# Patient Record
Sex: FEMALE | Race: WHITE | NOT HISPANIC OR LATINO | Employment: OTHER | URBAN - METROPOLITAN AREA
[De-identification: names, ages, dates, MRNs, and addresses within clinical notes are randomized per-mention and may not be internally consistent; named-entity substitution may affect disease eponyms.]

---

## 2018-05-30 ENCOUNTER — HOSPITAL ENCOUNTER (INPATIENT)
Facility: HOSPITAL | Age: 78
LOS: 6 days | Discharge: HOME | End: 2018-06-06
Attending: INTERNAL MEDICINE | Admitting: INTERNAL MEDICINE

## 2018-05-30 ENCOUNTER — APPOINTMENT (OUTPATIENT)
Dept: GENERAL RADIOLOGY | Facility: HOSPITAL | Age: 78
End: 2018-05-30

## 2018-05-30 PROBLEM — Z85.3 HISTORY OF BREAST CANCER: Status: ACTIVE | Noted: 2018-05-30

## 2018-05-30 PROBLEM — D64.9 NORMOCYTIC ANEMIA: Status: ACTIVE | Noted: 2018-05-30

## 2018-05-30 PROBLEM — E87.20 LACTIC ACIDOSIS: Status: ACTIVE | Noted: 2018-05-30

## 2018-05-30 PROBLEM — F03.90 DEMENTIA: Status: ACTIVE | Noted: 2018-05-30

## 2018-05-30 PROBLEM — N17.9 ACUTE RENAL FAILURE: Status: ACTIVE | Noted: 2018-05-30

## 2018-05-30 PROBLEM — I10 ESSENTIAL HYPERTENSION: Status: ACTIVE | Noted: 2018-05-30

## 2018-05-30 LAB
ALBUMIN SERPL-MCNC: 3.4 G/DL (ref 3.2–4.8)
ALBUMIN/GLOB SERPL: 1.4 G/DL (ref 1.5–2.5)
ALP SERPL-CCNC: 46 U/L (ref 25–100)
ALT SERPL W P-5'-P-CCNC: 9 U/L (ref 7–40)
ANION GAP SERPL CALCULATED.3IONS-SCNC: 12 MMOL/L (ref 3–11)
APTT PPP: 25.8 SECONDS (ref 24–31)
AST SERPL-CCNC: 12 U/L (ref 0–33)
BILIRUB SERPL-MCNC: 0.5 MG/DL (ref 0.3–1.2)
BUN BLD-MCNC: 104 MG/DL (ref 9–23)
BUN/CREAT SERPL: 22.6 (ref 7–25)
CALCIUM SPEC-SCNC: 8.3 MG/DL (ref 8.7–10.4)
CHLORIDE SERPL-SCNC: 111 MMOL/L (ref 99–109)
CO2 SERPL-SCNC: 14 MMOL/L (ref 20–31)
CREAT BLD-MCNC: 4.6 MG/DL (ref 0.6–1.3)
D-LACTATE SERPL-SCNC: 0.9 MMOL/L (ref 0.5–2)
DEPRECATED RDW RBC AUTO: 42.3 FL (ref 37–54)
ERYTHROCYTE [DISTWIDTH] IN BLOOD BY AUTOMATED COUNT: 13.7 % (ref 11.3–14.5)
GFR SERPL CREATININE-BSD FRML MDRD: 9 ML/MIN/1.73
GLOBULIN UR ELPH-MCNC: 2.4 GM/DL
GLUCOSE BLD-MCNC: 93 MG/DL (ref 70–100)
HCT VFR BLD AUTO: 30 % (ref 34.5–44)
HGB BLD-MCNC: 10.4 G/DL (ref 11.5–15.5)
INR PPP: 1.02 (ref 0.91–1.09)
MCH RBC QN AUTO: 29.5 PG (ref 27–31)
MCHC RBC AUTO-ENTMCNC: 34.7 G/DL (ref 32–36)
MCV RBC AUTO: 85.2 FL (ref 80–99)
PHOSPHATE SERPL-MCNC: 5.8 MG/DL (ref 2.4–5.1)
PLATELET # BLD AUTO: 173 10*3/MM3 (ref 150–450)
PMV BLD AUTO: 11.1 FL (ref 6–12)
POTASSIUM BLD-SCNC: 3.8 MMOL/L (ref 3.5–5.5)
PROT SERPL-MCNC: 5.8 G/DL (ref 5.7–8.2)
PROTHROMBIN TIME: 10.7 SECONDS (ref 9.6–11.5)
RBC # BLD AUTO: 3.52 10*6/MM3 (ref 3.89–5.14)
SODIUM BLD-SCNC: 137 MMOL/L (ref 132–146)
WBC NRBC COR # BLD: 7.51 10*3/MM3 (ref 3.5–10.8)

## 2018-05-30 PROCEDURE — 25010000002 HEPARIN (PORCINE) PER 1000 UNITS: Performed by: NURSE PRACTITIONER

## 2018-05-30 PROCEDURE — 85610 PROTHROMBIN TIME: CPT | Performed by: INTERNAL MEDICINE

## 2018-05-30 PROCEDURE — 63710000001 DONEPEZIL 10 MG TABLET: Performed by: NURSE PRACTITIONER

## 2018-05-30 PROCEDURE — A9270 NON-COVERED ITEM OR SERVICE: HCPCS | Performed by: NURSE PRACTITIONER

## 2018-05-30 PROCEDURE — 84100 ASSAY OF PHOSPHORUS: CPT | Performed by: INTERNAL MEDICINE

## 2018-05-30 PROCEDURE — 80053 COMPREHEN METABOLIC PANEL: CPT | Performed by: INTERNAL MEDICINE

## 2018-05-30 PROCEDURE — 85730 THROMBOPLASTIN TIME PARTIAL: CPT | Performed by: INTERNAL MEDICINE

## 2018-05-30 PROCEDURE — 71045 X-RAY EXAM CHEST 1 VIEW: CPT

## 2018-05-30 PROCEDURE — 83605 ASSAY OF LACTIC ACID: CPT | Performed by: NURSE PRACTITIONER

## 2018-05-30 PROCEDURE — 99223 1ST HOSP IP/OBS HIGH 75: CPT | Performed by: NURSE PRACTITIONER

## 2018-05-30 PROCEDURE — 85027 COMPLETE CBC AUTOMATED: CPT | Performed by: INTERNAL MEDICINE

## 2018-05-30 RX ORDER — HEPARIN SODIUM 5000 [USP'U]/ML
5000 INJECTION, SOLUTION INTRAVENOUS; SUBCUTANEOUS EVERY 12 HOURS SCHEDULED
Status: DISCONTINUED | OUTPATIENT
Start: 2018-05-30 | End: 2018-06-06 | Stop reason: HOSPADM

## 2018-05-30 RX ORDER — ACETAMINOPHEN 325 MG/1
650 TABLET ORAL EVERY 4 HOURS PRN
Status: DISCONTINUED | OUTPATIENT
Start: 2018-05-30 | End: 2018-06-06 | Stop reason: HOSPADM

## 2018-05-30 RX ORDER — DONEPEZIL HYDROCHLORIDE 10 MG/1
10 TABLET, FILM COATED ORAL NIGHTLY
Status: DISCONTINUED | OUTPATIENT
Start: 2018-05-30 | End: 2018-06-06 | Stop reason: HOSPADM

## 2018-05-30 RX ORDER — ASPIRIN 81 MG/1
81 TABLET ORAL DAILY
Status: DISCONTINUED | OUTPATIENT
Start: 2018-05-31 | End: 2018-06-06 | Stop reason: HOSPADM

## 2018-05-30 RX ORDER — SODIUM CHLORIDE 9 MG/ML
125 INJECTION, SOLUTION INTRAVENOUS CONTINUOUS
Status: DISCONTINUED | OUTPATIENT
Start: 2018-05-30 | End: 2018-06-05

## 2018-05-30 RX ADMIN — DONEPEZIL HYDROCHLORIDE 10 MG: 10 TABLET, FILM COATED ORAL at 21:49

## 2018-05-30 RX ADMIN — SODIUM CHLORIDE 100 ML/HR: 9 INJECTION, SOLUTION INTRAVENOUS at 21:49

## 2018-05-30 RX ADMIN — HEPARIN SODIUM 5000 UNITS: 5000 INJECTION, SOLUTION INTRAVENOUS; SUBCUTANEOUS at 21:49

## 2018-05-31 ENCOUNTER — APPOINTMENT (OUTPATIENT)
Dept: ULTRASOUND IMAGING | Facility: HOSPITAL | Age: 78
End: 2018-05-31

## 2018-05-31 PROBLEM — K52.9 GASTROENTERITIS: Status: ACTIVE | Noted: 2018-05-31

## 2018-05-31 PROBLEM — E07.9 THYROID MASS: Status: ACTIVE | Noted: 2018-05-31

## 2018-05-31 PROBLEM — N17.9 AKI (ACUTE KIDNEY INJURY): Status: ACTIVE | Noted: 2018-05-31

## 2018-05-31 LAB
ALBUMIN SERPL-MCNC: 3.2 G/DL (ref 3.2–4.8)
ALBUMIN/GLOB SERPL: 1.3 G/DL (ref 1.5–2.5)
ALP SERPL-CCNC: 43 U/L (ref 25–100)
ALT SERPL W P-5'-P-CCNC: 9 U/L (ref 7–40)
AMORPH URATE CRY URNS QL MICRO: NORMAL /HPF
ANION GAP SERPL CALCULATED.3IONS-SCNC: 12 MMOL/L (ref 3–11)
AST SERPL-CCNC: 9 U/L (ref 0–33)
BACTERIA UR QL AUTO: NORMAL /HPF
BASOPHILS # BLD AUTO: 0.02 10*3/MM3 (ref 0–0.2)
BASOPHILS NFR BLD AUTO: 0.3 % (ref 0–1)
BILIRUB SERPL-MCNC: 0.5 MG/DL (ref 0.3–1.2)
BILIRUB UR QL STRIP: NEGATIVE
BUN BLD-MCNC: 101 MG/DL (ref 9–23)
BUN/CREAT SERPL: 25.3 (ref 7–25)
CA-I SERPL ISE-MCNC: 1.23 MMOL/L (ref 1.12–1.32)
CALCIUM SPEC-SCNC: 8.1 MG/DL (ref 8.7–10.4)
CHLORIDE SERPL-SCNC: 114 MMOL/L (ref 99–109)
CLARITY UR: CLEAR
CO2 SERPL-SCNC: 12 MMOL/L (ref 20–31)
COLOR UR: YELLOW
CREAT BLD-MCNC: 4 MG/DL (ref 0.6–1.3)
CREAT UR-MCNC: 102.6 MG/DL
DEPRECATED RDW RBC AUTO: 42.2 FL (ref 37–54)
EOSINOPHIL # BLD AUTO: 0.15 10*3/MM3 (ref 0–0.3)
EOSINOPHIL NFR BLD AUTO: 2.1 % (ref 0–3)
EOSINOPHIL SPEC QL MICRO: 0 % EOS/100 CELLS (ref 0–0)
ERYTHROCYTE [DISTWIDTH] IN BLOOD BY AUTOMATED COUNT: 13.8 % (ref 11.3–14.5)
FERRITIN SERPL-MCNC: 400 NG/ML (ref 10–291)
FOLATE SERPL-MCNC: 6.14 NG/ML (ref 3.2–20)
GFR SERPL CREATININE-BSD FRML MDRD: 11 ML/MIN/1.73
GLOBULIN UR ELPH-MCNC: 2.4 GM/DL
GLUCOSE BLD-MCNC: 99 MG/DL (ref 70–100)
GLUCOSE UR STRIP-MCNC: NEGATIVE MG/DL
HCT VFR BLD AUTO: 29.7 % (ref 34.5–44)
HGB BLD-MCNC: 10.2 G/DL (ref 11.5–15.5)
HGB UR QL STRIP.AUTO: ABNORMAL
HYALINE CASTS UR QL AUTO: NORMAL /LPF
IMM GRANULOCYTES # BLD: 0.02 10*3/MM3 (ref 0–0.03)
IMM GRANULOCYTES NFR BLD: 0.3 % (ref 0–0.6)
IRON 24H UR-MRATE: 62 MCG/DL (ref 50–175)
IRON SATN MFR SERPL: 30 % (ref 15–50)
KETONES UR QL STRIP: NEGATIVE
LEUKOCYTE ESTERASE UR QL STRIP.AUTO: NEGATIVE
LYMPHOCYTES # BLD AUTO: 1.07 10*3/MM3 (ref 0.6–4.8)
LYMPHOCYTES NFR BLD AUTO: 14.8 % (ref 24–44)
MCH RBC QN AUTO: 29.2 PG (ref 27–31)
MCHC RBC AUTO-ENTMCNC: 34.3 G/DL (ref 32–36)
MCV RBC AUTO: 85.1 FL (ref 80–99)
MONOCYTES # BLD AUTO: 0.6 10*3/MM3 (ref 0–1)
MONOCYTES NFR BLD AUTO: 8.3 % (ref 0–12)
NEUTROPHILS # BLD AUTO: 5.39 10*3/MM3 (ref 1.5–8.3)
NEUTROPHILS NFR BLD AUTO: 74.5 % (ref 41–71)
NITRITE UR QL STRIP: NEGATIVE
PH UR STRIP.AUTO: <=5 [PH] (ref 5–8)
PLATELET # BLD AUTO: 188 10*3/MM3 (ref 150–450)
PMV BLD AUTO: 12 FL (ref 6–12)
POTASSIUM BLD-SCNC: 3.7 MMOL/L (ref 3.5–5.5)
PROT SERPL-MCNC: 5.6 G/DL (ref 5.7–8.2)
PROT UR QL STRIP: NEGATIVE
RBC # BLD AUTO: 3.49 10*6/MM3 (ref 3.89–5.14)
RBC # UR: NORMAL /HPF
REF LAB TEST METHOD: NORMAL
RETICS/RBC NFR AUTO: 1.33 % (ref 0.5–1.5)
SODIUM BLD-SCNC: 138 MMOL/L (ref 132–146)
SODIUM UR-SCNC: <10 MMOL/L (ref 30–90)
SP GR UR STRIP: 1.01 (ref 1–1.03)
SQUAMOUS #/AREA URNS HPF: NORMAL /HPF
TIBC SERPL-MCNC: 210 MCG/DL (ref 250–450)
TSH SERPL DL<=0.05 MIU/L-ACNC: 1.27 MIU/ML (ref 0.35–5.35)
UROBILINOGEN UR QL STRIP: ABNORMAL
VIT B12 BLD-MCNC: 353 PG/ML (ref 211–911)
WBC NRBC COR # BLD: 7.23 10*3/MM3 (ref 3.5–10.8)
WBC UR QL AUTO: NORMAL /HPF

## 2018-05-31 PROCEDURE — 83540 ASSAY OF IRON: CPT | Performed by: NURSE PRACTITIONER

## 2018-05-31 PROCEDURE — 85025 COMPLETE CBC W/AUTO DIFF WBC: CPT | Performed by: NURSE PRACTITIONER

## 2018-05-31 PROCEDURE — 83550 IRON BINDING TEST: CPT | Performed by: NURSE PRACTITIONER

## 2018-05-31 PROCEDURE — 76775 US EXAM ABDO BACK WALL LIM: CPT

## 2018-05-31 PROCEDURE — 87205 SMEAR GRAM STAIN: CPT | Performed by: INTERNAL MEDICINE

## 2018-05-31 PROCEDURE — 99233 SBSQ HOSP IP/OBS HIGH 50: CPT | Performed by: FAMILY MEDICINE

## 2018-05-31 PROCEDURE — 82607 VITAMIN B-12: CPT | Performed by: NURSE PRACTITIONER

## 2018-05-31 PROCEDURE — 84443 ASSAY THYROID STIM HORMONE: CPT | Performed by: NURSE PRACTITIONER

## 2018-05-31 PROCEDURE — 81001 URINALYSIS AUTO W/SCOPE: CPT | Performed by: INTERNAL MEDICINE

## 2018-05-31 PROCEDURE — 85045 AUTOMATED RETICULOCYTE COUNT: CPT | Performed by: NURSE PRACTITIONER

## 2018-05-31 PROCEDURE — 25010000002 HEPARIN (PORCINE) PER 1000 UNITS: Performed by: NURSE PRACTITIONER

## 2018-05-31 PROCEDURE — 82728 ASSAY OF FERRITIN: CPT | Performed by: NURSE PRACTITIONER

## 2018-05-31 PROCEDURE — 84165 PROTEIN E-PHORESIS SERUM: CPT | Performed by: NURSE PRACTITIONER

## 2018-05-31 PROCEDURE — 76536 US EXAM OF HEAD AND NECK: CPT

## 2018-05-31 PROCEDURE — 25010000002 ONDANSETRON PER 1 MG: Performed by: NURSE PRACTITIONER

## 2018-05-31 PROCEDURE — 82746 ASSAY OF FOLIC ACID SERUM: CPT | Performed by: NURSE PRACTITIONER

## 2018-05-31 PROCEDURE — 82330 ASSAY OF CALCIUM: CPT | Performed by: NURSE PRACTITIONER

## 2018-05-31 PROCEDURE — 80053 COMPREHEN METABOLIC PANEL: CPT | Performed by: NURSE PRACTITIONER

## 2018-05-31 PROCEDURE — 84300 ASSAY OF URINE SODIUM: CPT | Performed by: INTERNAL MEDICINE

## 2018-05-31 PROCEDURE — P9612 CATHETERIZE FOR URINE SPEC: HCPCS

## 2018-05-31 PROCEDURE — 82570 ASSAY OF URINE CREATININE: CPT | Performed by: INTERNAL MEDICINE

## 2018-05-31 RX ORDER — ONDANSETRON 2 MG/ML
4 INJECTION INTRAMUSCULAR; INTRAVENOUS EVERY 6 HOURS PRN
Status: DISCONTINUED | OUTPATIENT
Start: 2018-05-31 | End: 2018-06-03

## 2018-05-31 RX ADMIN — ASPIRIN 81 MG: 81 TABLET, COATED ORAL at 09:08

## 2018-05-31 RX ADMIN — DONEPEZIL HYDROCHLORIDE 10 MG: 10 TABLET, FILM COATED ORAL at 23:33

## 2018-05-31 RX ADMIN — SODIUM CHLORIDE 125 ML/HR: 9 INJECTION, SOLUTION INTRAVENOUS at 19:28

## 2018-05-31 RX ADMIN — ONDANSETRON 4 MG: 2 INJECTION, SOLUTION INTRAMUSCULAR; INTRAVENOUS at 15:28

## 2018-05-31 RX ADMIN — ONDANSETRON 4 MG: 2 INJECTION, SOLUTION INTRAMUSCULAR; INTRAVENOUS at 02:00

## 2018-05-31 RX ADMIN — SODIUM CHLORIDE 500 ML: 9 INJECTION, SOLUTION INTRAVENOUS at 02:00

## 2018-05-31 RX ADMIN — HEPARIN SODIUM 5000 UNITS: 5000 INJECTION, SOLUTION INTRAVENOUS; SUBCUTANEOUS at 22:00

## 2018-05-31 RX ADMIN — SODIUM CHLORIDE 125 ML/HR: 9 INJECTION, SOLUTION INTRAVENOUS at 11:55

## 2018-05-31 RX ADMIN — HEPARIN SODIUM 5000 UNITS: 5000 INJECTION, SOLUTION INTRAVENOUS; SUBCUTANEOUS at 09:08

## 2018-06-01 LAB
ALBUMIN SERPL-MCNC: 2.9 G/DL (ref 2.9–4.4)
ALBUMIN SERPL-MCNC: 3 G/DL (ref 3.2–4.8)
ALBUMIN/GLOB SERPL: 1.3 {RATIO} (ref 0.7–1.7)
ALPHA1 GLOB FLD ELPH-MCNC: 0.2 G/DL (ref 0–0.4)
ALPHA2 GLOB SERPL ELPH-MCNC: 0.6 G/DL (ref 0.4–1)
ANION GAP SERPL CALCULATED.3IONS-SCNC: 6 MMOL/L (ref 3–11)
B-GLOBULIN SERPL ELPH-MCNC: 0.7 G/DL (ref 0.7–1.3)
BASOPHILS # BLD AUTO: 0.02 10*3/MM3 (ref 0–0.2)
BASOPHILS NFR BLD AUTO: 0.3 % (ref 0–1)
BUN BLD-MCNC: 76 MG/DL (ref 9–23)
BUN/CREAT SERPL: 27.1 (ref 7–25)
CALCIUM SPEC-SCNC: 8.1 MG/DL (ref 8.7–10.4)
CHLORIDE SERPL-SCNC: 120 MMOL/L (ref 99–109)
CO2 SERPL-SCNC: 16 MMOL/L (ref 20–31)
CREAT BLD-MCNC: 2.8 MG/DL (ref 0.6–1.3)
CREAT UR-MCNC: 74.7 MG/DL
DEPRECATED RDW RBC AUTO: 43.6 FL (ref 37–54)
EOSINOPHIL # BLD AUTO: 0.12 10*3/MM3 (ref 0–0.3)
EOSINOPHIL NFR BLD AUTO: 1.6 % (ref 0–3)
ERYTHROCYTE [DISTWIDTH] IN BLOOD BY AUTOMATED COUNT: 14 % (ref 11.3–14.5)
GAMMA GLOB SERPL ELPH-MCNC: 0.9 G/DL (ref 0.4–1.8)
GFR SERPL CREATININE-BSD FRML MDRD: 16 ML/MIN/1.73
GLOBULIN SER CALC-MCNC: 2.3 G/DL (ref 2.2–3.9)
GLUCOSE BLD-MCNC: 101 MG/DL (ref 70–100)
HCT VFR BLD AUTO: 28.1 % (ref 34.5–44)
HGB BLD-MCNC: 9.7 G/DL (ref 11.5–15.5)
IMM GRANULOCYTES # BLD: 0.03 10*3/MM3 (ref 0–0.03)
IMM GRANULOCYTES NFR BLD: 0.4 % (ref 0–0.6)
LYMPHOCYTES # BLD AUTO: 0.99 10*3/MM3 (ref 0.6–4.8)
LYMPHOCYTES NFR BLD AUTO: 12.9 % (ref 24–44)
Lab: ABNORMAL
M-SPIKE: ABNORMAL G/DL
MCH RBC QN AUTO: 29.6 PG (ref 27–31)
MCHC RBC AUTO-ENTMCNC: 34.5 G/DL (ref 32–36)
MCV RBC AUTO: 85.7 FL (ref 80–99)
MONOCYTES # BLD AUTO: 0.52 10*3/MM3 (ref 0–1)
MONOCYTES NFR BLD AUTO: 6.8 % (ref 0–12)
NEUTROPHILS # BLD AUTO: 6 10*3/MM3 (ref 1.5–8.3)
NEUTROPHILS NFR BLD AUTO: 78.4 % (ref 41–71)
PHOSPHATE SERPL-MCNC: 4.5 MG/DL (ref 2.4–5.1)
PLATELET # BLD AUTO: 170 10*3/MM3 (ref 150–450)
PMV BLD AUTO: 11.4 FL (ref 6–12)
POTASSIUM BLD-SCNC: 3.6 MMOL/L (ref 3.5–5.5)
PROT PATTERN SERPL ELPH-IMP: ABNORMAL
PROT SERPL-MCNC: 5.2 G/DL (ref 6–8.5)
PROT UR-MCNC: 13 MG/DL (ref 1–14)
RBC # BLD AUTO: 3.28 10*6/MM3 (ref 3.89–5.14)
SODIUM BLD-SCNC: 142 MMOL/L (ref 132–146)
WBC NRBC COR # BLD: 7.65 10*3/MM3 (ref 3.5–10.8)

## 2018-06-01 PROCEDURE — 82570 ASSAY OF URINE CREATININE: CPT | Performed by: INTERNAL MEDICINE

## 2018-06-01 PROCEDURE — 80069 RENAL FUNCTION PANEL: CPT | Performed by: FAMILY MEDICINE

## 2018-06-01 PROCEDURE — 99232 SBSQ HOSP IP/OBS MODERATE 35: CPT | Performed by: FAMILY MEDICINE

## 2018-06-01 PROCEDURE — 84156 ASSAY OF PROTEIN URINE: CPT | Performed by: INTERNAL MEDICINE

## 2018-06-01 PROCEDURE — 85025 COMPLETE CBC W/AUTO DIFF WBC: CPT | Performed by: INTERNAL MEDICINE

## 2018-06-01 PROCEDURE — 25010000002 ONDANSETRON PER 1 MG: Performed by: NURSE PRACTITIONER

## 2018-06-01 PROCEDURE — 25010000002 HEPARIN (PORCINE) PER 1000 UNITS: Performed by: NURSE PRACTITIONER

## 2018-06-01 RX ADMIN — SODIUM CHLORIDE 125 ML/HR: 9 INJECTION, SOLUTION INTRAVENOUS at 18:46

## 2018-06-01 RX ADMIN — ONDANSETRON 4 MG: 2 INJECTION, SOLUTION INTRAMUSCULAR; INTRAVENOUS at 03:46

## 2018-06-01 RX ADMIN — ASPIRIN 81 MG: 81 TABLET, COATED ORAL at 09:02

## 2018-06-01 RX ADMIN — HEPARIN SODIUM 5000 UNITS: 5000 INJECTION, SOLUTION INTRAVENOUS; SUBCUTANEOUS at 09:02

## 2018-06-01 RX ADMIN — HEPARIN SODIUM 5000 UNITS: 5000 INJECTION, SOLUTION INTRAVENOUS; SUBCUTANEOUS at 20:20

## 2018-06-01 RX ADMIN — ONDANSETRON 4 MG: 2 INJECTION, SOLUTION INTRAMUSCULAR; INTRAVENOUS at 20:20

## 2018-06-01 RX ADMIN — DONEPEZIL HYDROCHLORIDE 10 MG: 10 TABLET, FILM COATED ORAL at 20:21

## 2018-06-01 NOTE — PROGRESS NOTES
"    [facility] Medicine Services  PROGRESS NOTE    Patient Name: Shantelle Decker  : 1940  MRN: [id number]    Date of Admission: 2018  Length of Stay: 1  Primary Care Physician: Raghavendra Mei MD    Subjective   Subjective     CC:  ARF    HPI:  C/o N/V few episodes overnight, \"something I ate\".  Denies chronic problems with N/V.  Today feels fine, up to chair, drinking soup without problems.      Review of Systems  Otherwise ROS is negative except as mentioned in the HPI.    Objective   Objective     Vital Signs:   Temp:  [97 °F (36.1 °C)-98.4 °F (36.9 °C)] 98.4 °F (36.9 °C)  Heart Rate:  [59-85] 64  Resp:  [15-16] 15  BP: ()/(46-68) 91/54        Physical Exam:  Constitutional: No acute distress, awake, alert, nontoxic, mildly obese body habitus  Respiratory: Clear to auscultation bilaterally, good effort, nonlabored respirations   Cardiovascular: RRR, no murmur  Gastrointestinal: Positive bowel sounds, soft, nontender, nondistended  Musculoskeletal: No peripheral edema, normal muscle tone for age  Psychiatric: Appropriate affect, limited insight and judgement, cooperative      Results Reviewed:  I have personally reviewed current lab, radiology, and data and agree.      Results from last 7 days  Lab Units 18  0742 18   WBC 10*3/mm3 7.65 7.23 7.51   HEMOGLOBIN g/dL 9.7* 10.2* 10.4*   HEMATOCRIT % 28.1* 29.7* 30.0*   PLATELETS 10*3/mm3 170 188 173   INR   --   --  1.02       Results from last 7 days  Lab Units 18  0742 18   SODIUM mmol/L 142 138 137   POTASSIUM mmol/L 3.6 3.7 3.8   CHLORIDE mmol/L 120* 114* 111*   CO2 mmol/L 16.0* 12.0* 14.0*   BUN mg/dL 76* 101* 104*   CREATININE mg/dL 2.80* 4.00* 4.60*   GLUCOSE mg/dL 101* 99 93   CALCIUM mg/dL 8.1* 8.1* 8.3*   ALT (SGPT) U/L  --  9 9   AST (SGOT) U/L  --  9 12     Estimated Creatinine Clearance: 16.4 mL/min (A) (by C-G formula based on SCr of 2.8 mg/dL " (H)).  No results found for: BNP  No results found for: PHART    Microbiology Results Abnormal     Procedure Component Value - Date/Time    Eosinophil Smear - Urine, Urine, Clean Catch [[id number]]  (Normal) Collected:  05/31/18 0155    Lab Status:  Final result Specimen:  Urine from Urine, Catheter In/Out Updated:  05/31/18 0309     Eosinophil Smear 0 % EOS/100 Cells     Narrative:       No eosinophil seen          Imaging Results (last 24 hours)     Procedure Component Value Units Date/Time    US Thyroid [[id number]] Collected:  05/31/18 1323     Updated:  05/31/18 1323    Narrative:       EXAMINATION: US THYROID- 05/31/2018     INDICATION: bilateral thyroid masses on CT of chest; thyroid mass      TECHNIQUE: Sonographic imaging was obtained of the thyroid in both the  sagittal and transverse planes.     COMPARISON: NONE     FINDINGS: The isthmus is within normal limits or mildly thickened at 6  mm. No underlying mass or lesions identified.     The right lobe of the thyroid measures 4.5 x 2.2 x 3.3 cm. There is  enlargement of the right lobe of the thyroid the largest dominant nodule  measuring 3.8 x 2.1 x 2.9 cm. Minimal flow seen within the nodule.     The left lobe of the thyroid measures 4.0 x 1.8 x 1.2 cm. There is a  smaller nodule seen within the left lobe of the thyroid measuring 2.5 x  1.3 x 1.4 cm.       Impression:       Enlargement of the thyroid with dominant nodules identified  within the thyroid bilaterally. Continued follow-up in 6 months is  recommended for stability.      D:  05/31/2018  E:  05/31/2018       US Renal Limited [[id number]] Collected:  05/31/18 1315     Updated:  05/31/18 1315    Narrative:       EXAMINATION: US RENAL LIMITED- 05/31/2018     INDICATION: acute renal failure; renal insufficiency     TECHNIQUE: Sonographic imaging was obtained of the kidneys in both the  sagittal and transverse planes.     COMPARISON: NONE     FINDINGS: The right kidney measures in length from pole to pole  10.8 cm.  No solid cortical mass or renal cortical cysts seen within the right  kidney. No hydronephrosis or nephrolithiasis.     The left kidney measures in length from pole to pole 9.7 cm. No solid  cortical mass or renal cortical cysts seen within the left kidney. No  hydronephrosis or nephrolithiasis. The bladder is grossly unremarkable  in appearance.       Impression:       Unremarkable bilateral renal ultrasound.      D:  05/31/2018  E:  05/31/2018                I have reviewed the medications.    Assessment/Plan   Assessment / Plan     Hospital Problem List     * (Principal)Acute renal failure    Coronary artery disease without angina pectoris    Lactic acidosis    Normocytic anemia    Dementia    Essential hypertension    History of breast cancer    Gastroenteritis    Thyroid mass    LIANNA (acute kidney injury)             Brief Hospital Course to date:  Shantelle Decker is a 78 y.o. female with a past medical history significant for CAD, essential hypertension, breast cancer and dementia presented initially to [facility] due to abnormal creatinine level.  Patient was seen by PCP about a week ago due to weakness, nausea, vomiting, diarrhea and fatigue likey related to viral illness as other family was also sick at that time with same.  She had lab work drawn at PCP office, since then PCP had been trying to get a hold of the patient since last week due to creatinine level of 4 but was unsuccessful. On 5/30/18 patient presented back to PCP for follow up and patient was sent to the ED for her abnormal lab findings. Evaluation at [facility] included creatinine 5.8, CT chest abdomen and pelvis unremarkable except thyroid mass, elevated lactate at 2.2.  She was given fluids and transferred to [facility] for of renal consultation.    Assessment & Plan:    - continue IVF's as dehydration and ATN of ongoing ACEI use while dehydrated suspected -->  Creatinine responding nicely.   Will d/w Nephro but if creatinine continues to rapidly improve likely could be d/c appropriate in next 24-48hr and remain off ACEI.  - Nephrology consult follows  - am renal function panel  - U/S thyroid with enlarged nodules and recc'd repeat imaging 6 months, TSH normal    DVT Prophylaxis:  SQ hep    CODE STATUS: Full Code    Disposition: I expect the patient to be discharged possible tomorrow pending her creatinine level.      Electronically signed by Nancy Jamil MD, 06/01/18, 10:26 AM.

## 2018-06-01 NOTE — PLAN OF CARE
Problem: Patient Care Overview  Goal: Plan of Care Review  Outcome: Ongoing (interventions implemented as appropriate)   06/01/18 1647   Coping/Psychosocial   Plan of Care Reviewed With patient;spouse   OTHER   Outcome Summary Diarrhea not noted, no vomitting. IVF maintained. Patient is cooperative.   Plan of Care Review   Progress improving     Goal: Individualization and Mutuality  Outcome: Ongoing (interventions implemented as appropriate)   06/01/18 1647   Individualization   Patient Specific Goals (Include Timeframe) Improve nutritional and fluid intake.     Goal: Discharge Needs Assessment  Outcome: Ongoing (interventions implemented as appropriate)   06/01/18 1647   Discharge Needs Assessment   Equipment Needed After Discharge walker, rolling

## 2018-06-01 NOTE — PROGRESS NOTES
Continued Stay Note  [facility]     Patient Name: Shantelle Decker  MRN: [id number]  Today's Date: 6/1/2018    Admit Date: 5/30/2018          Discharge Plan     Row Name 06/01/18 1300       Plan    Plan Return home with spouse    Patient/Family in Agreement with Plan yes    Plan Comments Met with patient and  at bedside. They are hoping patient will be discharged Sunday. PT consult still pending, but  states patient has been up walking with the nurse and back and forth to the bathroom. Currently sitting in chair eating lunch. They deny need for home health or inpatient rehab at this time. CM will continue to follow. Gaby North RN [phone]    Final Discharge Disposition Code 01 - home or self-care              Discharge Codes    No documentation.       Expected Discharge Date and Time     Expected Discharge Date Expected Discharge Time    Jun 2, 2018             Gaby North RN

## 2018-06-01 NOTE — PROGRESS NOTES
"   LOS: 1 day    Patient Care Team:  Raghavendra Mei MD as PCP - General (Family Medicine)    Reason For Visit:  F/U LIANNA  Subjective           Review of Systems:    Pulm: No soa   CV:  No CP      Objective       aspirin 81 mg Oral Daily   donepezil 10 mg Oral Nightly   heparin (porcine) 5,000 Units Subcutaneous Q12H       sodium chloride 125 mL/hr Last Rate: 125 mL/hr (05/31/18 1928)         Vital Signs:  Blood pressure 95/53, pulse 65, temperature 97.7 °F (36.5 °C), temperature source Oral, resp. rate 14, height 165.1 cm (65\"), weight 77 kg (169 lb 12.1 oz), SpO2 100 %.    Flowsheet Rows      First Filed Value   Admission Height  165.1 cm (65\") Documented at 05/31/2018 1810   Admission Weight  77 kg (169 lb 12.1 oz) Documented at 05/31/2018 1810 05/31 0701 - 06/01 0700  In: 1920 [P.O.:120; I.V.:1800]  Out: 350 [Urine:350]    Physical Exam:    General Appearance: NAD, alert and cooperative, Ox3  Eyes: PER, conjunctivae and sclerae normal, no icterus  Lungs: respirations regular and unlabored, no crepitus, clear to auscultation  Heart/CV: regular rhythm & normal rate, no murmur, no gallop, no rub and no edema  Abdomen: not distended, soft, non-tender, no masses,  bowel sounds present  Skin: No rash, Warm and dry    Radiology:            Labs: P/C RATIO 174.    Results from last 7 days  Lab Units 06/01/18  0742 05/31/18 0450 05/30/18 2029   WBC 10*3/mm3 7.65 7.23 7.51   HEMOGLOBIN g/dL 9.7* 10.2* 10.4*   HEMATOCRIT % 28.1* 29.7* 30.0*   PLATELETS 10*3/mm3 170 188 173       Results from last 7 days  Lab Units 06/01/18  0742 05/31/18 0450 05/30/18 2029   SODIUM mmol/L 142 138 137   POTASSIUM mmol/L 3.6 3.7 3.8   CHLORIDE mmol/L 120* 114* 111*   CO2 mmol/L 16.0* 12.0* 14.0*   BUN mg/dL 76* 101* 104*   CREATININE mg/dL 2.80* 4.00* 4.60*   CALCIUM mg/dL 8.1* 8.1* 8.3*   PHOSPHORUS mg/dL 4.5  --  5.8*   ALBUMIN g/dL 3.00* 3.20 3.40       Results from last 7 days  Lab Units 06/01/18  0742   GLUCOSE mg/dL 101* "         Results from last 7 days  Lab Units 05/31/18  0450   ALK PHOS U/L 43   BILIRUBIN mg/dL 0.5   ALT (SGPT) U/L 9   AST (SGOT) U/L 9             Results from last 7 days  Lab Units 05/31/18  0155   COLOR UA  Yellow   CLARITY UA  Clear   PH, URINE  <=5.0   SPECIFIC GRAVITY, URINE  1.013   GLUCOSE UA  Negative   KETONES UA  Negative   BILIRUBIN UA  Negative   PROTEIN UA  Negative   BLOOD UA  Trace*   LEUKOCYTES UA  Negative   NITRITE UA  Negative       Estimated Creatinine Clearance: 16.4 mL/min (A) (by C-G formula based on SCr of 2.8 mg/dL (H)).      Assessment     Principal Problem:    Acute renal failure  Active Problems:    Coronary artery disease without angina pectoris    Lactic acidosis    Normocytic anemia    Dementia    Essential hypertension    History of breast cancer    Gastroenteritis    Thyroid mass    LIANNA (acute kidney injury)            Impression: LIANNA LIKELY FROM VOLUME DEPLETION AND ACE EFFECT. GFR IS IMPROVING.            Recommendations: PRESENT IVF'S. WOULD KEEP OFF ACE.      Curt Winkler MD  06/01/18  12:08 PM

## 2018-06-01 NOTE — PLAN OF CARE
Problem: Skin Injury Risk (Adult)  Goal: Identify Related Risk Factors and Signs and Symptoms  Outcome: Ongoing (interventions implemented as appropriate)   06/01/18 0334   Skin Injury Risk (Adult)   Related Risk Factors (Skin Injury Risk) advanced age;fluid intake inadequate;mobility impaired;nutritional deficiencies     Goal: Skin Health and Integrity  Outcome: Ongoing (interventions implemented as appropriate)   06/01/18 0334   Skin Injury Risk (Adult)   Skin Health and Integrity making progress toward outcome

## 2018-06-02 LAB
ALBUMIN SERPL-MCNC: 2.7 G/DL (ref 3.2–4.8)
ANION GAP SERPL CALCULATED.3IONS-SCNC: 7 MMOL/L (ref 3–11)
BUN BLD-MCNC: 59 MG/DL (ref 9–23)
BUN/CREAT SERPL: 25.7 (ref 7–25)
C DIFF TOX GENS STL QL NAA+PROBE: NOT DETECTED
CALCIUM SPEC-SCNC: 7.4 MG/DL (ref 8.7–10.4)
CHLORIDE SERPL-SCNC: 121 MMOL/L (ref 99–109)
CO2 SERPL-SCNC: 16 MMOL/L (ref 20–31)
CREAT BLD-MCNC: 2.3 MG/DL (ref 0.6–1.3)
GFR SERPL CREATININE-BSD FRML MDRD: 20 ML/MIN/1.73
GLUCOSE BLD-MCNC: 89 MG/DL (ref 70–100)
PHOSPHATE SERPL-MCNC: 3.5 MG/DL (ref 2.4–5.1)
POTASSIUM BLD-SCNC: 4.4 MMOL/L (ref 3.5–5.5)
SODIUM BLD-SCNC: 144 MMOL/L (ref 132–146)

## 2018-06-02 PROCEDURE — 80069 RENAL FUNCTION PANEL: CPT | Performed by: FAMILY MEDICINE

## 2018-06-02 PROCEDURE — 87493 C DIFF AMPLIFIED PROBE: CPT | Performed by: NURSE PRACTITIONER

## 2018-06-02 PROCEDURE — 99232 SBSQ HOSP IP/OBS MODERATE 35: CPT | Performed by: NURSE PRACTITIONER

## 2018-06-02 PROCEDURE — 97161 PT EVAL LOW COMPLEX 20 MIN: CPT

## 2018-06-02 PROCEDURE — 25010000002 ONDANSETRON PER 1 MG: Performed by: NURSE PRACTITIONER

## 2018-06-02 PROCEDURE — 25010000002 HEPARIN (PORCINE) PER 1000 UNITS: Performed by: NURSE PRACTITIONER

## 2018-06-02 PROCEDURE — 97116 GAIT TRAINING THERAPY: CPT

## 2018-06-02 RX ADMIN — HEPARIN SODIUM 5000 UNITS: 5000 INJECTION, SOLUTION INTRAVENOUS; SUBCUTANEOUS at 20:32

## 2018-06-02 RX ADMIN — HEPARIN SODIUM 5000 UNITS: 5000 INJECTION, SOLUTION INTRAVENOUS; SUBCUTANEOUS at 08:26

## 2018-06-02 RX ADMIN — SODIUM CHLORIDE 1000 ML: 9 INJECTION, SOLUTION INTRAVENOUS at 04:36

## 2018-06-02 RX ADMIN — ACETAMINOPHEN 650 MG: 325 TABLET ORAL at 20:32

## 2018-06-02 RX ADMIN — DONEPEZIL HYDROCHLORIDE 10 MG: 10 TABLET, FILM COATED ORAL at 20:32

## 2018-06-02 RX ADMIN — ASPIRIN 81 MG: 81 TABLET, COATED ORAL at 08:26

## 2018-06-02 RX ADMIN — ONDANSETRON 4 MG: 2 INJECTION, SOLUTION INTRAMUSCULAR; INTRAVENOUS at 14:04

## 2018-06-02 RX ADMIN — SODIUM CHLORIDE 125 ML/HR: 9 INJECTION, SOLUTION INTRAVENOUS at 08:26

## 2018-06-02 RX ADMIN — ONDANSETRON 4 MG: 2 INJECTION, SOLUTION INTRAMUSCULAR; INTRAVENOUS at 03:26

## 2018-06-02 RX ADMIN — ONDANSETRON 4 MG: 2 INJECTION, SOLUTION INTRAMUSCULAR; INTRAVENOUS at 20:41

## 2018-06-02 NOTE — PLAN OF CARE
Problem: Fall Risk (Adult)  Goal: Identify Related Risk Factors and Signs and Symptoms  Outcome: Ongoing (interventions implemented as appropriate)    Goal: Absence of Fall  Outcome: Ongoing (interventions implemented as appropriate)      Problem: Skin Injury Risk (Adult)  Goal: Identify Related Risk Factors and Signs and Symptoms  Outcome: Ongoing (interventions implemented as appropriate)    Goal: Skin Health and Integrity  Outcome: Ongoing (interventions implemented as appropriate)      Problem: Patient Care Overview  Goal: Plan of Care Review  Outcome: Ongoing (interventions implemented as appropriate)   06/02/18 0227   Coping/Psychosocial   Plan of Care Reviewed With patient;spouse   OTHER   Outcome Summary no emesis, no vomiting, minimal c/o nausea, no c/o pain, vss, rested well, pt hopeful that she will be home tomorrow   Plan of Care Review   Progress improving     Goal: Individualization and Mutuality  Outcome: Ongoing (interventions implemented as appropriate)    Goal: Discharge Needs Assessment  Outcome: Ongoing (interventions implemented as appropriate)

## 2018-06-02 NOTE — THERAPY EVALUATION
Acute Care - Physical Therapy Initial Evaluation   [place]     Patient Name: Shantelle Decker  : 1940  MRN: [id number]  Today's Date: 2018   Onset of Illness/Injury or Date of Surgery: 18  Date of Referral to PT: 18  Referring Physician: MD MELBA      Admit Date: 2018    Visit Dx: No diagnosis found.  Patient Active Problem List   Diagnosis   • Incarcerated ventral hernia   • Coronary artery disease without angina pectoris   • Morbid obesity   • Acute renal failure   • Lactic acidosis   • Normocytic anemia   • Dementia   • Essential hypertension   • History of breast cancer   • Gastroenteritis   • Thyroid mass   • LIANNA (acute kidney injury)     Past Medical History:   Diagnosis Date   • Breast cancer    • Coronary artery disease    • Dementia    • Dysrhythmia    • Hypertension      Past Surgical History:   Procedure Laterality Date   • ADENOIDECTOMY     • BREAST SURGERY     • CARDIAC DEFIBRILLATOR PLACEMENT     • EXPLORATORY LAPAROTOMY N/A 2016    Procedure: open ventral hernia repair with mesh;  Surgeon: Bright Buckley MD;  Location: [facility];  Service:    • MASTECTOMY Bilateral    • TONSILLECTOMY          PT ASSESSMENT (last 12 hours)      Physical Therapy Evaluation     Row Name 18 0835          PT Evaluation Time/Intention    Subjective Information no complaints  -CD     Document Type evaluation  -CD     Mode of Treatment physical therapy  -CD     Patient Effort good  -CD     Symptoms Noted During/After Treatment none  -CD     Row Name 18 0835          General Information    Patient Profile Reviewed? yes  -CD     Onset of Illness/Injury or Date of Surgery 18  -CD     Referring Physician MD MELBA  -CD     Patient Observations alert;cooperative;agree to therapy  -CD     Patient/Family Observations  PRESENT AT END OF EVAL.   -CD     General Observations of Patient PT SUPINE UPON ARRIVAL ON RA AND WITH IV.   -CD     Prior Level of Function  "independent:;all household mobility;gait;transfer;bed mobility;min assist:;dressing;bathing;mod assist:;home management  -CD     Equipment Currently Used at Home cane, straight;rollator;shower chair  -CD     Pertinent History of Current Functional Problem PT TO OSH WITH ABNORMAL CREATININE LEVELS AFTER PCP VISIT DUE TO WEAKNSS, N&V, DIARRHEA AND FATIGUE. CT CHEST SHOWED THYROID MASSESS,CT ABDOMIN SHOWED DIVERTICULOSIS. CHEST XRAY NEG. PT WAS TRANSFERRED TO [facility].   -CD     Existing Precautions/Restrictions fall   CONFUSION.   -CD     Equipment Issued to Patient --   LOANER WALKER AND GAIT BELT IN ROOM.   -CD     Risks Reviewed patient:;spouse/S.O.:;LOB;change in vital signs;lines disloged  -CD     Benefits Reviewed patient and family:;improve function;increase independence;increase strength;increase balance;increase knowledge  -CD     Barriers to Rehab none identified  -CD     Row Name 06/02/18 0835          Relationship/Environment    Lives With spouse  -CD     Family Caregiver if Needed spouse  -CD     Row Name 06/02/18 0835          Resource/Environmental Concerns    Current Living Arrangements home/apartment/condo  -CD     Row Name 06/02/18 0835          Cognitive Assessment/Intervention- PT/OT    Orientation Status (Cognition) oriented to;person   UNABLE TO PROVIDE HISTORY RE: EQUIPMENT/PLOF. \"I'M NOT SURE\"  -CD     Follows Commands (Cognition) over 90% accuracy  -CD     Safety Deficit (Cognitive) awareness of need for assistance;insight into deficits/self awareness  -CD     Row Name 06/02/18 0835          Safety Issues, Functional Mobility    Safety Issues Affecting Function (Mobility) insight into deficits/self awareness;awareness of need for assistance  -CD     Impairments Affecting Function (Mobility) balance;strength  -CD     Row Name 06/02/18 0835          Bed Mobility Assessment/Treatment    Bed Mobility Assessment/Treatment supine-sit  -CD     Supine-Sit [place] (Bed Mobility) contact guard  -CD     " Assistive Device (Bed Mobility) bed rails;head of bed elevated  -CD     Row Name 06/02/18 0835          Transfer Assessment/Treatment    Transfer Assessment/Treatment sit-stand transfer;stand-sit transfer  -CD     Comment (Transfers) CUES FOR HAND PLACEMENT.   -CD     Sit-Stand [place] (Transfers) verbal cues;contact guard  -CD     Stand-Sit [place] (Transfers) verbal cues;contact guard  -CD     Row Name 06/02/18 0835          Sit-Stand Transfer    Assistive Device (Sit-Stand Transfers) walker, front-wheeled  -CD     Row Name 06/02/18 0835          Stand-Sit Transfer    Assistive Device (Stand-Sit Transfers) walker, front-wheeled  -CD     Row Name 06/02/18 0835          Gait/Stairs Assessment/Training    Gait/Stairs Assessment/Training gait/ambulation independence;gait/ambulation assistive device;distance ambulated  -CD     [place] Level (Gait) contact guard  -CD     Assistive Device (Gait) walker, front-wheeled  -CD     Distance in Feet (Gait) 240   -CD     Deviations/Abnormal Patterns (Gait) stride length decreased;deyanira decreased  -CD     Bilateral Gait Deviations forward flexed posture  -CD     Row Name 06/02/18 0835          General ROM    GENERAL ROM COMMENTS B LE AROM WFL'S   -CD     Row Name 06/02/18 0835          General Assessment (Manual Muscle Testing)    General Manual Muscle Testing (MMT) Assessment lower extremity strength deficits identified   GROSSLY 4/5 B LE'S WITH FUNCTIONAL MOBILITY.   -CD     Row Name 06/02/18 0835          Pain Assessment    Additional Documentation Pain Scale: Numbers Pre/Post-Treatment (Group)  -CD     Row Name 06/02/18 0835          Pain Scale: Numbers Pre/Post-Treatment    Pain Scale: Numbers, Pretreatment 0/10 - no pain  -CD     Pain Scale: Numbers, Post-Treatment 0/10 - no pain  -CD     Row Name 06/02/18 0835          Plan of Care Review    Plan of Care Reviewed With patient;spouse  -CD     Row Name 06/02/18 0835          Physical Therapy Clinical  Impression    Date of Referral to PT 05/31/18  -CD     PT Diagnosis (PT Clinical Impression) IMPAIRED FUNCTIONAL MOBILITY.   -CD     Patient/Family Goals Statement (PT Clinical Impression) TO GO HOME.  [facility] AS PT IS CLOSE TO BASELINE AND HE IS ABLE TO ASSIST 24/7.   -CD     Criteria for Skilled Interventions Met (PT Clinical Impression) yes  -CD     Rehab Potential (PT Clinical Summary) good, to achieve stated therapy goals  -CD     Care Plan Review (PT) evaluation/treatment results reviewed;care plan/treatment goals reviewed;risks/benefits reviewed;current/potential barriers reviewed;patient/other agree to care plan  -CD     Care Plan Review, Other Participant (PT Clinical Impression) spouse  -CD     Row Name 06/02/18 0835          Vital Signs    Pre Systolic BP Rehab --   VSS. NSG CLEARED FOR TREATMENT.   -CD     Row Name 06/02/18 0835          Physical Therapy Goals    Bed Mobility Goal Selection (PT) bed mobility, PT goal 1  -CD     Transfer Goal Selection (PT) transfer, PT goal 1  -CD     Gait Training Goal Selection (PT) gait training, PT goal 1  -CD     Row Name 06/02/18 0835          Bed Mobility Goal 1 (PT)    Activity/Assistive Device (Bed Mobility Goal 1, PT) bed mobility activities, all  -CD     [place] Level/Cues Needed (Bed Mobility Goal 1, PT) independent  -CD     Time Frame (Bed Mobility Goal 1, PT) long term goal (LTG);2 weeks  -CD     Row Name 06/02/18 0835          Transfer Goal 1 (PT)    Activity/Assistive Device (Transfer Goal 1, PT) transfers, all;cane, straight  -CD     [place] Level/Cues Needed (Transfer Goal 1, PT) independent  -CD     Time Frame (Transfer Goal 1, PT) long term goal (LTG);2 weeks  -CD     Row Name 06/02/18 0835          Gait Training Goal 1 (PT)    Activity/Assistive Device (Gait Training Goal 1, PT) gait (walking locomotion);assistive device use;cane, straight  -CD     [place] Level (Gait Training Goal 1, PT) supervision required  -CD      Distance (Gait Goal 1, PT) 600  -CD     Time Frame (Gait Training Goal 1, PT) long term goal (LTG);2 weeks  -CD     Row Name 06/02/18 0835          Positioning and Restraints    Pre-Treatment Position in bed  -CD     Post Treatment Position chair  -CD     In Chair sitting;call light within reach;notified nsg;exit alarm on;with family/caregiver   WITH BREAKFAST TRAY.   -CD     Row Name 06/02/18 0835          Living Environment    Home Accessibility tub/shower is not walk in   NO STAIRS AT HOME.   -CD       User Key  (r) = Recorded By, (t) = Taken By, (c) = Cosigned By    Initials Name Provider Type    CD Franca Reveles, PT Physical Therapist          Physical Therapy Education     Title: PT OT SLP Therapies (Done)     Topic: Physical Therapy (Done)     Point: Mobility training (Done)    Learning Progress Summary     Learner Status Readiness Method Response Comment Documented by    Patient Done Acceptance E VU,NR BENEFITS OF OOB ACTIVITY, SAFETY WITH MOBILITY, PROGRESSION OF POC, D/C PLANNING.  06/02/18 0937    Significant Other Done Acceptance E VU,NR BENEFITS OF OOB ACTIVITY, SAFETY WITH MOBILITY, PROGRESSION OF POC, D/C PLANNING.  06/02/18 0937          Point: Home exercise program (Done)    Learning Progress Summary     Learner Status Readiness Method Response Comment Documented by    Patient Done Acceptance E VU,NR BENEFITS OF OOB ACTIVITY, SAFETY WITH MOBILITY, PROGRESSION OF POC, D/C PLANNING.  06/02/18 0937    Significant Other Done Acceptance E VU,NR BENEFITS OF OOB ACTIVITY, SAFETY WITH MOBILITY, PROGRESSION OF POC, D/C PLANNING.  06/02/18 0937          Point: Body mechanics (Done)    Learning Progress Summary     Learner Status Readiness Method Response Comment Documented by    Patient Done Acceptance E VU,NR BENEFITS OF OOB ACTIVITY, SAFETY WITH MOBILITY, PROGRESSION OF POC, D/C PLANNING.  06/02/18 0937    Significant Other Done Acceptance E VU,NR BENEFITS OF OOB ACTIVITY, SAFETY WITH MOBILITY,  PROGRESSION OF POC, D/C PLANNING.  06/02/18 0937          Point: Precautions (Done)    Learning Progress Summary     Learner Status Readiness Method Response Comment Documented by    Patient Done Acceptance E VU,NR BENEFITS OF OOB ACTIVITY, SAFETY WITH MOBILITY, PROGRESSION OF POC, D/C PLANNING.  06/02/18 0937    Significant Other Done Acceptance E VU,NR BENEFITS OF OOB ACTIVITY, SAFETY WITH MOBILITY, PROGRESSION OF POC, D/C PLANNING.  06/02/18 0937                      User Key     Initials Effective Dates Name Provider Type Discipline     06/19/15 -  Franca Reveles, PT Physical Therapist PT                PT Recommendation and Plan  Anticipated Discharge Disposition (PT): home with assist  Planned Therapy Interventions (PT Eval): balance training, bed mobility training, gait training, home exercise program, strengthening, transfer training  Outcome Summary/Treatment Plan (PT)  Anticipated Discharge Disposition (PT): home with assist  Plan of Care Reviewed With: patient  Progress: improving  Outcome Summary: PT PRESENTS WITH EVOLVING SYMPTOMS TO INCLUDE GENERALIZED WEAKNESS,  IMPAIRED BALANCE AND DECLINE IN FUNCTIONAL MOBILITY. PT IS LIKELY CLOSE TO BASELINE BUT WILL BENEFIT FROM SHORT COURSE OF INPT P.T. PT IS SAFE TO RETURN HOME WITH HUSBANDS ASSIST.           Outcome Measures     Row Name 06/02/18 0835             How much help from another person do you currently need...    Turning from your back to your side while in flat bed without using bedrails? 4  -CD      Moving from lying on back to sitting on the side of a flat bed without bedrails? 3  -CD      Moving to and from a bed to a chair (including a wheelchair)? 3  -CD      Standing up from a chair using your arms (e.g., wheelchair, bedside chair)? 3  -CD      Climbing 3-5 steps with a railing? 3  -CD      To walk in hospital room? 3  -CD      AM-PAC 6 Clicks Score 19  -CD         Functional Assessment    Outcome Measure Options AM-PAC 6 Clicks Basic  Mobility (PT)  -CD        User Key  (r) = Recorded By, (t) = Taken By, (c) = Cosigned By    Initials Name Provider Type    WM Reveles PT Physical Therapist           Time Calculation:         PT Charges     Row Name 06/02/18 0942             Time Calculation    Start Time 0835  -CD      PT Received On 06/02/18  -      PT Goal Re-Cert Due Date 06/12/18  -         Time Calculation- PT    Total Timed Code Minutes- PT 13 minute(s)  -CD        User Key  (r) = Recorded By, (t) = Taken By, (c) = Cosigned By    Initials Name Provider Type    WM Reveles PT Physical Therapist          Therapy Charges for Today     Code Description Service Date Service Provider Modifiers Qty    04914426481 HC GAIT TRAINING EA 15 MIN 6/2/2018 Franca Reveles, PT GP 1    44771569496 HC PT EVAL LOW COMPLEXITY 4 6/2/2018 Franca Reveles, PT GP 1          PT G-Codes  Outcome Measure Options: AM-PAC 6 Clicks Basic Mobility (PT)      Franca Reveles PT  6/2/2018

## 2018-06-02 NOTE — PROGRESS NOTES
[facility] Medicine Services  PROGRESS NOTE    Patient Name: Shantelle Decker  : 1940  MRN: [id number]    Date of Admission: 2018  Length of Stay: 2  Primary Care Physician: Raghavendra Mei MD    Subjective   Subjective     CC:  F/u ARF    HPI:  Pt is seen sitting up in chair in NAD.  Has a winter hat on.   at bs.  Pt states she had n/v last night but better today.  Ate breakfast.  No abd pain, cp or soa.  + BM.  States she feels better but not going home until tomorrow or Monday (states that what the kidney doctor told her).  No new issues.     Review of Systems   Gen- No fevers, chills  CV- No chest pain, palpitations  Resp- No cough, dyspnea  GI- No N/V/D, abd pain    Otherwise ROS is negative except as mentioned in the HPI.    Objective   Objective     Vital Signs:   Temp:  [97.7 °F (36.5 °C)-98.6 °F (37 °C)] 97.8 °F (36.6 °C)  Heart Rate:  [59-80] 60  Resp:  [15-16] 16  BP: ()/(51-66) 99/66        Physical Exam:  Constitutional: No acute distress, awake, alert.  Sitting up in chair.   at bs.   HENT: NCAT, mucous membranes moist  Respiratory: Clear to auscultation bilaterally, respiratory effort normal.  On RA.    Cardiovascular: RRR, no murmurs, rubs, or gallops, palpable pedal pulses bilaterally  Gastrointestinal: Positive bowel sounds, soft, nontender, nondistended.  Obese  Musculoskeletal: No bilateral ankle edema.  GARCIA spont   Psychiatric: Appropriate affect, cooperative and pleasant.  Limited insight and judgement  Neurologic: Oriented x 3, strength symmetric in all extremities, Cranial Nerves grossly intact to confrontation, speech clear and appropriate.  Follows commands   Skin: No rashes      Results Reviewed:  I have personally reviewed current lab, radiology, and data and agree.      Results from last 7 days  Lab Units 18  0742 18  0450 18   WBC 10*3/mm3 7.65 7.23 7.51   HEMOGLOBIN g/dL 9.7* 10.2* 10.4*   HEMATOCRIT %  28.1* 29.7* 30.0*   PLATELETS 10*3/mm3 170 188 173   INR   --   --  1.02       Results from last 7 days  Lab Units 06/02/18  0727 06/01/18  0742 05/31/18  0450 05/30/18 2029   SODIUM mmol/L 144 142 138 137   POTASSIUM mmol/L 4.4 3.6 3.7 3.8   CHLORIDE mmol/L 121* 120* 114* 111*   CO2 mmol/L 16.0* 16.0* 12.0* 14.0*   BUN mg/dL 59* 76* 101* 104*   CREATININE mg/dL 2.30* 2.80* 4.00* 4.60*   GLUCOSE mg/dL 89 101* 99 93   CALCIUM mg/dL 7.4* 8.1* 8.1* 8.3*   ALT (SGPT) U/L  --   --  9 9   AST (SGOT) U/L  --   --  9 12     Estimated Creatinine Clearance: 20 mL/min (A) (by C-G formula based on SCr of 2.3 mg/dL (H)).  No results found for: BNP  No results found for: PHART    Microbiology Results Abnormal     Procedure Component Value - Date/Time    Eosinophil Smear - Urine, Urine, Clean Catch [[id number]]  (Normal) Collected:  05/31/18 0155    Lab Status:  Final result Specimen:  Urine from Urine, Catheter In/Out Updated:  05/31/18 0309     Eosinophil Smear 0 % EOS/100 Cells     Narrative:       No eosinophil seen          Imaging Results (last 24 hours)     Procedure Component Value Units Date/Time    US Renal Limited [[id number]] Collected:  05/31/18 1315     Updated:  06/01/18 1532    Narrative:       EXAMINATION: US RENAL LIMITED- 05/31/2018     INDICATION: acute renal failure; renal insufficiency     TECHNIQUE: Sonographic imaging was obtained of the kidneys in both the  sagittal and transverse planes.     COMPARISON: NONE     FINDINGS: The right kidney measures in length from pole to pole 10.8 cm.  No solid cortical mass or renal cortical cysts seen within the right  kidney. No hydronephrosis or nephrolithiasis.     The left kidney measures in length from pole to pole 9.7 cm. No solid  cortical mass or renal cortical cysts seen within the left kidney. No  hydronephrosis or nephrolithiasis. The bladder is grossly unremarkable  in appearance.       Impression:       Unremarkable bilateral renal ultrasound.      D:   05/31/2018  E:  05/31/2018     This report was finalized on 6/1/2018 3:30 PM by Dr. Marisela Woods MD.       US Thyroid [[id number]] Collected:  05/31/18 1323     Updated:  06/01/18 1532    Narrative:       EXAMINATION: US THYROID- 05/31/2018     INDICATION: bilateral thyroid masses on CT of chest; thyroid mass      TECHNIQUE: Sonographic imaging was obtained of the thyroid in both the  sagittal and transverse planes.     COMPARISON: NONE     FINDINGS: The isthmus is within normal limits or mildly thickened at 6  mm. No underlying mass or lesions identified.     The right lobe of the thyroid measures 4.5 x 2.2 x 3.3 cm. There is  enlargement of the right lobe of the thyroid the largest dominant nodule  measuring 3.8 x 2.1 x 2.9 cm. Minimal flow seen within the nodule.     The left lobe of the thyroid measures 4.0 x 1.8 x 1.2 cm. There is a  smaller nodule seen within the left lobe of the thyroid measuring 2.5 x  1.3 x 1.4 cm.       Impression:       Enlargement of the thyroid with dominant nodules identified  within the thyroid bilaterally. Continued follow-up in 6 months is  recommended for stability.      D:  05/31/2018  E:  05/31/2018     This report was finalized on 6/1/2018 3:30 PM by Dr. Marisela Woods MD.                I have reviewed the medications.    Assessment/Plan   Assessment / Plan     Hospital Problem List     * (Principal)Acute renal failure    Coronary artery disease without angina pectoris    Lactic acidosis    Normocytic anemia    Dementia    Essential hypertension    History of breast cancer    Gastroenteritis    Thyroid mass    LIANNA (acute kidney injury)             Brief Hospital Course to date:  Shantelle Decker is a 78 y.o. female with a past medical history significant for CAD, essential hypertension, breast cancer and dementia presented initially to [facility] due to abnormal creatinine level.  Patient was seen by PCP about a week ago due to weakness, nausea,  vomiting, diarrhea and fatigue likey related to viral illness as other family was also sick at that time with same.  She had lab work drawn at PCP office, since then PCP had been trying to get a hold of the patient since last week due to creatinine level of 4 but was unsuccessful. On 5/30/18 patient presented back to PCP for follow up and patient was sent to the ED for her abnormal lab findings. Evaluation at [facility] included creatinine 5.8, CT chest abdomen and pelvis unremarkable except thyroid mass, elevated lactate at 2.2.  She was given fluids and transferred to [facility] for of renal consultation.    Assessment & Plan:    - continue IVF's as dehydration and ATN of ongoing ACEI use while dehydrated suspected -->  Creatinine responding nicely.    - Nephro following.  Noted creatinine continues to improve, down from 2.8 yesterday to 2.3 today.  Anticipate if continues to improved may dc home as soon as tomorrow or Monday and will remain off ACEI.  - am renal function panel per neph  - U/S thyroid with enlarged nodules and rec'd repeat imaging 6 months, TSH normal  --BP low end of nl but stable, asymptomatic, monitor    DVT Prophylaxis:  SQ hep    CODE STATUS: Full Code    Disposition: I expect the patient to be discharged possible tomorrow pending her creatinine level and nephrology recs.      Electronically signed by ROSALINO Owen, 06/02/18, 1:47 PM.

## 2018-06-02 NOTE — PROGRESS NOTES
"   LOS: 2 days    Patient Care Team:  Raghavendra Mei MD as PCP - General (Family Medicine)    Reason For Visit:  F/U LIANNA  Subjective           Review of Systems:    Pulm: No soa   CV:  No CP      Objective       aspirin 81 mg Oral Daily   donepezil 10 mg Oral Nightly   heparin (porcine) 5,000 Units Subcutaneous Q12H       sodium chloride 125 mL/hr Last Rate: 125 mL/hr (06/02/18 0826)         Vital Signs:  Blood pressure 99/66, pulse 60, temperature 97.8 °F (36.6 °C), temperature source Oral, resp. rate 16, height 165.1 cm (65\"), weight 77 kg (169 lb 12.1 oz), SpO2 98 %.    Flowsheet Rows      First Filed Value   Admission Height  165.1 cm (65\") Documented at 05/31/2018 1810   Admission Weight  77 kg (169 lb 12.1 oz) Documented at 05/30/2018 2108          06/01 0701 - 06/02 0700  In: 1500 [P.O.:600; I.V.:900]  Out: 200 [Urine:200]    Physical Exam:    General Appearance: NAD, alert and cooperative, Ox3  Eyes: PER, conjunctivae and sclerae normal, no icterus  Lungs: respirations regular and unlabored, no crepitus, clear to auscultation  Heart/CV: regular rhythm & normal rate, no murmur, no gallop, no rub and no edema  Abdomen: not distended, soft, non-tender, no masses,  bowel sounds present  Skin: No rash, Warm and dry    Radiology:            Labs:    Results from last 7 days  Lab Units 06/01/18  0742 05/31/18 0450 05/30/18 2029   WBC 10*3/mm3 7.65 7.23 7.51   HEMOGLOBIN g/dL 9.7* 10.2* 10.4*   HEMATOCRIT % 28.1* 29.7* 30.0*   PLATELETS 10*3/mm3 170 188 173       Results from last 7 days  Lab Units 06/02/18  0727 06/01/18  0742 05/31/18 0450 05/30/18 2029   SODIUM mmol/L 144 142 138 137   POTASSIUM mmol/L 4.4 3.6 3.7 3.8   CHLORIDE mmol/L 121* 120* 114* 111*   CO2 mmol/L 16.0* 16.0* 12.0* 14.0*   BUN mg/dL 59* 76* 101* 104*   CREATININE mg/dL 2.30* 2.80* 4.00* 4.60*   CALCIUM mg/dL 7.4* 8.1* 8.1* 8.3*   PHOSPHORUS mg/dL 3.5 4.5  --  5.8*   ALBUMIN g/dL 2.70* 3.00* 3.20  2.9 3.40       Results from last 7 " days  Lab Units 06/02/18  0727   GLUCOSE mg/dL 89         Results from last 7 days  Lab Units 05/31/18  0450   ALK PHOS U/L 43   BILIRUBIN mg/dL 0.5   ALT (SGPT) U/L 9   AST (SGOT) U/L 9             Results from last 7 days  Lab Units 05/31/18  0155   COLOR UA  Yellow   CLARITY UA  Clear   PH, URINE  <=5.0   SPECIFIC GRAVITY, URINE  1.013   GLUCOSE UA  Negative   KETONES UA  Negative   BILIRUBIN UA  Negative   PROTEIN UA  Negative   BLOOD UA  Trace*   LEUKOCYTES UA  Negative   NITRITE UA  Negative       Estimated Creatinine Clearance: 20 mL/min (A) (by C-G formula based on SCr of 2.3 mg/dL (H)).      Assessment     Principal Problem:    Acute renal failure  Active Problems:    Coronary artery disease without angina pectoris    Lactic acidosis    Normocytic anemia    Dementia    Essential hypertension    History of breast cancer    Gastroenteritis    Thyroid mass    LIANNA (acute kidney injury)            Impression: LIANNA FROM HYPOTENSION, VOLUME DEPLETION AND ACE. GFR IMPROVING. ANEMIA.            Recommendations: LAB AM.      Curt Winkler MD  06/02/18  1:33 PM

## 2018-06-02 NOTE — PLAN OF CARE
Problem: Patient Care Overview  Goal: Plan of Care Review  Outcome: Ongoing (interventions implemented as appropriate)   06/02/18 7685   Coping/Psychosocial   Plan of Care Reviewed With patient   OTHER   Outcome Summary PT PRESENTS WITH EVOLVING SYMPTOMS TO INCLUDE GENERALIZED WEAKNESS, IMPAIRED BALANCE AND DECLINE IN FUNCTIONAL MOBILITY. PT IS LIKELY CLOSE TO BASELINE BUT WILL BENEFIT FROM SHORT COURSE OF P.T. TO IMPROVE BALANCE WITH GAIT AND PROGRESS FROM WALKER TO CANE. PT IS SAFE TO RETURN HOME WITH HUSBANDS ASSIST AT D/C.    Plan of Care Review   Progress improving

## 2018-06-03 LAB
ANION GAP SERPL CALCULATED.3IONS-SCNC: 8 MMOL/L (ref 3–11)
BUN BLD-MCNC: 42 MG/DL (ref 9–23)
BUN/CREAT SERPL: 22.1 (ref 7–25)
CALCIUM SPEC-SCNC: 7.6 MG/DL (ref 8.7–10.4)
CHLORIDE SERPL-SCNC: 120 MMOL/L (ref 99–109)
CO2 SERPL-SCNC: 15 MMOL/L (ref 20–31)
CREAT BLD-MCNC: 1.9 MG/DL (ref 0.6–1.3)
GFR SERPL CREATININE-BSD FRML MDRD: 25 ML/MIN/1.73
GLUCOSE BLD-MCNC: 85 MG/DL (ref 70–100)
POTASSIUM BLD-SCNC: 4.2 MMOL/L (ref 3.5–5.5)
SODIUM BLD-SCNC: 143 MMOL/L (ref 132–146)

## 2018-06-03 PROCEDURE — 80048 BASIC METABOLIC PNL TOTAL CA: CPT | Performed by: INTERNAL MEDICINE

## 2018-06-03 PROCEDURE — 99232 SBSQ HOSP IP/OBS MODERATE 35: CPT | Performed by: NURSE PRACTITIONER

## 2018-06-03 PROCEDURE — 25010000002 HEPARIN (PORCINE) PER 1000 UNITS: Performed by: NURSE PRACTITIONER

## 2018-06-03 RX ORDER — SODIUM BICARBONATE 650 MG/1
650 TABLET ORAL 2 TIMES DAILY
Status: DISCONTINUED | OUTPATIENT
Start: 2018-06-03 | End: 2018-06-05 | Stop reason: SDUPTHER

## 2018-06-03 RX ORDER — ONDANSETRON 4 MG/1
4 TABLET, FILM COATED ORAL EVERY 8 HOURS PRN
Status: DISCONTINUED | OUTPATIENT
Start: 2018-06-03 | End: 2018-06-06 | Stop reason: HOSPADM

## 2018-06-03 RX ADMIN — ONDANSETRON 4 MG: 4 TABLET, FILM COATED ORAL at 22:47

## 2018-06-03 RX ADMIN — SODIUM BICARBONATE 650 MG TABLET 650 MG: at 20:13

## 2018-06-03 RX ADMIN — ONDANSETRON 4 MG: 4 TABLET, FILM COATED ORAL at 14:03

## 2018-06-03 RX ADMIN — HEPARIN SODIUM 5000 UNITS: 5000 INJECTION, SOLUTION INTRAVENOUS; SUBCUTANEOUS at 08:25

## 2018-06-03 RX ADMIN — HEPARIN SODIUM 5000 UNITS: 5000 INJECTION, SOLUTION INTRAVENOUS; SUBCUTANEOUS at 20:13

## 2018-06-03 RX ADMIN — DONEPEZIL HYDROCHLORIDE 10 MG: 10 TABLET, FILM COATED ORAL at 20:13

## 2018-06-03 RX ADMIN — ASPIRIN 81 MG: 81 TABLET, COATED ORAL at 08:25

## 2018-06-03 RX ADMIN — SODIUM BICARBONATE 650 MG TABLET 650 MG: at 13:02

## 2018-06-03 RX ADMIN — SODIUM CHLORIDE 125 ML/HR: 9 INJECTION, SOLUTION INTRAVENOUS at 00:11

## 2018-06-03 RX ADMIN — SODIUM CHLORIDE 125 ML/HR: 9 INJECTION, SOLUTION INTRAVENOUS at 20:33

## 2018-06-03 NOTE — PROGRESS NOTES
"   LOS: 3 days    Patient Care Team:  Raghavendra Mei MD as PCP - General (Family Medicine)    Reason For Visit:  F/U LIANNA  Subjective           Review of Systems:    Pulm: No soa   CV:  No CP      Objective       aspirin 81 mg Oral Daily   donepezil 10 mg Oral Nightly   heparin (porcine) 5,000 Units Subcutaneous Q12H   sodium bicarbonate 650 mg Oral BID       sodium chloride 125 mL/hr Last Rate: 125 mL/hr (06/03/18 0011)         Vital Signs:  Blood pressure (!) 82/39, pulse 70, temperature 98 °F (36.7 °C), temperature source Oral, resp. rate 18, height 165.1 cm (65\"), weight 77 kg (169 lb 12.1 oz), SpO2 98 %.    Flowsheet Rows      First Filed Value   Admission Height  165.1 cm (65\") Documented at 05/31/2018 1810   Admission Weight  77 kg (169 lb 12.1 oz) Documented at 05/30/2018 2108          06/02 0701 - 06/03 0700  In: -   Out: 250 [Urine:250]    Physical Exam:    General Appearance: NAD, alert and cooperative, Ox3  Eyes: PER, conjunctivae and sclerae normal, no icterus  Lungs: respirations regular and unlabored, no crepitus, clear to auscultation  Heart/CV: regular rhythm & normal rate, no murmur, no gallop, no rub and no edema  Abdomen: not distended, soft, non-tender, no masses,  bowel sounds present  Skin: No rash, Warm and dry    Radiology:            Labs:    Results from last 7 days  Lab Units 06/01/18  0742 05/31/18  0450 05/30/18 2029   WBC 10*3/mm3 7.65 7.23 7.51   HEMOGLOBIN g/dL 9.7* 10.2* 10.4*   HEMATOCRIT % 28.1* 29.7* 30.0*   PLATELETS 10*3/mm3 170 188 173       Results from last 7 days  Lab Units 06/03/18  0812 06/02/18  0727 06/01/18  0742 05/31/18  0450 05/30/18 2029   SODIUM mmol/L 143 144 142 138 137   POTASSIUM mmol/L 4.2 4.4 3.6 3.7 3.8   CHLORIDE mmol/L 120* 121* 120* 114* 111*   CO2 mmol/L 15.0* 16.0* 16.0* 12.0* 14.0*   BUN mg/dL 42* 59* 76* 101* 104*   CREATININE mg/dL 1.90* 2.30* 2.80* 4.00* 4.60*   CALCIUM mg/dL 7.6* 7.4* 8.1* 8.1* 8.3*   PHOSPHORUS mg/dL  --  3.5 4.5  --  5.8* "   ALBUMIN g/dL  --  2.70* 3.00* 3.20  2.9 3.40       Results from last 7 days  Lab Units 06/03/18  0812   GLUCOSE mg/dL 85         Results from last 7 days  Lab Units 05/31/18  0450   ALK PHOS U/L 43   BILIRUBIN mg/dL 0.5   ALT (SGPT) U/L 9   AST (SGOT) U/L 9             Results from last 7 days  Lab Units 05/31/18  0155   COLOR UA  Yellow   CLARITY UA  Clear   PH, URINE  <=5.0   SPECIFIC GRAVITY, URINE  1.013   GLUCOSE UA  Negative   KETONES UA  Negative   BILIRUBIN UA  Negative   PROTEIN UA  Negative   BLOOD UA  Trace*   LEUKOCYTES UA  Negative   NITRITE UA  Negative       Estimated Creatinine Clearance: 24.2 mL/min (A) (by C-G formula based on SCr of 1.9 mg/dL (H)).      Assessment     Principal Problem:    Acute renal failure  Active Problems:    Coronary artery disease without angina pectoris    Lactic acidosis    Normocytic anemia    Dementia    Essential hypertension    History of breast cancer    Gastroenteritis    Thyroid mass    LIANNA (acute kidney injury)            Impression: LIANNA WITH IMPROVING GFR. ACIDOSIS.            Recommendations: LAB AM. START PO BICARBONATE.    Curt Winkler MD  06/03/18  12:26 PM

## 2018-06-03 NOTE — PLAN OF CARE
Problem: Fall Risk (Adult)  Goal: Identify Related Risk Factors and Signs and Symptoms  Outcome: Ongoing (interventions implemented as appropriate)    Goal: Absence of Fall  Outcome: Ongoing (interventions implemented as appropriate)      Problem: Skin Injury Risk (Adult)  Goal: Identify Related Risk Factors and Signs and Symptoms  Outcome: Ongoing (interventions implemented as appropriate)    Goal: Skin Health and Integrity  Outcome: Ongoing (interventions implemented as appropriate)      Problem: Patient Care Overview  Goal: Plan of Care Review  Outcome: Ongoing (interventions implemented as appropriate)   06/03/18 0436   Coping/Psychosocial   Plan of Care Reviewed With patient   OTHER   Outcome Summary rested well , c/o nausea x 1,  at bedside. probable dc soon.   Plan of Care Review   Progress improving     Goal: Individualization and Mutuality  Outcome: Ongoing (interventions implemented as appropriate)    Goal: Interprofessional Rounds/Family Conf  Outcome: Ongoing (interventions implemented as appropriate)

## 2018-06-03 NOTE — PLAN OF CARE
Problem: Fall Risk (Adult)  Goal: Identify Related Risk Factors and Signs and Symptoms  Outcome: Ongoing (interventions implemented as appropriate)    Goal: Absence of Fall  Outcome: Ongoing (interventions implemented as appropriate)      Problem: Skin Injury Risk (Adult)  Goal: Identify Related Risk Factors and Signs and Symptoms  Outcome: Ongoing (interventions implemented as appropriate)    Goal: Skin Health and Integrity  Outcome: Ongoing (interventions implemented as appropriate)      Problem: Patient Care Overview  Goal: Plan of Care Review  Outcome: Ongoing (interventions implemented as appropriate)    Goal: Individualization and Mutuality  Outcome: Ongoing (interventions implemented as appropriate)    Goal: Discharge Needs Assessment  Outcome: Ongoing (interventions implemented as appropriate)

## 2018-06-03 NOTE — PROGRESS NOTES
[facility] Medicine Services  PROGRESS NOTE    Patient Name: Shantelle Decker  : 1940  MRN: [id number]    Date of Admission: 2018  Length of Stay: 3  Primary Care Physician: Raghavendra Mei MD    Subjective   Subjective     CC:  F/u ARF    HPI:  Seen resting upright in bed in no acute distress.   in chair at bedside.  Both watching TV.  Patient continues to wear her red Winter toboggan.  She is awake and alert.  Smiling.  Reports that she feels much better.  Only intermittent mild nausea overnight.  No further vomiting or diarrhea.  States no pain currently.  Overall feels much better.  No dizziness, syncope, shortness of air.  Nursing staff, blood pressures continue to run low however patient is fairly stable and asymptomatic.        Review of Systems   Gen- No fevers, chills  CV- No chest pain, palpitations  Resp- No cough, dyspnea  GI- No N/V/D, abd pain    Otherwise ROS is negative except as mentioned in the HPI.    Objective   Objective     Vital Signs:   Temp:  [97.5 °F (36.4 °C)-99.2 °F (37.3 °C)] 98 °F (36.7 °C)  Heart Rate:  [59-76] 70  Resp:  [16-20] 18  BP: ()/(33-90) 112/90        Physical Exam:  Constitutional: No acute distress, awake, alert.  Sitting up in bed.   in chair at bs.   HENT: NCAT, mucous membranes moist  Respiratory: Clear to auscultation bilaterally, respiratory effort normal.  On RA.    Cardiovascular: RRR, no murmurs, rubs, or gallops, palpable pedal pulses bilaterally  Gastrointestinal: Positive bowel sounds, soft, nontender, nondistended.  Obese  Musculoskeletal: No bilateral ankle edema.  GARCIA spont   Psychiatric: Appropriate affect, cooperative and pleasant.  Limited insight and judgement  Neurologic: Oriented x 3, strength symmetric in all extremities, Cranial Nerves grossly intact to confrontation, speech clear and appropriate.  Slightly hard of hearing.  Follows commands   Skin: No rashes      Results Reviewed:  I have  personally reviewed current lab, radiology, and data and agree.      Results from last 7 days  Lab Units 06/01/18  0742 05/31/18  0450 05/30/18 2029   WBC 10*3/mm3 7.65 7.23 7.51   HEMOGLOBIN g/dL 9.7* 10.2* 10.4*   HEMATOCRIT % 28.1* 29.7* 30.0*   PLATELETS 10*3/mm3 170 188 173   INR   --   --  1.02       Results from last 7 days  Lab Units 06/03/18  0812 06/02/18  0727 06/01/18  0742 05/31/18  0450 05/30/18 2029   SODIUM mmol/L 143 144 142 138 137   POTASSIUM mmol/L 4.2 4.4 3.6 3.7 3.8   CHLORIDE mmol/L 120* 121* 120* 114* 111*   CO2 mmol/L 15.0* 16.0* 16.0* 12.0* 14.0*   BUN mg/dL 42* 59* 76* 101* 104*   CREATININE mg/dL 1.90* 2.30* 2.80* 4.00* 4.60*   GLUCOSE mg/dL 85 89 101* 99 93   CALCIUM mg/dL 7.6* 7.4* 8.1* 8.1* 8.3*   ALT (SGPT) U/L  --   --   --  9 9   AST (SGOT) U/L  --   --   --  9 12     Estimated Creatinine Clearance: 24.2 mL/min (A) (by C-G formula based on SCr of 1.9 mg/dL (H)).  No results found for: BNP  No results found for: PHART    Microbiology Results Abnormal     Procedure Component Value - Date/Time    Clostridium Difficile Toxin - Stool, Per Rectum [[id number]] Collected:  06/02/18 1429    Lab Status:  Final result Specimen:  Stool from Per Rectum Updated:  06/02/18 1719    Narrative:       The following orders were created for panel order Clostridium Difficile Toxin - Stool, Per Rectum.  Procedure                               Abnormality         Status                     ---------                               -----------         ------                     Clostridium Difficile To...[[id number]]  Normal              Final result                 Please view results for these tests on the individual orders.    Clostridium Difficile Toxin, PCR - Stool, Per Rectum [[id number]]  (Normal) Collected:  06/02/18 1429    Lab Status:  Final result Specimen:  Stool from Per Rectum Updated:  06/02/18 5369     C. Difficile Toxins by PCR Not Detected    Narrative:         Performance characteristics of  test not established for patients <2 years of age.  Negative for Toxigenic C. Difficile    Eosinophil Smear - Urine, Urine, Clean Catch [[id number]]  (Normal) Collected:  05/31/18 0155    Lab Status:  Final result Specimen:  Urine from Urine, Catheter In/Out Updated:  05/31/18 0309     Eosinophil Smear 0 % EOS/100 Cells     Narrative:       No eosinophil seen          Imaging Results (last 24 hours)     ** No results found for the last 24 hours. **             I have reviewed the medications.    Assessment/Plan   Assessment / Plan     Hospital Problem List     * (Principal)Acute renal failure    Coronary artery disease without angina pectoris    Lactic acidosis    Normocytic anemia    Dementia    Essential hypertension    History of breast cancer    Gastroenteritis    Thyroid mass    LIANNA (acute kidney injury)             Brief Hospital Course to date:  Shantelle Decker is a 78 y.o. female with a past medical history significant for CAD, essential hypertension, breast cancer and dementia presented initially to [facility] due to abnormal creatinine level.  Patient was seen by PCP about a week ago due to weakness, nausea, vomiting, diarrhea and fatigue likey related to viral illness as other family was also sick at that time with same.  She had lab work drawn at PCP office, since then PCP had been trying to get a hold of the patient since last week due to creatinine level of 4 but was unsuccessful. On 5/30/18 patient presented back to PCP for follow up and patient was sent to the ED for her abnormal lab findings. Evaluation at [facility] included creatinine 5.8, CT chest abdomen and pelvis unremarkable except thyroid mass, elevated lactate at 2.2.  She was given fluids and transferred to [facility] for of renal consultation.    Assessment & Plan:    - continue IVF's as dehydration and ATN of ongoing ACEI use while dehydrated suspected -->  Creatinine responding nicely.    - Nephro  following.  Noted creatinine continues to improve, down from 2.3 to 1.9 today.  Anticipate if continues to improved may dc home as soon as tomorrow or Tuesday pending nephrology recs---and will remain off ACEI.  - U/S thyroid with enlarged nodules and rec'd repeat imaging 6 months, TSH normal  --BP continues to run low end of nl but stable, asymptomatic, monitor.  Will also have staff ck orthostatic bps today and records to further eval.   Low CO2  --trending down.  Asymptomatic  --monitor labs. May need bicarb tabs.   --nephrology following.  Pending recs.   --am labs     DVT Prophylaxis:  SQ hep    CODE STATUS: Full Code    Disposition: I expect the patient to be discharged possible tomorrow pending her creatinine level and nephrology recs.      Electronically signed by ROSALINO Owen, 06/03/18, 1:44 PM.

## 2018-06-04 LAB
ALBUMIN SERPL-MCNC: 2.7 G/DL (ref 3.2–4.8)
ANION GAP SERPL CALCULATED.3IONS-SCNC: 4 MMOL/L (ref 3–11)
BASOPHILS # BLD AUTO: 0.02 10*3/MM3 (ref 0–0.2)
BASOPHILS NFR BLD AUTO: 0.3 % (ref 0–1)
BUN BLD-MCNC: 29 MG/DL (ref 9–23)
BUN/CREAT SERPL: 17.1 (ref 7–25)
CALCIUM SPEC-SCNC: 7.4 MG/DL (ref 8.7–10.4)
CHLORIDE SERPL-SCNC: 120 MMOL/L (ref 99–109)
CO2 SERPL-SCNC: 19 MMOL/L (ref 20–31)
CREAT BLD-MCNC: 1.7 MG/DL (ref 0.6–1.3)
DEPRECATED RDW RBC AUTO: 47.8 FL (ref 37–54)
EOSINOPHIL # BLD AUTO: 0.28 10*3/MM3 (ref 0–0.3)
EOSINOPHIL NFR BLD AUTO: 4.8 % (ref 0–3)
ERYTHROCYTE [DISTWIDTH] IN BLOOD BY AUTOMATED COUNT: 14.8 % (ref 11.3–14.5)
GFR SERPL CREATININE-BSD FRML MDRD: 29 ML/MIN/1.73
GLUCOSE BLD-MCNC: 106 MG/DL (ref 70–100)
HCT VFR BLD AUTO: 27 % (ref 34.5–44)
HGB BLD-MCNC: 9.1 G/DL (ref 11.5–15.5)
IMM GRANULOCYTES # BLD: 0.04 10*3/MM3 (ref 0–0.03)
IMM GRANULOCYTES NFR BLD: 0.7 % (ref 0–0.6)
LYMPHOCYTES # BLD AUTO: 1.16 10*3/MM3 (ref 0.6–4.8)
LYMPHOCYTES NFR BLD AUTO: 19.9 % (ref 24–44)
MCH RBC QN AUTO: 29.8 PG (ref 27–31)
MCHC RBC AUTO-ENTMCNC: 33.7 G/DL (ref 32–36)
MCV RBC AUTO: 88.5 FL (ref 80–99)
MONOCYTES # BLD AUTO: 0.43 10*3/MM3 (ref 0–1)
MONOCYTES NFR BLD AUTO: 7.4 % (ref 0–12)
NEUTROPHILS # BLD AUTO: 3.94 10*3/MM3 (ref 1.5–8.3)
NEUTROPHILS NFR BLD AUTO: 67.6 % (ref 41–71)
PHOSPHATE SERPL-MCNC: 2.3 MG/DL (ref 2.4–5.1)
PLATELET # BLD AUTO: 174 10*3/MM3 (ref 150–450)
PMV BLD AUTO: 11.4 FL (ref 6–12)
POTASSIUM BLD-SCNC: 3.8 MMOL/L (ref 3.5–5.5)
RBC # BLD AUTO: 3.05 10*6/MM3 (ref 3.89–5.14)
SODIUM BLD-SCNC: 143 MMOL/L (ref 132–146)
WBC NRBC COR # BLD: 5.83 10*3/MM3 (ref 3.5–10.8)

## 2018-06-04 PROCEDURE — 85025 COMPLETE CBC W/AUTO DIFF WBC: CPT | Performed by: NURSE PRACTITIONER

## 2018-06-04 PROCEDURE — 97110 THERAPEUTIC EXERCISES: CPT

## 2018-06-04 PROCEDURE — 80069 RENAL FUNCTION PANEL: CPT | Performed by: INTERNAL MEDICINE

## 2018-06-04 PROCEDURE — 99232 SBSQ HOSP IP/OBS MODERATE 35: CPT | Performed by: INTERNAL MEDICINE

## 2018-06-04 RX ADMIN — SODIUM BICARBONATE 650 MG TABLET 650 MG: at 20:27

## 2018-06-04 RX ADMIN — ACETAMINOPHEN 650 MG: 325 TABLET ORAL at 05:05

## 2018-06-04 RX ADMIN — SODIUM CHLORIDE 125 ML/HR: 9 INJECTION, SOLUTION INTRAVENOUS at 12:25

## 2018-06-04 RX ADMIN — SODIUM CHLORIDE 125 ML/HR: 9 INJECTION, SOLUTION INTRAVENOUS at 20:27

## 2018-06-04 RX ADMIN — ASPIRIN 81 MG: 81 TABLET, COATED ORAL at 09:06

## 2018-06-04 RX ADMIN — SODIUM BICARBONATE 650 MG TABLET 650 MG: at 09:06

## 2018-06-04 RX ADMIN — DONEPEZIL HYDROCHLORIDE 10 MG: 10 TABLET, FILM COATED ORAL at 20:27

## 2018-06-04 NOTE — PROGRESS NOTES
"   LOS: 4 days    Patient Care Team:  Raghavendra Mei MD as PCP - General (Family Medicine)    Reason For Visit:  F/U LIANNA  Subjective         No new complaints  Review of Systems:    Pulm: No soa   CV:  No CP      Objective       aspirin 81 mg Oral Daily   donepezil 10 mg Oral Nightly   heparin (porcine) 5,000 Units Subcutaneous Q12H   sodium bicarbonate 650 mg Oral BID       sodium chloride 125 mL/hr Last Rate: 125 mL/hr (06/04/18 1225)         Vital Signs:  Blood pressure 102/61, pulse 62, temperature 97.8 °F (36.6 °C), temperature source Oral, resp. rate 15, height 165.1 cm (65\"), weight 77 kg (169 lb 12.1 oz), SpO2 97 %.    Flowsheet Rows      First Filed Value   Admission Height  165.1 cm (65\") Documented at 05/31/2018 1810   Admission Weight  77 kg (169 lb 12.1 oz) Documented at 05/30/2018 2108          06/03 0701 - 06/04 0700  In: 3158 [P.O.:1200; I.V.:1958]  Out: 550 [Urine:550]    Physical Exam:    General Appearance: NAD, alert and cooperative, Ox3  Eyes: PER, conjunctivae and sclerae normal, no icterus  Lungs: respirations regular and unlabored, no crepitus, clear to auscultation  Heart/CV: regular rhythm & normal rate, no murmur, no gallop, no rub and no edema  Abdomen: not distended, soft, non-tender, no masses,  bowel sounds present  Skin: No rash, Warm and dry    Radiology:            Labs:    Results from last 7 days  Lab Units 06/04/18  0939 06/01/18  0742 05/31/18  0450   WBC 10*3/mm3 5.83 7.65 7.23   HEMOGLOBIN g/dL 9.1* 9.7* 10.2*   HEMATOCRIT % 27.0* 28.1* 29.7*   PLATELETS 10*3/mm3 174 170 188       Results from last 7 days  Lab Units 06/04/18  0939 06/03/18  0812 06/02/18  0727 06/01/18  0742 05/31/18  0450 05/30/18  2029   SODIUM mmol/L 143 143 144 142 138 137   POTASSIUM mmol/L 3.8 4.2 4.4 3.6 3.7 3.8   CHLORIDE mmol/L 120* 120* 121* 120* 114* 111*   CO2 mmol/L 19.0* 15.0* 16.0* 16.0* 12.0* 14.0*   BUN mg/dL 29* 42* 59* 76* 101* 104*   CREATININE mg/dL 1.70* 1.90* 2.30* 2.80* 4.00* 4.60* "   CALCIUM mg/dL 7.4* 7.6* 7.4* 8.1* 8.1* 8.3*   PHOSPHORUS mg/dL 2.3*  --  3.5 4.5  --  5.8*   ALBUMIN g/dL 2.70*  --  2.70* 3.00* 3.20  2.9 3.40       Results from last 7 days  Lab Units 06/04/18  0939   GLUCOSE mg/dL 106*         Results from last 7 days  Lab Units 05/31/18  0450   ALK PHOS U/L 43   BILIRUBIN mg/dL 0.5   ALT (SGPT) U/L 9   AST (SGOT) U/L 9             Results from last 7 days  Lab Units 05/31/18  0155   COLOR UA  Yellow   CLARITY UA  Clear   PH, URINE  <=5.0   SPECIFIC GRAVITY, URINE  1.013   GLUCOSE UA  Negative   KETONES UA  Negative   BILIRUBIN UA  Negative   PROTEIN UA  Negative   BLOOD UA  Trace*   LEUKOCYTES UA  Negative   NITRITE UA  Negative       Estimated Creatinine Clearance: 27.1 mL/min (A) (by C-G formula based on SCr of 1.7 mg/dL (H)).      Assessment     Principal Problem:    Acute renal failure  Active Problems:    Coronary artery disease without angina pectoris    Lactic acidosis    Normocytic anemia    Dementia    Essential hypertension    History of breast cancer    Gastroenteritis    Thyroid mass    LIANNA (acute kidney injury)            Impression:   LIANNA FROM HYPOTENSION, VOLUME DEPLETION AND ACE. GFR IMPROVING.   NEMIA.            Recommendations:   Continue to encourage po intake  Sodium will improve if she drink more water      Artie Parkinson MD  06/04/18  12:41 PM

## 2018-06-04 NOTE — PLAN OF CARE
Problem: Fall Risk (Adult)  Goal: Absence of Fall  Outcome: Ongoing (interventions implemented as appropriate)      Problem: Skin Injury Risk (Adult)  Goal: Skin Health and Integrity  Outcome: Ongoing (interventions implemented as appropriate)      Problem: Patient Care Overview  Goal: Plan of Care Review  Outcome: Ongoing (interventions implemented as appropriate)

## 2018-06-04 NOTE — PROGRESS NOTES
[facility] Medicine Services  PROGRESS NOTE    Patient Name: Shantelle Decker  : 1940  MRN: [id number]    Date of Admission: 2018  Length of Stay: 4  Primary Care Physician: Raghavendra Mei MD    Subjective   Subjective     CC:  F/u ARF    HPI:  Breathing is good right now; had some hemoptysis last night.  Has been up walking.  Has been drinking fluids  Hoping to go home soon .    Review of Systems   Gen- No fevers, chills  CV- No chest pain, palpitations  Resp- No cough, dyspnea  GI- No N/V/D, abd pain    Otherwise ROS is negative except as mentioned in the HPI.    Objective   Objective     Vital Signs:   Temp:  [97.8 °F (36.6 °C)-98.6 °F (37 °C)] 97.8 °F (36.6 °C)  Heart Rate:  [] 62  Resp:  [14-20] 15  BP: ()/(58-72) 102/61        Physical Exam:  Constitutional: No acute distress, asleep but wakes easily.   present, talking about taking her dancing after discharge  HENT: NCAT, mucous membranes moist  Respiratory: Clear to auscultation bilaterally, respiratory effort normal.  On RA.    Cardiovascular: RRR, no murmurs, rubs, or gallops, palpable pedal pulses bilaterally  Gastrointestinal: Positive bowel sounds, soft, nontender, nondistended.  Obese  Musculoskeletal: No bilateral ankle edema.  GARCIA spont   Psychiatric: Appropriate affect, cooperative and pleasant.  Limited insight and judgement  Neurologic: Oriented x 3, strength symmetric in all extremities, Cranial Nerves grossly intact to confrontation, speech clear and appropriate.  Slightly hard of hearing.  Follows commands   Skin: No rashes      Results Reviewed:  I have personally reviewed current lab, radiology, and data and agree.      Results from last 7 days  Lab Units 18  0939 18  0742 18  0450 18   WBC 10*3/mm3 5.83 7.65 7.23 7.51   HEMOGLOBIN g/dL 9.1* 9.7* 10.2* 10.4*   HEMATOCRIT % 27.0* 28.1* 29.7* 30.0*   PLATELETS 10*3/mm3 174 170 188 173   INR   --   --   --   1.02       Results from last 7 days  Lab Units 06/04/18  0939 06/03/18  0812 06/02/18  0727  05/31/18  0450 05/30/18 2029   SODIUM mmol/L 143 143 144  < > 138 137   POTASSIUM mmol/L 3.8 4.2 4.4  < > 3.7 3.8   CHLORIDE mmol/L 120* 120* 121*  < > 114* 111*   CO2 mmol/L 19.0* 15.0* 16.0*  < > 12.0* 14.0*   BUN mg/dL 29* 42* 59*  < > 101* 104*   CREATININE mg/dL 1.70* 1.90* 2.30*  < > 4.00* 4.60*   GLUCOSE mg/dL 106* 85 89  < > 99 93   CALCIUM mg/dL 7.4* 7.6* 7.4*  < > 8.1* 8.3*   ALT (SGPT) U/L  --   --   --   --  9 9   AST (SGOT) U/L  --   --   --   --  9 12   < > = values in this interval not displayed.  Estimated Creatinine Clearance: 27.1 mL/min (A) (by C-G formula based on SCr of 1.7 mg/dL (H)).  No results found for: BNP  No results found for: PHART    Microbiology Results Abnormal     Procedure Component Value - Date/Time    Clostridium Difficile Toxin - Stool, Per Rectum [[id number]] Collected:  06/02/18 1429    Lab Status:  Final result Specimen:  Stool from Per Rectum Updated:  06/02/18 1719    Narrative:       The following orders were created for panel order Clostridium Difficile Toxin - Stool, Per Rectum.  Procedure                               Abnormality         Status                     ---------                               -----------         ------                     Clostridium Difficile To...[[id number]]  Normal              Final result                 Please view results for these tests on the individual orders.    Clostridium Difficile Toxin, PCR - Stool, Per Rectum [[id number]]  (Normal) Collected:  06/02/18 1429    Lab Status:  Final result Specimen:  Stool from Per Rectum Updated:  06/02/18 1719     C. Difficile Toxins by PCR Not Detected    Narrative:         Performance characteristics of test not established for patients <2 years of age.  Negative for Toxigenic C. Difficile    Eosinophil Smear - Urine, Urine, Clean Catch [[id number]]  (Normal) Collected:  05/31/18 0155    Lab Status:   Final result Specimen:  Urine from Urine, Catheter In/Out Updated:  05/31/18 0309     Eosinophil Smear 0 % EOS/100 Cells     Narrative:       No eosinophil seen          Imaging Results (last 24 hours)     ** No results found for the last 24 hours. **             I have reviewed the medications.    Assessment/Plan   Assessment / Plan     Hospital Problem List     * (Principal)Acute renal failure    Coronary artery disease without angina pectoris    Lactic acidosis    Normocytic anemia    Dementia    Essential hypertension    History of breast cancer    Gastroenteritis    Thyroid mass    LIANNA (acute kidney injury)             Brief Hospital Course to date:  Shantelle Decker is a 78 y.o. female with a past medical history significant for CAD, essential hypertension, breast cancer and dementia presented initially to [facility] due to abnormal creatinine level.  Patient was seen by PCP about a week ago due to weakness, nausea, vomiting, diarrhea and fatigue likey related to viral illness as other family was also sick at that time with same.  She had lab work drawn at PCP office, since then PCP had been trying to get a hold of the patient since last week due to creatinine level of 4 but was unsuccessful. On 5/30/18 patient presented back to PCP for follow up and patient was sent to the ED for her abnormal lab findings. Evaluation at [facility] included creatinine 5.8, CT chest abdomen and pelvis unremarkable except thyroid mass, elevated lactate at 2.2.  She was given fluids and transferred to [facility] for of renal consultation.    Assessment & Plan:    LIANNA  - improving  - continue IVF's as dehydration and ATN of ongoing ACEI use while dehydrated suspected  -- holding acei    - U/S thyroid with enlarged nodules and rec'd repeat imaging 6 months, TSH normal    --BP continues to run low end of nl but stable, asymptomatic, monitor.        DVT Prophylaxis:  SQ hep    CODE STATUS: Full  Code    Disposition: I expect the patient to be discharged in AM    Electronically signed by Oumou Dacosta MD, 06/04/18, 3:12 PM.

## 2018-06-04 NOTE — PLAN OF CARE
Problem: Patient Care Overview  Goal: Plan of Care Review  Outcome: Ongoing (interventions implemented as appropriate)   06/04/18 2753   Coping/Psychosocial   Plan of Care Reviewed With patient   OTHER   Outcome Summary Pt ambulated 250 feet with CGA and RW, limited by fatigue. Pt required cues for safety throughout. Initiated HEP and will progress with mobility as able.

## 2018-06-04 NOTE — PROGRESS NOTES
Continued Stay Note   [place]     Patient Name: Shantelle Decker  MRN: [id number]  Today's Date: 6/4/2018    Admit Date: 5/30/2018          Discharge Plan     Row Name 06/04/18 1333       Plan    Plan Home with     Patient/Family in Agreement with Plan yes    Plan Comments Spoke with patient and . Her discharge plan remains the same, home with . No anticipated needs at this time. Will continue to follow. Roxanne Wright RN x [phone]              Discharge Codes    No documentation.       Expected Discharge Date and Time     Expected Discharge Date Expected Discharge Time    Jun 2, 2018             Roxanne Wright RN

## 2018-06-04 NOTE — THERAPY TREATMENT NOTE
Acute Care - Physical Therapy Treatment Note  [facility]     Patient Name: Shantelle Decker  : 1940  MRN: [id number]  Today's Date: 2018  Onset of Illness/Injury or Date of Surgery: 18  Date of Referral to PT: 18  Referring Physician: MD MELBA    Admit Date: 2018    Visit Dx:  No diagnosis found.  Patient Active Problem List   Diagnosis   • Incarcerated ventral hernia   • Coronary artery disease without angina pectoris   • Morbid obesity   • Acute renal failure   • Lactic acidosis   • Normocytic anemia   • Dementia   • Essential hypertension   • History of breast cancer   • Gastroenteritis   • Thyroid mass   • LIANNA (acute kidney injury)       Therapy Treatment          Rehabilitation Treatment Summary     Row Name 18 1421             Treatment Time/Intention    Discipline physical therapist  -LM      Document Type therapy note (daily note)  -LM      Subjective Information no complaints  -LM      Mode of Treatment physical therapy;individual therapy  -LM      Patient/Family Observations Spouse present.  -LM      Care Plan Review patient/other agree to care plan  -LM      Care Plan Review, Other Participant(s) spouse  -LM      Therapy Frequency (PT Clinical Impression) daily  -LM      Patient Effort good  -LM      Existing Precautions/Restrictions fall  -LM      Recorded by [LM] Jaky Elizalde, PT 18 1452      Row Name 18 1421             Vital Signs    O2 Delivery Pre Treatment room air  -LM      O2 Delivery Intra Treatment room air  -LM      O2 Delivery Post Treatment room air  -LM      Pre Patient Position Supine  -LM      Intra Patient Position Standing  -LM      Post Patient Position Supine  -LM      Recorded by [LM] Jaky Elizalde, PT 18 1454      Row Name 18 1421             Cognitive Assessment/Intervention    Additional Documentation Cognitive Assessment/Intervention (Group)  -LM      Recorded by [LM] Jaky Elizalde, PT 18 145      Row Name  06/04/18 1421             Cognitive Assessment/Intervention- PT/OT    Affect/Mental Status (Cognitive) confused   mildly  -LM      Orientation Status (Cognition) oriented to;person  -LM      Follows Commands (Cognition) over 90% accuracy  -LM      Cognitive Function (Cognitive) safety deficit  -LM      Safety Deficit (Cognitive) mild deficit;insight into deficits/self awareness;problem solving;judgment;safety precautions awareness;awareness of need for assistance  -LM      Recorded by [LM] Jaky lEizalde, PT 06/04/18 1454      Row Name 06/04/18 1421             Safety Issues, Functional Mobility    Safety Issues Affecting Function (Mobility) insight into deficits/self awareness;safety precaution awareness;safety precautions follow-through/compliance  -LM      Impairments Affecting Function (Mobility) balance;strength  -LM      Recorded by [LM] Jaky Elizalde, PT 06/04/18 1454      Row Name 06/04/18 1421             Bed Mobility Assessment/Treatment    Bed Mobility Assessment/Treatment supine-sit;sit-supine  -LM      Supine-Sit [place] (Bed Mobility) minimum assist (75% patient effort);verbal cues  -LM      Sit-Supine [place] (Bed Mobility) contact guard;verbal cues  -LM      Assistive Device (Bed Mobility) bed rails;head of bed elevated  -LM      Comment (Bed Mobility) Verbal cues for sequencing, pt required Jayme at trunk to raise into upright sitting.  -LM      Recorded by [LM] Jaky Elizalde, PT 06/04/18 1454      Row Name 06/04/18 1421             Transfer Assessment/Treatment    Transfer Assessment/Treatment sit-stand transfer;stand-sit transfer  -LM      Comment (Transfers) Verbal cues for correct hand placement.  -LM      Sit-Stand [place] (Transfers) contact guard;verbal cues  -LM      Stand-Sit [place] (Transfers) contact guard;verbal cues  -LM      Recorded by [LM] Jaky Elizalde, PT 06/04/18 1454      Row Name 06/04/18 1421             Sit-Stand Transfer    Assistive Device  "(Sit-Stand Transfers) walker, front-wheeled  -LM      Recorded by [LM] Jaky Elizalde, PT 06/04/18 1454      Row Name 06/04/18 1421             Stand-Sit Transfer    Assistive Device (Stand-Sit Transfers) walker, front-wheeled  -LM      Recorded by [LM] Jaky FULTON Fide, PT 06/04/18 1454      Row Name 06/04/18 1421             Gait/Stairs Assessment/Training    [place] Level (Gait) contact guard;verbal cues  -LM      Assistive Device (Gait) walker, front-wheeled  -LM      Distance in Feet (Gait) 250  -LM      Pattern (Gait) step-through  -LM      Deviations/Abnormal Patterns (Gait) bilateral deviations;deyanira decreased;stride length decreased  -LM      Bilateral Gait Deviations forward flexed posture;heel strike decreased  -LM      Comment (Gait/Stairs) Pt ambulated with step-through pattern at slow pace. Pt did not attend to cues to step closer to RW, pt reported \"it did not feel comfortable.\" Verbal cues to remain within RW at all times and upright posture. Gait limited by fatigue.  -LM      Recorded by [LM] Jaky K Fide, PT 06/04/18 1454      Row Name 06/04/18 1421             Motor Skills Assessment/Interventions    Additional Documentation Therapeutic Exercise (Group)  -LM      Recorded by [LM] Jaky K Fide, PT 06/04/18 1454      Row Name 06/04/18 1421             Therapeutic Exercise    Additional Documentation Therapeutic Exercise (Row)  -LM      Recorded by [LM] Jaky Elizalde, PT 06/04/18 1454      Row Name 06/04/18 1421             Therapeutic Exercise    Lower Extremity (Therapeutic Exercise) gluteal sets;quad sets, bilateral  -LM      Lower Extremity Range of Motion (Therapeutic Exercise) ankle dorsiflexion/plantar flexion, bilateral  -LM      Recorded by [LM] Jaky KIRSTIN Elizalde, PT 06/04/18 1454      Row Name 06/04/18 1421             Positioning and Restraints    Pre-Treatment Position in bed  -LM      Post Treatment Position bed  -LM      In Bed notified nsg;supine;call light within " reach;encouraged to call for assist;exit alarm on;with family/caregiver  -LM      Recorded by [LM] Jaky Elizalde, PT 06/04/18 1454      Row Name 06/04/18 1421             Pain Assessment    Additional Documentation Pain Scale: Numbers Pre/Post-Treatment (Group)  -LM      Recorded by [LM] Jaky Elizalde, PT 06/04/18 1454      Row Name 06/04/18 1421             Pain Scale: Numbers Pre/Post-Treatment    Pain Scale: Numbers, Pretreatment 0/10 - no pain  -LM      Pain Scale: Numbers, Post-Treatment 0/10 - no pain  -LM      Recorded by [LM] Jaky Elizalde, PT 06/04/18 1454      Row Name 06/04/18 1421             Plan of Care Review    Plan of Care Reviewed With patient  -LM      Recorded by [LM] Jaky Elizalde, PT 06/04/18 1454      Row Name 06/04/18 1421             Outcome Summary/Treatment Plan (PT)    Daily Summary of Progress (PT) progress toward functional goals is good  -LM      Recorded by [] Jaky Elizalde, PT 06/04/18 1454        User Key  (r) = Recorded By, (t) = Taken By, (c) = Cosigned By    Initials Name Effective Dates Discipline    LM Jaky Elizalde, PT 04/03/18 -  PT                     Physical Therapy Education     Title: PT OT SLP Therapies (Done)     Topic: Physical Therapy (Done)     Point: Mobility training (Done)    Learning Progress Summary     Learner Status Readiness Method Response Comment Documented by    Patient Done Acceptance E,D VU,NR Reviewed benefits of activity, safety with mobility/transfers.  06/04/18 1454     Done Acceptance E,TB VU,Bed IU,NR   06/03/18 1527     Done Acceptance E VU,NR BENEFITS OF OOB ACTIVITY, SAFETY WITH MOBILITY, PROGRESSION OF POC, D/C PLANNING.  06/02/18 0937    Significant Other Done Acceptance E,TB VU,Bed IU,NR   06/03/18 1527     Done Acceptance E VU,NR BENEFITS OF OOB ACTIVITY, SAFETY WITH MOBILITY, PROGRESSION OF POC, D/C PLANNING.  06/02/18 0937          Point: Home exercise program (Done)    Learning Progress Summary     Learner Status  Readiness Method Response Comment Documented by    Patient Done Acceptance E,D VU,NR Reviewed benefits of activity, safety with mobility/transfers.  06/04/18 1454     Done Acceptance E,TB VU,Bed IU,NR   06/03/18 1527     Done Acceptance E VU,NR BENEFITS OF OOB ACTIVITY, SAFETY WITH MOBILITY, PROGRESSION OF POC, D/C PLANNING.  06/02/18 0937    Significant Other Done Acceptance E,TB VU,Bed IU,NR   06/03/18 1527     Done Acceptance E VU,NR BENEFITS OF OOB ACTIVITY, SAFETY WITH MOBILITY, PROGRESSION OF POC, D/C PLANNING.  06/02/18 0937          Point: Body mechanics (Done)    Learning Progress Summary     Learner Status Readiness Method Response Comment Documented by    Patient Done Acceptance E,D VU,NR Reviewed benefits of activity, safety with mobility/transfers.  06/04/18 1454     Done Acceptance E,TB VU,Bed IU,NR   06/03/18 1527     Done Acceptance E VU,NR BENEFITS OF OOB ACTIVITY, SAFETY WITH MOBILITY, PROGRESSION OF POC, D/C PLANNING.  06/02/18 0937    Significant Other Done Acceptance E,TB VU,Bed IU,NR   06/03/18 1527     Done Acceptance E VU,NR BENEFITS OF OOB ACTIVITY, SAFETY WITH MOBILITY, PROGRESSION OF POC, D/C PLANNING.  06/02/18 0937          Point: Precautions (Done)    Learning Progress Summary     Learner Status Readiness Method Response Comment Documented by    Patient Done Acceptance E,D VU,NR Reviewed benefits of activity, safety with mobility/transfers.  06/04/18 1454     Done Acceptance E,TB VU,Bed IU,NR   06/03/18 1527     Done Acceptance E VU,NR BENEFITS OF OOB ACTIVITY, SAFETY WITH MOBILITY, PROGRESSION OF POC, D/C PLANNING.  06/02/18 0937    Significant Other Done Acceptance E,TB VU,Bed IU,NR   06/03/18 1527     Done Acceptance E VU,NR BENEFITS OF OOB ACTIVITY, SAFETY WITH MOBILITY, PROGRESSION OF POC, D/C PLANNING.  06/02/18 0937                      User Key     Initials Effective Dates Name Provider Type Discipline     06/19/15 -  Franca Reveles, PT Physical  Therapist PT    KAREN 11/10/16 -  Nohemy Jackson RN Registered Nurse Nurse     04/03/18 -  Jaky Elizalde, PT Physical Therapist PT                    PT Recommendation and Plan  Therapy Frequency (PT Clinical Impression): daily  Outcome Summary/Treatment Plan (PT)  Daily Summary of Progress (PT): progress toward functional goals is good  Plan of Care Reviewed With: patient  Outcome Summary: Pt ambulated 250 feet with CGA and RW, limited by fatigue. Pt required cues for safety throughout. Initiated HEP and will progress with mobility as able.           Outcome Measures     Row Name 06/04/18 1421 06/02/18 0835          How much help from another person do you currently need...    Turning from your back to your side while in flat bed without using bedrails? 3  -LM 4  -CD     Moving from lying on back to sitting on the side of a flat bed without bedrails? 3  -LM 3  -CD     Moving to and from a bed to a chair (including a wheelchair)? 3  -LM 3  -CD     Standing up from a chair using your arms (e.g., wheelchair, bedside chair)? 3  -LM 3  -CD     Climbing 3-5 steps with a railing? 3  -LM 3  -CD     To walk in hospital room? 3  -LM 3  -CD     AM-PAC 6 Clicks Score 18  -LM 19  -CD        Functional Assessment    Outcome Measure Options AM-PAC 6 Clicks Basic Mobility (PT)  -LM AM-PAC 6 Clicks Basic Mobility (PT)  -CD       User Key  (r) = Recorded By, (t) = Taken By, (c) = Cosigned By    Initials Name Provider Type    CD Franca Reveles, PT Physical Therapist    ALIA Elizalde, PT Physical Therapist           Time Calculation:         PT Charges     Row Name 06/04/18 1455             Time Calculation    Start Time 1421  -LM      PT Received On 06/04/18  -LM      PT Goal Re-Cert Due Date 06/12/18  -LM         Time Calculation- PT    Total Timed Code Minutes- PT 20 minute(s)  -        User Key  (r) = Recorded By, (t) = Taken By, (c) = Cosigned By    Initials Name Provider Type    ALIA Elizalde PT Physical  Therapist          Therapy Charges for Today     Code Description Service Date Service Provider Modifiers Qty    62653923723 HC PT THER PROC EA 15 MIN 6/4/2018 Jaky Elizalde, PT GP 1          PT G-Codes  Outcome Measure Options: AM-PAC 6 Clicks Basic Mobility (PT)    Jaky Elizalde PT  6/4/2018

## 2018-06-04 NOTE — PROGRESS NOTES
"                  Clinical Nutrition     Nutrition Assessment  Reason for Visit:   Follow-up protocol      Patient Name: Shantelle Decker  YOB: 1940  MRN: [id number]  Date of Encounter: 06/04/18 12:52 PM  Admission date: 5/30/2018      Nutrition Assessment   Assessment       Hospital Problem List  Principal Problem:    Acute renal failure  Active Problems:    Coronary artery disease without angina pectoris    Lactic acidosis    Normocytic anemia    Dementia    Essential hypertension    History of breast cancer    Gastroenteritis    Thyroid mass    LIANNA (acute kidney injury)      PMH: She  has a past medical history of Breast cancer; Coronary artery disease; Dementia; Dysrhythmia; and Hypertension.   PSxH: She  has a past surgical history that includes Cardiac defibrillator placement; Adenoidectomy; Tonsillectomy; Mastectomy (Bilateral); Breast surgery; and Exploratory Laparotomy (N/A, 7/16/2016).       Anthropometrics     Height: 165.1 cm (65\")  Last filed wt: Weight: 77 kg (169 lb 12.1 oz) (05/31/18 1810)  Weight Method: Bed scale    BMI: BMI (Calculated): 28.2  Overweight: 25.0-29.9kg/m2     Ideal Body Weight (IBW) (kg): 57.29      Labs reviewed     )  Results from last 7 days  Lab Units 06/04/18  0939  05/31/18  0450   SODIUM mmol/L 143  < > 138   POTASSIUM mmol/L 3.8  < > 3.7   CHLORIDE mmol/L 120*  < > 114*   CO2 mmol/L 19.0*  < > 12.0*   BUN mg/dL 29*  < > 101*   CREATININE mg/dL 1.70*  < > 4.00*   CALCIUM mg/dL 7.4*  < > 8.1*   BILIRUBIN mg/dL  --   --  0.5   ALK PHOS U/L  --   --  43   ALT (SGPT) U/L  --   --  9   AST (SGOT) U/L  --   --  9   GLUCOSE mg/dL 106*  < > 99   < > = values in this interval not displayed.        No results found for: HGBA1C    Intake/Ouptut 24 hrs (7:00AM - 6:59 AM)     Intake & Output (last day)       06/03 0701 - 06/04 0700 06/04 0701 - 06/05 0700    P.O. 1200     I.V. (mL/kg) 1958 (25.4) 909 (11.8)    Total Intake(mL/kg) 3158 (41) 909 (11.8)    Urine (mL/kg/hr) 550 " (0.3)     Stool 0 (0)     Total Output 550      Net +2608 +909          Unmeasured Urine Occurrence 3 x 1 x    Unmeasured Stool Occurrence 5 x 1 x          Current Nutrition Prescription     PO: Diet Regular; Cardiac, Renal    Intake:  Per charting pt consuming 75% of 6 meals          Nutrition Diagnosis       Problem Nutrition appropriate for condition at this time   Etiology    Signs/Symptoms PO intake 75% of 6 meals       Monitoring/Evaluation:   Per protocol, PO intake      Will Continue to follow per protocol      Taisha JEFFERSON Waits  Time Spent: 15 minutes

## 2018-06-05 LAB
ANION GAP SERPL CALCULATED.3IONS-SCNC: 3 MMOL/L (ref 3–11)
BUN BLD-MCNC: 24 MG/DL (ref 9–23)
BUN/CREAT SERPL: 16 (ref 7–25)
CALCIUM SPEC-SCNC: 7.2 MG/DL (ref 8.7–10.4)
CHLORIDE SERPL-SCNC: 123 MMOL/L (ref 99–109)
CO2 SERPL-SCNC: 16 MMOL/L (ref 20–31)
CREAT BLD-MCNC: 1.5 MG/DL (ref 0.6–1.3)
GFR SERPL CREATININE-BSD FRML MDRD: 33 ML/MIN/1.73
GLUCOSE BLD-MCNC: 72 MG/DL (ref 70–100)
POTASSIUM BLD-SCNC: 3.7 MMOL/L (ref 3.5–5.5)
SODIUM BLD-SCNC: 142 MMOL/L (ref 132–146)

## 2018-06-05 PROCEDURE — 80048 BASIC METABOLIC PNL TOTAL CA: CPT | Performed by: INTERNAL MEDICINE

## 2018-06-05 PROCEDURE — 25010000002 HEPARIN (PORCINE) PER 1000 UNITS: Performed by: NURSE PRACTITIONER

## 2018-06-05 PROCEDURE — 99232 SBSQ HOSP IP/OBS MODERATE 35: CPT | Performed by: INTERNAL MEDICINE

## 2018-06-05 RX ORDER — SODIUM CHLORIDE 450 MG/100ML
75 INJECTION, SOLUTION INTRAVENOUS CONTINUOUS
Status: DISCONTINUED | OUTPATIENT
Start: 2018-06-05 | End: 2018-06-06 | Stop reason: HOSPADM

## 2018-06-05 RX ORDER — SODIUM BICARBONATE 650 MG/1
650 TABLET ORAL 3 TIMES DAILY
Status: DISCONTINUED | OUTPATIENT
Start: 2018-06-05 | End: 2018-06-06 | Stop reason: HOSPADM

## 2018-06-05 RX ORDER — DIPHENOXYLATE HYDROCHLORIDE AND ATROPINE SULFATE 2.5; .025 MG/1; MG/1
1 TABLET ORAL 4 TIMES DAILY PRN
Status: DISCONTINUED | OUTPATIENT
Start: 2018-06-05 | End: 2018-06-06 | Stop reason: HOSPADM

## 2018-06-05 RX ORDER — SODIUM BICARBONATE 650 MG/1
650 TABLET ORAL 2 TIMES DAILY
Qty: 9 TABLET | Refills: 0 | Status: CANCELLED | OUTPATIENT
Start: 2018-06-05 | End: 2018-06-10

## 2018-06-05 RX ADMIN — ASPIRIN 81 MG: 81 TABLET, COATED ORAL at 09:40

## 2018-06-05 RX ADMIN — DONEPEZIL HYDROCHLORIDE 10 MG: 10 TABLET, FILM COATED ORAL at 21:10

## 2018-06-05 RX ADMIN — HEPARIN SODIUM 5000 UNITS: 5000 INJECTION, SOLUTION INTRAVENOUS; SUBCUTANEOUS at 21:10

## 2018-06-05 RX ADMIN — SODIUM CHLORIDE 75 ML/HR: 4.5 INJECTION, SOLUTION INTRAVENOUS at 12:15

## 2018-06-05 RX ADMIN — SODIUM BICARBONATE 650 MG TABLET 650 MG: at 16:00

## 2018-06-05 RX ADMIN — SODIUM BICARBONATE 650 MG TABLET 650 MG: at 21:10

## 2018-06-05 RX ADMIN — DIPHENOXYLATE HYDROCHLORIDE AND ATROPINE SULFATE 1 TABLET: 2.5; .025 TABLET ORAL at 16:00

## 2018-06-05 RX ADMIN — SODIUM BICARBONATE 650 MG TABLET 650 MG: at 12:14

## 2018-06-05 RX ADMIN — SODIUM BICARBONATE 650 MG TABLET 650 MG: at 09:40

## 2018-06-05 NOTE — PLAN OF CARE
Problem: Fall Risk (Adult)  Goal: Identify Related Risk Factors and Signs and Symptoms  Outcome: Ongoing (interventions implemented as appropriate)   06/03/18 0436   Fall Risk (Adult)   Related Risk Factors (Fall Risk) age-related changes;fatigue/slow reaction;gait/mobility problems   Signs and Symptoms (Fall Risk) presence of risk factors     Goal: Absence of Fall  Outcome: Ongoing (interventions implemented as appropriate)   06/04/18 2341   Fall Risk (Adult)   Absence of Fall making progress toward outcome

## 2018-06-05 NOTE — PROGRESS NOTES
"   LOS: 5 days    Patient Care Team:  Raghavendra Mei MD as PCP - General (Family Medicine)    Reason For Visit:  F/U LIANNA  Subjective         No new complaints  Review of Systems:    Pulm: No soa   CV:  No CP      Objective       aspirin 81 mg Oral Daily   donepezil 10 mg Oral Nightly   heparin (porcine) 5,000 Units Subcutaneous Q12H   sodium bicarbonate 650 mg Oral BID       sodium chloride 125 mL/hr Last Rate: 125 mL/hr (06/04/18 2027)         Vital Signs:  Blood pressure 117/71, pulse 59, temperature 97.8 °F (36.6 °C), temperature source Oral, resp. rate 16, height 165.1 cm (65\"), weight 77 kg (169 lb 12.1 oz), SpO2 98 %.    Flowsheet Rows      First Filed Value   Admission Height  165.1 cm (65\") Documented at 05/31/2018 1810   Admission Weight  77 kg (169 lb 12.1 oz) Documented at 05/30/2018 2108          06/04 0701 - 06/05 0700  In: 2639 [P.O.:720; I.V.:1919]  Out: -     Physical Exam:    General Appearance: NAD, alert and cooperative, Ox3  Eyes: PER, conjunctivae and sclerae normal, no icterus  Lungs: respirations regular and unlabored, no crepitus, clear to auscultation  Heart/CV: regular rhythm & normal rate, no murmur, no gallop, no rub and no edema  Abdomen: not distended, soft, non-tender, no masses,  bowel sounds present  Skin: No rash, Warm and dry    Radiology:            Labs:    Results from last 7 days  Lab Units 06/04/18  0939 06/01/18  0742 05/31/18  0450   WBC 10*3/mm3 5.83 7.65 7.23   HEMOGLOBIN g/dL 9.1* 9.7* 10.2*   HEMATOCRIT % 27.0* 28.1* 29.7*   PLATELETS 10*3/mm3 174 170 188       Results from last 7 days  Lab Units 06/05/18  0329 06/04/18  0939 06/03/18  0812 06/02/18  0727 06/01/18  0742 05/31/18  0450 05/30/18 2029   SODIUM mmol/L 142 143 143 144 142 138 137   POTASSIUM mmol/L 3.7 3.8 4.2 4.4 3.6 3.7 3.8   CHLORIDE mmol/L 123* 120* 120* 121* 120* 114* 111*   CO2 mmol/L 16.0* 19.0* 15.0* 16.0* 16.0* 12.0* 14.0*   BUN mg/dL 24* 29* 42* 59* 76* 101* 104*   CREATININE mg/dL 1.50* 1.70* " 1.90* 2.30* 2.80* 4.00* 4.60*   CALCIUM mg/dL 7.2* 7.4* 7.6* 7.4* 8.1* 8.1* 8.3*   PHOSPHORUS mg/dL  --  2.3*  --  3.5 4.5  --  5.8*   ALBUMIN g/dL  --  2.70*  --  2.70* 3.00* 3.20  2.9 3.40       Results from last 7 days  Lab Units 06/05/18  0329   GLUCOSE mg/dL 72         Results from last 7 days  Lab Units 05/31/18  0450   ALK PHOS U/L 43   BILIRUBIN mg/dL 0.5   ALT (SGPT) U/L 9   AST (SGOT) U/L 9             Results from last 7 days  Lab Units 05/31/18  0155   COLOR UA  Yellow   CLARITY UA  Clear   PH, URINE  <=5.0   SPECIFIC GRAVITY, URINE  1.013   GLUCOSE UA  Negative   KETONES UA  Negative   BILIRUBIN UA  Negative   PROTEIN UA  Negative   BLOOD UA  Trace*   LEUKOCYTES UA  Negative   NITRITE UA  Negative       Estimated Creatinine Clearance: 30.7 mL/min (A) (by C-G formula based on SCr of 1.5 mg/dL (H)).      Assessment     Principal Problem:    Acute renal failure  Active Problems:    Coronary artery disease without angina pectoris    Lactic acidosis    Normocytic anemia    Dementia    Essential hypertension    History of breast cancer    Gastroenteritis    Thyroid mass    LIANNA (acute kidney injury)            Impression:   LIANNA FROM HYPOTENSION, VOLUME DEPLETION AND ACE. GFR IMPROVING.   NEMIA.            Recommendations:   Continue to encourage po intake  Change fluids to 0.45% sol  Sodium will improve if she drink more water  Will add sodium bicarbonate      Artie Parkinson MD  06/05/18  10:31 AM

## 2018-06-05 NOTE — PROGRESS NOTES
[facility] Medicine Services  PROGRESS NOTE    Patient Name: Shantelle Decker  : 1940  MRN: [id number]    Date of Admission: 2018  Length of Stay: 5  Primary Care Physician: Raghavendra Mei MD    Subjective   Subjective     CC:  F/u ARF    HPI:  Breathing is good.   She denies having diarrhea but husb and RN confirm it - 2-3 eps today    Review of Systems   Gen- No fevers, chills  CV- No chest pain, palpitations  Resp- No cough, dyspnea  GI- No N/V/D, abd pain    Otherwise ROS is negative except as mentioned in the HPI.    Objective   Objective     Vital Signs:   Temp:  [97.8 °F (36.6 °C)-99 °F (37.2 °C)] 98.1 °F (36.7 °C)  Heart Rate:  [59-74] 63  Resp:  [16-20] 20  BP: ()/(49-71) 126/67        Physical Exam:  Constitutional: No acute distress, asleep but wakes easily.   present.   HENT: NCAT, mucous membranes moist  Respiratory: Clear to auscultation bilaterally, respiratory effort normal.  On RA.    Cardiovascular: RRR, no murmurs, rubs, or gallops, palpable pedal pulses bilaterally  Gastrointestinal: Positive bowel sounds, soft, nontender, nondistended.  Obese  Musculoskeletal: No bilateral ankle edema.  GARCIA spont   Psychiatric: Appropriate affect, cooperative and pleasant.  Limited insight and judgement  Neurologic: Oriented x 3, strength symmetric in all extremities, Cranial Nerves grossly intact to confrontation, speech clear and appropriate.  Slightly hard of hearing.  Follows commands.  Appears to have memory deficit  Skin: No rashes      Results Reviewed:  I have personally reviewed current lab, radiology, and data and agree.      Results from last 7 days  Lab Units 18  0939 18  0742 18  0450 18   WBC 10*3/mm3 5.83 7.65 7.23 7.51   HEMOGLOBIN g/dL 9.1* 9.7* 10.2* 10.4*   HEMATOCRIT % 27.0* 28.1* 29.7* 30.0*   PLATELETS 10*3/mm3 174 170 188 173   INR   --   --   --  1.02       Results from last 7 days  Lab Units 18  4605  06/04/18  0939 06/03/18  0812  05/31/18  0450 05/30/18 2029   SODIUM mmol/L 142 143 143  < > 138 137   POTASSIUM mmol/L 3.7 3.8 4.2  < > 3.7 3.8   CHLORIDE mmol/L 123* 120* 120*  < > 114* 111*   CO2 mmol/L 16.0* 19.0* 15.0*  < > 12.0* 14.0*   BUN mg/dL 24* 29* 42*  < > 101* 104*   CREATININE mg/dL 1.50* 1.70* 1.90*  < > 4.00* 4.60*   GLUCOSE mg/dL 72 106* 85  < > 99 93   CALCIUM mg/dL 7.2* 7.4* 7.6*  < > 8.1* 8.3*   ALT (SGPT) U/L  --   --   --   --  9 9   AST (SGOT) U/L  --   --   --   --  9 12   < > = values in this interval not displayed.  Estimated Creatinine Clearance: 30.7 mL/min (A) (by C-G formula based on SCr of 1.5 mg/dL (H)).  No results found for: BNP  No results found for: PHART    Microbiology Results Abnormal     Procedure Component Value - Date/Time    Clostridium Difficile Toxin - Stool, Per Rectum [[id number]] Collected:  06/02/18 1429    Lab Status:  Final result Specimen:  Stool from Per Rectum Updated:  06/02/18 1719    Narrative:       The following orders were created for panel order Clostridium Difficile Toxin - Stool, Per Rectum.  Procedure                               Abnormality         Status                     ---------                               -----------         ------                     Clostridium Difficile To...[[id number]]  Normal              Final result                 Please view results for these tests on the individual orders.    Clostridium Difficile Toxin, PCR - Stool, Per Rectum [[id number]]  (Normal) Collected:  06/02/18 1429    Lab Status:  Final result Specimen:  Stool from Per Rectum Updated:  06/02/18 1719     C. Difficile Toxins by PCR Not Detected    Narrative:         Performance characteristics of test not established for patients <2 years of age.  Negative for Toxigenic C. Difficile    Eosinophil Smear - Urine, Urine, Clean Catch [[id number]]  (Normal) Collected:  05/31/18 0155    Lab Status:  Final result Specimen:  Urine from Urine, Catheter In/Out Updated:   05/31/18 0309     Eosinophil Smear 0 % EOS/100 Cells     Narrative:       No eosinophil seen          Imaging Results (last 24 hours)     ** No results found for the last 24 hours. **             I have reviewed the medications.    Assessment/Plan   Assessment / Plan     Hospital Problem List     * (Principal)Acute renal failure    Coronary artery disease without angina pectoris    Lactic acidosis    Normocytic anemia    Dementia    Essential hypertension    History of breast cancer    Gastroenteritis    Thyroid mass    LIANNA (acute kidney injury)             Brief Hospital Course to date:  Shantelle Decker is a 78 y.o. female with a past medical history significant for CAD, essential hypertension, breast cancer and dementia presented initially to [facility] due to abnormal creatinine level.  Patient was seen by PCP about a week ago due to weakness, nausea, vomiting, diarrhea and fatigue likey related to viral illness as other family was also sick at that time with same.  She had lab work drawn at PCP office, since then PCP had been trying to get a hold of the patient since last week due to creatinine level of 4 but was unsuccessful. On 5/30/18 patient presented back to PCP for follow up and patient was sent to the ED for her abnormal lab findings. Evaluation at [facility] included creatinine 5.8, CT chest abdomen and pelvis unremarkable except thyroid mass, elevated lactate at 2.2.  She was given fluids and transferred to [facility] for of renal consultation.    Assessment & Plan:    LIANNA  - improving  - continue IVF's as dehydration and ATN of ongoing ACEI use while dehydrated suspected  -- holding acei    - U/S thyroid with enlarged nodules and rec'd repeat imaging 6 months, TSH normal    --BP continues to run low end of nl but stable, asymptomatic, monitor.      Diarrrhea  -- await resolution      DVT Prophylaxis:  SQ hep    CODE STATUS: Full Code    Disposition: I expect  the patient to be discharged in AM    Electronically signed by Oumou Dacosta MD, 06/05/18, 3:00 PM.

## 2018-06-06 VITALS
HEART RATE: 60 BPM | HEIGHT: 65 IN | TEMPERATURE: 97.9 F | WEIGHT: 169.75 LBS | OXYGEN SATURATION: 95 % | SYSTOLIC BLOOD PRESSURE: 136 MMHG | RESPIRATION RATE: 16 BRPM | DIASTOLIC BLOOD PRESSURE: 73 MMHG | BODY MASS INDEX: 28.28 KG/M2

## 2018-06-06 PROBLEM — E87.20 LACTIC ACIDOSIS: Status: RESOLVED | Noted: 2018-05-30 | Resolved: 2018-06-06

## 2018-06-06 PROBLEM — N17.9 ACUTE RENAL FAILURE: Status: RESOLVED | Noted: 2018-05-30 | Resolved: 2018-06-06

## 2018-06-06 PROBLEM — N17.9 AKI (ACUTE KIDNEY INJURY): Status: RESOLVED | Noted: 2018-05-31 | Resolved: 2018-06-06

## 2018-06-06 PROBLEM — K52.9 GASTROENTERITIS: Status: RESOLVED | Noted: 2018-05-31 | Resolved: 2018-06-06

## 2018-06-06 LAB
ANION GAP SERPL CALCULATED.3IONS-SCNC: 5 MMOL/L (ref 3–11)
BUN BLD-MCNC: 20 MG/DL (ref 9–23)
BUN/CREAT SERPL: 14.5 (ref 7–25)
CALCIUM SPEC-SCNC: 7.5 MG/DL (ref 8.7–10.4)
CHLORIDE SERPL-SCNC: 118 MMOL/L (ref 99–109)
CO2 SERPL-SCNC: 18 MMOL/L (ref 20–31)
CREAT BLD-MCNC: 1.38 MG/DL (ref 0.6–1.3)
GFR SERPL CREATININE-BSD FRML MDRD: 37 ML/MIN/1.73
GLUCOSE BLD-MCNC: 86 MG/DL (ref 70–100)
POTASSIUM BLD-SCNC: 3.6 MMOL/L (ref 3.5–5.5)
SODIUM BLD-SCNC: 141 MMOL/L (ref 132–146)

## 2018-06-06 PROCEDURE — 25010000002 HEPARIN (PORCINE) PER 1000 UNITS: Performed by: NURSE PRACTITIONER

## 2018-06-06 PROCEDURE — 99239 HOSP IP/OBS DSCHRG MGMT >30: CPT | Performed by: NURSE PRACTITIONER

## 2018-06-06 PROCEDURE — 80048 BASIC METABOLIC PNL TOTAL CA: CPT | Performed by: INTERNAL MEDICINE

## 2018-06-06 RX ADMIN — SODIUM BICARBONATE 650 MG TABLET 650 MG: at 08:33

## 2018-06-06 RX ADMIN — HEPARIN SODIUM 5000 UNITS: 5000 INJECTION, SOLUTION INTRAVENOUS; SUBCUTANEOUS at 08:33

## 2018-06-06 RX ADMIN — ONDANSETRON 4 MG: 4 TABLET, FILM COATED ORAL at 08:35

## 2018-06-06 RX ADMIN — ASPIRIN 81 MG: 81 TABLET, COATED ORAL at 08:33

## 2018-06-06 NOTE — PROGRESS NOTES
"   LOS: 6 days    Patient Care Team:  Raghavendra Mei MD as PCP - General (Family Medicine)    Reason For Visit:  F/U LIANNA  Subjective         No new complaints  Still not able to eat full tray  Review of Systems:    Pulm: No soa   CV:  No CP      Objective       aspirin 81 mg Oral Daily   donepezil 10 mg Oral Nightly   heparin (porcine) 5,000 Units Subcutaneous Q12H   sodium bicarbonate 650 mg Oral TID       sodium chloride 75 mL/hr Last Rate: 75 mL/hr (06/05/18 1215)         Vital Signs:  Blood pressure 111/68, pulse 70, temperature 98.4 °F (36.9 °C), temperature source Oral, resp. rate 18, height 165.1 cm (65\"), weight 77 kg (169 lb 12.1 oz), SpO2 100 %.    Flowsheet Rows      First Filed Value   Admission Height  165.1 cm (65\") Documented at 05/31/2018 1810   Admission Weight  77 kg (169 lb 12.1 oz) Documented at 05/30/2018 2108          06/05 0701 - 06/06 0700  In: 2339 [P.O.:1640; I.V.:699]  Out: -     Physical Exam:    General Appearance: NAD, alert and cooperative, Ox3  Eyes: PER, conjunctivae and sclerae normal, no icterus  Lungs: respirations regular and unlabored, no crepitus, clear to auscultation  Heart/CV: regular rhythm & normal rate, no murmur, no gallop, no rub and no edema  Abdomen: not distended, soft, non-tender, no masses,  bowel sounds present  Skin: No rash, Warm and dry    Radiology:            Labs:    Results from last 7 days  Lab Units 06/04/18  0939 06/01/18  0742 05/31/18  0450   WBC 10*3/mm3 5.83 7.65 7.23   HEMOGLOBIN g/dL 9.1* 9.7* 10.2*   HEMATOCRIT % 27.0* 28.1* 29.7*   PLATELETS 10*3/mm3 174 170 188       Results from last 7 days  Lab Units 06/06/18  0705 06/05/18  0329 06/04/18  0939 06/03/18  0812 06/02/18  0727 06/01/18  0742 05/31/18  0450 05/30/18  2029   SODIUM mmol/L 141 142 143 143 144 142 138 137   POTASSIUM mmol/L 3.6 3.7 3.8 4.2 4.4 3.6 3.7 3.8   CHLORIDE mmol/L 118* 123* 120* 120* 121* 120* 114* 111*   CO2 mmol/L 18.0* 16.0* 19.0* 15.0* 16.0* 16.0* 12.0* 14.0*   BUN mg/dL " 20 24* 29* 42* 59* 76* 101* 104*   CREATININE mg/dL 1.38* 1.50* 1.70* 1.90* 2.30* 2.80* 4.00* 4.60*   CALCIUM mg/dL 7.5* 7.2* 7.4* 7.6* 7.4* 8.1* 8.1* 8.3*   PHOSPHORUS mg/dL  --   --  2.3*  --  3.5 4.5  --  5.8*   ALBUMIN g/dL  --   --  2.70*  --  2.70* 3.00* 3.20  2.9 3.40       Results from last 7 days  Lab Units 06/06/18  0705   GLUCOSE mg/dL 86         Results from last 7 days  Lab Units 05/31/18  0450   ALK PHOS U/L 43   BILIRUBIN mg/dL 0.5   ALT (SGPT) U/L 9   AST (SGOT) U/L 9             Results from last 7 days  Lab Units 05/31/18  0155   COLOR UA  Yellow   CLARITY UA  Clear   PH, URINE  <=5.0   SPECIFIC GRAVITY, URINE  1.013   GLUCOSE UA  Negative   KETONES UA  Negative   BILIRUBIN UA  Negative   PROTEIN UA  Negative   BLOOD UA  Trace*   LEUKOCYTES UA  Negative   NITRITE UA  Negative       Estimated Creatinine Clearance: 33.4 mL/min (A) (by C-G formula based on SCr of 1.38 mg/dL (H)).      Assessment     Principal Problem:    Acute renal failure  Active Problems:    Coronary artery disease without angina pectoris    Lactic acidosis    Normocytic anemia    Dementia    Essential hypertension    History of breast cancer    Gastroenteritis    Thyroid mass    LIANNA (acute kidney injury)            Impression:   LIANNA FROM HYPOTENSION, VOLUME DEPLETION AND ACE. GFR IMPROVING.   NEMIA.            Recommendations:   Continue to encourage po intake  Change fluids to 0.45% sol- improving na  hc03 better      Artie Parkinson MD  06/06/18  9:27 AM

## 2018-06-06 NOTE — DISCHARGE SUMMARY
[facility] Medicine Services  DISCHARGE SUMMARY    Patient Name: Shantelle Decker  : 1940  MRN: [id number]    Date of Admission: 2018  Date of Discharge:  18  Primary Care Physician: Raghavendra Mei MD    Consults     Date and Time Order Name Status Description    2018 0030 Inpatient Nephrology Consult Completed         Hospital Course     Presenting Problem:   LIANNA (acute kidney injury) [N17.9]    Active Hospital Problems (** Indicates Principal Problem)    Diagnosis Date Noted   • Thyroid mass [E07.9] 2018   • Normocytic anemia [D64.9] 2018   • Dementia [F03.90] 2018   • Essential hypertension [I10] 2018   • History of breast cancer [Z85.3] 2018   • Coronary artery disease without angina pectoris [I25.10] 2016      Resolved Hospital Problems    Diagnosis Date Noted Date Resolved   • **Acute renal failure [N17.9] 2018   • Gastroenteritis [K52.9] 2018   • LIANNA (acute kidney injury) [N17.9] 2018   • Lactic acidosis [E87.2] 2018          Hospital Course:  Shantelle Decker is a 78 y.o. female with a past medical history significant for CAD, essential hypertension, breast cancer and dementia presented initially to [facility] due to abnormal creatinine level.  Patient was seen by PCP about a week ago due to weakness, nausea, vomiting, diarrhea and fatigue likey related to viral illness as other family was also sick at that time with same.  She had lab work drawn at PCP office, since then PCP had been trying to get a hold of the patient since last week due to creatinine level of 4 but was unsuccessful. On 18 patient presented back to PCP for follow up and patient was sent to the ED for her abnormal lab findings. Evaluation at [facility] included creatinine 5.8, CT chest abdomen and pelvis unremarkable except thyroid mass, elevated lactate  at 2.2.  She was given fluids and transferred to [facility] for of renal consultation.    Patient has been given IV fluids half-normal saline and started on sodium bicarbonate tablets with significant improvement in laboratory work.  Renal function has approached near baseline today with a creatinine of 1.3.  Patient is eating and drinking well.  No nausea vomiting or further diarrhea.  She will need [facility]up with [facility] in approximately 4 weeks.  She should continue to hold lisinopril and Lasix at this time due to lower blood pressure and recovering renal function.  Follow-up with primary care for bmp and blood pressure check in one week.  PCP to further manage all medications that are on hold.    Incidental finding of thyroid mass with enlarged nodules with recommended repeat imaging in 6 months.  TSH normal this hospitalization.  Patient to be discharged home today with follow-up to primary care in 1 week.              Day of Discharge     HPI:   Patient feeling well. Asking for william, sausage and eggs on exam (after she has eaten breakfast). No n/v or further diarrhea.    Review of Systems  Gen- No fevers, chills  CV- No chest pain, palpitations  Resp- No cough, dyspnea  GI- No N/V/D, abd pain      Otherwise ROS is negative except as mentioned in the HPI.    Vital Signs:   Temp:  [97.9 °F (36.6 °C)-98.9 °F (37.2 °C)] 98.4 °F (36.9 °C)  Heart Rate:  [62-70] 70  Resp:  [18-20] 18  BP: (111-129)/(62-75) 111/68     Physical Exam:  Constitutional: No acute distress, awake, alert  HENT: NCAT, mucous membranes moist  Respiratory: Clear to auscultation bilaterally, respiratory effort normal   Cardiovascular: RRR, no murmurs, rubs, or gallops, palpable pedal pulses bilaterally  Gastrointestinal: Positive bowel sounds, soft, nontender, nondistended  Musculoskeletal: No bilateral ankle edema  Psychiatric: Appropriate affect, cooperative  Neurologic: Oriented x 2, strength symmetric in all  extremities, Cranial Nerves grossly intact to confrontation, speech clear  Skin: No rashes      Pertinent  and/or Most Recent Results       Results from last 7 days  Lab Units 06/06/18  0705 06/05/18  0329 06/04/18  0939 06/03/18  0812 06/02/18  0727 06/01/18  0742 05/31/18 0450 05/30/18 2029   WBC 10*3/mm3  --   --  5.83  --   --  7.65 7.23 7.51   HEMOGLOBIN g/dL  --   --  9.1*  --   --  9.7* 10.2* 10.4*   HEMATOCRIT %  --   --  27.0*  --   --  28.1* 29.7* 30.0*   PLATELETS 10*3/mm3  --   --  174  --   --  170 188 173   SODIUM mmol/L 141 142 143 143 144 142 138 137   POTASSIUM mmol/L 3.6 3.7 3.8 4.2 4.4 3.6 3.7 3.8   CHLORIDE mmol/L 118* 123* 120* 120* 121* 120* 114* 111*   CO2 mmol/L 18.0* 16.0* 19.0* 15.0* 16.0* 16.0* 12.0* 14.0*   BUN mg/dL 20 24* 29* 42* 59* 76* 101* 104*   CREATININE mg/dL 1.38* 1.50* 1.70* 1.90* 2.30* 2.80* 4.00* 4.60*   GLUCOSE mg/dL 86 72 106* 85 89 101* 99 93   CALCIUM mg/dL 7.5* 7.2* 7.4* 7.6* 7.4* 8.1* 8.1* 8.3*       Results from last 7 days  Lab Units 05/31/18 0450 05/30/18 2029   BILIRUBIN mg/dL 0.5 0.5   ALK PHOS U/L 43 46   ALT (SGPT) U/L 9 9   AST (SGOT) U/L 9 12   PROTIME Seconds  --  10.7   INR   --  1.02   APTT seconds  --  25.8           Invalid input(s): TG, LDLCALC, LDLREALC    Results from last 7 days  Lab Units 05/31/18 0450   TSH mIU/mL 1.269     Brief Urine Lab Results  (Last result in the past 365 days)      Color   Clarity   Blood   Leuk Est   Nitrite   Protein   CREAT   Urine HCG        06/01/18 0104             74.7             Microbiology Results Abnormal     Procedure Component Value - Date/Time    Clostridium Difficile Toxin - Stool, Per Rectum [[id number]] Collected:  06/02/18 1429    Lab Status:  Final result Specimen:  Stool from Per Rectum Updated:  06/02/18 2669    Narrative:       The following orders were created for panel order Clostridium Difficile Toxin - Stool, Per Rectum.  Procedure                               Abnormality         Status                      ---------                               -----------         ------                     Clostridium Difficile To...[[id number]]  Normal              Final result                 Please view results for these tests on the individual orders.    Clostridium Difficile Toxin, PCR - Stool, Per Rectum [[id number]]  (Normal) Collected:  06/02/18 1429    Lab Status:  Final result Specimen:  Stool from Per Rectum Updated:  06/02/18 1719     C. Difficile Toxins by PCR Not Detected    Narrative:         Performance characteristics of test not established for patients <2 years of age.  Negative for Toxigenic C. Difficile    Eosinophil Smear - Urine, Urine, Clean Catch [[id number]]  (Normal) Collected:  05/31/18 0155    Lab Status:  Final result Specimen:  Urine from Urine, Catheter In/Out Updated:  05/31/18 0309     Eosinophil Smear 0 % EOS/100 Cells     Narrative:       No eosinophil seen          Imaging Results (all)     Procedure Component Value Units Date/Time    US Renal Limited [[id number]] Collected:  05/31/18 1315     Updated:  06/01/18 1532    Narrative:       EXAMINATION: US RENAL LIMITED- 05/31/2018     INDICATION: acute renal failure; renal insufficiency     TECHNIQUE: Sonographic imaging was obtained of the kidneys in both the  sagittal and transverse planes.     COMPARISON: NONE     FINDINGS: The right kidney measures in length from pole to pole 10.8 cm.  No solid cortical mass or renal cortical cysts seen within the right  kidney. No hydronephrosis or nephrolithiasis.     The left kidney measures in length from pole to pole 9.7 cm. No solid  cortical mass or renal cortical cysts seen within the left kidney. No  hydronephrosis or nephrolithiasis. The bladder is grossly unremarkable  in appearance.       Impression:       Unremarkable bilateral renal ultrasound.      D:  05/31/2018  E:  05/31/2018     This report was finalized on 6/1/2018 3:30 PM by Dr. Marisela Woods MD.       US Thyroid [[id number]]  Collected:  05/31/18 1323     Updated:  06/01/18 1532    Narrative:       EXAMINATION: US THYROID- 05/31/2018     INDICATION: bilateral thyroid masses on CT of chest; thyroid mass      TECHNIQUE: Sonographic imaging was obtained of the thyroid in both the  sagittal and transverse planes.     COMPARISON: NONE     FINDINGS: The isthmus is within normal limits or mildly thickened at 6  mm. No underlying mass or lesions identified.     The right lobe of the thyroid measures 4.5 x 2.2 x 3.3 cm. There is  enlargement of the right lobe of the thyroid the largest dominant nodule  measuring 3.8 x 2.1 x 2.9 cm. Minimal flow seen within the nodule.     The left lobe of the thyroid measures 4.0 x 1.8 x 1.2 cm. There is a  smaller nodule seen within the left lobe of the thyroid measuring 2.5 x  1.3 x 1.4 cm.       Impression:       Enlargement of the thyroid with dominant nodules identified  within the thyroid bilaterally. Continued follow-up in 6 months is  recommended for stability.      D:  05/31/2018  E:  05/31/2018     This report was finalized on 6/1/2018 3:30 PM by Dr. Marisela Woods MD.       XR Chest 1 View [[id number]] Collected:  05/30/18 2017     Updated:  05/30/18 2105    Narrative:       EXAM:  XR Chest, 1 View    CLINICAL HISTORY:  78 years old, female; Signs and symptoms; Other: Fatigue; Additional info:   Renal failure, fatigue    TECHNIQUE:  Frontal view of the chest.    COMPARISON:  CR - XR CHEST 1  2016-07-16 17:11    FINDINGS:  Lungs: Calcified granulomas within the right midlung zone.  Pleural space:  Normal.  No pneumothorax.  Heart:  Normal.  No cardiomegaly.  Mediastinum:  Normal.  Bones/joints:  Moderate degenerative changes of the glenohumeral and   acromioclavicular joints.  Humeral heads are high riding in relation to the   glenoid, with reciprocal remodeling of the undersurface of the acromion,   compatible with chronic rotator cuff arthropathy.  Soft tissues:  Surgical clips within the left  axilla.  Vasculature:  Atherosclerotic calcification of the thoracic aorta.  Tubes, lines and devices:  Subclavian transvenous pacemaker in place.      Impression:         1. No acute findings.    2. Non-acute findings are described above.    THIS DOCUMENT HAS BEEN ELECTRONICALLY SIGNED BY MANUEL HINOJOSA MD                           Discharge Details      Shantelle Decker   Home Medication Instructions ADRIANNE:[id number]    Printed on:06/06/18 5119   Medication Information                      aspirin 81 MG EC tablet  Take 81 mg by mouth daily.             donepezil (ARICEPT) 10 MG tablet  Take 10 mg by mouth every night.                   Discharge Disposition:  Home or Self Care    Discharge Diet:  Diet Instructions     Advance Diet As Tolerated             Discharge Activity:   Activity Instructions     Activity as Tolerated             Special Instructions:  Follow up BMP/ BP check with PCP 1 week    No future appointments.    Additional Instructions for the Follow-ups that You Need to Schedule     Discharge Follow-up with PCP    As directed      Follow Up Details:  1 week - BMP repeat         Discharge Follow-up with Specified Provider: GEN 4 weeks; 1 Month    As directed      To:  GEN 4 weeks    Follow Up:  1 Month               Time Spent on Discharge:  36 minutes    Electronically signed by ROSALINO Doherty, 06/06/18, 10:47 AM.

## 2021-03-02 ENCOUNTER — HOSPITAL ENCOUNTER (OUTPATIENT)
Facility: HOSPITAL | Age: 81
Setting detail: OBSERVATION
Discharge: HOME | End: 2021-03-05
Attending: EMERGENCY MEDICINE | Admitting: HOSPITALIST

## 2021-03-02 DIAGNOSIS — F02.81 ALZHEIMER'S DEMENTIA WITH BEHAVIORAL DISTURBANCE, UNSPECIFIED TIMING OF DEMENTIA ONSET: Primary | ICD-10-CM

## 2021-03-02 DIAGNOSIS — F03.911 AGITATION DUE TO DEMENTIA (HCC): ICD-10-CM

## 2021-03-02 DIAGNOSIS — R45.851 SUICIDAL IDEATION: ICD-10-CM

## 2021-03-02 DIAGNOSIS — G30.9 ALZHEIMER'S DEMENTIA WITH BEHAVIORAL DISTURBANCE, UNSPECIFIED TIMING OF DEMENTIA ONSET: Primary | ICD-10-CM

## 2021-03-02 DIAGNOSIS — R46.89 AGGRESSIVE BEHAVIOR: ICD-10-CM

## 2021-03-02 LAB
ALBUMIN SERPL-MCNC: 3.6 G/DL (ref 3.5–5.2)
ALBUMIN/GLOB SERPL: 1.4 G/DL
ALP SERPL-CCNC: 61 U/L (ref 39–117)
ALT SERPL W P-5'-P-CCNC: 12 U/L (ref 1–33)
ANION GAP SERPL CALCULATED.3IONS-SCNC: 8 MMOL/L (ref 5–15)
APTT PPP: 26.6 SECONDS (ref 24–37)
AST SERPL-CCNC: 16 U/L (ref 1–32)
BACTERIA UR QL AUTO: ABNORMAL /HPF
BASOPHILS # BLD AUTO: 0.08 10*3/MM3 (ref 0–0.2)
BASOPHILS NFR BLD AUTO: 0.9 % (ref 0–1.5)
BILIRUB SERPL-MCNC: 0.5 MG/DL (ref 0–1.2)
BILIRUB UR QL STRIP: NEGATIVE
BUN SERPL-MCNC: 28 MG/DL (ref 8–23)
BUN/CREAT SERPL: 22.8 (ref 7–25)
CALCIUM SPEC-SCNC: 8.5 MG/DL (ref 8.6–10.5)
CHLORIDE SERPL-SCNC: 107 MMOL/L (ref 98–107)
CLARITY UR: CLEAR
CO2 SERPL-SCNC: 24 MMOL/L (ref 22–29)
COLOR UR: YELLOW
CREAT SERPL-MCNC: 1.23 MG/DL (ref 0.57–1)
DEPRECATED RDW RBC AUTO: 41.2 FL (ref 37–54)
EOSINOPHIL # BLD AUTO: 0.09 10*3/MM3 (ref 0–0.4)
EOSINOPHIL NFR BLD AUTO: 1 % (ref 0.3–6.2)
ERYTHROCYTE [DISTWIDTH] IN BLOOD BY AUTOMATED COUNT: 12.1 % (ref 12.3–15.4)
GFR SERPL CREATININE-BSD FRML MDRD: 42 ML/MIN/1.73
GLOBULIN UR ELPH-MCNC: 2.6 GM/DL
GLUCOSE SERPL-MCNC: 103 MG/DL (ref 65–99)
GLUCOSE UR STRIP-MCNC: NEGATIVE MG/DL
HCT VFR BLD AUTO: 39.3 % (ref 34–46.6)
HGB BLD-MCNC: 12.6 G/DL (ref 12–15.9)
HGB UR QL STRIP.AUTO: ABNORMAL
HYALINE CASTS UR QL AUTO: ABNORMAL /LPF
IMM GRANULOCYTES # BLD AUTO: 0.04 10*3/MM3 (ref 0–0.05)
IMM GRANULOCYTES NFR BLD AUTO: 0.4 % (ref 0–0.5)
INR PPP: 1.04 (ref 0.85–1.16)
KETONES UR QL STRIP: NEGATIVE
LEUKOCYTE ESTERASE UR QL STRIP.AUTO: ABNORMAL
LYMPHOCYTES # BLD AUTO: 1.9 10*3/MM3 (ref 0.7–3.1)
LYMPHOCYTES NFR BLD AUTO: 20.2 % (ref 19.6–45.3)
MCH RBC QN AUTO: 29.7 PG (ref 26.6–33)
MCHC RBC AUTO-ENTMCNC: 32.1 G/DL (ref 31.5–35.7)
MCV RBC AUTO: 92.7 FL (ref 79–97)
MONOCYTES # BLD AUTO: 0.88 10*3/MM3 (ref 0.1–0.9)
MONOCYTES NFR BLD AUTO: 9.4 % (ref 5–12)
NEUTROPHILS NFR BLD AUTO: 6.41 10*3/MM3 (ref 1.7–7)
NEUTROPHILS NFR BLD AUTO: 68.1 % (ref 42.7–76)
NITRITE UR QL STRIP: NEGATIVE
NRBC BLD AUTO-RTO: 0 /100 WBC (ref 0–0.2)
PH UR STRIP.AUTO: 6 [PH] (ref 5–8)
PLATELET # BLD AUTO: 237 10*3/MM3 (ref 140–450)
PMV BLD AUTO: 10.8 FL (ref 6–12)
POTASSIUM SERPL-SCNC: 4.9 MMOL/L (ref 3.5–5.2)
PROT SERPL-MCNC: 6.2 G/DL (ref 6–8.5)
PROT UR QL STRIP: NEGATIVE
PROTHROMBIN TIME: 13.3 SECONDS (ref 11.5–14)
RBC # BLD AUTO: 4.24 10*6/MM3 (ref 3.77–5.28)
RBC # UR: ABNORMAL /HPF
REF LAB TEST METHOD: ABNORMAL
SODIUM SERPL-SCNC: 139 MMOL/L (ref 136–145)
SP GR UR STRIP: 1.01 (ref 1–1.03)
SQUAMOUS #/AREA URNS HPF: ABNORMAL /HPF
TSH SERPL DL<=0.05 MIU/L-ACNC: 4.36 UIU/ML (ref 0.27–4.2)
UROBILINOGEN UR QL STRIP: ABNORMAL
WBC # BLD AUTO: 9.4 10*3/MM3 (ref 3.4–10.8)
WBC UR QL AUTO: ABNORMAL /HPF

## 2021-03-02 PROCEDURE — 80306 DRUG TEST PRSMV INSTRMNT: CPT | Performed by: EMERGENCY MEDICINE

## 2021-03-02 PROCEDURE — 85730 THROMBOPLASTIN TIME PARTIAL: CPT | Performed by: EMERGENCY MEDICINE

## 2021-03-02 PROCEDURE — 81001 URINALYSIS AUTO W/SCOPE: CPT | Performed by: EMERGENCY MEDICINE

## 2021-03-02 PROCEDURE — 80053 COMPREHEN METABOLIC PANEL: CPT | Performed by: EMERGENCY MEDICINE

## 2021-03-02 PROCEDURE — 85610 PROTHROMBIN TIME: CPT | Performed by: EMERGENCY MEDICINE

## 2021-03-02 PROCEDURE — 99284 EMERGENCY DEPT VISIT MOD MDM: CPT

## 2021-03-02 PROCEDURE — 84443 ASSAY THYROID STIM HORMONE: CPT | Performed by: EMERGENCY MEDICINE

## 2021-03-02 PROCEDURE — 85025 COMPLETE CBC W/AUTO DIFF WBC: CPT | Performed by: EMERGENCY MEDICINE

## 2021-03-02 RX ORDER — QUETIAPINE FUMARATE 25 MG/1
75 TABLET, FILM COATED ORAL NIGHTLY
COMMUNITY
End: 2023-01-26 | Stop reason: HOSPADM

## 2021-03-02 RX ORDER — MEMANTINE HYDROCHLORIDE 10 MG/1
10 TABLET ORAL 2 TIMES DAILY
COMMUNITY

## 2021-03-02 RX ORDER — TRAZODONE HYDROCHLORIDE 50 MG/1
50 TABLET ORAL NIGHTLY
COMMUNITY

## 2021-03-02 RX ORDER — CITALOPRAM 20 MG/1
20 TABLET ORAL DAILY
COMMUNITY
End: 2023-01-26 | Stop reason: HOSPADM

## 2021-03-03 PROBLEM — F03.918 DEMENTIA WITH BEHAVIORAL DISTURBANCE: Status: ACTIVE | Noted: 2018-05-30

## 2021-03-03 PROBLEM — N18.30 CKD (CHRONIC KIDNEY DISEASE) STAGE 3, GFR 30-59 ML/MIN: Status: ACTIVE | Noted: 2021-03-03

## 2021-03-03 LAB
AMPHET+METHAMPHET UR QL: NEGATIVE
AMPHETAMINES UR QL: NEGATIVE
BARBITURATES UR QL SCN: NEGATIVE
BENZODIAZ UR QL SCN: NEGATIVE
BUPRENORPHINE SERPL-MCNC: NEGATIVE NG/ML
CANNABINOIDS SERPL QL: NEGATIVE
COCAINE UR QL: NEGATIVE
ETHANOL BLD-MCNC: <10 MG/DL (ref 0–10)
FLUAV RNA RESP QL NAA+PROBE: NOT DETECTED
FLUBV RNA RESP QL NAA+PROBE: NOT DETECTED
METHADONE UR QL SCN: NEGATIVE
OPIATES UR QL: NEGATIVE
OXYCODONE UR QL SCN: NEGATIVE
PCP UR QL SCN: NEGATIVE
PROPOXYPH UR QL: NEGATIVE
SARS-COV-2 RNA RESP QL NAA+PROBE: NOT DETECTED
TRICYCLICS UR QL SCN: NEGATIVE

## 2021-03-03 PROCEDURE — 82077 ASSAY SPEC XCP UR&BREATH IA: CPT | Performed by: EMERGENCY MEDICINE

## 2021-03-03 PROCEDURE — 25010000002 HALOPERIDOL LACTATE PER 5 MG: Performed by: INTERNAL MEDICINE

## 2021-03-03 PROCEDURE — 96372 THER/PROPH/DIAG INJ SC/IM: CPT

## 2021-03-03 PROCEDURE — G0378 HOSPITAL OBSERVATION PER HR: HCPCS

## 2021-03-03 PROCEDURE — 99219 PR INITIAL OBSERVATION CARE/DAY 50 MINUTES: CPT | Performed by: INTERNAL MEDICINE

## 2021-03-03 PROCEDURE — 87636 SARSCOV2 & INF A&B AMP PRB: CPT | Performed by: PHYSICIAN ASSISTANT

## 2021-03-03 PROCEDURE — 96374 THER/PROPH/DIAG INJ IV PUSH: CPT

## 2021-03-03 PROCEDURE — 25010000002 LORAZEPAM PER 2 MG: Performed by: INTERNAL MEDICINE

## 2021-03-03 PROCEDURE — 25010000002 ENOXAPARIN PER 10 MG: Performed by: INTERNAL MEDICINE

## 2021-03-03 RX ORDER — SODIUM CHLORIDE 0.9 % (FLUSH) 0.9 %
10 SYRINGE (ML) INJECTION EVERY 12 HOURS SCHEDULED
Status: DISCONTINUED | OUTPATIENT
Start: 2021-03-03 | End: 2021-03-05 | Stop reason: HOSPADM

## 2021-03-03 RX ORDER — HALOPERIDOL 5 MG/ML
3 INJECTION INTRAMUSCULAR ONCE
Status: COMPLETED | OUTPATIENT
Start: 2021-03-03 | End: 2021-03-03

## 2021-03-03 RX ORDER — SODIUM CHLORIDE 0.9 % (FLUSH) 0.9 %
10 SYRINGE (ML) INJECTION AS NEEDED
Status: DISCONTINUED | OUTPATIENT
Start: 2021-03-03 | End: 2021-03-05 | Stop reason: HOSPADM

## 2021-03-03 RX ORDER — DONEPEZIL HYDROCHLORIDE 10 MG/1
10 TABLET, FILM COATED ORAL NIGHTLY
Status: DISCONTINUED | OUTPATIENT
Start: 2021-03-03 | End: 2021-03-05 | Stop reason: HOSPADM

## 2021-03-03 RX ORDER — TRAZODONE HYDROCHLORIDE 50 MG/1
50 TABLET ORAL NIGHTLY
Status: DISCONTINUED | OUTPATIENT
Start: 2021-03-03 | End: 2021-03-05 | Stop reason: HOSPADM

## 2021-03-03 RX ORDER — ACETAMINOPHEN 325 MG/1
650 TABLET ORAL EVERY 4 HOURS PRN
Status: DISCONTINUED | OUTPATIENT
Start: 2021-03-03 | End: 2021-03-05 | Stop reason: HOSPADM

## 2021-03-03 RX ORDER — QUETIAPINE FUMARATE 25 MG/1
25 TABLET, FILM COATED ORAL EVERY 8 HOURS PRN
Status: DISCONTINUED | OUTPATIENT
Start: 2021-03-03 | End: 2021-03-05 | Stop reason: HOSPADM

## 2021-03-03 RX ORDER — LORAZEPAM 2 MG/ML
0.5 INJECTION INTRAMUSCULAR EVERY 6 HOURS PRN
Status: DISCONTINUED | OUTPATIENT
Start: 2021-03-03 | End: 2021-03-05 | Stop reason: HOSPADM

## 2021-03-03 RX ORDER — MEMANTINE HYDROCHLORIDE 10 MG/1
10 TABLET ORAL 2 TIMES DAILY
Status: DISCONTINUED | OUTPATIENT
Start: 2021-03-03 | End: 2021-03-05 | Stop reason: HOSPADM

## 2021-03-03 RX ORDER — CITALOPRAM 20 MG/1
20 TABLET ORAL DAILY
Status: DISCONTINUED | OUTPATIENT
Start: 2021-03-03 | End: 2021-03-05 | Stop reason: HOSPADM

## 2021-03-03 RX ORDER — QUETIAPINE FUMARATE 25 MG/1
50 TABLET, FILM COATED ORAL NIGHTLY
Status: DISCONTINUED | OUTPATIENT
Start: 2021-03-03 | End: 2021-03-05 | Stop reason: HOSPADM

## 2021-03-03 RX ADMIN — CITALOPRAM HYDROBROMIDE 20 MG: 20 TABLET ORAL at 17:00

## 2021-03-03 RX ADMIN — ENOXAPARIN SODIUM 40 MG: 40 INJECTION SUBCUTANEOUS at 17:43

## 2021-03-03 RX ADMIN — ACETAMINOPHEN 650 MG: 325 TABLET ORAL at 21:11

## 2021-03-03 RX ADMIN — HALOPERIDOL LACTATE 3 MG: 5 INJECTION, SOLUTION INTRAMUSCULAR at 20:22

## 2021-03-03 RX ADMIN — LORAZEPAM 0.5 MG: 2 INJECTION INTRAMUSCULAR; INTRAVENOUS at 18:25

## 2021-03-03 RX ADMIN — QUETIAPINE FUMARATE 25 MG: 25 TABLET ORAL at 17:00

## 2021-03-03 RX ADMIN — QUETIAPINE FUMARATE 50 MG: 25 TABLET ORAL at 21:35

## 2021-03-03 NOTE — ED NOTES
Spoke with Lolis at [facility] re: transferring pt, sts currently reviewing chart and would like pt tested for COVID.  Fax results of COVID test to [phone] and call for follow up with Lolis at [phone].  Melania notified.     Taryn Woo, RN  03/03/21 7592

## 2021-03-03 NOTE — ED NOTES
Per Lolis at [facility], admission refused per psychiatrist, stating he feels patient is at baseline and does meet admission criteria.     Taryn Woo, RADHA  03/03/21 8831

## 2021-03-03 NOTE — PROGRESS NOTES
Discharge Planning Assessment  [facility]     Patient Name: Shantelle Decker  MRN: [id number]  Today's Date: 3/3/2021    Admit Date: 3/2/2021    Discharge Needs Assessment    No documentation.       Discharge Plan     Row Name 03/03/21 0299       Plan    Plan  update    Plan Comments  Pt spouse was presented with ABN document and signed. Questions answered per CM. Spouse is only really concerned with how many days pt will be here and when he can take her home.    Row Name 03/03/21 8324       Plan    Plan  update    Plan Comments  spoke with admissions person at [facility], faxed packet to [phone] for review. Although ATT Dr. Marlow is admitting her to observation for placement to continue        Continued Care and Services - Admitted Since 3/2/2021    Coordination has not been started for this encounter.         Demographic Summary    No documentation.       Functional Status    No documentation.       Psychosocial    No documentation.       Abuse/Neglect    No documentation.       Legal    No documentation.       Substance Abuse    No documentation.       Patient Forms    No documentation.           Maggy Mcallister RN
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
## 2021-03-03 NOTE — ED NOTES
Per Dr. Mei case management is to evaluate pt today if [facility].  Dr. Braun notified.     Taryn Woo, RADHA  03/03/21 0632

## 2021-03-03 NOTE — ED NOTES
Food trays given to patient and family; assisted with set up      Denisa Orta, PCT  03/03/21 9664

## 2021-03-03 NOTE — ED NOTES
"DURING BLOOD DRAW, PT BECAME AGITATED AND STARTED SHOUTING \" I WISH I WAS JUST DEAD.  WHY DON'T YOU KILL ME?  CAN'T YOU JUST GIVE ME PILLS\"      PROVIDER NOTIFIED.     Ramiro Jolley  03/02/21 3161    "

## 2021-03-03 NOTE — PROGRESS NOTES
Discharge Planning Assessment  [facility]     Patient Name: Shantelle Decker  MRN: [id number]  Today's Date: 3/3/2021    Admit Date: 3/2/2021    Discharge Needs Assessment     Row Name 03/03/21 1533       Living Environment    Lives With  spouse    Name(s) of Who Lives With Patient  Sheldon [phone]    Current Living Arrangements  home/apartment/condo    Potentially Unsafe Housing Conditions  unable to assess    Primary Care Provided by  spouse/significant other;self    Provides Primary Care For  no one    Family Caregiver if Needed  spouse;child(guillermina), adult    Family Caregiver Names  pt has 3 adult step daughters    Quality of Family Relationships  unable to assess    Able to Return to Prior Arrangements  no       Resource/Environmental Concerns    Resource/Environmental Concerns  other (see comments)    Transportation Concerns  car, none       Transition Planning    Patient/Family Anticipates Transition to  long-term care facility    Patient/Family Anticipated Services at Transition  durable medical equipment;skilled nursing    Transportation Anticipated  family or friend will provide       Discharge Needs Assessment    Readmission Within the Last 30 Days  no previous admission in last 30 days    Equipment Currently Used at Home  none    Concerns to be Addressed  basic needs;cognitive/perceptual;coping/stress;decision-making;developmental;discharge planning;mental health;relationship;other (see comments)    Anticipated Changes Related to Illness  inability to care for self    Equipment Needed After Discharge  walker, rolling    Discharge Facility/Level of Care Needs  nursing facility, intermediate    Provided Post Acute Provider List?  N/A    N/A Provider List Comment  pt is unable to focus or make decisions currently; ditto for spouse- there is some mental deficit there    Provided Post Acute Provider Quality & Resource List?  N/A    Current Discharge Risk  cognitively impaired        Discharge Plan     Row Name  03/03/21 1538       Plan    Plan  see previous notes    Final Discharge Disposition Code  30 - still a patient    Row Name 03/03/21 1529       Plan    Plan  update    Plan Comments  Pt spouse was presented with ABN document and signed. Questions answered per CM. Spouse is only really concerned with how many days pt will be here and when he can take her home.    Row Name 03/03/21 1422       Plan    Plan  update    Plan Comments  spoke with admissions person at [facility], faxed packet to [phone] for review. Although ATT Dr. Marlow is admitting her to observation for placement to continue        Continued Care and Services - Admitted Since 3/2/2021    Coordination has not been started for this encounter.         Demographic Summary    No documentation.       Functional Status     Row Name 03/03/21 1533       Functional Status    Usual Activity Tolerance  good       Functional Status, IADL    Medications  assistive person    Meal Preparation  assistive person    Housekeeping  assistive person    Laundry  assistive person    Shopping  assistive person       Mental Status    General Appearance WDL  ex       Mental Status Summary    Recent Changes in Mental Status/Cognitive Functioning  no changes       Employment/    Employment Status  retired        Psychosocial    No documentation.       Abuse/Neglect    No documentation.       Legal    No documentation.       Substance Abuse    No documentation.       Patient Forms    No documentation.           Maggy Mcallister RN
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
## 2021-03-03 NOTE — PLAN OF CARE
Goal Outcome Evaluation:     Progress: no change     Patient transferred to unit from ED this shift. Patient is confused; unable to logically answer questions. Required PRN medications. Bed alarms in place. On RA. Will scream out due to confusion.
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
## 2021-03-03 NOTE — PROGRESS NOTES
Discharge Planning Assessment  [facility]     Patient Name: Shantelle Decker  MRN: [id number]  Today's Date: 3/3/2021    Admit Date: 3/2/2021    Discharge Needs Assessment    No documentation.       Discharge Plan     Row Name 03/03/21 5689       Plan    Plan  update    Plan Comments  spoke with admissions person at [facility], faxed packet to [phone] for review. Although ATT Dr. Marlow is admitting her to observation for placement to continue    Row Name 03/03/21 1155       Plan    Plan  initial    Plan Comments  MANOLO assisted ARABELLA with POC. Called [facility] [phone] to inquire about bed availabillty for this pt, no success; also spoke with Jillian Fuentes, [phone] with [facility] for bed, also no success. ARABELLA ANN, continues to follow for POC    Final Discharge Disposition Code  30 - still a patient        Continued Care and Services - Admitted Since 3/2/2021    Coordination has not been started for this encounter.         Demographic Summary    No documentation.       Functional Status    No documentation.       Psychosocial    No documentation.       Abuse/Neglect    No documentation.       Legal    No documentation.       Substance Abuse    No documentation.       Patient Forms    No documentation.           Maggy Mcallister RN
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
## 2021-03-04 LAB — T4 FREE SERPL-MCNC: 1.16 NG/DL (ref 0.93–1.7)

## 2021-03-04 PROCEDURE — 96372 THER/PROPH/DIAG INJ SC/IM: CPT

## 2021-03-04 PROCEDURE — 99226 PR SBSQ OBSERVATION CARE/DAY 35 MINUTES: CPT | Performed by: HOSPITALIST

## 2021-03-04 PROCEDURE — 84439 ASSAY OF FREE THYROXINE: CPT | Performed by: HOSPITALIST

## 2021-03-04 PROCEDURE — G0378 HOSPITAL OBSERVATION PER HR: HCPCS

## 2021-03-04 PROCEDURE — 97162 PT EVAL MOD COMPLEX 30 MIN: CPT

## 2021-03-04 PROCEDURE — 25010000002 ENOXAPARIN PER 10 MG: Performed by: INTERNAL MEDICINE

## 2021-03-04 RX ADMIN — QUETIAPINE FUMARATE 50 MG: 25 TABLET ORAL at 21:40

## 2021-03-04 RX ADMIN — SODIUM CHLORIDE, PRESERVATIVE FREE 10 ML: 5 INJECTION INTRAVENOUS at 21:40

## 2021-03-04 RX ADMIN — MEMANTINE 10 MG: 10 TABLET ORAL at 11:58

## 2021-03-04 RX ADMIN — TRAZODONE HYDROCHLORIDE 50 MG: 50 TABLET ORAL at 21:40

## 2021-03-04 RX ADMIN — SODIUM CHLORIDE, PRESERVATIVE FREE 10 ML: 5 INJECTION INTRAVENOUS at 11:59

## 2021-03-04 RX ADMIN — ENOXAPARIN SODIUM 40 MG: 40 INJECTION SUBCUTANEOUS at 11:58

## 2021-03-04 RX ADMIN — MEMANTINE 10 MG: 10 TABLET ORAL at 21:41

## 2021-03-04 RX ADMIN — QUETIAPINE FUMARATE 25 MG: 25 TABLET ORAL at 04:06

## 2021-03-04 RX ADMIN — DONEPEZIL HYDROCHLORIDE 10 MG: 10 TABLET, FILM COATED ORAL at 21:41

## 2021-03-04 RX ADMIN — CITALOPRAM HYDROBROMIDE 20 MG: 20 TABLET ORAL at 11:58
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
## 2021-03-04 NOTE — THERAPY EVALUATION
Patient Name: Shantelle Decker  : 1940    MRN: [id number]                              Today's Date: 3/4/2021       Admit Date: 3/2/2021    Visit Dx:     ICD-10-CM ICD-9-CM   1. Alzheimer's dementia with behavioral disturbance, unspecified timing of dementia onset (CMS/HCC)  G30.9 331.0    F02.81 294.11   2. Agitation due to dementia (CMS/[facility])  F03.91 294.21   3. Suicidal ideation  R45.851 V62.84   4. Aggressive behavior  R46.89 V40.39     Patient Active Problem List   Diagnosis   • Incarcerated ventral hernia   • Coronary artery disease without angina pectoris   • Morbid obesity (CMS/[facility])   • Normocytic anemia   • Dementia with behavioral disturbance (CMS/[facility])   • Essential hypertension   • History of breast cancer   • Thyroid mass   • CKD (chronic kidney disease) stage 3, GFR 30-59 ml/min (CMS/HCC)     Past Medical History:   Diagnosis Date   • Breast cancer (CMS/HCC)    • Coronary artery disease    • Dementia (CMS/[facility])    • Dysrhythmia    • Hypertension      Past Surgical History:   Procedure Laterality Date   • ADENOIDECTOMY     • BREAST SURGERY     • CARDIAC DEFIBRILLATOR PLACEMENT     • EXPLORATORY LAPAROTOMY N/A 2016    Procedure: open ventral hernia repair with mesh;  Surgeon: Bright Buckley MD;  [facility];  Service:    • MASTECTOMY Bilateral    • TONSILLECTOMY       General Information     Row Name 21 1528          Physical Therapy Time and Intention    Document Type  evaluation  -KG     Mode of Treatment  physical therapy  -KG     Row Name 21 1528          General Information    Patient Profile Reviewed  yes  -KG     Prior Level of Function  -- pt is poor historian; TBA later  -KG     Existing Precautions/Restrictions  fall;other (see comments) dementia; [place]  -KG     Barriers to Rehab  medically complex;previous functional deficit;cognitive status;hearing deficit  -KG     Row Name 21 1528          Living Environment    Lives With  spouse  -KG     Row Name 21  1528          Home Main Entrance    Number of Stairs, Main Entrance  -- pt is poor historian; TBA later  -KG     Row Name 03/04/21 1528          Cognition    Orientation Status (Cognition)  oriented to;person  -KG     Row Name 03/04/21 1528          Safety Issues, Functional Mobility    Safety Issues Affecting Function (Mobility)  ability to follow commands;awareness of need for assistance;insight into deficits/self-awareness;safety precaution awareness;safety precautions follow-through/compliance  -KG     Impairments Affecting Function (Mobility)  balance;cognition;coordination;endurance/activity tolerance;postural/trunk control;strength  -KG     Cognitive Impairments, Mobility Safety/Performance  attention;awareness, need for assistance;insight into deficits/self-awareness;safety precaution awareness;safety precaution follow-through  -KG       User Key  (r) = Recorded By, (t) = Taken By, (c) = Cosigned By    Initials Name Provider Type    KG Armida Stoll, PT Physical Therapist        Mobility     Row Name 03/04/21 1529          Bed Mobility    Bed Mobility  scooting/bridging;supine-sit;sit-supine  -KG     Scooting/Bridging [place] (Bed Mobility)  maximum assist (25% patient effort);2 person assist;verbal cues  -KG     Supine-Sit [place] (Bed Mobility)  moderate assist (50% patient effort);2 person assist;verbal cues  -KG     Sit-Supine [place] (Bed Mobility)  moderate assist (50% patient effort);verbal cues  -KG     Assistive Device (Bed Mobility)  bed rails;draw sheet;head of bed elevated  -KG     Comment (Bed Mobility)  Increased cueing for sequencing and technique. Pt able to assist with mobility once initiated. Required assistance at trunk and BLEs.  -KG     Row Name 03/04/21 1529          Transfers    Comment (Transfers)  STS from EOB with PT/tech in front on either side to block stormy knees and provide B UE support.  -KG     Row Name 03/04/21 1529          Sit-Stand Transfer     Sit-Stand [place] (Transfers)  minimum assist (75% patient effort);2 person assist;verbal cues  -KG     Assistive Device (Sit-Stand Transfers)  other (see comments) B UE support  -KG     Row Name 03/04/21 1529          Gait/Stairs (Locomotion)    [place] Level (Gait)  moderate assist (50% patient effort);2 person assist;verbal cues  -KG     Assistive Device (Gait)  other (see comments) B UE support  -KG     Distance in Feet (Gait)  2  -KG     Comment (Gait/Stairs)  Pt able to take 4-5 side steps toward HOB. Max cueing for sequencing and technique. Pt unsteady; required B UE support for increased stability. Additional ambulation deferred due to pt's cognitive status and impaired balance.  -KG       User Key  (r) = Recorded By, (t) = Taken By, (c) = Cosigned By    Initials Name Provider Type    KG Armida Stoll, PT Physical Therapist        Obj/Interventions     Row Name 03/04/21 1532          Range of Motion Comprehensive    Comment, General Range of Motion  BLE WFL  -KG     Row Name 03/04/21 1532          Strength Comprehensive (MMT)    Comment, General Manual Muscle Testing (MMT) Assessment  BLE grossly 3+/5  -KG     Row Name 03/04/21 1532          Balance    Balance Assessment  sitting static balance;standing static balance;standing dynamic balance  -KG     Static Sitting Balance  mild impairment;supported;sitting, edge of bed  -KG     Static Standing Balance  mild impairment;supported;standing  -KG     Dynamic Standing Balance  moderate impairment;supported;standing  -KG     Row Name 03/04/21 1532          Sensory Assessment (Somatosensory)    Sensory Assessment (Somatosensory)  unable/difficult to assess  -KG       User Key  (r) = Recorded By, (t) = Taken By, (c) = Cosigned By    Initials Name Provider Type    KG Armida Stoll, PT Physical Therapist        Goals/Plan     Row Name 03/04/21 1535          Bed Mobility Goal 1 (PT)    Activity/Assistive Device (Bed Mobility Goal 1, PT)  sit  to supine;supine to sit  -KG     [place] Level/Cues Needed (Bed Mobility Goal 1, PT)  minimum assist (75% or more patient effort)  -KG     Time Frame (Bed Mobility Goal 1, PT)  2 weeks  -KG     Progress/Outcomes (Bed Mobility Goal 1, PT)  goal ongoing  -KG     Row Name 03/04/21 1535          Transfer Goal 1 (PT)    Activity/Assistive Device (Transfer Goal 1, PT)  sit-to-stand/stand-to-sit;bed-to-chair/chair-to-bed  -KG     [place] Level/Cues Needed (Transfer Goal 1, PT)  minimum assist (75% or more patient effort)  -KG     Time Frame (Transfer Goal 1, PT)  2 weeks  -KG     Progress/Outcome (Transfer Goal 1, PT)  goal ongoing  -KG     Row Name 03/04/21 1535          Gait Training Goal 1 (PT)    Activity/Assistive Device (Gait Training Goal 1, PT)  gait (walking locomotion);assistive device use;walker, rolling  -KG     [place] Level (Gait Training Goal 1, PT)  moderate assist (50-74% patient effort)  -KG     Distance (Gait Training Goal 1, PT)  50 feet  -KG     Time Frame (Gait Training Goal 1, PT)  2 weeks  -KG     Progress/Outcome (Gait Training Goal 1, PT)  goal ongoing  -KG       User Key  (r) = Recorded By, (t) = Taken By, (c) = Cosigned By    Initials Name Provider Type    KG Armida Stoll, PT Physical Therapist        Clinical Impression     Row Name 03/04/21 1532          Pain    Additional Documentation  Pain Scale: FACES Pre/Post-Treatment (Group)  -KG     Row Name 03/04/21 1532          Pain Scale: FACES Pre/Post-Treatment    Pain: FACES Scale, Pretreatment  0-->no hurt  -KG     Posttreatment Pain Rating  0-->no hurt  -KG     Row Name 03/04/21 1532          Plan of Care Review    Plan of Care Reviewed With  patient  -KG     Outcome Summary  PT initial evaluation completed for pt presenting with generalized weakness, impaired cognition, and decreased functional mobility. Pt performed STS transfers with Jayme x2 and took side steps toward HOB with modA x2 and B UE support. Pt's decreased  independence warrants PT skilled care. Recommend D/C to SNF.  -KG     Row Name 03/04/21 1532          Therapy Assessment/Plan (PT)    Patient/Family Therapy Goals Statement (PT)  pt unable to state  -KG     Rehab Potential (PT)  fair, will monitor progress closely  -KG     Criteria for Skilled Interventions Met (PT)  yes;skilled treatment is necessary  -KG     Row Name 03/04/21 1532          Vital Signs    Pre Systolic BP Rehab  -- no telemetry; RN cleared for treatment  -KG     O2 Delivery Pre Treatment  room air  -KG     O2 Delivery Post Treatment  room air  -KG     Pre Patient Position  Supine  -KG     Intra Patient Position  Standing  -KG     Post Patient Position  Supine  -KG     Row Name 03/04/21 1532          Positioning and Restraints    Pre-Treatment Position  in bed  -KG     Post Treatment Position  bed  -KG     In Bed  notified nsg;supine;call light within reach;encouraged to call for assist;exit alarm on;side rails up x3  -KG       User Key  (r) = Recorded By, (t) = Taken By, (c) = Cosigned By    Initials Name Provider Type    Armida Bhandari, PT Physical Therapist        Outcome Measures     Row Name 03/04/21 1536          How much help from another person do you currently need...    Turning from your back to your side while in flat bed without using bedrails?  3  -KG     Moving from lying on back to sitting on the side of a flat bed without bedrails?  2  -KG     Moving to and from a bed to a chair (including a wheelchair)?  2  -KG     Standing up from a chair using your arms (e.g., wheelchair, bedside chair)?  3  -KG     Climbing 3-5 steps with a railing?  1  -KG     To walk in hospital room?  2  -KG     AM-PAC 6 Clicks Score (PT)  13  -KG     Row Name 03/04/21 1536          Functional Assessment    Outcome Measure Options  AM-PAC 6 Clicks Basic Mobility (PT)  -KG       User Key  (r) = Recorded By, (t) = Taken By, (c) = Cosigned By    Initials Name Provider Type    Armida Bhandari, PT  Physical Therapist        Physical Therapy Education                 Title: PT OT SLP Therapies (In Progress)     Topic: Physical Therapy (In Progress)     Point: Mobility training (In Progress)     Learning Progress Summary           Patient Acceptance, E, NR by KG at 3/4/2021 1412                   Point: Home exercise program (Not Started)     Learner Progress:  Not documented in this visit.          Point: Body mechanics (In Progress)     Learning Progress Summary           Patient Acceptance, E, NR by KG at 3/4/2021 1412                   Point: Precautions (In Progress)     Learning Progress Summary           Patient Acceptance, E, NR by KG at 3/4/2021 1412                               User Key     Initials Effective Dates Name Provider Type Discipline    KG 05/22/20 -  Armida Stoll, PT Physical Therapist PT              PT Recommendation and Plan  Planned Therapy Interventions (PT): balance training, bed mobility training, gait training, strengthening, transfer training  Plan of Care Reviewed With: patient  Outcome Summary: PT initial evaluation completed for pt presenting with generalized weakness, impaired cognition, and decreased functional mobility. Pt performed STS transfers with Jayme x2 and took side steps toward HOB with modA x2 and B UE support. Pt's decreased independence warrants PT skilled care. Recommend D/C to SNF.     Time Calculation:   PT Charges     Row Name 03/04/21 1412             Time Calculation    Start Time  1412  -KG      PT Received On  03/04/21  -KG      PT Goal Re-Cert Due Date  03/14/21  -KG        User Key  (r) = Recorded By, (t) = Taken By, (c) = Cosigned By    Initials Name Provider Type    KG Armida Stoll, PT Physical Therapist        Therapy Charges for Today     Code Description Service Date Service Provider Modifiers Qty    09058752573 HC PT EVAL MOD COMPLEXITY 4 3/4/2021 Armida Stoll, PT GP 1    05649363949 HC PT THER SUPP EA 15 MIN 3/4/2021  Armida Stoll, PT GP 2          PT G-Codes  Outcome Measure Options: AM-PAC 6 Clicks Basic Mobility (PT)  AM-PAC 6 Clicks Score (PT): 13    Sherly Stoll, PT  3/4/2021

## 2021-03-04 NOTE — PROGRESS NOTES
[facility] Medicine Services  PROGRESS NOTE    Patient Name: Shantelle Decker  : 1940  MRN: [id number]    Date of Admission: 3/2/2021  Primary Care Physician: Raghavendra Mei MD    Subjective   Subjective     CC:  Agitation    HPI:  Agitated/confused, combative last night. Wakes and denies issues, but confused.    ROS:  Unable to assess    Objective   Objective     Vital Signs:   Temp:  [97.4 °F (36.3 °C)-98.2 °F (36.8 °C)] 97.4 °F (36.3 °C)  Heart Rate:  [60-83] 60  Resp:  [17-19] 17  BP: (120-178)/(68-76) 178/76        Physical Exam:  NAD, sleepy but arouses  OP clear, MM  PERRL  Neck supple  No LAD  RRR  CTAB  +BS, ND, NT, soft  GARCIA, spontaneously    Results Reviewed:  Results from last 7 days   Lab Units 21  2148   WBC 10*3/mm3 9.40   HEMOGLOBIN g/dL 12.6   HEMATOCRIT % 39.3   PLATELETS 10*3/mm3 237   INR  1.04     Results from last 7 days   Lab Units 21  2148   SODIUM mmol/L 139   POTASSIUM mmol/L 4.9   CHLORIDE mmol/L 107   CO2 mmol/L 24.0   BUN mg/dL 28*   CREATININE mg/dL 1.23*   GLUCOSE mg/dL 103*   CALCIUM mg/dL 8.5*   ALT (SGPT) U/L 12   AST (SGOT) U/L 16     Estimated Creatinine Clearance: 30.6 mL/min (A) (by C-G formula based on SCr of 1.23 mg/dL (H)).    Microbiology Results Abnormal     Procedure Component Value - Date/Time    COVID PRE-OP / PRE-PROCEDURE SCREENING ORDER (NO ISOLATION) - Swab, Nasopharynx [[id number]]  (Normal) Collected: 21 0401    Lab Status: Final result Specimen: Swab from Nasopharynx Updated: 21    Narrative:      The following orders were created for panel order COVID PRE-OP / PRE-PROCEDURE SCREENING ORDER (NO ISOLATION) - Swab, Nasopharynx.  Procedure                               Abnormality         Status                     ---------                               -----------         ------                     COVID-19 and FLU A/B PCR...[[id number]]  Normal              Final result                 Please view results  for these tests on the individual orders.    COVID-19 and FLU A/B PCR - Swab, Nasopharynx [[id number]]  (Normal) Collected: 03/03/21 0401    Lab Status: Final result Specimen: Swab from Nasopharynx Updated: 03/03/21 0512     COVID19 Not Detected     Influenza A PCR Not Detected     Influenza B PCR Not Detected    Narrative:      Fact sheet for providers: https://www.fda.gov/media/858938/download    Fact sheet for patients: https://www.fda.gov/media/159986/download    Test performed by PCR.          Imaging Results (Last 24 Hours)     ** No results found for the last 24 hours. **               I have reviewed the medications:  Scheduled Meds:citalopram, 20 mg, Oral, Daily  donepezil, 10 mg, Oral, Nightly  enoxaparin, 40 mg, Subcutaneous, Daily  memantine, 10 mg, Oral, BID  QUEtiapine, 50 mg, Oral, Nightly  sodium chloride, 10 mL, Intravenous, Q12H  traZODone, 50 mg, Oral, Nightly      Continuous Infusions:   PRN Meds:.•  acetaminophen  •  LORazepam  •  QUEtiapine  •  sodium chloride    Assessment/Plan   Assessment & Plan     Active Hospital Problems    Diagnosis  POA   • **Dementia with behavioral disturbance (CMS/[facility]) [F03.91]  Yes   • CKD (chronic kidney disease) stage 3, GFR 30-59 ml/min (CMS/[facility]) [N18.30]  Yes   • Essential hypertension [I10]  Yes   • Coronary artery disease without angina pectoris [I25.10]  Yes      Resolved Hospital Problems   No resolved problems to display.        Brief Hospital Course to date:  Shantelle Decker is a 81 y.o. female with history of dementia with behavior disturbance here with dementia with agitation and the  can no longer take care of her    Dementia with behavior disturbance/agitation  --seroquel  --haldol prn  --no evidence of UTI    CKD  --monitor intake    HTN  --monitor    Hx of CAD, PPM per EKG    Mildly elevated QTC    DVT Prophylaxis:  Lovenox      Disposition: I expect the patient to be discharged TBD.    CODE STATUS:   Code Status and Medical Interventions:    Ordered at: 03/03/21 1551     Level Of Support Discussed With:    Patient     Code Status:    CPR     Medical Interventions (Level of Support Prior to Arrest):    Full       Cameron Gabriel MD  03/04/21

## 2021-03-04 NOTE — PLAN OF CARE
Goal Outcome Evaluation:  Plan of Care Reviewed With: patient  Progress: no change  Outcome Summary: VSS. Pt on assessment presented with illogical speech and anxious behavior. Pt escalated to verbally agressive and violent behavior when staff attempted to reorient patient. Code white called, pt given IM haldol, pt monitored and sitter required to stay with patient. Pt remained with illogical speech w/o aggressive behavior after haldol administration. Pt fell asleep an hour after haldol administration. Pt required PRN PO seroquel during the night. ECG performed on pt during shift. Pt ambulated to the restroom with x2 assist. Last BM 3/3, Adequate UOP.

## 2021-03-04 NOTE — PLAN OF CARE
Goal Outcome Evaluation:  Plan of Care Reviewed With: patient     Outcome Summary: PT initial evaluation completed for pt presenting with generalized weakness, impaired cognition, and decreased functional mobility. Pt performed STS transfers with Jayme x2 and took side steps toward HOB with modA x2 and B UE support. Pt's decreased independence warrants PT skilled care. Recommend D/C to SNF.

## 2021-03-05 VITALS
SYSTOLIC BLOOD PRESSURE: 149 MMHG | RESPIRATION RATE: 17 BRPM | HEIGHT: 61 IN | WEIGHT: 150 LBS | HEART RATE: 58 BPM | DIASTOLIC BLOOD PRESSURE: 65 MMHG | BODY MASS INDEX: 28.32 KG/M2 | TEMPERATURE: 98.2 F | OXYGEN SATURATION: 94 %

## 2021-03-05 LAB
ANION GAP SERPL CALCULATED.3IONS-SCNC: 6 MMOL/L (ref 5–15)
BUN SERPL-MCNC: 26 MG/DL (ref 8–23)
BUN/CREAT SERPL: 23.6 (ref 7–25)
CALCIUM SPEC-SCNC: 8.4 MG/DL (ref 8.6–10.5)
CHLORIDE SERPL-SCNC: 110 MMOL/L (ref 98–107)
CO2 SERPL-SCNC: 23 MMOL/L (ref 22–29)
CREAT SERPL-MCNC: 1.1 MG/DL (ref 0.57–1)
DEPRECATED RDW RBC AUTO: 44 FL (ref 37–54)
ERYTHROCYTE [DISTWIDTH] IN BLOOD BY AUTOMATED COUNT: 12.4 % (ref 12.3–15.4)
GFR SERPL CREATININE-BSD FRML MDRD: 47 ML/MIN/1.73
GLUCOSE SERPL-MCNC: 86 MG/DL (ref 65–99)
HCT VFR BLD AUTO: 38.4 % (ref 34–46.6)
HGB BLD-MCNC: 11.9 G/DL (ref 12–15.9)
MCH RBC QN AUTO: 29.7 PG (ref 26.6–33)
MCHC RBC AUTO-ENTMCNC: 31 G/DL (ref 31.5–35.7)
MCV RBC AUTO: 95.8 FL (ref 79–97)
PLATELET # BLD AUTO: 210 10*3/MM3 (ref 140–450)
PMV BLD AUTO: 11.2 FL (ref 6–12)
POTASSIUM SERPL-SCNC: 4.2 MMOL/L (ref 3.5–5.2)
RBC # BLD AUTO: 4.01 10*6/MM3 (ref 3.77–5.28)
SODIUM SERPL-SCNC: 139 MMOL/L (ref 136–145)
WBC # BLD AUTO: 7.65 10*3/MM3 (ref 3.4–10.8)

## 2021-03-05 PROCEDURE — 85027 COMPLETE CBC AUTOMATED: CPT | Performed by: HOSPITALIST

## 2021-03-05 PROCEDURE — 99225 PR SBSQ OBSERVATION CARE/DAY 25 MINUTES: CPT | Performed by: HOSPITALIST

## 2021-03-05 PROCEDURE — 25010000002 ENOXAPARIN PER 10 MG: Performed by: INTERNAL MEDICINE

## 2021-03-05 PROCEDURE — 96372 THER/PROPH/DIAG INJ SC/IM: CPT

## 2021-03-05 PROCEDURE — 97530 THERAPEUTIC ACTIVITIES: CPT

## 2021-03-05 PROCEDURE — 80048 BASIC METABOLIC PNL TOTAL CA: CPT | Performed by: HOSPITALIST

## 2021-03-05 PROCEDURE — G0378 HOSPITAL OBSERVATION PER HR: HCPCS

## 2021-03-05 PROCEDURE — 97165 OT EVAL LOW COMPLEX 30 MIN: CPT

## 2021-03-05 RX ORDER — QUETIAPINE FUMARATE 25 MG/1
12.5 TABLET, FILM COATED ORAL EVERY 8 HOURS PRN
Qty: 30 TABLET | Refills: 1 | Status: SHIPPED | OUTPATIENT
Start: 2021-03-05 | End: 2022-10-19 | Stop reason: HOSPADM

## 2021-03-05 RX ADMIN — ENOXAPARIN SODIUM 40 MG: 40 INJECTION SUBCUTANEOUS at 10:00

## 2021-03-05 RX ADMIN — MEMANTINE 10 MG: 10 TABLET ORAL at 10:00

## 2021-03-05 RX ADMIN — QUETIAPINE FUMARATE 25 MG: 25 TABLET ORAL at 10:09

## 2021-03-05 RX ADMIN — CITALOPRAM HYDROBROMIDE 20 MG: 20 TABLET ORAL at 10:00

## 2021-03-05 NOTE — THERAPY EVALUATION
Patient Name: Shantelle Decker  : 1940    MRN: [id number]                              Today's Date: 3/5/2021       Admit Date: 3/2/2021    Visit Dx:     ICD-10-CM ICD-9-CM   1. Alzheimer's dementia with behavioral disturbance, unspecified timing of dementia onset (CMS/HCC)  G30.9 331.0    F02.81 294.11   2. Agitation due to dementia (CMS/[facility])  F03.91 294.21   3. Suicidal ideation  R45.851 V62.84   4. Aggressive behavior  R46.89 V40.39     Patient Active Problem List   Diagnosis   • Incarcerated ventral hernia   • Coronary artery disease without angina pectoris   • Morbid obesity (CMS/[facility])   • Normocytic anemia   • Dementia with behavioral disturbance (CMS/[facility])   • Essential hypertension   • History of breast cancer   • Thyroid mass   • CKD (chronic kidney disease) stage 3, GFR 30-59 ml/min (CMS/[facility])     Past Medical History:   Diagnosis Date   • Breast cancer (CMS/[facility])    • Coronary artery disease    • Dementia (CMS/[facility])    • Dysrhythmia    • Hypertension      Past Surgical History:   Procedure Laterality Date   • ADENOIDECTOMY     • BREAST SURGERY     • CARDIAC DEFIBRILLATOR PLACEMENT     • EXPLORATORY LAPAROTOMY N/A 2016    Procedure: open ventral hernia repair with mesh;  Surgeon: Bright Buckley MD;  Location: [facility];  Service:    • MASTECTOMY Bilateral    • TONSILLECTOMY       General Information     Row Name 21 1143          OT Time and Intention    Document Type  evaluation  -CL     Mode of Treatment  occupational therapy  -CL     Row Name 21 1143          General Information    Patient Profile Reviewed  yes  -CL     Prior Level of Function  -- pt poor historianDONNA further  -CL     Existing Precautions/Restrictions  fall;other (see comments) dementia, severely [place]  -CL     Barriers to Rehab  medically complex;previous functional deficit;cognitive status;hearing deficit  -CL     Row Name 21 1143          Living Environment    Lives With  spouse per DONNA FRENCH further  -CL      Row Name 03/05/21 1143          Cognition    Orientation Status (Cognition)  oriented to;person  -CL     Row Name 03/05/21 1143          Safety Issues, Functional Mobility    Impairments Affecting Function (Mobility)  balance;cognition;coordination;endurance/activity tolerance;strength  -CL       User Key  (r) = Recorded By, (t) = Taken By, (c) = Cosigned By    Initials Name Provider Type    CL Mala Gordillo OT Occupational Therapist          Mobility/ADL's     Row Name 03/05/21 1144          Transfers    Transfers  sit-stand transfer  -CL     Comment (Transfers)  BUE support  -CL     Sit-Stand [place] (Transfers)  moderate assist (50% patient effort);2 person assist;verbal cues  -CL     Row Name 03/05/21 1144          Activities of Daily Living    BADL Assessment/Intervention  upper body dressing;grooming  -CL     Row Name 03/05/21 1144          Upper Body Dressing Assessment/Training    [place] Level (Upper Body Dressing)  don;pajama/robe;moderate assist (50% patient effort)  -CL     Position (Upper Body Dressing)  supported sitting  -CL     Row Name 03/05/21 1144          Grooming Assessment/Training    [place] Level (Grooming)  hair care, combing/brushing;moderate assist (50% patient effort)  -CL     Position (Grooming)  supported sitting  -CL       User Key  (r) = Recorded By, (t) = Taken By, (c) = Cosigned By    Initials Name Provider Type    CL Mala Gordillo OT Occupational Therapist        Obj/Interventions     Row Name 03/05/21 1145          Range of Motion Comprehensive    General Range of Motion  bilateral upper extremity ROM WFL  -CL     Row Name 03/05/21 1145          Strength Comprehensive (MMT)    Comment, General Manual Muscle Testing (MMT) Assessment  BUE grossly 3+/5  -CL     Row Name 03/05/21 1145          Balance    Balance Assessment  sitting static balance;sitting dynamic balance;standing static balance  -CL     Static Sitting Balance  WFL;sitting in chair  -CL     Dynamic  Sitting Balance  WFL;sitting in chair  -CL     Static Standing Balance  mild impairment;supported  -CL       User Key  (r) = Recorded By, (t) = Taken By, (c) = Cosigned By    Initials Name Provider Type    Mala Corbin OT Occupational Therapist        Goals/Plan     Row Name 03/05/21 1147          Transfer Goal 1 (OT)    Activity/Assistive Device (Transfer Goal 1, OT)  sit-to-stand/stand-to-sit;toilet  -CL     [place] Level/Cues Needed (Transfer Goal 1, OT)  minimum assist (75% or more patient effort)  -CL     Time Frame (Transfer Goal 1, OT)  long term goal (LTG);10 days  -CL     Progress/Outcome (Transfer Goal 1, OT)  goal ongoing  -CL     Row Name 03/05/21 1147          Toileting Goal 1 (OT)    Activity/Device (Toileting Goal 1, OT)  adjust/manage clothing;perform perineal hygiene  -CL     [place] Level/Cues Needed (Toileting Goal 1, OT)  minimum assist (75% or more patient effort)  -CL     Time Frame (Toileting Goal 1, OT)  long term goal (LTG);10 days  -CL     Progress/Outcome (Toileting Goal 1, OT)  goal ongoing  -CL     Row Name 03/05/21 1147          Grooming Goal 1 (OT)    Activity/Device (Grooming Goal 1, OT)  hair care;wash face, hands  -CL     [place] (Grooming Goal 1, OT)  supervision required  -CL     Time Frame (Grooming Goal 1, OT)  long term goal (LTG);10 days  -CL     Progress/Outcome (Grooming Goal 1, OT)  goal ongoing  -CL       User Key  (r) = Recorded By, (t) = Taken By, (c) = Cosigned By    Initials Name Provider Type    Mala Corbin OT Occupational Therapist        Clinical Impression     Row Name 03/05/21 1140          Pain Assessment    Additional Documentation  Pain Scale: FACES Pre/Post-Treatment (Group)  -CL     Row Name 03/05/21 1145          Pain Scale: FACES Pre/Post-Treatment    Pain: FACES Scale, Pretreatment  0-->no hurt  -CL     Posttreatment Pain Rating  0-->no hurt  -CL     Row Name 03/05/21 1140          Plan of Care Review    Plan of Care Reviewed With   patient  -CL     Outcome Summary  OT eval complete. Pt is Mod Ax2 for STS t/f and Mod A for ADL performance. Pt limited d/t cognitive status and generalized weakness though demo good effort. Recommend cont skilled IPOT POC. Recommend pt DC to SNF.  -CL     Row Name 03/05/21 1145          Therapy Assessment/Plan (OT)    Patient/Family Therapy Goal Statement (OT)  Return to PLOF  -CL     Rehab Potential (OT)  good, to achieve stated therapy goals  -CL     Criteria for Skilled Therapeutic Interventions Met (OT)  yes;skilled treatment is necessary  -CL     Therapy Frequency (OT)  daily  -CL     Row Name 03/05/21 1145          Therapy Plan Review/Discharge Plan (OT)    Anticipated Discharge Disposition (OT)  skilled nursing facility  -CL     Row Name 03/05/21 1145          Vital Signs    Pre Systolic BP Rehab  -- no tele, RN cleared for tx.  -CL     O2 Delivery Pre Treatment  room air  -CL     O2 Delivery Intra Treatment  room air  -CL     O2 Delivery Post Treatment  room air  -CL     Pre Patient Position  Sitting  -CL     Intra Patient Position  Standing  -CL     Post Patient Position  Sitting  -CL     Row Name 03/05/21 1145          Positioning and Restraints    Pre-Treatment Position  sitting in chair/recliner  -CL     Post Treatment Position  chair  -CL     In Chair  notified nsg;reclined;call light within reach;encouraged to call for assist;exit alarm on;waffle cushion;legs elevated  -CL       User Key  (r) = Recorded By, (t) = Taken By, (c) = Cosigned By    Initials Name Provider Type    CL Mala Gordillo, JESIKA Occupational Therapist        Outcome Measures     Row Name 03/05/21 1145          How much help from another is currently needed...    Putting on and taking off regular lower body clothing?  1  -CL     Bathing (including washing, rinsing, and drying)  2  -CL     Toileting (which includes using toilet bed pan or urinal)  1  -CL     Putting on and taking off regular upper body clothing  2  -CL     Taking care of  personal grooming (such as brushing teeth)  2  -CL     Eating meals  3  -CL     AM-PAC 6 Clicks Score (OT)  11  -CL     Row Name 03/05/21 1147          Functional Assessment    Outcome Measure Options  AM-PAC 6 Clicks Daily Activity (OT)  -CL       User Key  (r) = Recorded By, (t) = Taken By, (c) = Cosigned By    Initials Name Provider Type    CL Mala Gordillo OT Occupational Therapist        Occupational Therapy Education                 Title: PT OT SLP Therapies (In Progress)     Topic: Occupational Therapy (In Progress)     Point: ADL training (In Progress)     Description:   Instruct learner(s) on proper safety adaptation and remediation techniques during self care or transfers.   Instruct in proper use of assistive devices.              Learning Progress Summary           Patient Acceptance, E, NR by CL at 3/5/2021 1115                   Point: Home exercise program (Not Started)     Description:   Instruct learner(s) on appropriate technique for monitoring, assisting and/or progressing therapeutic exercises/activities.              Learner Progress:  Not documented in this visit.          Point: Precautions (In Progress)     Description:   Instruct learner(s) on prescribed precautions during self-care and functional transfers.              Learning Progress Summary           Patient Acceptance, E, NR by CL at 3/5/2021 1115                   Point: Body mechanics (In Progress)     Description:   Instruct learner(s) on proper positioning and spine alignment during self-care, functional mobility activities and/or exercises.              Learning Progress Summary           Patient Acceptance, E, NR by CL at 3/5/2021 1115                               User Key     Initials Effective Dates Name Provider Type Discipline     04/03/18 -  Mala Gordillo OT Occupational Therapist OT              OT Recommendation and Plan  Therapy Frequency (OT): daily  Plan of Care Review  Plan of Care Reviewed With: patient  Outcome  Summary: OT eval complete. Pt is Mod Ax2 for STS t/f and Mod A for ADL performance. Pt limited d/t cognitive status and generalized weakness though demo good effort. Recommend cont skilled IPOT POC. Recommend pt DC to SNF.     Time Calculation:   Time Calculation- OT     Row Name 03/05/21 1148             Time Calculation- OT    OT Start Time  1115  -CL      OT Received On  03/05/21  -CL      OT Goal Re-Cert Due Date  03/15/21  -CL         Timed Charges    23486 - OT Therapeutic Activity Minutes  10  -CL        User Key  (r) = Recorded By, (t) = Taken By, (c) = Cosigned By    Initials Name Provider Type    CL Mala Gordillo OT Occupational Therapist        Therapy Charges for Today     Code Description Service Date Service Provider Modifiers Qty    97419123359 HC OT THERAPEUTIC ACT EA 15 MIN 3/5/2021 Mala Gordillo OT GO 1    13334783179 HC OT EVAL LOW COMPLEXITY 3 3/5/2021 Mala Gordillo OT GO 1    78857910133 HC OT THER SUPP EA 15 MIN 3/5/2021 Mala Gordillo OT GO 2               Mala Gordillo OT  3/5/2021

## 2021-03-05 NOTE — PLAN OF CARE
Uneventful shift. Pt has slept for most of night. Has been very pleasant when awake. VSS on room air. Pt will possibly go home with  tomorrow. Will continue to monitor.

## 2021-03-05 NOTE — PLAN OF CARE
Problem: Adult Inpatient Plan of Care  Goal: Plan of Care Review  Outcome: Ongoing, Progressing     Problem: Fall Injury Risk  Goal: Absence of Fall and Fall-Related Injury  Outcome: Ongoing, Progressing  Intervention: Promote Injury-Free Environment  Recent Flowsheet Documentation  Taken 3/5/2021 1000 by Rosalba Baca, RN  Safety Promotion/Fall Prevention:   safety round/check completed   toileting scheduled  Taken 3/5/2021 0800 by Rosalba Baca, RN  Safety Promotion/Fall Prevention:   safety round/check completed   toileting scheduled   Goal Outcome Evaluation:   Pt behavior some improved with Seroquel. Feeds self. [place] at baseline. Plan to d/c home with  this afternoon.

## 2021-03-05 NOTE — PLAN OF CARE
Goal Outcome Evaluation:  Plan of Care Reviewed With: patient     Outcome Summary: OT eval complete. Pt is Mod Ax2 for STS t/f and Mod A for ADL performance. Pt limited d/t cognitive status and generalized weakness though demo good effort. Recommend cont skilled IPOT POC. Recommend pt DC to SNF.

## 2021-03-05 NOTE — PROGRESS NOTES
Case Management Discharge Note      Final Note: I talked with  on the phone this morning and then talked with him again in patient's room about taking patient home. /Sheldon still insistent on taking her home since he can't visit her in a nursing home and patient doesn't want placement either. Patient d/c home with  and as noted APS referral made. Janine @ [phone]    Provided Post Acute Provider List?: N/A  N/A Provider List Comment: pt is unable to focus or make decisions currently; ditto for spouse- there is some mental deficit there  Provided Post Acute Provider Quality & Resource List?: N/A    Selected Continued Care - Discharged on 3/5/2021 Admission date: 3/2/2021 - Discharge disposition: Home or Self Care    Destination    No services have been selected for the patient.              Durable Medical Equipment    No services have been selected for the patient.              Dialysis/Infusion    No services have been selected for the patient.              Home Medical Care    No services have been selected for the patient.              Therapy    No services have been selected for the patient.              Community Resources    No services have been selected for the patient.                       Final Discharge Disposition Code: 01 - home or self-care
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
## 2021-03-05 NOTE — PROGRESS NOTES
Continued Stay Note  [facility]     Patient Name: Shantelle Decker  MRN: [id number]  Today's Date: 3/5/2021    Admit Date: 3/2/2021    Discharge Plan     Row Name 03/05/21 1503       Plan    Plan  Web Referral ID : [id number]    Plan Comments  SW made an APS report today.  Web Referral ID : [id number].        Discharge Codes    No documentation.       Expected Discharge Date and Time     Expected Discharge Date Expected Discharge Time    Mar 5, 2021             VENUS Howard

## 2021-03-05 NOTE — PROGRESS NOTES
[facility] Medicine Services  PROGRESS NOTE    Patient Name: Shantelle Decker  : 1940  MRN: [id number]    Date of Admission: 3/2/2021  Primary Care Physician: Raghavendra Mei MD    Subjective   Subjective     CC:  Agitation    HPI:  Agitated/confused at shift change last night, but took her prescribed seroquel and rested. Had a calm day yesterday per nursing. Won't open eyes, but shakes her head no when asked about pain or needing anything,    ROS:  Unable to assess    Objective   Objective     Vital Signs:   Temp:  [98.2 °F (36.8 °C)] 98.2 °F (36.8 °C)  Heart Rate:  [58-78] 58  Resp:  [16-17] 17  BP: (112-149)/(65-78) 149/65        Physical Exam:  NAD, sleepy but arouses, but won't open her eyes  OP clear, MM, stable  PERRL, stable  Neck supple, stable  No LAD, stable  RRR, stable  CTAB, stable  +BS, ND, NT, soft, stable  GARCIA, spontaneously, stable    Results Reviewed:  Results from last 7 days   Lab Units 21  2148   WBC 10*3/mm3 9.40   HEMOGLOBIN g/dL 12.6   HEMATOCRIT % 39.3   PLATELETS 10*3/mm3 237   INR  1.04     Results from last 7 days   Lab Units 21  2148   SODIUM mmol/L 139   POTASSIUM mmol/L 4.9   CHLORIDE mmol/L 107   CO2 mmol/L 24.0   BUN mg/dL 28*   CREATININE mg/dL 1.23*   GLUCOSE mg/dL 103*   CALCIUM mg/dL 8.5*   ALT (SGPT) U/L 12   AST (SGOT) U/L 16     Estimated Creatinine Clearance: 30.6 mL/min (A) (by C-G formula based on SCr of 1.23 mg/dL (H)).    Microbiology Results Abnormal     Procedure Component Value - Date/Time    COVID PRE-OP / PRE-PROCEDURE SCREENING ORDER (NO ISOLATION) - Swab, Nasopharynx [[id number]]  (Normal) Collected: 21 0401    Lab Status: Final result Specimen: Swab from Nasopharynx Updated: 21    Narrative:      The following orders were created for panel order COVID PRE-OP / PRE-PROCEDURE SCREENING ORDER (NO ISOLATION) - Swab, Nasopharynx.  Procedure                               Abnormality         Status                      ---------                               -----------         ------                     COVID-19 and FLU A/B PCR...[[id number]]  Normal              Final result                 Please view results for these tests on the individual orders.    COVID-19 and FLU A/B PCR - Swab, Nasopharynx [[id number]]  (Normal) Collected: 03/03/21 0401    Lab Status: Final result Specimen: Swab from Nasopharynx Updated: 03/03/21 0512     COVID19 Not Detected     Influenza A PCR Not Detected     Influenza B PCR Not Detected    Narrative:      Fact sheet for providers: https://www.fda.gov/media/914969/download    Fact sheet for patients: https://www.fda.gov/media/844651/download    Test performed by PCR.          Imaging Results (Last 24 Hours)     ** No results found for the last 24 hours. **               I have reviewed the medications:  Scheduled Meds:citalopram, 20 mg, Oral, Daily  donepezil, 10 mg, Oral, Nightly  enoxaparin, 40 mg, Subcutaneous, Daily  memantine, 10 mg, Oral, BID  QUEtiapine, 50 mg, Oral, Nightly  sodium chloride, 10 mL, Intravenous, Q12H  traZODone, 50 mg, Oral, Nightly      Continuous Infusions:   PRN Meds:.•  acetaminophen  •  LORazepam  •  QUEtiapine  •  sodium chloride    Assessment/Plan   Assessment & Plan     Active Hospital Problems    Diagnosis  POA   • **Dementia with behavioral disturbance (CMS/[facility]) [F03.91]  Yes   • CKD (chronic kidney disease) stage 3, GFR 30-59 ml/min (CMS/[facility]) [N18.30]  Yes   • Essential hypertension [I10]  Yes   • Coronary artery disease without angina pectoris [I25.10]  Yes      Resolved Hospital Problems   No resolved problems to display.        Brief Hospital Course to date:  Shantelle Decker is a 81 y.o. female with history of dementia with behavior disturbance here with dementia with agitation and the  can no longer take care of her    Dementia with behavior disturbance/agitation  --seroquel  --haldol prn  --no evidence of UTI  --repeat labs this AM, check  CBC/BMP  --home with prn seroquel and home with , he declines any placement    CKD  --monitor intake    HTN  --monitor/stable    Hx of CAD, PPM per EKG    Mildly elevated QTC    DVT Prophylaxis:  Lovenox      Disposition: I expect the patient to be discharged TBD.    CODE STATUS:   Code Status and Medical Interventions:   Ordered at: 03/03/21 1553     Level Of Support Discussed With:    Patient     Code Status:    CPR     Medical Interventions (Level of Support Prior to Arrest):    Full       Cameron Gabriel MD  03/05/21

## 2021-03-06 ENCOUNTER — READMISSION MANAGEMENT (OUTPATIENT)
Dept: CALL CENTER | Facility: HOSPITAL | Age: 81
End: 2021-03-06

## 2021-03-06 NOTE — OUTREACH NOTE
Prep Survey      Responses   [facility] facility patient discharged from?  [place]   Is LACE score < 7 ?  No   Emergency Room discharge w/ pulse ox?  No   Eligibility  Readm Mgmt   Discharge diagnosis  **Dementia with behavioral disturbance    Does the patient have one of the following disease processes/diagnoses(primary or secondary)?  Other   Does the patient have Home health ordered?  No   Is there a DME ordered?  No   Prep survey completed?  Yes          Colleen Schwarz RN

## 2021-03-08 ENCOUNTER — READMISSION MANAGEMENT (OUTPATIENT)
Dept: CALL CENTER | Facility: HOSPITAL | Age: 81
End: 2021-03-08

## 2021-03-08 NOTE — OUTREACH NOTE
Medical Week 1 Survey      Responses   [facility] patient discharged from?  [place]   Does the patient have one of the following disease processes/diagnoses(primary or secondary)?  Other   Week 1 attempt successful?  No   Call start time  1534   Unsuccessful attempts  Attempt 1   Discharge diagnosis  Dementia with behavioral disturbance    Person spoke with today (if not patient) and relationship  Spoke with daughterChrista she gave me father's phone number as listed.          Carmen Ching RN

## 2021-03-09 ENCOUNTER — READMISSION MANAGEMENT (OUTPATIENT)
Dept: CALL CENTER | Facility: HOSPITAL | Age: 81
End: 2021-03-09

## 2021-03-09 NOTE — OUTREACH NOTE
Medical Week 1 Survey      Responses   [facility] patient discharged from?  [place]   Does the patient have one of the following disease processes/diagnoses(primary or secondary)?  Other   Week 1 attempt successful?  Yes   Call start time  1427   Call end time  1428   Medication comments  Spouse rushed off the phone,  states had to go  Mrs. Decker   Does the patient have a primary care provider?   Yes   Does the patient have an appointment with their PCP within 7 days of discharge?  Yes   Comments regarding PCP  PCP FU 3/10/21 per spouse   Has the patient kept scheduled appointments due by today?  N/A   Psychosocial issues?  No   Did the patient receive a copy of their discharge instructions?  Yes   Nursing interventions  Reviewed instructions with patient   What is the patient's perception of their health status since discharge?  Improving   Is the patient/caregiver able to teach back signs and symptoms related to disease process for when to call PCP?  Yes   Is the patient/caregiver able to teach back signs and symptoms related to disease process for when to call 911?  Yes   Is the patient/caregiver able to teach back the hierarchy of who to call/visit for symptoms/problems? PCP, Specialist, Home health nurse, Urgent Care, ED, 911  Yes   If the patient is a current smoker, are they able to teach back resources for cessation?  Not a smoker   Week 1 call completed?  Yes   Wrap up additional comments  Spouse states pt is better. Very short call. Spouse was rushing to  Mrs. Decker.          Daylin Orr RN

## 2021-04-04 ENCOUNTER — APPOINTMENT (OUTPATIENT)
Dept: GENERAL RADIOLOGY | Facility: HOSPITAL | Age: 81
End: 2021-04-04

## 2021-04-04 ENCOUNTER — APPOINTMENT (OUTPATIENT)
Dept: CARDIOLOGY | Facility: HOSPITAL | Age: 81
End: 2021-04-04

## 2021-04-04 ENCOUNTER — HOSPITAL ENCOUNTER (EMERGENCY)
Facility: HOSPITAL | Age: 81
Discharge: HOME | End: 2021-04-04
Attending: EMERGENCY MEDICINE | Admitting: EMERGENCY MEDICINE

## 2021-04-04 ENCOUNTER — APPOINTMENT (OUTPATIENT)
Dept: CT IMAGING | Facility: HOSPITAL | Age: 81
End: 2021-04-04

## 2021-04-04 VITALS
HEIGHT: 65 IN | RESPIRATION RATE: 16 BRPM | WEIGHT: 160 LBS | BODY MASS INDEX: 26.66 KG/M2 | DIASTOLIC BLOOD PRESSURE: 74 MMHG | SYSTOLIC BLOOD PRESSURE: 161 MMHG | OXYGEN SATURATION: 96 % | HEART RATE: 61 BPM | TEMPERATURE: 98.6 F

## 2021-04-04 DIAGNOSIS — R31.21 ASYMPTOMATIC MICROSCOPIC HEMATURIA: ICD-10-CM

## 2021-04-04 DIAGNOSIS — M71.20 BAKER'S CYST OF KNEE, UNSPECIFIED LATERALITY: ICD-10-CM

## 2021-04-04 DIAGNOSIS — N18.30 CHRONIC RENAL INSUFFICIENCY, STAGE 3 (MODERATE) (HCC): ICD-10-CM

## 2021-04-04 DIAGNOSIS — R60.0 BILATERAL EDEMA OF LOWER EXTREMITY: ICD-10-CM

## 2021-04-04 DIAGNOSIS — M79.605 LEFT LEG PAIN: Primary | ICD-10-CM

## 2021-04-04 DIAGNOSIS — M17.12 ARTHRITIS OF LEFT KNEE: ICD-10-CM

## 2021-04-04 LAB
ALBUMIN SERPL-MCNC: 3.6 G/DL (ref 3.5–5.2)
ALBUMIN/GLOB SERPL: 1.3 G/DL
ALP SERPL-CCNC: 61 U/L (ref 39–117)
ALT SERPL W P-5'-P-CCNC: 8 U/L (ref 1–33)
ANION GAP SERPL CALCULATED.3IONS-SCNC: 6 MMOL/L (ref 5–15)
AST SERPL-CCNC: 15 U/L (ref 1–32)
BACTERIA UR QL AUTO: ABNORMAL /HPF
BASOPHILS # BLD AUTO: 0.05 10*3/MM3 (ref 0–0.2)
BASOPHILS NFR BLD AUTO: 0.5 % (ref 0–1.5)
BH CV ECHO MEAS - BSA(HAYCOCK): 1.8 M^2
BH CV ECHO MEAS - BSA: 1.8 M^2
BH CV ECHO MEAS - BZI_BMI: 26.6 KILOGRAMS/M^2
BH CV ECHO MEAS - BZI_METRIC_HEIGHT: 165.1 CM
BH CV ECHO MEAS - BZI_METRIC_WEIGHT: 72.6 KG
BH CV LOWER VASCULAR LEFT COMMON FEMORAL AUGMENT: NORMAL
BH CV LOWER VASCULAR LEFT COMMON FEMORAL COMPRESS: NORMAL
BH CV LOWER VASCULAR LEFT COMMON FEMORAL PHASIC: NORMAL
BH CV LOWER VASCULAR LEFT COMMON FEMORAL SPONT: NORMAL
BH CV LOWER VASCULAR LEFT DISTAL FEMORAL COMPRESS: NORMAL
BH CV LOWER VASCULAR LEFT GASTRONEMIUS COMPRESS: NORMAL
BH CV LOWER VASCULAR LEFT GREATER SAPH AK COMPRESS: NORMAL
BH CV LOWER VASCULAR LEFT GREATER SAPH BK COMPRESS: NORMAL
BH CV LOWER VASCULAR LEFT LESSER SAPH COMPRESS: NORMAL
BH CV LOWER VASCULAR LEFT MID FEMORAL AUGMENT: NORMAL
BH CV LOWER VASCULAR LEFT MID FEMORAL COMPRESS: NORMAL
BH CV LOWER VASCULAR LEFT MID FEMORAL PHASIC: NORMAL
BH CV LOWER VASCULAR LEFT MID FEMORAL SPONT: NORMAL
BH CV LOWER VASCULAR LEFT PERONEAL COMPRESS: NORMAL
BH CV LOWER VASCULAR LEFT POPLITEAL AUGMENT: NORMAL
BH CV LOWER VASCULAR LEFT POPLITEAL COMPRESS: NORMAL
BH CV LOWER VASCULAR LEFT POPLITEAL PHASIC: NORMAL
BH CV LOWER VASCULAR LEFT POPLITEAL SPONT: NORMAL
BH CV LOWER VASCULAR LEFT POSTERIOR TIBIAL COMPRESS: NORMAL
BH CV LOWER VASCULAR LEFT PROFUNDA FEMORAL COMPRESS: NORMAL
BH CV LOWER VASCULAR LEFT PROXIMAL FEMORAL COMPRESS: NORMAL
BH CV LOWER VASCULAR LEFT SAPHENOFEMORAL JUNCTION COMPRESS: NORMAL
BH CV LOWER VASCULAR RIGHT COMMON FEMORAL AUGMENT: NORMAL
BH CV LOWER VASCULAR RIGHT COMMON FEMORAL COMPRESS: NORMAL
BH CV LOWER VASCULAR RIGHT COMMON FEMORAL PHASIC: NORMAL
BH CV LOWER VASCULAR RIGHT COMMON FEMORAL SPONT: NORMAL
BH CV LOWER VASCULAR RIGHT DISTAL FEMORAL COMPRESS: NORMAL
BH CV LOWER VASCULAR RIGHT GASTRONEMIUS COMPRESS: NORMAL
BH CV LOWER VASCULAR RIGHT GREATER SAPH AK COMPRESS: NORMAL
BH CV LOWER VASCULAR RIGHT GREATER SAPH BK COMPRESS: NORMAL
BH CV LOWER VASCULAR RIGHT LESSER SAPH COMPRESS: NORMAL
BH CV LOWER VASCULAR RIGHT MID FEMORAL AUGMENT: NORMAL
BH CV LOWER VASCULAR RIGHT MID FEMORAL COMPRESS: NORMAL
BH CV LOWER VASCULAR RIGHT MID FEMORAL PHASIC: NORMAL
BH CV LOWER VASCULAR RIGHT MID FEMORAL SPONT: NORMAL
BH CV LOWER VASCULAR RIGHT PERONEAL COMPRESS: NORMAL
BH CV LOWER VASCULAR RIGHT POPLITEAL AUGMENT: NORMAL
BH CV LOWER VASCULAR RIGHT POPLITEAL COMPRESS: NORMAL
BH CV LOWER VASCULAR RIGHT POPLITEAL PHASIC: NORMAL
BH CV LOWER VASCULAR RIGHT POPLITEAL SPONT: NORMAL
BH CV LOWER VASCULAR RIGHT POSTERIOR TIBIAL COMPRESS: NORMAL
BH CV LOWER VASCULAR RIGHT PROFUNDA FEMORAL COMPRESS: NORMAL
BH CV LOWER VASCULAR RIGHT PROXIMAL FEMORAL COMPRESS: NORMAL
BH CV LOWER VASCULAR RIGHT SAPHENOFEMORAL JUNCTION COMPRESS: NORMAL
BILIRUB SERPL-MCNC: 0.6 MG/DL (ref 0–1.2)
BILIRUB UR QL STRIP: NEGATIVE
BUN SERPL-MCNC: 25 MG/DL (ref 8–23)
BUN/CREAT SERPL: 16.9 (ref 7–25)
CALCIUM SPEC-SCNC: 8.5 MG/DL (ref 8.6–10.5)
CHLORIDE SERPL-SCNC: 108 MMOL/L (ref 98–107)
CK SERPL-CCNC: 103 U/L (ref 20–180)
CLARITY UR: CLEAR
CO2 SERPL-SCNC: 25 MMOL/L (ref 22–29)
COLOR UR: YELLOW
CREAT SERPL-MCNC: 1.48 MG/DL (ref 0.57–1)
D DIMER PPP FEU-MCNC: 1.52 MCGFEU/ML (ref 0–0.56)
DEPRECATED RDW RBC AUTO: 48.3 FL (ref 37–54)
EOSINOPHIL # BLD AUTO: 0.04 10*3/MM3 (ref 0–0.4)
EOSINOPHIL NFR BLD AUTO: 0.4 % (ref 0.3–6.2)
ERYTHROCYTE [DISTWIDTH] IN BLOOD BY AUTOMATED COUNT: 13.7 % (ref 12.3–15.4)
GFR SERPL CREATININE-BSD FRML MDRD: 34 ML/MIN/1.73
GLOBULIN UR ELPH-MCNC: 2.7 GM/DL
GLUCOSE SERPL-MCNC: 116 MG/DL (ref 65–99)
GLUCOSE UR STRIP-MCNC: NEGATIVE MG/DL
HCT VFR BLD AUTO: 39.8 % (ref 34–46.6)
HGB BLD-MCNC: 12.5 G/DL (ref 12–15.9)
HGB UR QL STRIP.AUTO: NEGATIVE
HYALINE CASTS UR QL AUTO: ABNORMAL /LPF
IMM GRANULOCYTES # BLD AUTO: 0.05 10*3/MM3 (ref 0–0.05)
IMM GRANULOCYTES NFR BLD AUTO: 0.5 % (ref 0–0.5)
KETONES UR QL STRIP: NEGATIVE
LEUKOCYTE ESTERASE UR QL STRIP.AUTO: ABNORMAL
LYMPHOCYTES # BLD AUTO: 1.13 10*3/MM3 (ref 0.7–3.1)
LYMPHOCYTES NFR BLD AUTO: 11.7 % (ref 19.6–45.3)
MAGNESIUM SERPL-MCNC: 2.1 MG/DL (ref 1.6–2.4)
MCH RBC QN AUTO: 30.2 PG (ref 26.6–33)
MCHC RBC AUTO-ENTMCNC: 31.4 G/DL (ref 31.5–35.7)
MCV RBC AUTO: 96.1 FL (ref 79–97)
MONOCYTES # BLD AUTO: 0.85 10*3/MM3 (ref 0.1–0.9)
MONOCYTES NFR BLD AUTO: 8.8 % (ref 5–12)
NEUTROPHILS NFR BLD AUTO: 7.54 10*3/MM3 (ref 1.7–7)
NEUTROPHILS NFR BLD AUTO: 78.1 % (ref 42.7–76)
NITRITE UR QL STRIP: NEGATIVE
NRBC BLD AUTO-RTO: 0 /100 WBC (ref 0–0.2)
PH UR STRIP.AUTO: 8 [PH] (ref 5–8)
PLATELET # BLD AUTO: 245 10*3/MM3 (ref 140–450)
PMV BLD AUTO: 10.5 FL (ref 6–12)
POTASSIUM SERPL-SCNC: 6 MMOL/L (ref 3.5–5.2)
PROT SERPL-MCNC: 6.3 G/DL (ref 6–8.5)
PROT UR QL STRIP: NEGATIVE
RBC # BLD AUTO: 4.14 10*6/MM3 (ref 3.77–5.28)
RBC # UR: ABNORMAL /HPF
REF LAB TEST METHOD: ABNORMAL
SODIUM SERPL-SCNC: 139 MMOL/L (ref 136–145)
SP GR UR STRIP: 1.02 (ref 1–1.03)
SQUAMOUS #/AREA URNS HPF: ABNORMAL /HPF
UROBILINOGEN UR QL STRIP: ABNORMAL
WBC # BLD AUTO: 9.66 10*3/MM3 (ref 3.4–10.8)
WBC UR QL AUTO: ABNORMAL /HPF

## 2021-04-04 PROCEDURE — 93970 EXTREMITY STUDY: CPT | Performed by: INTERNAL MEDICINE

## 2021-04-04 PROCEDURE — 85025 COMPLETE CBC W/AUTO DIFF WBC: CPT | Performed by: EMERGENCY MEDICINE

## 2021-04-04 PROCEDURE — 73590 X-RAY EXAM OF LOWER LEG: CPT

## 2021-04-04 PROCEDURE — 83735 ASSAY OF MAGNESIUM: CPT | Performed by: EMERGENCY MEDICINE

## 2021-04-04 PROCEDURE — 96372 THER/PROPH/DIAG INJ SC/IM: CPT

## 2021-04-04 PROCEDURE — 25010000002 HYDROMORPHONE PER 4 MG: Performed by: EMERGENCY MEDICINE

## 2021-04-04 PROCEDURE — 99283 EMERGENCY DEPT VISIT LOW MDM: CPT

## 2021-04-04 PROCEDURE — 85379 FIBRIN DEGRADATION QUANT: CPT | Performed by: EMERGENCY MEDICINE

## 2021-04-04 PROCEDURE — 36415 COLL VENOUS BLD VENIPUNCTURE: CPT

## 2021-04-04 PROCEDURE — 74176 CT ABD & PELVIS W/O CONTRAST: CPT

## 2021-04-04 PROCEDURE — 82550 ASSAY OF CK (CPK): CPT | Performed by: EMERGENCY MEDICINE

## 2021-04-04 PROCEDURE — 81001 URINALYSIS AUTO W/SCOPE: CPT | Performed by: EMERGENCY MEDICINE

## 2021-04-04 PROCEDURE — 73630 X-RAY EXAM OF FOOT: CPT

## 2021-04-04 PROCEDURE — 80053 COMPREHEN METABOLIC PANEL: CPT | Performed by: EMERGENCY MEDICINE

## 2021-04-04 PROCEDURE — 73552 X-RAY EXAM OF FEMUR 2/>: CPT

## 2021-04-04 PROCEDURE — 93970 EXTREMITY STUDY: CPT

## 2021-04-04 RX ORDER — LIDOCAINE 50 MG/G
3 PATCH TOPICAL
Status: DISCONTINUED | OUTPATIENT
Start: 2021-04-04 | End: 2021-04-04 | Stop reason: HOSPADM

## 2021-04-04 RX ORDER — SODIUM CHLORIDE 0.9 % (FLUSH) 0.9 %
10 SYRINGE (ML) INJECTION AS NEEDED
Status: DISCONTINUED | OUTPATIENT
Start: 2021-04-04 | End: 2021-04-04 | Stop reason: HOSPADM

## 2021-04-04 RX ORDER — LIDOCAINE 50 MG/G
1 PATCH TOPICAL EVERY 24 HOURS
Qty: 30 PATCH | Refills: 0 | Status: SHIPPED | OUTPATIENT
Start: 2021-04-04 | End: 2022-10-19 | Stop reason: HOSPADM

## 2021-04-04 RX ORDER — ACETAMINOPHEN 500 MG
1000 TABLET ORAL ONCE
Status: COMPLETED | OUTPATIENT
Start: 2021-04-04 | End: 2021-04-04

## 2021-04-04 RX ORDER — HYDROMORPHONE HYDROCHLORIDE 1 MG/ML
0.5 INJECTION, SOLUTION INTRAMUSCULAR; INTRAVENOUS; SUBCUTANEOUS ONCE
Status: COMPLETED | OUTPATIENT
Start: 2021-04-04 | End: 2021-04-04

## 2021-04-04 RX ADMIN — ACETAMINOPHEN 1000 MG: 500 TABLET, FILM COATED ORAL at 08:43

## 2021-04-04 RX ADMIN — LIDOCAINE 3 PATCH: 50 PATCH CUTANEOUS at 10:08

## 2021-04-04 RX ADMIN — HYDROMORPHONE HYDROCHLORIDE 0.5 MG: 1 INJECTION, SOLUTION INTRAMUSCULAR; INTRAVENOUS; SUBCUTANEOUS at 08:43

## 2021-04-04 NOTE — ED PROVIDER NOTES
Subjective   This is a pleasant 81-year-old female who has moderate dementia as she is  accompanied by her  who she lives with.  She was hospitalized here in early March with dementia with behavioral disturbance but refused placement at that time.  At her baseline she gets around room to room in the house using a walker and cane.  She sees Dr. Mei in for sales.    She presents today with left lower abdominal and left leg pain.  Her  reports that they went out dancing last night and she had a walker on the dance floor was just moving a little bit but did not fall.  She woke up this morning and could barely walk due to the pain.  Pain seems to be in the left lower abdomen and left leg.  The patient is very hard of hearing and also demented making history difficult.  Her  provides additional information.  Apparently she has not had symptoms like this in the past that he cannot recall.  She is not been sick with fevers or chills.  She is taken no medication at home for the pain.  She cannot really describe the pain but she points to her left lower quadrant and says it hurts.  Her left leg is swollen which the  reports is apparently a new finding.  She has no rash on the leg that we can see.  She has had no known Covid exposure.  She has had no known trauma.    Remainder review of systems and history are difficult due to dementia and being hard of hearing.          Review of Systems   Unable to perform ROS: Other       Past Medical History:   Diagnosis Date   • Breast cancer (CMS/HCC)    • Coronary artery disease    • Dementia (CMS/HCC)    • Dysrhythmia    • Hypertension        Allergies   Allergen Reactions   • Demerol [Meperidine] Anaphylaxis     And itching       Past Surgical History:   Procedure Laterality Date   • ADENOIDECTOMY     • BREAST SURGERY     • CARDIAC DEFIBRILLATOR PLACEMENT     • EXPLORATORY LAPAROTOMY N/A 7/16/2016    Procedure: open ventral hernia repair with mesh;   Surgeon: Bright Buckley MD;  Location: [facility];  Service:    • MASTECTOMY Bilateral    • TONSILLECTOMY         Family History   Problem Relation Age of Onset   • Heart disease Mother    • Heart disease Father    • Alcohol abuse Father        Social History     Socioeconomic History   • Marital status:      Spouse name: Not on file   • Number of children: Not on file   • Years of education: Not on file   • Highest education level: Not on file   Tobacco Use   • Smoking status: Former Smoker     Types: Cigarettes, Pipe   • Smokeless tobacco: Never Used   • Tobacco comment: quit over 20 years ago    Substance and Sexual Activity   • Alcohol use: Yes     Comment: glass of wine occasionally with dinner    • Drug use: Yes     Types: Marijuana     Comment: quit three years ago            Objective   Physical Exam  Constitutional:       Appearance: She is obese.      Comments: Is an older female who seems quite loquacious she is in no distress.  She engages easily in conversation but is a poor historian.   HENT:      Head: Normocephalic and atraumatic.      Right Ear: External ear normal.      Left Ear: External ear normal.      Nose: Nose normal.      Mouth/Throat:      Mouth: Mucous membranes are moist.      Pharynx: Oropharynx is clear.   Eyes:      Extraocular Movements: Extraocular movements intact.      Conjunctiva/sclera: Conjunctivae normal.      Pupils: Pupils are equal, round, and reactive to light.   Cardiovascular:      Rate and Rhythm: Normal rate and regular rhythm.      Heart sounds: Normal heart sounds.   Pulmonary:      Effort: Pulmonary effort is normal.      Breath sounds: Normal breath sounds.   Abdominal:      Comments: Her abdomen is obese but soft but she has left lower quadrant tenderness without organomegaly, masses, or guarding.  Her femoral pulses are good bilaterally.  Her back is normal to inspection as is her buttocks I do not see a rash there.  She is moderately tender in the low  back.   Musculoskeletal:      Cervical back: Normal range of motion and neck supple.      Comments: I can do a straight leg raise on the patient's left leg and it seems to hurt.  She has moderate swelling of her leg from the toes to the knee worse on the left than the right.  Her compartments are soft.  She has 3 of 4 pulses in her left foot no tissue loss she has 3/4 pulses in her right foot.   Skin:     General: Skin is warm and dry.      Capillary Refill: Capillary refill takes less than 2 seconds.   Neurological:      Mental Status: She is alert.      Comments: Face symmetric voice strong and tongue midline.  She is very hard of hearing but does seem to hear a little bit when you yell at her.  She has moderate dementia.  She moves all extremities and has modest global weakness without focality.         Procedures           ED Course           Recent Results (from the past 24 hour(s))   D-dimer, Quantitative    Collection Time: 04/04/21  6:54 AM    Specimen: Blood   Result Value Ref Range    D-Dimer, Quantitative 1.52 (H) 0.00 - 0.56 MCGFEU/mL   CBC Auto Differential    Collection Time: 04/04/21  6:54 AM    Specimen: Blood   Result Value Ref Range    WBC 9.66 3.40 - 10.80 10*3/mm3    RBC 4.14 3.77 - 5.28 10*6/mm3    Hemoglobin 12.5 12.0 - 15.9 g/dL    Hematocrit 39.8 34.0 - 46.6 %    MCV 96.1 79.0 - 97.0 fL    MCH 30.2 26.6 - 33.0 pg    MCHC 31.4 (L) 31.5 - 35.7 g/dL    RDW 13.7 12.3 - 15.4 %    RDW-SD 48.3 37.0 - 54.0 fl    MPV 10.5 6.0 - 12.0 fL    Platelets 245 140 - 450 10*3/mm3    Neutrophil % 78.1 (H) 42.7 - 76.0 %    Lymphocyte % 11.7 (L) 19.6 - 45.3 %    Monocyte % 8.8 5.0 - 12.0 %    Eosinophil % 0.4 0.3 - 6.2 %    Basophil % 0.5 0.0 - 1.5 %    Immature Grans % 0.5 0.0 - 0.5 %    Neutrophils, Absolute 7.54 (H) 1.70 - 7.00 10*3/mm3    Lymphocytes, Absolute 1.13 0.70 - 3.10 10*3/mm3    Monocytes, Absolute 0.85 0.10 - 0.90 10*3/mm3    Eosinophils, Absolute 0.04 0.00 - 0.40 10*3/mm3    Basophils, Absolute  0.05 0.00 - 0.20 10*3/mm3    Immature Grans, Absolute 0.05 0.00 - 0.05 10*3/mm3    nRBC 0.0 0.0 - 0.2 /100 WBC   Comprehensive Metabolic Panel    Collection Time: 04/04/21  7:39 AM    Specimen: Blood   Result Value Ref Range    Glucose 116 (H) 65 - 99 mg/dL    BUN 25 (H) 8 - 23 mg/dL    Creatinine 1.48 (H) 0.57 - 1.00 mg/dL    Sodium 139 136 - 145 mmol/L    Potassium 6.0 (H) 3.5 - 5.2 mmol/L    Chloride 108 (H) 98 - 107 mmol/L    CO2 25.0 22.0 - 29.0 mmol/L    Calcium 8.5 (L) 8.6 - 10.5 mg/dL    Total Protein 6.3 6.0 - 8.5 g/dL    Albumin 3.60 3.50 - 5.20 g/dL    ALT (SGPT) 8 1 - 33 U/L    AST (SGOT) 15 1 - 32 U/L    Alkaline Phosphatase 61 39 - 117 U/L    Total Bilirubin 0.6 0.0 - 1.2 mg/dL    eGFR Non African Amer 34 (L) >60 mL/min/1.73    Globulin 2.7 gm/dL    A/G Ratio 1.3 g/dL    BUN/Creatinine Ratio 16.9 7.0 - 25.0    Anion Gap 6.0 5.0 - 15.0 mmol/L   Magnesium    Collection Time: 04/04/21  7:39 AM    Specimen: Blood   Result Value Ref Range    Magnesium 2.1 1.6 - 2.4 mg/dL   CK    Collection Time: 04/04/21  7:39 AM    Specimen: Blood   Result Value Ref Range    Creatine Kinase 103 20 - 180 U/L   Urinalysis With Culture If Indicated - Urine, Clean Catch    Collection Time: 04/04/21  7:39 AM    Specimen: Urine, Clean Catch   Result Value Ref Range    Color, UA Yellow Yellow, Straw    Appearance, UA Clear Clear    pH, UA 8.0 5.0 - 8.0    Specific Gravity, UA 1.016 1.001 - 1.030    Glucose, UA Negative Negative    Ketones, UA Negative Negative    Bilirubin, UA Negative Negative    Blood, UA Negative Negative    Protein, UA Negative Negative    Leuk Esterase, UA Trace (A) Negative    Nitrite, UA Negative Negative    Urobilinogen, UA 1.0 E.U./dL 0.2 - 1.0 E.U./dL   Urinalysis, Microscopic Only - Urine, Clean Catch    Collection Time: 04/04/21  7:39 AM    Specimen: Urine, Clean Catch   Result Value Ref Range    RBC, UA 7-12 (A) None Seen, 0-2 /HPF    WBC, UA 0-2 None Seen, 0-2 /HPF    Bacteria, UA Trace None Seen,  Trace /HPF    Squamous Epithelial Cells, UA 3-6 (A) None Seen, 0-2 /HPF    Hyaline Casts, UA None Seen 0 - 6 /LPF    Methodology Automated Microscopy    Duplex Venous Lower Extremity - Bilateral CAR    Collection Time: 04/04/21  9:26 AM   Result Value Ref Range    BSA 1.8 m^2     CV ECHO DION - BZI_BMI 26.6 kilograms/m^2     [facility] 1.8 m^2     CV ECHO DION - BZI_METRIC_WEIGHT 72.6 kg     CV ECHO DION - BZI_METRIC_HEIGHT 165.1 cm    Right Common Femoral Spont Y     Right Common Femoral Phasic Y     Right Common Femoral Augment Y     Right Common Femoral Compress C     Right Saphenofemoral Junction Compress C     Right Profunda Femoral Compress C     Right Proximal Femoral Compress C     Right Mid Femoral Spont Y     Right Mid Femoral Phasic Y     Right Mid Femoral Augment Y     Right Mid Femoral Compress C     Right Distal Femoral Compress C     Right Popliteal Spont Y     Right Popliteal Phasic Y     Right Popliteal Augment Y     Right Popliteal Compress C     Right Posterior Tibial Compress C     Right Peroneal Compress C     Right GastronemiusSoleal Compress C     Right Greater Saph AK Compress C     Right Greater Saph BK Compress C     Right Lesser Saph Compress C     Left Common Femoral Spont Y     Left Common Femoral Phasic Y     Left Common Femoral Augment Y     Left Common Femoral Compress C     Left Saphenofemoral Junction Compress C     Left Profunda Femoral Compress C     Left Proximal Femoral Compress C     Left Mid Femoral Spont Y     Left Mid Femoral Phasic Y     Left Mid Femoral Augment Y     Left Mid Femoral Compress C     Left Distal Femoral Compress C     Left Popliteal Spont Y     Left Popliteal Phasic Y     Left Popliteal Augment Y     Left Popliteal Compress C     Left Posterior Tibial Compress C     Left Peroneal Compress C     Left GastronemiusSoleal Compress C     Left Greater Saph AK Compress C     Left Greater Saph BK Compress C     Left Lesser Saph Compress C       Note: In addition to lab results from this visit, the labs listed above may include labs taken at another facility or during a different encounter within the last 24 hours. Please correlate lab times with ED admission and discharge times for further clarification of the services performed during this visit.    XR Foot 3+ View Left   Preliminary Result   Generalized osteopenia of the left foot, relatively mild   degenerative changes as noted, and suspected flatfoot deformity. No   clearly acute bony abnormality is appreciated.       DICTATED:   04/04/2021   EDITED/ls :   04/04/2021               CT Abdomen Pelvis Without Contrast   Preliminary Result   1. Small gallstones. No visible inflammation.       2. Pancolonic diverticulosis, but no evidence of active inflammation.       3. Suggestion of mild fat stranding of the left midabdominal small bowel   mesentery but normal-appearing adjacent small bowel. Clinical   significance is uncertain. If the patient has persistent midabdominal   pain, consider follow-up imaging.       4. No clearly acute intra-abdominal or intrapelvic pathology is   identified elsewhere.       DICTATED:   04/04/2021   EDITED/ls :   04/04/2021              XR Femur 2 View Left   Preliminary Result   Limited portable radiographs as described, particularly for   evaluation of the anterior knee joint. Advanced knee joint DJD. No   visible femur fracture.       DICTATED:   04/04/2021   EDITED/ls :   04/04/2021               XR Tibia Fibula 2 View Left   Preliminary Result   1. No evidence of acute trauma to the tibia and fibula.       2. Much better visualization of the knee joint than on the preceding   femur films. Advanced DJD but no visible fracture.       3. Generalized subcutaneous edema.        DICTATED:   04/04/2021   EDITED/ls :   04/04/2021            Vitals:    04/04/21 0800 04/04/21 0815 04/04/21 0845 04/04/21 0900   BP:    161/74   BP Location:       Patient Position:       Pulse:  64 64 64 61   Resp:       Temp:       TempSrc:       SpO2: 96% 99% 94% 96%   Weight:       Height:         Medications   acetaminophen (TYLENOL) tablet 1,000 mg (1,000 mg Oral Given 4/4/21 0843)   HYDROmorphone (DILAUDID) injection 0.5 mg (0.5 mg Intramuscular Given 4/4/21 0843)     ECG/EMG Results (last 24 hours)     ** No results found for the last 24 hours. **        No orders to display                                     MDM  Number of Diagnoses or Management Options  Arthritis of left knee  Asymptomatic microscopic hematuria  Baker's cyst of knee, unspecified laterality  Bilateral edema of lower extremity  Chronic renal insufficiency, stage 3 (moderate) (CMS/HCC)  Left leg pain  Diagnosis management comments:       I reviewed all available studies at the bedside with the patient.  I spoken personally with the ultrasound technician.  She has no evidence of clot in her legs but does have bilateral Baker's cyst.    Her CT of the abdomen pelvis also fairly reassuring she does have chronic renal insufficiency and microscopic hematuria and a lot of arthritis in her legs.  I think it is likely the Baker's cyst that are causing her problems currently.  We applied Lidoderm patches and Ace wrap as I talked to her and her  about the fact that she probably is going to get worse over time.  I will refer them to orthopedics for follow-up but have also asked him to follow-up with her primary care doctor for ongoing treatment evaluation and to follow-up with the lab abnormalities that were noted here.    She is able to ambulate with assistance.  They are comfortable going home.  Return to the ER if worse in any way.    Are agreeable with the plan       Amount and/or Complexity of Data Reviewed  Clinical lab tests: reviewed  Tests in the radiology section of CPT®: reviewed  Decide to obtain previous medical records or to obtain history from someone other than the patient: yes        Final diagnoses:   Left leg pain    Bilateral edema of lower extremity   Baker's cyst of knee, unspecified laterality   Arthritis of left knee   Chronic renal insufficiency, stage 3 (moderate) (CMS/HCC)   Asymptomatic microscopic hematuria       ED Disposition  ED Disposition     ED Disposition Condition Comment    Discharge Stable           Raghavendra Mei MD  [address]  [facility] [address] [phone]    Schedule an appointment as soon as possible for a visit       Howard Jones MD  [address] [facility] [address]  [place] [id number] [phone]    Schedule an appointment as soon as possible for a visit            Medication List      New Prescriptions    lidocaine 5 %  Commonly known as: LIDODERM  Place 1 patch on the skin as directed by provider Daily. Remove & Discard patch within 12 hours or as directed by MD           Where to Get Your Medications      These medications were sent to [facility] [address] [place] [address] [address] [facility] - [phone] [facility] [phone] FX  [facility] [address]    Phone: [phone]   · lidocaine 5 %          Stan Muñiz MD  04/04/21 2714

## 2022-05-20 ENCOUNTER — HOSPITAL ENCOUNTER (INPATIENT)
Facility: HOSPITAL | Age: 82
LOS: 1 days | Discharge: HOME | End: 2022-05-23
Attending: STUDENT IN AN ORGANIZED HEALTH CARE EDUCATION/TRAINING PROGRAM | Admitting: INTERNAL MEDICINE

## 2022-05-20 ENCOUNTER — APPOINTMENT (OUTPATIENT)
Dept: GENERAL RADIOLOGY | Facility: HOSPITAL | Age: 82
End: 2022-05-20

## 2022-05-20 DIAGNOSIS — D64.9 NORMOCYTIC ANEMIA: ICD-10-CM

## 2022-05-20 DIAGNOSIS — E87.20 METABOLIC ACIDOSIS: ICD-10-CM

## 2022-05-20 DIAGNOSIS — K43.6 INCARCERATED VENTRAL HERNIA: ICD-10-CM

## 2022-05-20 DIAGNOSIS — I10 ESSENTIAL HYPERTENSION: ICD-10-CM

## 2022-05-20 DIAGNOSIS — N18.30 STAGE 3 CHRONIC KIDNEY DISEASE, UNSPECIFIED WHETHER STAGE 3A OR 3B CKD: ICD-10-CM

## 2022-05-20 DIAGNOSIS — F02.81 ALZHEIMER'S DEMENTIA WITH BEHAVIORAL DISTURBANCE, UNSPECIFIED TIMING OF DEMENTIA ONSET: ICD-10-CM

## 2022-05-20 DIAGNOSIS — R07.89 OTHER CHEST PAIN: Primary | ICD-10-CM

## 2022-05-20 DIAGNOSIS — I25.10 CORONARY ARTERY DISEASE INVOLVING NATIVE CORONARY ARTERY OF NATIVE HEART WITHOUT ANGINA PECTORIS: ICD-10-CM

## 2022-05-20 DIAGNOSIS — G30.9 ALZHEIMER'S DEMENTIA WITH BEHAVIORAL DISTURBANCE, UNSPECIFIED TIMING OF DEMENTIA ONSET: ICD-10-CM

## 2022-05-20 LAB
ALBUMIN SERPL-MCNC: 4 G/DL (ref 3.5–5.2)
ALBUMIN/GLOB SERPL: 1.5 G/DL
ALP SERPL-CCNC: 77 U/L (ref 39–117)
ALT SERPL W P-5'-P-CCNC: 10 U/L (ref 1–33)
ANION GAP SERPL CALCULATED.3IONS-SCNC: 13 MMOL/L (ref 5–15)
AST SERPL-CCNC: 18 U/L (ref 1–32)
BASOPHILS # BLD AUTO: 0.04 10*3/MM3 (ref 0–0.2)
BASOPHILS NFR BLD AUTO: 0.4 % (ref 0–1.5)
BILIRUB SERPL-MCNC: 0.8 MG/DL (ref 0–1.2)
BUN SERPL-MCNC: 22 MG/DL (ref 8–23)
BUN/CREAT SERPL: 15.8 (ref 7–25)
CALCIUM SPEC-SCNC: 8.6 MG/DL (ref 8.6–10.5)
CHLORIDE SERPL-SCNC: 110 MMOL/L (ref 98–107)
CO2 SERPL-SCNC: 19 MMOL/L (ref 22–29)
CREAT SERPL-MCNC: 1.39 MG/DL (ref 0.57–1)
DEPRECATED RDW RBC AUTO: 47.5 FL (ref 37–54)
EGFRCR SERPLBLD CKD-EPI 2021: 37.5 ML/MIN/1.73
EOSINOPHIL # BLD AUTO: 0.19 10*3/MM3 (ref 0–0.4)
EOSINOPHIL NFR BLD AUTO: 2 % (ref 0.3–6.2)
ERYTHROCYTE [DISTWIDTH] IN BLOOD BY AUTOMATED COUNT: 13.6 % (ref 12.3–15.4)
GLOBULIN UR ELPH-MCNC: 2.7 GM/DL
GLUCOSE SERPL-MCNC: 90 MG/DL (ref 65–99)
HCT VFR BLD AUTO: 40.6 % (ref 34–46.6)
HGB BLD-MCNC: 13.1 G/DL (ref 12–15.9)
HOLD SPECIMEN: NORMAL
IMM GRANULOCYTES # BLD AUTO: 0.06 10*3/MM3 (ref 0–0.05)
IMM GRANULOCYTES NFR BLD AUTO: 0.6 % (ref 0–0.5)
LIPASE SERPL-CCNC: 27 U/L (ref 13–60)
LYMPHOCYTES # BLD AUTO: 1.74 10*3/MM3 (ref 0.7–3.1)
LYMPHOCYTES NFR BLD AUTO: 17.9 % (ref 19.6–45.3)
MCH RBC QN AUTO: 30.3 PG (ref 26.6–33)
MCHC RBC AUTO-ENTMCNC: 32.3 G/DL (ref 31.5–35.7)
MCV RBC AUTO: 94 FL (ref 79–97)
MONOCYTES # BLD AUTO: 0.86 10*3/MM3 (ref 0.1–0.9)
MONOCYTES NFR BLD AUTO: 8.8 % (ref 5–12)
NEUTROPHILS NFR BLD AUTO: 6.83 10*3/MM3 (ref 1.7–7)
NEUTROPHILS NFR BLD AUTO: 70.3 % (ref 42.7–76)
NRBC BLD AUTO-RTO: 0 /100 WBC (ref 0–0.2)
NT-PROBNP SERPL-MCNC: 2393 PG/ML (ref 0–1800)
PLATELET # BLD AUTO: 242 10*3/MM3 (ref 140–450)
PMV BLD AUTO: 10.5 FL (ref 6–12)
POTASSIUM SERPL-SCNC: 5.1 MMOL/L (ref 3.5–5.2)
PROT SERPL-MCNC: 6.7 G/DL (ref 6–8.5)
RBC # BLD AUTO: 4.32 10*6/MM3 (ref 3.77–5.28)
SODIUM SERPL-SCNC: 142 MMOL/L (ref 136–145)
TROPONIN T SERPL-MCNC: <0.01 NG/ML (ref 0–0.03)
TROPONIN T SERPL-MCNC: <0.01 NG/ML (ref 0–0.03)
WBC NRBC COR # BLD: 9.72 10*3/MM3 (ref 3.4–10.8)
WHOLE BLOOD HOLD COAG: NORMAL
WHOLE BLOOD HOLD SPECIMEN: NORMAL

## 2022-05-20 PROCEDURE — 84443 ASSAY THYROID STIM HORMONE: CPT | Performed by: PHYSICIAN ASSISTANT

## 2022-05-20 PROCEDURE — 25010000002 FUROSEMIDE PER 20 MG: Performed by: STUDENT IN AN ORGANIZED HEALTH CARE EDUCATION/TRAINING PROGRAM

## 2022-05-20 PROCEDURE — 83735 ASSAY OF MAGNESIUM: CPT | Performed by: PHYSICIAN ASSISTANT

## 2022-05-20 PROCEDURE — 85025 COMPLETE CBC W/AUTO DIFF WBC: CPT

## 2022-05-20 PROCEDURE — 71045 X-RAY EXAM CHEST 1 VIEW: CPT

## 2022-05-20 PROCEDURE — 99284 EMERGENCY DEPT VISIT MOD MDM: CPT

## 2022-05-20 PROCEDURE — 80053 COMPREHEN METABOLIC PANEL: CPT

## 2022-05-20 PROCEDURE — 84484 ASSAY OF TROPONIN QUANT: CPT | Performed by: STUDENT IN AN ORGANIZED HEALTH CARE EDUCATION/TRAINING PROGRAM

## 2022-05-20 PROCEDURE — 83690 ASSAY OF LIPASE: CPT

## 2022-05-20 PROCEDURE — 83880 ASSAY OF NATRIURETIC PEPTIDE: CPT

## 2022-05-20 PROCEDURE — 84484 ASSAY OF TROPONIN QUANT: CPT

## 2022-05-20 PROCEDURE — 93005 ELECTROCARDIOGRAM TRACING: CPT | Performed by: STUDENT IN AN ORGANIZED HEALTH CARE EDUCATION/TRAINING PROGRAM

## 2022-05-20 RX ORDER — FUROSEMIDE 10 MG/ML
40 INJECTION INTRAMUSCULAR; INTRAVENOUS ONCE
Status: COMPLETED | OUTPATIENT
Start: 2022-05-20 | End: 2022-05-20

## 2022-05-20 RX ORDER — SODIUM CHLORIDE 0.9 % (FLUSH) 0.9 %
10 SYRINGE (ML) INJECTION AS NEEDED
Status: DISCONTINUED | OUTPATIENT
Start: 2022-05-20 | End: 2022-05-23 | Stop reason: HOSPADM

## 2022-05-20 RX ORDER — ASPIRIN 81 MG/1
324 TABLET, CHEWABLE ORAL ONCE
Status: COMPLETED | OUTPATIENT
Start: 2022-05-20 | End: 2022-05-21

## 2022-05-20 RX ADMIN — FUROSEMIDE 40 MG: 10 INJECTION, SOLUTION INTRAMUSCULAR; INTRAVENOUS at 23:58

## 2022-05-21 ENCOUNTER — APPOINTMENT (OUTPATIENT)
Dept: CARDIOLOGY | Facility: HOSPITAL | Age: 82
End: 2022-05-21

## 2022-05-21 PROBLEM — E87.20 METABOLIC ACIDOSIS: Status: ACTIVE | Noted: 2022-05-21

## 2022-05-21 PROBLEM — R07.9 CHEST PAIN: Status: ACTIVE | Noted: 2022-05-21

## 2022-05-21 LAB
ANION GAP SERPL CALCULATED.3IONS-SCNC: 12 MMOL/L (ref 5–15)
BASOPHILS # BLD AUTO: 0.05 10*3/MM3 (ref 0–0.2)
BASOPHILS NFR BLD AUTO: 0.6 % (ref 0–1.5)
BUN SERPL-MCNC: 23 MG/DL (ref 8–23)
BUN/CREAT SERPL: 16.8 (ref 7–25)
CALCIUM SPEC-SCNC: 8.5 MG/DL (ref 8.6–10.5)
CHLORIDE SERPL-SCNC: 112 MMOL/L (ref 98–107)
CHOLEST SERPL-MCNC: 189 MG/DL (ref 0–200)
CLUMPED PLATELETS: PRESENT
CO2 SERPL-SCNC: 19 MMOL/L (ref 22–29)
CREAT SERPL-MCNC: 1.37 MG/DL (ref 0.57–1)
D-LACTATE SERPL-SCNC: 0.7 MMOL/L (ref 0.5–2)
DEPRECATED RDW RBC AUTO: 46.8 FL (ref 37–54)
EGFRCR SERPLBLD CKD-EPI 2021: 38.2 ML/MIN/1.73
EOSINOPHIL # BLD AUTO: 0.12 10*3/MM3 (ref 0–0.4)
EOSINOPHIL NFR BLD AUTO: 1.4 % (ref 0.3–6.2)
ERYTHROCYTE [DISTWIDTH] IN BLOOD BY AUTOMATED COUNT: 13.6 % (ref 12.3–15.4)
FLUAV RNA RESP QL NAA+PROBE: NOT DETECTED
FLUBV RNA RESP QL NAA+PROBE: NOT DETECTED
GLUCOSE SERPL-MCNC: 104 MG/DL (ref 65–99)
HBA1C MFR BLD: 4.6 % (ref 4.8–5.6)
HCT VFR BLD AUTO: 38.6 % (ref 34–46.6)
HDLC SERPL-MCNC: 71 MG/DL (ref 40–60)
HGB BLD-MCNC: 12.1 G/DL (ref 12–15.9)
IMM GRANULOCYTES # BLD AUTO: 0.05 10*3/MM3 (ref 0–0.05)
IMM GRANULOCYTES NFR BLD AUTO: 0.6 % (ref 0–0.5)
LDLC SERPL CALC-MCNC: 107 MG/DL (ref 0–100)
LDLC/HDLC SERPL: 1.49 {RATIO}
LYMPHOCYTES # BLD AUTO: 1.33 10*3/MM3 (ref 0.7–3.1)
LYMPHOCYTES NFR BLD AUTO: 15.2 % (ref 19.6–45.3)
MAGNESIUM SERPL-MCNC: 2.1 MG/DL (ref 1.6–2.4)
MCH RBC QN AUTO: 29.7 PG (ref 26.6–33)
MCHC RBC AUTO-ENTMCNC: 31.3 G/DL (ref 31.5–35.7)
MCV RBC AUTO: 94.8 FL (ref 79–97)
MONOCYTES # BLD AUTO: 0.95 10*3/MM3 (ref 0.1–0.9)
MONOCYTES NFR BLD AUTO: 10.9 % (ref 5–12)
NEUTROPHILS NFR BLD AUTO: 6.23 10*3/MM3 (ref 1.7–7)
NEUTROPHILS NFR BLD AUTO: 71.3 % (ref 42.7–76)
NRBC BLD AUTO-RTO: 0 /100 WBC (ref 0–0.2)
PLATELET # BLD AUTO: 170 10*3/MM3 (ref 140–450)
PMV BLD AUTO: 11.5 FL (ref 6–12)
POTASSIUM SERPL-SCNC: 4.4 MMOL/L (ref 3.5–5.2)
RBC # BLD AUTO: 4.07 10*6/MM3 (ref 3.77–5.28)
RBC MORPH BLD: NORMAL
RSV RNA NPH QL NAA+NON-PROBE: NOT DETECTED
SARS-COV-2 RNA RESP QL NAA+PROBE: NOT DETECTED
SODIUM SERPL-SCNC: 143 MMOL/L (ref 136–145)
TRIGL SERPL-MCNC: 60 MG/DL (ref 0–150)
TROPONIN T SERPL-MCNC: <0.01 NG/ML (ref 0–0.03)
TSH SERPL DL<=0.05 MIU/L-ACNC: 4.99 UIU/ML (ref 0.27–4.2)
VLDLC SERPL-MCNC: 11 MG/DL (ref 5–40)
WBC MORPH BLD: NORMAL
WBC NRBC COR # BLD: 8.73 10*3/MM3 (ref 3.4–10.8)

## 2022-05-21 PROCEDURE — 84484 ASSAY OF TROPONIN QUANT: CPT | Performed by: INTERNAL MEDICINE

## 2022-05-21 PROCEDURE — G0378 HOSPITAL OBSERVATION PER HR: HCPCS

## 2022-05-21 PROCEDURE — 85007 BL SMEAR W/DIFF WBC COUNT: CPT | Performed by: PHYSICIAN ASSISTANT

## 2022-05-21 PROCEDURE — 83605 ASSAY OF LACTIC ACID: CPT | Performed by: INTERNAL MEDICINE

## 2022-05-21 PROCEDURE — 85025 COMPLETE CBC W/AUTO DIFF WBC: CPT | Performed by: PHYSICIAN ASSISTANT

## 2022-05-21 PROCEDURE — 99223 1ST HOSP IP/OBS HIGH 75: CPT | Performed by: PHYSICIAN ASSISTANT

## 2022-05-21 PROCEDURE — 25010000002 MORPHINE PER 10 MG: Performed by: INTERNAL MEDICINE

## 2022-05-21 PROCEDURE — 83036 HEMOGLOBIN GLYCOSYLATED A1C: CPT | Performed by: INTERNAL MEDICINE

## 2022-05-21 PROCEDURE — 80048 BASIC METABOLIC PNL TOTAL CA: CPT | Performed by: PHYSICIAN ASSISTANT

## 2022-05-21 PROCEDURE — 80061 LIPID PANEL: CPT | Performed by: INTERNAL MEDICINE

## 2022-05-21 PROCEDURE — 87637 SARSCOV2&INF A&B&RSV AMP PRB: CPT | Performed by: INTERNAL MEDICINE

## 2022-05-21 PROCEDURE — 93306 TTE W/DOPPLER COMPLETE: CPT

## 2022-05-21 PROCEDURE — 93306 TTE W/DOPPLER COMPLETE: CPT | Performed by: INTERNAL MEDICINE

## 2022-05-21 RX ORDER — MORPHINE SULFATE 2 MG/ML
2 INJECTION, SOLUTION INTRAMUSCULAR; INTRAVENOUS EVERY 4 HOURS PRN
Status: DISCONTINUED | OUTPATIENT
Start: 2022-05-21 | End: 2022-05-23 | Stop reason: HOSPADM

## 2022-05-21 RX ORDER — QUETIAPINE FUMARATE 25 MG/1
12.5 TABLET, FILM COATED ORAL EVERY 8 HOURS PRN
Status: DISCONTINUED | OUTPATIENT
Start: 2022-05-21 | End: 2022-05-23 | Stop reason: HOSPADM

## 2022-05-21 RX ORDER — SODIUM CHLORIDE 0.9 % (FLUSH) 0.9 %
10 SYRINGE (ML) INJECTION AS NEEDED
Status: DISCONTINUED | OUTPATIENT
Start: 2022-05-21 | End: 2022-05-23 | Stop reason: HOSPADM

## 2022-05-21 RX ORDER — ASPIRIN 81 MG/1
81 TABLET ORAL DAILY
Status: DISCONTINUED | OUTPATIENT
Start: 2022-05-21 | End: 2022-05-23 | Stop reason: HOSPADM

## 2022-05-21 RX ORDER — MEMANTINE HYDROCHLORIDE 10 MG/1
10 TABLET ORAL 2 TIMES DAILY
Status: DISCONTINUED | OUTPATIENT
Start: 2022-05-21 | End: 2022-05-23 | Stop reason: HOSPADM

## 2022-05-21 RX ORDER — ACETAMINOPHEN 325 MG/1
650 TABLET ORAL EVERY 4 HOURS PRN
Status: DISCONTINUED | OUTPATIENT
Start: 2022-05-21 | End: 2022-05-23 | Stop reason: HOSPADM

## 2022-05-21 RX ORDER — QUETIAPINE FUMARATE 25 MG/1
50 TABLET, FILM COATED ORAL NIGHTLY
Status: DISCONTINUED | OUTPATIENT
Start: 2022-05-21 | End: 2022-05-23 | Stop reason: HOSPADM

## 2022-05-21 RX ORDER — LIDOCAINE 50 MG/G
1 PATCH TOPICAL
Status: DISCONTINUED | OUTPATIENT
Start: 2022-05-21 | End: 2022-05-23 | Stop reason: HOSPADM

## 2022-05-21 RX ORDER — TRAZODONE HYDROCHLORIDE 50 MG/1
50 TABLET ORAL NIGHTLY
Status: DISCONTINUED | OUTPATIENT
Start: 2022-05-21 | End: 2022-05-23 | Stop reason: HOSPADM

## 2022-05-21 RX ORDER — CITALOPRAM 20 MG/1
20 TABLET ORAL DAILY
Status: DISCONTINUED | OUTPATIENT
Start: 2022-05-21 | End: 2022-05-23 | Stop reason: HOSPADM

## 2022-05-21 RX ORDER — SODIUM CHLORIDE 0.9 % (FLUSH) 0.9 %
10 SYRINGE (ML) INJECTION EVERY 12 HOURS SCHEDULED
Status: DISCONTINUED | OUTPATIENT
Start: 2022-05-21 | End: 2022-05-23 | Stop reason: HOSPADM

## 2022-05-21 RX ORDER — DONEPEZIL HYDROCHLORIDE 10 MG/1
10 TABLET, FILM COATED ORAL NIGHTLY
Status: DISCONTINUED | OUTPATIENT
Start: 2022-05-21 | End: 2022-05-23 | Stop reason: HOSPADM

## 2022-05-21 RX ADMIN — TRAZODONE HYDROCHLORIDE 50 MG: 50 TABLET ORAL at 20:36

## 2022-05-21 RX ADMIN — ASPIRIN 81 MG 324 MG: 81 TABLET ORAL at 01:43

## 2022-05-21 RX ADMIN — QUETIAPINE FUMARATE 50 MG: 25 TABLET ORAL at 02:47

## 2022-05-21 RX ADMIN — DONEPEZIL HYDROCHLORIDE 10 MG: 10 TABLET ORAL at 20:36

## 2022-05-21 RX ADMIN — TRAZODONE HYDROCHLORIDE 50 MG: 50 TABLET ORAL at 02:47

## 2022-05-21 RX ADMIN — QUETIAPINE FUMARATE 50 MG: 25 TABLET ORAL at 20:36

## 2022-05-21 RX ADMIN — CITALOPRAM HYDROBROMIDE 20 MG: 20 TABLET ORAL at 08:45

## 2022-05-21 RX ADMIN — ASPIRIN 81 MG: 81 TABLET, COATED ORAL at 08:45

## 2022-05-21 RX ADMIN — Medication 10 ML: at 08:55

## 2022-05-21 RX ADMIN — MEMANTINE 10 MG: 10 TABLET ORAL at 20:36

## 2022-05-21 RX ADMIN — MORPHINE SULFATE 2 MG: 2 INJECTION, SOLUTION INTRAMUSCULAR; INTRAVENOUS at 01:43

## 2022-05-21 RX ADMIN — LIDOCAINE 1 PATCH: 50 PATCH CUTANEOUS at 09:01

## 2022-05-21 RX ADMIN — MEMANTINE 10 MG: 10 TABLET ORAL at 08:45

## 2022-05-21 RX ADMIN — Medication 10 ML: at 20:36

## 2022-05-21 RX ADMIN — DONEPEZIL HYDROCHLORIDE 10 MG: 10 TABLET ORAL at 02:47

## 2022-05-22 LAB
ANION GAP SERPL CALCULATED.3IONS-SCNC: 10 MMOL/L (ref 5–15)
BASOPHILS # BLD AUTO: 0.05 10*3/MM3 (ref 0–0.2)
BASOPHILS NFR BLD AUTO: 0.7 % (ref 0–1.5)
BH CV ECHO MEAS - AO MAX PG: 11 MMHG
BH CV ECHO MEAS - AO MEAN PG: 6 MMHG
BH CV ECHO MEAS - AO ROOT DIAM: 3.4 CM
BH CV ECHO MEAS - AO V2 MAX: 165.9 CM/SEC
BH CV ECHO MEAS - AO V2 VTI: 40.9 CM
BH CV ECHO MEAS - AVA(I,D): 2.3 CM2
BH CV ECHO MEAS - EDV(CUBED): 47.9 ML
BH CV ECHO MEAS - EDV(MOD-SP2): 43.6 ML
BH CV ECHO MEAS - EDV(MOD-SP4): 49.6 ML
BH CV ECHO MEAS - EF(MOD-BP): 58.7 %
BH CV ECHO MEAS - EF(MOD-SP2): 54.1 %
BH CV ECHO MEAS - EF(MOD-SP4): 62.9 %
BH CV ECHO MEAS - ESV(CUBED): 15.2 ML
BH CV ECHO MEAS - ESV(MOD-SP2): 20 ML
BH CV ECHO MEAS - ESV(MOD-SP4): 18.4 ML
BH CV ECHO MEAS - FS: 31.9 %
BH CV ECHO MEAS - IVS/LVPW: 0.98 CM
BH CV ECHO MEAS - IVSD: 1.22 CM
BH CV ECHO MEAS - LA DIMENSION: 2.6 CM
BH CV ECHO MEAS - LAT PEAK E' VEL: 8.6 CM/SEC
BH CV ECHO MEAS - LV MASS(C)D: 149.1 GRAMS
BH CV ECHO MEAS - LV MAX PG: 5 MMHG
BH CV ECHO MEAS - LV MEAN PG: 3 MMHG
BH CV ECHO MEAS - LV V1 MAX: 111.6 CM/SEC
BH CV ECHO MEAS - LV V1 VTI: 30.2 CM
BH CV ECHO MEAS - LVIDD: 3.6 CM
BH CV ECHO MEAS - LVIDS: 2.48 CM
BH CV ECHO MEAS - LVOT AREA: 3.1 CM2
BH CV ECHO MEAS - LVOT DIAM: 1.99 CM
BH CV ECHO MEAS - LVPWD: 1.24 CM
BH CV ECHO MEAS - MED PEAK E' VEL: 6 CM/SEC
BH CV ECHO MEAS - MR MAX PG: 81.5 MMHG
BH CV ECHO MEAS - MR MAX VEL: 451.5 CM/SEC
BH CV ECHO MEAS - MV A MAX VEL: 99.4 CM/SEC
BH CV ECHO MEAS - MV DEC SLOPE: 249.9 CM/SEC2
BH CV ECHO MEAS - MV DEC TIME: 0.31 MSEC
BH CV ECHO MEAS - MV E MAX VEL: 72.8 CM/SEC
BH CV ECHO MEAS - MV E/A: 0.73
BH CV ECHO MEAS - MV P1/2T: 86.3 MSEC
BH CV ECHO MEAS - MVA(P1/2T): 2.6 CM2
BH CV ECHO MEAS - PA ACC TIME: 0.17 SEC
BH CV ECHO MEAS - PA PR(ACCEL): 4.5 MMHG
BH CV ECHO MEAS - PA V2 MAX: 114.9 CM/SEC
BH CV ECHO MEAS - RAP SYSTOLE: 3 MMHG
BH CV ECHO MEAS - RVSP: 33 MMHG
BH CV ECHO MEAS - SV(LVOT): 94 ML
BH CV ECHO MEAS - SV(MOD-SP2): 23.6 ML
BH CV ECHO MEAS - SV(MOD-SP4): 31.2 ML
BH CV ECHO MEAS - TAPSE (>1.6): 2.24 CM
BH CV ECHO MEAS - TR MAX PG: 24.4 MMHG
BH CV ECHO MEAS - TR MAX VEL: 245.2 CM/SEC
BH CV ECHO MEASUREMENTS AVERAGE E/E' RATIO: 9.97
BH CV XLRA - RV BASE: 3.5 CM
BH CV XLRA - RV LENGTH: 6.4 CM
BH CV XLRA - RV MID: 2.8 CM
BH CV XLRA - TDI S': 12.4 CM/SEC
BUN SERPL-MCNC: 20 MG/DL (ref 8–23)
BUN/CREAT SERPL: 17.4 (ref 7–25)
CALCIUM SPEC-SCNC: 8.4 MG/DL (ref 8.6–10.5)
CHLORIDE SERPL-SCNC: 107 MMOL/L (ref 98–107)
CO2 SERPL-SCNC: 22 MMOL/L (ref 22–29)
CREAT SERPL-MCNC: 1.15 MG/DL (ref 0.57–1)
DEPRECATED RDW RBC AUTO: 45.9 FL (ref 37–54)
EGFRCR SERPLBLD CKD-EPI 2021: 47.1 ML/MIN/1.73
EOSINOPHIL # BLD AUTO: 0.28 10*3/MM3 (ref 0–0.4)
EOSINOPHIL NFR BLD AUTO: 4.1 % (ref 0.3–6.2)
ERYTHROCYTE [DISTWIDTH] IN BLOOD BY AUTOMATED COUNT: 13.4 % (ref 12.3–15.4)
GLUCOSE SERPL-MCNC: 84 MG/DL (ref 65–99)
HCT VFR BLD AUTO: 36.4 % (ref 34–46.6)
HGB BLD-MCNC: 12.1 G/DL (ref 12–15.9)
IMM GRANULOCYTES # BLD AUTO: 0.05 10*3/MM3 (ref 0–0.05)
IMM GRANULOCYTES NFR BLD AUTO: 0.7 % (ref 0–0.5)
LEFT ATRIUM VOLUME INDEX: 26.7 ML/M2
LV EF 2D ECHO EST: 65 %
LYMPHOCYTES # BLD AUTO: 1.77 10*3/MM3 (ref 0.7–3.1)
LYMPHOCYTES NFR BLD AUTO: 26 % (ref 19.6–45.3)
MAXIMAL PREDICTED HEART RATE: 136 BPM
MCH RBC QN AUTO: 30.9 PG (ref 26.6–33)
MCHC RBC AUTO-ENTMCNC: 33.2 G/DL (ref 31.5–35.7)
MCV RBC AUTO: 92.9 FL (ref 79–97)
MONOCYTES # BLD AUTO: 0.66 10*3/MM3 (ref 0.1–0.9)
MONOCYTES NFR BLD AUTO: 9.7 % (ref 5–12)
NEUTROPHILS NFR BLD AUTO: 4 10*3/MM3 (ref 1.7–7)
NEUTROPHILS NFR BLD AUTO: 58.8 % (ref 42.7–76)
NRBC BLD AUTO-RTO: 0 /100 WBC (ref 0–0.2)
PLATELET # BLD AUTO: 217 10*3/MM3 (ref 140–450)
PMV BLD AUTO: 10.1 FL (ref 6–12)
POTASSIUM SERPL-SCNC: 4.3 MMOL/L (ref 3.5–5.2)
QT INTERVAL: 430 MS
QT INTERVAL: 448 MS
QTC INTERVAL: 454 MS
QTC INTERVAL: 480 MS
RBC # BLD AUTO: 3.92 10*6/MM3 (ref 3.77–5.28)
SODIUM SERPL-SCNC: 139 MMOL/L (ref 136–145)
STRESS TARGET HR: 116 BPM
WBC NRBC COR # BLD: 6.81 10*3/MM3 (ref 3.4–10.8)

## 2022-05-22 PROCEDURE — G0378 HOSPITAL OBSERVATION PER HR: HCPCS

## 2022-05-22 PROCEDURE — 85025 COMPLETE CBC W/AUTO DIFF WBC: CPT | Performed by: PHYSICIAN ASSISTANT

## 2022-05-22 PROCEDURE — 99232 SBSQ HOSP IP/OBS MODERATE 35: CPT | Performed by: INTERNAL MEDICINE

## 2022-05-22 PROCEDURE — 97165 OT EVAL LOW COMPLEX 30 MIN: CPT

## 2022-05-22 PROCEDURE — 97530 THERAPEUTIC ACTIVITIES: CPT

## 2022-05-22 PROCEDURE — 80048 BASIC METABOLIC PNL TOTAL CA: CPT | Performed by: PHYSICIAN ASSISTANT

## 2022-05-22 RX ADMIN — QUETIAPINE FUMARATE 50 MG: 25 TABLET ORAL at 21:12

## 2022-05-22 RX ADMIN — TRAZODONE HYDROCHLORIDE 50 MG: 50 TABLET ORAL at 21:12

## 2022-05-22 RX ADMIN — Medication 10 ML: at 21:12

## 2022-05-22 RX ADMIN — ASPIRIN 81 MG: 81 TABLET, COATED ORAL at 10:26

## 2022-05-22 RX ADMIN — MEMANTINE 10 MG: 10 TABLET ORAL at 10:26

## 2022-05-22 RX ADMIN — CITALOPRAM HYDROBROMIDE 20 MG: 20 TABLET ORAL at 10:26

## 2022-05-22 RX ADMIN — MEMANTINE 10 MG: 10 TABLET ORAL at 21:12

## 2022-05-22 RX ADMIN — DONEPEZIL HYDROCHLORIDE 10 MG: 10 TABLET ORAL at 21:11

## 2022-05-22 RX ADMIN — Medication 10 ML: at 10:26

## 2022-05-23 ENCOUNTER — READMISSION MANAGEMENT (OUTPATIENT)
Dept: CALL CENTER | Facility: HOSPITAL | Age: 82
End: 2022-05-23

## 2022-05-23 VITALS
OXYGEN SATURATION: 96 % | SYSTOLIC BLOOD PRESSURE: 148 MMHG | BODY MASS INDEX: 36.73 KG/M2 | DIASTOLIC BLOOD PRESSURE: 79 MMHG | HEART RATE: 79 BPM | HEIGHT: 62 IN | WEIGHT: 199.6 LBS | RESPIRATION RATE: 16 BRPM | TEMPERATURE: 98.1 F

## 2022-05-23 PROBLEM — R07.89 OTHER CHEST PAIN: Status: ACTIVE | Noted: 2022-05-23

## 2022-05-23 LAB
ANION GAP SERPL CALCULATED.3IONS-SCNC: 7 MMOL/L (ref 5–15)
BASOPHILS # BLD AUTO: 0.04 10*3/MM3 (ref 0–0.2)
BASOPHILS NFR BLD AUTO: 0.5 % (ref 0–1.5)
BUN SERPL-MCNC: 18 MG/DL (ref 8–23)
BUN/CREAT SERPL: 14.2 (ref 7–25)
CALCIUM SPEC-SCNC: 8.3 MG/DL (ref 8.6–10.5)
CHLORIDE SERPL-SCNC: 107 MMOL/L (ref 98–107)
CO2 SERPL-SCNC: 22 MMOL/L (ref 22–29)
CREAT SERPL-MCNC: 1.27 MG/DL (ref 0.57–1)
DEPRECATED RDW RBC AUTO: 45.3 FL (ref 37–54)
EGFRCR SERPLBLD CKD-EPI 2021: 41.8 ML/MIN/1.73
EOSINOPHIL # BLD AUTO: 0.3 10*3/MM3 (ref 0–0.4)
EOSINOPHIL NFR BLD AUTO: 3.9 % (ref 0.3–6.2)
ERYTHROCYTE [DISTWIDTH] IN BLOOD BY AUTOMATED COUNT: 13.4 % (ref 12.3–15.4)
GLUCOSE SERPL-MCNC: 86 MG/DL (ref 65–99)
HCT VFR BLD AUTO: 34.9 % (ref 34–46.6)
HGB BLD-MCNC: 11.6 G/DL (ref 12–15.9)
IMM GRANULOCYTES # BLD AUTO: 0.03 10*3/MM3 (ref 0–0.05)
IMM GRANULOCYTES NFR BLD AUTO: 0.4 % (ref 0–0.5)
LYMPHOCYTES # BLD AUTO: 1.98 10*3/MM3 (ref 0.7–3.1)
LYMPHOCYTES NFR BLD AUTO: 26 % (ref 19.6–45.3)
MCH RBC QN AUTO: 30.6 PG (ref 26.6–33)
MCHC RBC AUTO-ENTMCNC: 33.2 G/DL (ref 31.5–35.7)
MCV RBC AUTO: 92.1 FL (ref 79–97)
MONOCYTES # BLD AUTO: 0.76 10*3/MM3 (ref 0.1–0.9)
MONOCYTES NFR BLD AUTO: 10 % (ref 5–12)
NEUTROPHILS NFR BLD AUTO: 4.5 10*3/MM3 (ref 1.7–7)
NEUTROPHILS NFR BLD AUTO: 59.2 % (ref 42.7–76)
NRBC BLD AUTO-RTO: 0 /100 WBC (ref 0–0.2)
PLATELET # BLD AUTO: 206 10*3/MM3 (ref 140–450)
PMV BLD AUTO: 10.1 FL (ref 6–12)
POTASSIUM SERPL-SCNC: 4.5 MMOL/L (ref 3.5–5.2)
RBC # BLD AUTO: 3.79 10*6/MM3 (ref 3.77–5.28)
SODIUM SERPL-SCNC: 136 MMOL/L (ref 136–145)
WBC NRBC COR # BLD: 7.61 10*3/MM3 (ref 3.4–10.8)

## 2022-05-23 PROCEDURE — 97110 THERAPEUTIC EXERCISES: CPT

## 2022-05-23 PROCEDURE — 99239 HOSP IP/OBS DSCHRG MGMT >30: CPT | Performed by: INTERNAL MEDICINE

## 2022-05-23 PROCEDURE — 85025 COMPLETE CBC W/AUTO DIFF WBC: CPT | Performed by: PHYSICIAN ASSISTANT

## 2022-05-23 PROCEDURE — 97161 PT EVAL LOW COMPLEX 20 MIN: CPT

## 2022-05-23 PROCEDURE — 80048 BASIC METABOLIC PNL TOTAL CA: CPT | Performed by: PHYSICIAN ASSISTANT

## 2022-05-23 RX ORDER — PHENOL 1.4 %
10 AEROSOL, SPRAY (ML) MUCOUS MEMBRANE NIGHTLY
Qty: 100 TABLET | Refills: 2 | Status: SHIPPED | OUTPATIENT
Start: 2022-05-23 | End: 2023-01-26 | Stop reason: HOSPADM

## 2022-05-23 RX ORDER — DOCUSATE SODIUM 100 MG/1
100 CAPSULE, LIQUID FILLED ORAL ONCE
Status: COMPLETED | OUTPATIENT
Start: 2022-05-23 | End: 2022-05-23

## 2022-05-23 RX ORDER — NIFEDIPINE 60 MG/1
60 TABLET, EXTENDED RELEASE ORAL
Status: DISCONTINUED | OUTPATIENT
Start: 2022-05-23 | End: 2022-05-23 | Stop reason: HOSPADM

## 2022-05-23 RX ORDER — NIFEDIPINE 60 MG/1
60 TABLET, FILM COATED, EXTENDED RELEASE ORAL
Qty: 30 TABLET | Refills: 5 | Status: SHIPPED | OUTPATIENT
Start: 2022-05-24 | End: 2023-01-26 | Stop reason: HOSPADM

## 2022-05-23 RX ADMIN — NIFEDIPINE 60 MG: 60 TABLET, FILM COATED, EXTENDED RELEASE ORAL at 08:57

## 2022-05-23 RX ADMIN — MEMANTINE 10 MG: 10 TABLET ORAL at 08:57

## 2022-05-23 RX ADMIN — CITALOPRAM HYDROBROMIDE 20 MG: 20 TABLET ORAL at 08:57

## 2022-05-23 RX ADMIN — ASPIRIN 81 MG: 81 TABLET, COATED ORAL at 08:57

## 2022-05-23 RX ADMIN — DOCUSATE SODIUM 100 MG: 100 CAPSULE, LIQUID FILLED ORAL at 13:30

## 2022-05-23 RX ADMIN — LIDOCAINE 1 PATCH: 50 PATCH CUTANEOUS at 08:57

## 2022-05-24 NOTE — OUTREACH NOTE
Prep Survey    Flowsheet Row Responses   [facility] facility patient discharged from? [facility]   Is LACE score < 7 ? No   Emergency Room discharge w/ pulse ox? No   Eligibility Readm Mgmt   Discharge diagnosis Left sided musculoskeletal Chest pain, s/p fall   Does the patient have one of the following disease processes/diagnoses(primary or secondary)? Other   Does the patient have Home health ordered? No   Is there a DME ordered? No   Prep survey completed? Yes          ZOILA DOALN - Registered Nurse

## 2022-05-25 ENCOUNTER — READMISSION MANAGEMENT (OUTPATIENT)
Dept: CALL CENTER | Facility: HOSPITAL | Age: 82
End: 2022-05-25

## 2022-05-25 NOTE — OUTREACH NOTE
Medical Week 1 Survey    Flowsheet Row Responses   [facility] patient discharged from? [facility]   Does the patient have one of the following disease processes/diagnoses(primary or secondary)? Other   Week 1 attempt successful? No   Unsuccessful attempts Attempt 1          MIKAYLA PARHAM - Licensed Nurse

## 2022-06-02 ENCOUNTER — READMISSION MANAGEMENT (OUTPATIENT)
Dept: CALL CENTER | Facility: HOSPITAL | Age: 82
End: 2022-06-02

## 2022-06-02 NOTE — OUTREACH NOTE
Medical Week 2 Survey    Flowsheet Row Responses   [facility] patient discharged from? [facility]   Does the patient have one of the following disease processes/diagnoses(primary or secondary)? Other   Week 2 attempt successful? No   Unsuccessful attempts Attempt 1   Discharge diagnosis Left sided musculoskeletal Chest pain, s/p fall          SELENA S - Registered Nurse

## 2022-06-15 ENCOUNTER — READMISSION MANAGEMENT (OUTPATIENT)
Dept: CALL CENTER | Facility: HOSPITAL | Age: 82
End: 2022-06-15

## 2022-06-15 NOTE — OUTREACH NOTE
Medical Week 4 Survey    Flowsheet Row Responses   [facility] patient discharged from? [facility]   Does the patient have one of the following disease processes/diagnoses(primary or secondary)? Other   Week 4 attempt successful? Donna XIAO - Registered Nurse

## 2022-10-07 ENCOUNTER — APPOINTMENT (OUTPATIENT)
Dept: GENERAL RADIOLOGY | Facility: HOSPITAL | Age: 82
End: 2022-10-07

## 2022-10-07 ENCOUNTER — APPOINTMENT (OUTPATIENT)
Dept: CT IMAGING | Facility: HOSPITAL | Age: 82
End: 2022-10-07

## 2022-10-07 ENCOUNTER — HOSPITAL ENCOUNTER (INPATIENT)
Facility: HOSPITAL | Age: 82
LOS: 11 days | Discharge: NURSING, REHAB OR LONG-TERM CARE | End: 2022-10-19
Attending: EMERGENCY MEDICINE | Admitting: INTERNAL MEDICINE

## 2022-10-07 DIAGNOSIS — E86.0 ACUTE DEHYDRATION: ICD-10-CM

## 2022-10-07 DIAGNOSIS — R77.8 ELEVATED TROPONIN: ICD-10-CM

## 2022-10-07 DIAGNOSIS — R13.11 DYSPHAGIA, ORAL PHASE: ICD-10-CM

## 2022-10-07 DIAGNOSIS — N28.9 ACUTE RENAL INSUFFICIENCY: ICD-10-CM

## 2022-10-07 DIAGNOSIS — K56.41 FECAL IMPACTION: Primary | ICD-10-CM
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
## 2022-10-07 PROBLEM — R13.10 DIFFICULTY SWALLOWING: Status: ACTIVE | Noted: 2022-10-07

## 2022-10-07 PROBLEM — R79.89 ELEVATED LACTIC ACID LEVEL: Status: ACTIVE | Noted: 2022-10-07

## 2022-10-07 PROBLEM — D72.829 LEUKOCYTOSIS: Status: ACTIVE | Noted: 2022-10-07

## 2022-10-07 LAB
ALBUMIN SERPL-MCNC: 3.2 G/DL (ref 3.5–5.2)
ALBUMIN/GLOB SERPL: 1.1 G/DL
ALP SERPL-CCNC: 64 U/L (ref 39–117)
ALT SERPL W P-5'-P-CCNC: 6 U/L (ref 1–33)
ANION GAP SERPL CALCULATED.3IONS-SCNC: 15 MMOL/L (ref 5–15)
AST SERPL-CCNC: 16 U/L (ref 1–32)
BACTERIA UR QL AUTO: NORMAL /HPF
BASOPHILS # BLD AUTO: 0.03 10*3/MM3 (ref 0–0.2)
BASOPHILS NFR BLD AUTO: 0.3 % (ref 0–1.5)
BILIRUB SERPL-MCNC: 1.2 MG/DL (ref 0–1.2)
BILIRUB UR QL STRIP: NEGATIVE
BUN SERPL-MCNC: 26 MG/DL (ref 8–23)
BUN/CREAT SERPL: 12.3 (ref 7–25)
CALCIUM SPEC-SCNC: 8.8 MG/DL (ref 8.6–10.5)
CHLORIDE SERPL-SCNC: 103 MMOL/L (ref 98–107)
CLARITY UR: CLEAR
CO2 SERPL-SCNC: 20 MMOL/L (ref 22–29)
COLOR UR: YELLOW
CREAT SERPL-MCNC: 2.11 MG/DL (ref 0.57–1)
D-LACTATE SERPL-SCNC: 2.4 MMOL/L (ref 0.5–2)
DEPRECATED RDW RBC AUTO: 42.4 FL (ref 37–54)
EGFRCR SERPLBLD CKD-EPI 2021: 22.7 ML/MIN/1.73
EOSINOPHIL # BLD AUTO: 0.01 10*3/MM3 (ref 0–0.4)
EOSINOPHIL NFR BLD AUTO: 0.1 % (ref 0.3–6.2)
ERYTHROCYTE [DISTWIDTH] IN BLOOD BY AUTOMATED COUNT: 13.1 % (ref 12.3–15.4)
FLUAV SUBTYP SPEC NAA+PROBE: NOT DETECTED
FLUBV RNA ISLT QL NAA+PROBE: NOT DETECTED
GLOBULIN UR ELPH-MCNC: 3 GM/DL
GLUCOSE SERPL-MCNC: 106 MG/DL (ref 65–99)
GLUCOSE UR STRIP-MCNC: NEGATIVE MG/DL
HCT VFR BLD AUTO: 38.4 % (ref 34–46.6)
HGB BLD-MCNC: 12.9 G/DL (ref 12–15.9)
HGB UR QL STRIP.AUTO: NEGATIVE
HYALINE CASTS UR QL AUTO: NORMAL /LPF
IMM GRANULOCYTES # BLD AUTO: 0.07 10*3/MM3 (ref 0–0.05)
IMM GRANULOCYTES NFR BLD AUTO: 0.6 % (ref 0–0.5)
KETONES UR QL STRIP: ABNORMAL
LEUKOCYTE ESTERASE UR QL STRIP.AUTO: ABNORMAL
LYMPHOCYTES # BLD AUTO: 1.62 10*3/MM3 (ref 0.7–3.1)
LYMPHOCYTES NFR BLD AUTO: 14.7 % (ref 19.6–45.3)
MCH RBC QN AUTO: 29.5 PG (ref 26.6–33)
MCHC RBC AUTO-ENTMCNC: 33.6 G/DL (ref 31.5–35.7)
MCV RBC AUTO: 87.7 FL (ref 79–97)
MONOCYTES # BLD AUTO: 1.08 10*3/MM3 (ref 0.1–0.9)
MONOCYTES NFR BLD AUTO: 9.8 % (ref 5–12)
NEUTROPHILS NFR BLD AUTO: 74.5 % (ref 42.7–76)
NEUTROPHILS NFR BLD AUTO: 8.22 10*3/MM3 (ref 1.7–7)
NITRITE UR QL STRIP: NEGATIVE
NRBC BLD AUTO-RTO: 0 /100 WBC (ref 0–0.2)
PH UR STRIP.AUTO: 8.5 [PH] (ref 5–8)
PLATELET # BLD AUTO: 214 10*3/MM3 (ref 140–450)
PMV BLD AUTO: 11 FL (ref 6–12)
POTASSIUM SERPL-SCNC: 4.1 MMOL/L (ref 3.5–5.2)
PROCALCITONIN SERPL-MCNC: 0.06 NG/ML (ref 0–0.25)
PROT SERPL-MCNC: 6.2 G/DL (ref 6–8.5)
PROT UR QL STRIP: ABNORMAL
QT INTERVAL: 484 MS
QTC INTERVAL: 518 MS
RBC # BLD AUTO: 4.38 10*6/MM3 (ref 3.77–5.28)
RBC # UR STRIP: NORMAL /HPF
REF LAB TEST METHOD: NORMAL
SARS-COV-2 RNA PNL SPEC NAA+PROBE: NOT DETECTED
SODIUM SERPL-SCNC: 138 MMOL/L (ref 136–145)
SP GR UR STRIP: 1.02 (ref 1–1.03)
SQUAMOUS #/AREA URNS HPF: NORMAL /HPF
TROPONIN T SERPL-MCNC: 0.05 NG/ML (ref 0–0.03)
UROBILINOGEN UR QL STRIP: ABNORMAL
WBC # UR STRIP: NORMAL /HPF
WBC NRBC COR # BLD: 11.03 10*3/MM3 (ref 3.4–10.8)

## 2022-10-07 PROCEDURE — 93005 ELECTROCARDIOGRAM TRACING: CPT | Performed by: INTERNAL MEDICINE

## 2022-10-07 PROCEDURE — 73502 X-RAY EXAM HIP UNI 2-3 VIEWS: CPT

## 2022-10-07 PROCEDURE — G0378 HOSPITAL OBSERVATION PER HR: HCPCS

## 2022-10-07 PROCEDURE — 71045 X-RAY EXAM CHEST 1 VIEW: CPT

## 2022-10-07 PROCEDURE — 74176 CT ABD & PELVIS W/O CONTRAST: CPT

## 2022-10-07 PROCEDURE — 80053 COMPREHEN METABOLIC PANEL: CPT | Performed by: EMERGENCY MEDICINE

## 2022-10-07 PROCEDURE — 99223 1ST HOSP IP/OBS HIGH 75: CPT | Performed by: INTERNAL MEDICINE

## 2022-10-07 PROCEDURE — 83605 ASSAY OF LACTIC ACID: CPT | Performed by: EMERGENCY MEDICINE

## 2022-10-07 PROCEDURE — 85025 COMPLETE CBC W/AUTO DIFF WBC: CPT | Performed by: EMERGENCY MEDICINE

## 2022-10-07 PROCEDURE — 70450 CT HEAD/BRAIN W/O DYE: CPT

## 2022-10-07 PROCEDURE — 84484 ASSAY OF TROPONIN QUANT: CPT | Performed by: EMERGENCY MEDICINE

## 2022-10-07 PROCEDURE — 84145 PROCALCITONIN (PCT): CPT | Performed by: INTERNAL MEDICINE

## 2022-10-07 PROCEDURE — 25010000002 DIAZEPAM PER 5 MG: Performed by: EMERGENCY MEDICINE

## 2022-10-07 PROCEDURE — 25010000002 HALOPERIDOL LACTATE PER 5 MG: Performed by: EMERGENCY MEDICINE

## 2022-10-07 PROCEDURE — 93005 ELECTROCARDIOGRAM TRACING: CPT | Performed by: EMERGENCY MEDICINE

## 2022-10-07 PROCEDURE — 81001 URINALYSIS AUTO W/SCOPE: CPT | Performed by: EMERGENCY MEDICINE

## 2022-10-07 PROCEDURE — P9612 CATHETERIZE FOR URINE SPEC: HCPCS

## 2022-10-07 PROCEDURE — 99284 EMERGENCY DEPT VISIT MOD MDM: CPT

## 2022-10-07 PROCEDURE — 87636 SARSCOV2 & INF A&B AMP PRB: CPT | Performed by: EMERGENCY MEDICINE

## 2022-10-07 RX ORDER — ONDANSETRON 4 MG/1
4 TABLET, FILM COATED ORAL EVERY 6 HOURS PRN
Status: DISCONTINUED | OUTPATIENT
Start: 2022-10-07 | End: 2022-10-19 | Stop reason: HOSPADM

## 2022-10-07 RX ORDER — SODIUM CHLORIDE 0.9 % (FLUSH) 0.9 %
10 SYRINGE (ML) INJECTION EVERY 12 HOURS SCHEDULED
Status: DISCONTINUED | OUTPATIENT
Start: 2022-10-08 | End: 2022-10-19 | Stop reason: HOSPADM

## 2022-10-07 RX ORDER — DIAZEPAM 5 MG/ML
5 INJECTION, SOLUTION INTRAMUSCULAR; INTRAVENOUS ONCE
Status: DISCONTINUED | OUTPATIENT
Start: 2022-10-07 | End: 2022-10-07

## 2022-10-07 RX ORDER — QUETIAPINE FUMARATE 25 MG/1
12.5 TABLET, FILM COATED ORAL EVERY 8 HOURS PRN
Status: DISCONTINUED | OUTPATIENT
Start: 2022-10-07 | End: 2022-10-19 | Stop reason: HOSPADM

## 2022-10-07 RX ORDER — CHOLECALCIFEROL (VITAMIN D3) 125 MCG
10 CAPSULE ORAL NIGHTLY
Refills: 2 | Status: DISCONTINUED | OUTPATIENT
Start: 2022-10-08 | End: 2022-10-19 | Stop reason: HOSPADM

## 2022-10-07 RX ORDER — SODIUM CHLORIDE 0.9 % (FLUSH) 0.9 %
10 SYRINGE (ML) INJECTION AS NEEDED
Status: DISCONTINUED | OUTPATIENT
Start: 2022-10-07 | End: 2022-10-19 | Stop reason: HOSPADM

## 2022-10-07 RX ORDER — ACETAMINOPHEN 325 MG/1
650 TABLET ORAL EVERY 4 HOURS PRN
Status: DISCONTINUED | OUTPATIENT
Start: 2022-10-07 | End: 2022-10-19 | Stop reason: HOSPADM

## 2022-10-07 RX ORDER — QUETIAPINE FUMARATE 25 MG/1
50 TABLET, FILM COATED ORAL NIGHTLY
Status: DISCONTINUED | OUTPATIENT
Start: 2022-10-08 | End: 2022-10-19 | Stop reason: HOSPADM

## 2022-10-07 RX ORDER — DIAZEPAM 5 MG/ML
2.5 INJECTION, SOLUTION INTRAMUSCULAR; INTRAVENOUS ONCE
Status: COMPLETED | OUTPATIENT
Start: 2022-10-07 | End: 2022-10-07

## 2022-10-07 RX ORDER — HALOPERIDOL 5 MG/ML
1 INJECTION INTRAMUSCULAR ONCE
Status: COMPLETED | OUTPATIENT
Start: 2022-10-07 | End: 2022-10-07

## 2022-10-07 RX ORDER — BISACODYL 10 MG
10 SUPPOSITORY, RECTAL RECTAL ONCE
Status: COMPLETED | OUTPATIENT
Start: 2022-10-07 | End: 2022-10-08

## 2022-10-07 RX ORDER — TRAZODONE HYDROCHLORIDE 50 MG/1
50 TABLET ORAL NIGHTLY
Status: DISCONTINUED | OUTPATIENT
Start: 2022-10-08 | End: 2022-10-19 | Stop reason: HOSPADM

## 2022-10-07 RX ORDER — SODIUM CHLORIDE 9 MG/ML
75 INJECTION, SOLUTION INTRAVENOUS CONTINUOUS
Status: ACTIVE | OUTPATIENT
Start: 2022-10-08 | End: 2022-10-08

## 2022-10-07 RX ORDER — CITALOPRAM 20 MG/1
20 TABLET ORAL DAILY
Status: DISCONTINUED | OUTPATIENT
Start: 2022-10-08 | End: 2022-10-19 | Stop reason: HOSPADM

## 2022-10-07 RX ORDER — ACETAMINOPHEN 650 MG/1
650 SUPPOSITORY RECTAL EVERY 4 HOURS PRN
Status: DISCONTINUED | OUTPATIENT
Start: 2022-10-07 | End: 2022-10-19 | Stop reason: HOSPADM

## 2022-10-07 RX ORDER — MEMANTINE HYDROCHLORIDE 10 MG/1
5 TABLET ORAL 2 TIMES DAILY
Status: DISCONTINUED | OUTPATIENT
Start: 2022-10-08 | End: 2022-10-19 | Stop reason: HOSPADM

## 2022-10-07 RX ORDER — NIFEDIPINE 30 MG/1
60 TABLET, EXTENDED RELEASE ORAL
Refills: 5 | Status: DISCONTINUED | OUTPATIENT
Start: 2022-10-08 | End: 2022-10-19 | Stop reason: HOSPADM

## 2022-10-07 RX ORDER — ACETAMINOPHEN 160 MG/5ML
650 SOLUTION ORAL EVERY 4 HOURS PRN
Status: DISCONTINUED | OUTPATIENT
Start: 2022-10-07 | End: 2022-10-19 | Stop reason: HOSPADM

## 2022-10-07 RX ORDER — HEPARIN SODIUM 5000 [USP'U]/ML
5000 INJECTION, SOLUTION INTRAVENOUS; SUBCUTANEOUS EVERY 8 HOURS SCHEDULED
Status: DISCONTINUED | OUTPATIENT
Start: 2022-10-08 | End: 2022-10-19 | Stop reason: HOSPADM

## 2022-10-07 RX ORDER — ASPIRIN 81 MG/1
81 TABLET ORAL DAILY
Status: DISCONTINUED | OUTPATIENT
Start: 2022-10-08 | End: 2022-10-14

## 2022-10-07 RX ORDER — ONDANSETRON 2 MG/ML
4 INJECTION INTRAMUSCULAR; INTRAVENOUS EVERY 6 HOURS PRN
Status: DISCONTINUED | OUTPATIENT
Start: 2022-10-07 | End: 2022-10-19 | Stop reason: HOSPADM

## 2022-10-07 RX ORDER — DONEPEZIL HYDROCHLORIDE 10 MG/1
10 TABLET, FILM COATED ORAL NIGHTLY
Status: DISCONTINUED | OUTPATIENT
Start: 2022-10-08 | End: 2022-10-19 | Stop reason: HOSPADM

## 2022-10-07 RX ADMIN — SODIUM CHLORIDE 1000 ML: 9 INJECTION, SOLUTION INTRAVENOUS at 19:08

## 2022-10-07 RX ADMIN — DIAZEPAM 2.5 MG: 5 INJECTION, SOLUTION INTRAMUSCULAR; INTRAVENOUS at 21:33

## 2022-10-07 RX ADMIN — HALOPERIDOL LACTATE 1 MG: 5 INJECTION, SOLUTION INTRAMUSCULAR at 20:13

## 2022-10-08 LAB
ANION GAP SERPL CALCULATED.3IONS-SCNC: 10 MMOL/L (ref 5–15)
BASOPHILS # BLD AUTO: 0.04 10*3/MM3 (ref 0–0.2)
BASOPHILS NFR BLD AUTO: 0.5 % (ref 0–1.5)
BUN SERPL-MCNC: 25 MG/DL (ref 8–23)
BUN/CREAT SERPL: 13 (ref 7–25)
CALCIUM SPEC-SCNC: 8.2 MG/DL (ref 8.6–10.5)
CHLORIDE SERPL-SCNC: 105 MMOL/L (ref 98–107)
CHOLEST SERPL-MCNC: 163 MG/DL (ref 0–200)
CO2 SERPL-SCNC: 24 MMOL/L (ref 22–29)
CREAT SERPL-MCNC: 1.93 MG/DL (ref 0.57–1)
D-LACTATE SERPL-SCNC: 1.5 MMOL/L (ref 0.5–2)
DEPRECATED RDW RBC AUTO: 43.3 FL (ref 37–54)
EGFRCR SERPLBLD CKD-EPI 2021: 25.3 ML/MIN/1.73
EOSINOPHIL # BLD AUTO: 0.07 10*3/MM3 (ref 0–0.4)
EOSINOPHIL NFR BLD AUTO: 0.8 % (ref 0.3–6.2)
ERYTHROCYTE [DISTWIDTH] IN BLOOD BY AUTOMATED COUNT: 13.2 % (ref 12.3–15.4)
GLUCOSE SERPL-MCNC: 101 MG/DL (ref 65–99)
HBA1C MFR BLD: 4.6 % (ref 4.8–5.6)
HCT VFR BLD AUTO: 35.9 % (ref 34–46.6)
HDLC SERPL-MCNC: 44 MG/DL (ref 40–60)
HGB BLD-MCNC: 11.8 G/DL (ref 12–15.9)
IMM GRANULOCYTES # BLD AUTO: 0.06 10*3/MM3 (ref 0–0.05)
IMM GRANULOCYTES NFR BLD AUTO: 0.7 % (ref 0–0.5)
LDLC SERPL CALC-MCNC: 101 MG/DL (ref 0–100)
LDLC/HDLC SERPL: 2.27 {RATIO}
LYMPHOCYTES # BLD AUTO: 1.26 10*3/MM3 (ref 0.7–3.1)
LYMPHOCYTES NFR BLD AUTO: 14.8 % (ref 19.6–45.3)
MAGNESIUM SERPL-MCNC: 1.8 MG/DL (ref 1.6–2.4)
MCH RBC QN AUTO: 29.8 PG (ref 26.6–33)
MCHC RBC AUTO-ENTMCNC: 32.9 G/DL (ref 31.5–35.7)
MCV RBC AUTO: 90.7 FL (ref 79–97)
MONOCYTES # BLD AUTO: 0.93 10*3/MM3 (ref 0.1–0.9)
MONOCYTES NFR BLD AUTO: 10.9 % (ref 5–12)
NEUTROPHILS NFR BLD AUTO: 6.16 10*3/MM3 (ref 1.7–7)
NEUTROPHILS NFR BLD AUTO: 72.3 % (ref 42.7–76)
NRBC BLD AUTO-RTO: 0 /100 WBC (ref 0–0.2)
PLATELET # BLD AUTO: 186 10*3/MM3 (ref 140–450)
PMV BLD AUTO: 11.8 FL (ref 6–12)
POTASSIUM SERPL-SCNC: 3.9 MMOL/L (ref 3.5–5.2)
RBC # BLD AUTO: 3.96 10*6/MM3 (ref 3.77–5.28)
SODIUM SERPL-SCNC: 139 MMOL/L (ref 136–145)
TRIGL SERPL-MCNC: 95 MG/DL (ref 0–150)
TROPONIN T SERPL-MCNC: 0.04 NG/ML (ref 0–0.03)
TROPONIN T SERPL-MCNC: 0.05 NG/ML (ref 0–0.03)
TSH SERPL DL<=0.05 MIU/L-ACNC: 5.9 UIU/ML (ref 0.27–4.2)
VLDLC SERPL-MCNC: 18 MG/DL (ref 5–40)
WBC NRBC COR # BLD: 8.52 10*3/MM3 (ref 3.4–10.8)

## 2022-10-08 PROCEDURE — 93010 ELECTROCARDIOGRAM REPORT: CPT | Performed by: INTERNAL MEDICINE

## 2022-10-08 PROCEDURE — 85025 COMPLETE CBC W/AUTO DIFF WBC: CPT | Performed by: INTERNAL MEDICINE

## 2022-10-08 PROCEDURE — 97166 OT EVAL MOD COMPLEX 45 MIN: CPT

## 2022-10-08 PROCEDURE — 80061 LIPID PANEL: CPT | Performed by: INTERNAL MEDICINE

## 2022-10-08 PROCEDURE — 99232 SBSQ HOSP IP/OBS MODERATE 35: CPT | Performed by: INTERNAL MEDICINE

## 2022-10-08 PROCEDURE — 84484 ASSAY OF TROPONIN QUANT: CPT | Performed by: INTERNAL MEDICINE

## 2022-10-08 PROCEDURE — 80048 BASIC METABOLIC PNL TOTAL CA: CPT | Performed by: INTERNAL MEDICINE

## 2022-10-08 PROCEDURE — 83735 ASSAY OF MAGNESIUM: CPT | Performed by: INTERNAL MEDICINE

## 2022-10-08 PROCEDURE — 83036 HEMOGLOBIN GLYCOSYLATED A1C: CPT | Performed by: INTERNAL MEDICINE

## 2022-10-08 PROCEDURE — 25010000002 HEPARIN (PORCINE) PER 1000 UNITS: Performed by: INTERNAL MEDICINE

## 2022-10-08 PROCEDURE — 83605 ASSAY OF LACTIC ACID: CPT | Performed by: EMERGENCY MEDICINE

## 2022-10-08 PROCEDURE — P9612 CATHETERIZE FOR URINE SPEC: HCPCS

## 2022-10-08 PROCEDURE — 84443 ASSAY THYROID STIM HORMONE: CPT | Performed by: INTERNAL MEDICINE

## 2022-10-08 PROCEDURE — 92610 EVALUATE SWALLOWING FUNCTION: CPT

## 2022-10-08 RX ORDER — POLYETHYLENE GLYCOL 3350 17 G/17G
17 POWDER, FOR SOLUTION ORAL DAILY
Status: DISCONTINUED | OUTPATIENT
Start: 2022-10-08 | End: 2022-10-19 | Stop reason: HOSPADM

## 2022-10-08 RX ORDER — DOCUSATE SODIUM 100 MG/1
100 CAPSULE, LIQUID FILLED ORAL 2 TIMES DAILY
Status: DISCONTINUED | OUTPATIENT
Start: 2022-10-08 | End: 2022-10-19 | Stop reason: HOSPADM

## 2022-10-08 RX ORDER — SODIUM CHLORIDE 9 MG/ML
100 INJECTION, SOLUTION INTRAVENOUS CONTINUOUS
Status: ACTIVE | OUTPATIENT
Start: 2022-10-08 | End: 2022-10-09

## 2022-10-08 RX ADMIN — QUETIAPINE FUMARATE 50 MG: 25 TABLET, FILM COATED ORAL at 19:08

## 2022-10-08 RX ADMIN — HEPARIN SODIUM 5000 UNITS: 5000 INJECTION INTRAVENOUS; SUBCUTANEOUS at 13:29

## 2022-10-08 RX ADMIN — HEPARIN SODIUM 5000 UNITS: 5000 INJECTION INTRAVENOUS; SUBCUTANEOUS at 20:50

## 2022-10-08 RX ADMIN — MEMANTINE 5 MG: 10 TABLET ORAL at 01:11

## 2022-10-08 RX ADMIN — SODIUM CHLORIDE 75 ML/HR: 9 INJECTION, SOLUTION INTRAVENOUS at 01:16

## 2022-10-08 RX ADMIN — DOCUSATE SODIUM 100 MG: 100 CAPSULE, LIQUID FILLED ORAL at 19:09

## 2022-10-08 RX ADMIN — Medication 10 ML: at 19:09

## 2022-10-08 RX ADMIN — DONEPEZIL HYDROCHLORIDE 10 MG: 10 TABLET, FILM COATED ORAL at 01:11

## 2022-10-08 RX ADMIN — ASPIRIN 81 MG: 81 TABLET, COATED ORAL at 09:34

## 2022-10-08 RX ADMIN — DOCUSATE SODIUM 100 MG: 100 CAPSULE, LIQUID FILLED ORAL at 13:29

## 2022-10-08 RX ADMIN — DONEPEZIL HYDROCHLORIDE 10 MG: 10 TABLET, FILM COATED ORAL at 19:09

## 2022-10-08 RX ADMIN — MEMANTINE 5 MG: 10 TABLET ORAL at 09:43

## 2022-10-08 RX ADMIN — Medication 10 ML: at 09:43

## 2022-10-08 RX ADMIN — HEPARIN SODIUM 5000 UNITS: 5000 INJECTION INTRAVENOUS; SUBCUTANEOUS at 01:10

## 2022-10-08 RX ADMIN — CITALOPRAM HYDROBROMIDE 20 MG: 20 TABLET ORAL at 09:34

## 2022-10-08 RX ADMIN — TRAZODONE HYDROCHLORIDE 50 MG: 50 TABLET ORAL at 01:10

## 2022-10-08 RX ADMIN — NIFEDIPINE 60 MG: 30 TABLET, FILM COATED, EXTENDED RELEASE ORAL at 09:34

## 2022-10-08 RX ADMIN — BISACODYL 10 MG: 10 SUPPOSITORY RECTAL at 00:02

## 2022-10-08 RX ADMIN — Medication 10 MG: at 19:08

## 2022-10-08 RX ADMIN — Medication 10 MG: at 01:11

## 2022-10-08 RX ADMIN — POLYETHYLENE GLYCOL 3350 17 G: 17 POWDER, FOR SOLUTION ORAL at 13:29

## 2022-10-08 RX ADMIN — QUETIAPINE FUMARATE 50 MG: 25 TABLET, FILM COATED ORAL at 01:10

## 2022-10-08 RX ADMIN — HEPARIN SODIUM 5000 UNITS: 5000 INJECTION INTRAVENOUS; SUBCUTANEOUS at 06:56

## 2022-10-08 RX ADMIN — TRAZODONE HYDROCHLORIDE 50 MG: 50 TABLET ORAL at 19:09

## 2022-10-08 RX ADMIN — MEMANTINE 5 MG: 10 TABLET ORAL at 19:08

## 2022-10-08 NOTE — PLAN OF CARE
Goal Outcome Evaluation:  Plan of Care Reviewed With: patient, spouse            Pt disoriented x4, vss, room air, low urine output this shift bladder scanned and I/O performed per md, poor po intake, slp evaluated swallowing this shift and new orders in place, bm this shift,  OT is recommending IPR at [place].

## 2022-10-08 NOTE — THERAPY EVALUATION
Acute Care - Speech Language Pathology   Swallow Initial Evaluation [facility]   Clinical Swallow Evaluation       Patient Name: Shantelle Decker  : 1940  MRN: [id number]  Today's Date: 10/8/2022               Admit Date: 10/7/2022    Visit Dx:     ICD-10-CM ICD-9-CM   1. Fecal impaction (HCC)  K56.41 560.32   2. Acute dehydration  E86.0 276.51   3. Acute renal insufficiency  N28.9 593.9   4. Elevated troponin  R77.8 790.6     Patient Active Problem List   Diagnosis   • Incarcerated ventral hernia   • Coronary artery disease without angina pectoris   • Morbid obesity (HCC)   • Normocytic anemia   • Dementia with behavioral disturbance   • Essential hypertension   • History of breast cancer   • Thyroid mass   • LIANNA (acute kidney injury) (HCC)   • CKD (chronic kidney disease) stage 3, GFR 30-59 ml/min (HCC)   • Chest pain   • Metabolic acidosis   • Other chest pain   • Fecal impaction (HCC)   • Elevated lactic acid level   • Elevated troponin   • Difficulty swallowing   • Leukocytosis   • Fall     Past Medical History:   Diagnosis Date   • Breast cancer (CMS/HCC)    • Coronary artery disease    • Dementia (CMS/HCC)    • Dysrhythmia    • Hypertension      Past Surgical History:   Procedure Laterality Date   • ADENOIDECTOMY     • BREAST SURGERY     • CARDIAC DEFIBRILLATOR PLACEMENT     • EXPLORATORY LAPAROTOMY N/A 2016    Procedure: open ventral hernia repair with mesh;  Surgeon: Bright Buckley MD;  Location: [facility];  Service:    • MASTECTOMY Bilateral    • TONSILLECTOMY         SLP Recommendation and Plan  SLP Swallowing Diagnosis: mild, oral dysphagia, R/O pharyngeal dysphagia (10/08/22 1440)  SLP Diet Recommendation: soft textures, thin liquids (10/08/22 1440)  Recommended Precautions and Strategies: upright posture during/after eating, small bites of food and sips of liquid, general aspiration precautions, assist with feeding, other (see comments) (slow rate) (10/08/22 144)  SLP Rec. for Method of  Medication Administration: meds whole, as tolerated (10/08/22 1440)     Monitor for Signs of Aspiration: yes, notify SLP if any concerns (10/08/22 1440)     Swallow Criteria for Skilled Therapeutic Interventions Met: demonstrates skilled criteria (10/08/22 1440)  Anticipated Discharge Disposition (SLP): unknown (10/08/22 1440)  Rehab Potential/Prognosis, Swallowing: good, to achieve stated therapy goals (10/08/22 1440)     Predicted Duration Therapy Intervention (Days): until discharge (10/08/22 1440)                                     Plan of Care Reviewed With: patient, spouse      SWALLOW EVALUATION (last 72 hours)     SLP Adult Swallow Evaluation     Row Name 10/08/22 1440                   Rehab Evaluation    Document Type evaluation  -KL        Subjective Information no complaints  -KL        Patient Observations alert;cooperative;agree to therapy  -KL        Patient/Family/Caregiver Comments/Observations Pt's  present  -KL        Patient Effort good  -KL        Symptoms Noted During/After Treatment none  -KL           General Information    Patient Profile Reviewed yes  -KL        Pertinent History Of Current Problem Pt with PMH of advanced dementia, HTN, CAD, breast cancer. Pt adm after a fall at home w/ , constipation, abdominal pain, and recent vomitting. Pt's  reports pt has difficulty swallowing solids.  -KL        Current Method of Nutrition soft textures;thin liquids  -KL        Precautions/Limitations, Vision WFL;for purposes of eval  -KL        Precautions/Limitations, Hearing WFL;for purposes of eval  -KL        Prior Level of Function-Communication cognitive-linguistic impairment  -KL        Prior Level of Function-Swallowing mechanical soft textures;thin liquids  -        Plans/Goals Discussed with patient and family;agreed upon  -        Barriers to Rehab cognitive status;previous functional deficit  -KL        Patient's Goals for Discharge patient did not state  -KL            Pain    Additional Documentation Pain Scale: FACES Pre/Post-Treatment (Group)  -KL           Pain Scale: FACES Pre/Post-Treatment    Pain: FACES Scale, Pretreatment 0-->no hurt  -KL        Posttreatment Pain Rating 0-->no hurt  -KL           Oral Motor Structure and Function    Dentition Assessment edentulous  -KL        Secretion Management WNL/WFL  -KL        Mucosal Quality moist, healthy  -KL        Volitional Swallow WFL  -KL        Volitional Cough WFL  -KL           Oral Musculature and Cranial Nerve Assessment    Oral Motor General Assessment generalized oral motor weakness  -KL           General Eating/Swallowing Observations    Respiratory Support Currently in Use room air  -KL        Eating/Swallowing Skills fed by SLP;impulsive self-feeding behaviors  -KL        Positioning During Eating upright 90 degree;upright in bed  -KL        Utensils Used spoon;cup;straw  -KL        Consistencies Trialed soft textures;pureed;thin liquids  -KL           Respiratory    Respiratory Status WFL  -KL           Clinical Swallow Eval    Oral Prep Phase impaired  -KL        Oral Transit WFL  -KL        Oral Residue WFL  -KL        Pharyngeal Phase no overt signs/symptoms of pharyngeal impairment  -KL        Esophageal Phase unremarkable  -KL        Clinical Swallow Evaluation Summary CSE completed. Pt trialed with thin liquids via cup/straw, puree, and soft solid. Pt with no overt clinical s/sx of aspiration with any consistency. Pt noted to have impulsive self-feeding behaviors requiring consistent cues with each trial to slow down, small bites/sips, and complete swallow before taking another bite. Recommend pt remain on current soft diet with thin liquids, PNA/aspiration precautions, consistent oral care, and feeding assistance to cue for strategies listed above. Recommend MBS w/ limited UGI to further assess oropharyngeal swallow and screen for esophageal dysphagia. ST to f/u with MBS on 10/10.  -KL           Oral  Prep Concerns    Oral Prep Concerns prolonged mastication  -        Prolonged Mastication mechanical soft  -           SLP Evaluation Clinical Impression    SLP Swallowing Diagnosis mild;oral dysphagia;R/O pharyngeal dysphagia  -        Functional Impact risk of aspiration/pneumonia  -        Rehab Potential/Prognosis, Swallowing good, to achieve stated therapy goals  -        Swallow Criteria for Skilled Therapeutic Interventions Met demonstrates skilled criteria  -           Recommendations    Predicted Duration Therapy Intervention (Days) until discharge  -        SLP Diet Recommendation soft textures;thin liquids  -        Recommended Precautions and Strategies upright posture during/after eating;small bites of food and sips of liquid;general aspiration precautions;assist with feeding;other (see comments)  slow rate  -        Oral Care Recommendations Oral Care BID/PRN  -        SLP Rec. for Method of Medication Administration meds whole;as tolerated  -        Monitor for Signs of Aspiration yes;notify SLP if any concerns  -        Anticipated Discharge Disposition (SLP) unknown  -              User Key  (r) = Recorded By, (t) = Taken By, (c) = Cosigned By    Initials Name Effective Dates    Dianne Gamble MS CCC-SLP 06/15/21 -                 EDUCATION  The patient has been educated in the following areas:   Dysphagia (Swallowing Impairment).              Time Calculation:    Time Calculation- SLP     Row Name 10/08/22 1642             Time Calculation- SLP    SLP Start Time 1440  -      SLP Received On 10/08/22  -         Untimed Charges    SLP Eval/Re-eval  ST Eval Oral Pharyng Swallow - [id number]  -      [id number] Eval Oral Pharyng Swallow Minutes 60  -KL         Total Minutes    Untimed Charges Total Minutes 60  -KL       Total Minutes 60  -KL            User Key  (r) = Recorded By, (t) = Taken By, (c) = Cosigned By    Initials Name Provider Type    Dianne Gamble MS CCC-SLP  Speech and Language Pathologist                Therapy Charges for Today     Code Description Service Date Service Provider Modifiers Qty    [id number]  ST EVAL ORAL PHARYNG SWALLOW 4 10/8/2022 Dianne Hernandez MS CCC-SLP GN 1      Patient was not wearing a face mask and did not exhibit coughing during this therapy encounter.  Procedure performed was not aerosolizing, involved close contact (within 6 feet for at least 15 minutes or longer), and did not involve contact with infectious secretions or specimens.  Therapist used appropriate personal protective equipment including gloves, standard procedure mask and eye protection.  Appropriate PPE was worn during the entire therapy session.  Hand hygiene was completed before and after therapy session.          Dianne Hernandez MS CCC-SLP  10/8/2022

## 2022-10-08 NOTE — PLAN OF CARE
Goal Outcome Evaluation:         SLP evaluation completed. Will continue to address dysphagia via MBS 10/10. Please see note for further details and recommendations.

## 2022-10-08 NOTE — PROGRESS NOTES
[facility]  PROGRESS NOTE    Patient Name: Shantelle Decker  : 1940  MRN: [id number]    Date of Admission: 10/7/2022  Primary Care Physician: Provider, No Known    Subjective   Subjective     CC:  F/U fall, fecal impaction    HPI:  Has had some bowel movements.  Not making much urine.    ROS:  Gen- No fevers  GI- As above    Objective   Objective     Vital Signs:   Temp:  [97.5 °F (36.4 °C)-98.8 °F (37.1 °C)] 98 °F (36.7 °C)  Heart Rate:  [64-84] 68  Resp:  [16-18] 16  BP: ()/(56-84) 133/65     Physical Exam:  Constitutional: No acute distress, awake, laying in bed  HENT: NCAT, mucous membranes moist  Respiratory: Respiratory effort normal   Cardiovascular: RRR  Psychiatric: Calm, cooperative  Neurologic: Speech clear  Skin: No rashes on exposed surfaces    Results Reviewed:  LAB RESULTS:      Lab 10/08/22  0541 10/08/22  0039 10/07/22  1906   WBC 8.52  --  11.03*   HEMOGLOBIN 11.8*  --  12.9   HEMATOCRIT 35.9  --  38.4   PLATELETS 186  --  214   NEUTROS ABS 6.16  --  8.22*   IMMATURE GRANS (ABS) 0.06*  --  0.07*   LYMPHS ABS 1.26  --  1.62   MONOS ABS 0.93*  --  1.08*   EOS ABS 0.07  --  0.01   MCV 90.7  --  87.7   PROCALCITONIN  --   --  0.06   LACTATE  --  1.5 2.4*         Lab 10/08/22  0541 10/07/22  1906   SODIUM 139 138   POTASSIUM 3.9 4.1   CHLORIDE 105 103   CO2 24.0 20.0*   ANION GAP 10.0 15.0   BUN 25* 26*   CREATININE 1.93* 2.11*   EGFR 25.3* 22.7*   GLUCOSE 101* 106*   CALCIUM 8.2* 8.8   MAGNESIUM 1.8  --    HEMOGLOBIN A1C 4.60*  --    TSH 5.900*  --          Lab 10/07/22  1906   TOTAL PROTEIN 6.2   ALBUMIN 3.20*   GLOBULIN 3.0   ALT (SGPT) 6   AST (SGOT) 16   BILIRUBIN 1.2   ALK PHOS 64         Lab 10/08/22  0541 10/08/22  0039 10/07/22  1906   TROPONIN T 0.053* 0.042* 0.054*         Lab 10/08/22  0541   CHOLESTEROL 163   LDL CHOL 101*   HDL CHOL 44   TRIGLYCERIDES 95             Brief Urine Lab Results  (Last result in the past 365 days)       Color   Clarity   Blood   Leuk Est   Nitrite   Protein   CREAT   Urine HCG        10/07/22 1917 Yellow   Clear   Negative   Trace   Negative   30 mg/dL (1+)                 Microbiology Results Abnormal     Procedure Component Value - Date/Time    COVID PRE-OP / PRE-PROCEDURE SCREENING ORDER (NO ISOLATION) - Swab, Nasopharynx [[id number]]  (Normal) Collected: 10/07/22 1955    Lab Status: Final result Specimen: Swab from Nasopharynx Updated: 10/07/22 2039    Narrative:      The following orders were created for panel order COVID PRE-OP / PRE-PROCEDURE SCREENING ORDER (NO ISOLATION) - Swab, Nasopharynx.  Procedure                               Abnormality         Status                     ---------                               -----------         ------                     COVID-19 and FLU A/B PCR...[[id number]]  Normal              Final result                 Please view results for these tests on the individual orders.    COVID-19 and FLU A/B PCR - Swab, Nasopharynx [[id number]]  (Normal) Collected: 10/07/22 1955    Lab Status: Final result Specimen: Swab from Nasopharynx Updated: 10/07/22 2039     COVID19 Not Detected     Influenza A PCR Not Detected     Influenza B PCR Not Detected    Narrative:      Fact sheet for providers: https://www.fda.gov/media/769666/download    Fact sheet for patients: https://www.fda.gov/media/363691/download    Test performed by PCR.          CT Abdomen Pelvis Without Contrast    Result Date: 10/7/2022   DATE OF EXAM: 10/7/2022 7:22 PM  PROCEDURE: CT ABDOMEN PELVIS WO CONTRAST-  INDICATIONS: Abdominal pain.  COMPARISON: 04/04/2021.  TECHNIQUE: Routine transaxial slices were obtained through the abdomen and pelvis without the administration of intravenous contrast. Reconstructed coronal and sagittal images were also obtained. Automated exposure control and iterative construction methods were used.  FINDINGS: There are tiny layering gallstones. The exam is limited by noncontrast  technique. The liver, adrenal glands, pancreas, spleen, and left kidney are normal. There is suggestion of a benign cyst in the upper pole of the right kidney. There are atherosclerotic vascular calcifications throughout the abdomen and pelvis. The uterus has a slightly lobulated contour suggestive of fibroids. The adnexal structures and urinary bladder are normal. There is increased stool in the rectosigmoid colon consistent with fecal impaction. There is colonic diverticulosis without evidence of acute diverticulitis. There is the appendix is either small in size or surgically absent. There are no dilated loops of bowel to indicate an obstructive process. The patient has a moderate-sized hiatal hernia. There is grade 1 anterior spondylolisthesis of L4 on L5. There are no suspicious osteolytic or sclerotic lesions within the bony structures. There are no acute findings beneath the hemidiaphragms.      Impression:  1. Cholelithiasis. 2. No definite acute findings. 3. Rectosigmoid colon fecal impaction. 4. Multiple incidental findings as noted above. The exam is limited by noncontrast technique.  This report was finalized on 10/7/2022 8:30 PM by Raghavendra Perea MD.      CT Head Without Contrast    Result Date: 10/7/2022   DATE OF EXAM: 10/7/2022 7:22 PM  PROCEDURE: CT HEAD WO CONTRAST-  INDICATIONS: Confusion.  COMPARISON: No Comparisons Available  TECHNIQUE: Routine transaxial cuts were obtained through the head without the administration of contrast. Automated exposure control and iterative reconstruction methods were used.  FINDINGS: There is enlargement of the sulci, fissures, ventricles, and basal cisterns indicating volume loss secondary to age-appropriate atrophy. There are areas of decreased density in the white matter tracts felt to represent chronic microvascular ischemia. There is no acute hemorrhage, midline shift, or suspicious extra-axial fluid collections. There is no skull fracture. The visualized  paranasal and mastoid sinuses are clear. There are extensive atherosclerotic vascular calcifications in the cavernous carotid arteries and the distal vertebral arteries. The orbital contents are normal.      Impression:  1. No acute findings. 2. Volume loss secondary to age-appropriate atrophy. 3. Chronic ischemic changes.  This report was finalized on 10/7/2022 7:59 PM by Raghavendra Perea MD.      XR Chest 1 View    Result Date: 10/7/2022  DATE OF EXAM: 10/7/2022 8:57 PM  PROCEDURE: XR CHEST 1 VW-  INDICATIONS: Confusion  COMPARISON: 05/20/2022.  TECHNIQUE: Single radiographic view of the chest was obtained.  FINDINGS: The heart size is normal. The pulmonary vascular markings are normal. The lungs and pleural spaces are clear of active disease. There is a left-sided transvenous pacemaker in place. There are surgical clips in the left axilla.  There are chronic age-related changes involving the bony thorax and thoracic aorta.      Impression: No active disease.  This report was finalized on 10/7/2022 9:09 PM by Raghavendra Perea MD.      XR Hip With or Without Pelvis 2 - 3 View Right    Result Date: 10/7/2022  DATE OF EXAM: 10/7/2022 8:55 PM  PROCEDURE: XR HIP W OR WO PELVIS 2-3 VIEW RIGHT-  INDICATIONS: Fall, right hip pain.  COMPARISON: CT of the pelvis performed on 10/07/2022.  TECHNIQUE: AP and Lateral views of the right hip with Pelvis were obtained.  FINDINGS: The bony structures are demineralized. There is no acute fracture or dislocation. The pelvic bony structures were better evaluated on the CT exam. There are mild arthritic changes involving the hip joints and sacroiliac joints. There are vascular calcifications in the pelvis.      Impression: No acute findings.  This report was finalized on 10/7/2022 9:08 PM by Raghavendra Perea MD.        Results for orders placed during the hospital encounter of 05/20/22    Adult Transthoracic Echo Complete W/ Cont if Necessary Per Protocol    Interpretation Summary  · Left ventricular  systolic function is normal. Estimated left ventricular EF = 65%.  · Left ventricular wall thickness is consistent with mild concentric hypertrophy.  · Left ventricular diastolic function is consistent with (grade I) impaired relaxation.  · The aortic valve is mildly calcified. Otherwise, the cardiac valves are anatomically and functionally normal.  · Estimated right ventricular systolic pressure from tricuspid regurgitation is normal (<35 mmHg).      I have reviewed the medications:  Scheduled Meds:aspirin, 81 mg, Oral, Daily  citalopram, 20 mg, Oral, Daily  donepezil, 10 mg, Oral, Nightly  heparin (porcine), 5,000 Units, Subcutaneous, Q8H  melatonin, 10 mg, Oral, Nightly  memantine, 5 mg, Oral, BID  NIFEdipine XL, 60 mg, Oral, Q24H  QUEtiapine, 50 mg, Oral, Nightly  sodium chloride, 10 mL, Intravenous, Q12H  traZODone, 50 mg, Oral, Nightly      Continuous Infusions:sodium chloride, 75 mL/hr, Last Rate: 75 mL/hr (10/08/22 0116)      PRN Meds:.•  acetaminophen **OR** acetaminophen **OR** acetaminophen  •  ondansetron **OR** ondansetron  •  QUEtiapine  •  [COMPLETED] Insert peripheral IV **AND** sodium chloride  •  sodium chloride    Assessment & Plan   Assessment & Plan     Active Hospital Problems    Diagnosis  POA   • **Fecal impaction (HCC) [K56.41]  Yes   • Elevated lactic acid level [R79.89]  Unknown   • Elevated troponin [R77.8]  Unknown   • Difficulty swallowing [R13.10]  Unknown   • Leukocytosis [D72.829]  Unknown   • Fall [W19.XXXA]  Unknown   • LIANNA (acute kidney injury) (HCC) [N17.9]  Yes   • Dementia with behavioral disturbance [F03.918]  Yes   • Essential hypertension [I10]  Yes   • Coronary artery disease without angina pectoris [I25.10]  Yes      Resolved Hospital Problems   No resolved problems to display.        Brief Hospital Course to date:  Shantelle Decker is a 82 y.o. female with advanced dementia, HTN, CAD, history of breast cancer who presented to the ED after a fall and was found to have fecal  impaction and acute kidney injury.    This patient's problems and plans were partially entered by my partner and updated as appropriate by me 10/08/22.    Fecal impaction  - Now disimpacted   - Start scheduled miralax and docusate     Difficulty swallowing  - SLP consult     Leukocytosis  Elevated lactic acid  - Suspect related to dehydration  - Procalcitonin negative, UA negative, CT abdomen pelvis without infectious process, CXR without evidence of pneumonia     Elevated troponin  - Likely demand ischemia in setting of dehydration, LIANNA     LIANNA on CKD III  -- Creatinine 2.11 on admission (baseline 1.1-1.5)  -- Improving with IV fluids     Dementia  Fall  - Continue home meds  - PT/OT     HTN  CAD  - Continue home meds    Expected Discharge Location and Transportation: Home vs. SNF  Expected Discharge Date: 10/10    DVT prophylaxis:  Medical DVT prophylaxis orders are present.          CODE STATUS:   Code Status and Medical Interventions:   Ordered at: 10/07/22 2340     Level Of Support Discussed With:    Next of Kin (If No Surrogate)     Code Status (Patient has no pulse and is not breathing):    CPR (Attempt to Resuscitate)     Medical Interventions (Patient has pulse or is breathing):    Full Support       Denisa Duarte MD  10/08/22

## 2022-10-08 NOTE — PLAN OF CARE
Goal Outcome Evaluation:  Plan of Care Reviewed With: patient, spouse        Progress: no change  Outcome Evaluation: OT evaluation completed. Pt presents with generalized weakness, impaired mobility, AMS, and decreased activity tolerance limiting independence with ADLs. Pt performed bed mobility with Mod A x 2 and STS with Max A x 2. Unable to further progress mobility due to incontinent bowel episode in standing and LE weakness. Dep for layo care and LB dressing. OT rec IP OT and SNF at [place].

## 2022-10-08 NOTE — THERAPY EVALUATION
Patient Name: Shantelle Decker  : 1940    MRN: [id number]                              Today's Date: 10/8/2022       Admit Date: 10/7/2022    Visit Dx:     ICD-10-CM ICD-9-CM   1. Fecal impaction (HCC)  K56.41 560.32   2. Acute dehydration  E86.0 276.51   3. Acute renal insufficiency  N28.9 593.9   4. Elevated troponin  R77.8 790.6     Patient Active Problem List   Diagnosis   • Incarcerated ventral hernia   • Coronary artery disease without angina pectoris   • Morbid obesity (HCC)   • Normocytic anemia   • Dementia with behavioral disturbance   • Essential hypertension   • History of breast cancer   • Thyroid mass   • LIANNA (acute kidney injury) (HCC)   • CKD (chronic kidney disease) stage 3, GFR 30-59 ml/min (HCC)   • Chest pain   • Metabolic acidosis   • Other chest pain   • Fecal impaction (HCC)   • Elevated lactic acid level   • Elevated troponin   • Difficulty swallowing   • Leukocytosis   • Fall     Past Medical History:   Diagnosis Date   • Breast cancer (CMS/HCC)    • Coronary artery disease    • Dementia (CMS/HCC)    • Dysrhythmia    • Hypertension      Past Surgical History:   Procedure Laterality Date   • ADENOIDECTOMY     • BREAST SURGERY     • CARDIAC DEFIBRILLATOR PLACEMENT     • EXPLORATORY LAPAROTOMY N/A 2016    Procedure: open ventral hernia repair with mesh;  Surgeon: Bright Buckley MD;  Location: [facility];  Service:    • MASTECTOMY Bilateral    • TONSILLECTOMY        General Information     Row Name 10/08/22 1421          OT Time and Intention    Document Type evaluation  -AN     Mode of Treatment occupational therapy  -AN     Row Name 10/08/22 1421          General Information    Patient Profile Reviewed yes  -AN     Prior Level of Function mod assist:;max assist:;ADL's;all household mobility  per chart pt with advanced dementia and unable to provide PLOF; spouse reports that he assists with all mobility and ADLs at baseline, but pt is able to ambulate  -AN     Existing  Precautions/Restrictions fall  advanced dementia  -AN     Barriers to Rehab medically complex;previous functional deficit;cognitive status  -AN     Row Name 10/08/22 1421          Living Environment    People in Home spouse;other (see comments)  spouse injured during patients fall (questionable assist available)  -AN     Row Name 10/08/22 1421          Stairs Within Home, Primary    Stairs Comment, Within Home, Primary unable to gather full home setup due to AMS  -AN     Row Name 10/08/22 1421          Cognition    Orientation Status (Cognition) oriented to;person;disoriented to;place;situation;time;verbal cues/prompts needed for orientation  -AN     Row Name 10/08/22 1421          Safety Issues, Functional Mobility    Safety Issues Affecting Function (Mobility) ability to follow commands;safety precaution awareness;safety precautions follow-through/compliance;insight into deficits/self-awareness;judgment;problem-solving;sequencing abilities;awareness of need for assistance  -AN     Impairments Affecting Function (Mobility) balance;cognition;pain;strength;postural/trunk control  -AN     Cognitive Impairments, Mobility Safety/Performance awareness, need for assistance;insight into deficits/self-awareness;judgment;problem-solving/reasoning;sequencing abilities;safety precaution follow-through;safety precaution awareness  -AN           User Key  (r) = Recorded By, (t) = Taken By, (c) = Cosigned By    Initials Name Provider Type    Marcelle Che OT Occupational Therapist                 Mobility/ADL's     Row Name 10/08/22 1535          Bed Mobility    Bed Mobility rolling left;rolling right;supine-sit;sit-supine  -AN     Rolling Left [place] (Bed Mobility) maximum assist (25% patient effort);1 person assist;verbal cues  -AN     Rolling Right [place] (Bed Mobility) maximum assist (25% patient effort);1 person assist;verbal cues  -AN     Supine-Sit [place] (Bed Mobility) verbal cues;moderate assist  (50% patient effort);2 person assist;nonverbal cues (demo/gesture)  -AN     Sit-Supine [place] (Bed Mobility) verbal cues;nonverbal cues (demo/gesture);moderate assist (50% patient effort);2 person assist  -AN     Bed Mobility, Safety Issues cognitive deficits limit understanding;decreased use of arms for pushing/pulling;decreased use of legs for bridging/pushing;impaired trunk control for bed mobility  -AN     Assistive Device (Bed Mobility) draw sheet;bed rails  -AN     Comment, (Bed Mobility) cues for sequencing of steps, increased time due to AMS  -AN     Row Name 10/08/22 1535          Transfers    Transfers sit-stand transfer;stand-sit transfer  -AN     Row Name 10/08/22 1535          Sit-Stand Transfer    Sit-Stand [place] (Transfers) verbal cues;maximum assist (25% patient effort);2 person assist  -AN     Assistive Device (Sit-Stand Transfers) other (see comments)  UE support  -AN     Row Name 10/08/22 1535          Stand-Sit Transfer    Stand-Sit [place] (Transfers) verbal cues;maximum assist (25% patient effort);2 person assist  -AN     Assistive Device (Stand-Sit Transfers) other (see comments)  UE support  -AN     Row Name 10/08/22 1535          Functional Mobility    Functional Mobility- Ind. Level unable to perform  -AN     Row Name 10/08/22 1535          Activities of Daily Living    BADL Assessment/Intervention lower body dressing;toileting;grooming  -AN     Row Name 10/08/22 1535          Lower Body Dressing Assessment/Training    [place] Level (Lower Body Dressing) don;socks;dependent (less than 25% patient effort)  -AN     Position (Lower Body Dressing) supine  -AN     Row Name 10/08/22 1535          Toileting Assessment/Training    [place] Level (Toileting) adjust/manage clothing;perform perineal hygiene;dependent (less than 25% patient effort)  -AN     Position (Toileting) supine  -AN     Comment, (Toileting) incontinent bowel episode with STS requiring layo care once  returned to supine  -AN     Row Name 10/08/22 1535          Grooming Assessment/Training    [place] Level (Grooming) wash face, hands;set up  -AN     Position (Grooming) edge of bed sitting  -AN           User Key  (r) = Recorded By, (t) = Taken By, (c) = Cosigned By    Initials Name Provider Type    Marcelle Che OT Occupational Therapist               Obj/Interventions     Row Name 10/08/22 1539          Sensory Assessment (Somatosensory)    Sensory Assessment (Somatosensory) sensation intact  -AN     Row Name 10/08/22 1539          Vision Assessment/Intervention    Visual Impairment/Limitations WFL  -AN     Row Name 10/08/22 1539          Range of Motion Comprehensive    General Range of Motion no range of motion deficits identified  -AN     Row Name 10/08/22 1539          Strength Comprehensive (MMT)    General Manual Muscle Testing (MMT) Assessment upper extremity strength deficits identified;lower extremity strength deficits identified  -AN     Comment, General Manual Muscle Testing (MMT) Assessment BUE grossly 3+/5, BLE 3+/5  -AN     Row Name 10/08/22 1539          Balance    Balance Assessment sitting static balance;sitting dynamic balance;sit to stand dynamic balance;standing static balance  -AN     Static Sitting Balance contact guard;verbal cues  -AN     Dynamic Sitting Balance minimal assist;1-person assist;verbal cues  -AN     Position, Sitting Balance sitting edge of bed  -AN     Sit to Stand Dynamic Balance verbal cues;maximum assist;2-person assist  -AN     Static Standing Balance verbal cues;maximum assist;2-person assist  -AN     Position/Device Used, Standing Balance other (see comments);supported  UE support  -AN     Balance Interventions standing;sit to stand;supported;static;highly challenging;occupation based/functional task  -AN           User Key  (r) = Recorded By, (t) = Taken By, (c) = Cosigned By    Initials Name Provider Type    Marcelle Che OT Occupational  Therapist               Goals/Plan     Row Name 10/08/22 1545          Bed Mobility Goal 1 (OT)    Activity/Assistive Device (Bed Mobility Goal 1, OT) sit to supine/supine to sit  -AN     [place] Level/Cues Needed (Bed Mobility Goal 1, OT) minimum assist (75% or more patient effort)  -AN     Time Frame (Bed Mobility Goal 1, OT) long term goal (LTG);10 days  -AN     Row Name 10/08/22 1545          Transfer Goal 1 (OT)    Activity/Assistive Device (Transfer Goal 1, OT) sit-to-stand/stand-to-sit;bed-to-chair/chair-to-bed;walker, rolling  -AN     [place] Level/Cues Needed (Transfer Goal 1, OT) moderate assist (50-74% patient effort)  -AN     Time Frame (Transfer Goal 1, OT) long term goal (LTG);10 days  -AN     Row Name 10/08/22 1545          Toileting Goal 1 (OT)    Activity/Device (Toileting Goal 1, OT) adjust/manage clothing;perform perineal hygiene;commode, bedside without drop arms  -AN     [place] Level/Cues Needed (Toileting Goal 1, OT) moderate assist (50-74% patient effort)  -AN     Time Frame (Toileting Goal 1, OT) long term goal (LTG);10 days  -AN     Row Name 10/08/22 1545          Therapy Assessment/Plan (OT)    Planned Therapy Interventions (OT) activity tolerance training;adaptive equipment training;BADL retraining;functional balance retraining;occupation/activity based interventions;patient/caregiver education/training;transfer/mobility retraining;strengthening exercise  -AN           User Key  (r) = Recorded By, (t) = Taken By, (c) = Cosigned By    Initials Name Provider Type    Marcelle Che OT Occupational Therapist               Clinical Impression     Row Name 10/08/22 1540          Pain Assessment    Additional Documentation Pain Scale: FACES Pre/Post-Treatment (Group)  -AN     Row Name 10/08/22 1540          Pain Scale: FACES Pre/Post-Treatment    Pain: FACES Scale, Pretreatment 2-->hurts little bit  -AN     Posttreatment Pain Rating 2-->hurts little bit  -AN     Pain  Location generalized  -AN     Pre/Posttreatment Pain Comment tolerated  -AN     Row Name 10/08/22 1540          Plan of Care Review    Plan of Care Reviewed With patient;spouse  -AN     Progress no change  -AN     Outcome Evaluation OT evaluation completed. Pt presents with generalized weakness, impaired mobility, AMS, and decreased activity tolerance limiting independence with ADLs. Pt performed bed mobility with Mod A x 2 and STS with Max A x 2. Unable to further progress mobility due to incontinent bowel episode in standing and LE weakness. Dep for layo care and LB dressing. OT rec IP OT and SNF at [place].  -AN     Row Name 10/08/22 1540          Therapy Assessment/Plan (OT)    Patient/Family Therapy Goal Statement (OT) Return to PLOF  -AN     Rehab Potential (OT) good, to achieve stated therapy goals  -AN     Criteria for Skilled Therapeutic Interventions Met (OT) yes;skilled treatment is necessary  -AN     Therapy Frequency (OT) daily  -AN     Row Name 10/08/22 1540          Therapy Plan Review/Discharge Plan (OT)    Anticipated Discharge Disposition (OT) skilled nursing facility  -AN     Row Name 10/08/22 5200          Vital Signs    Pre Systolic BP Rehab --  VSS  -AN     O2 Delivery Pre Treatment room air  -AN     O2 Delivery Intra Treatment room air  -AN     O2 Delivery Post Treatment room air  -AN     Pre Patient Position Supine  -AN     Intra Patient Position Standing  -AN     Post Patient Position Supine  -AN     Row Name 10/08/22 1540          Positioning and Restraints    Pre-Treatment Position in bed  -AN     Post Treatment Position bed  -AN     In Bed notified nsg;supine;call light within reach;encouraged to call for assist;exit alarm on;with family/caregiver;side rails up x3;legs elevated  -AN           User Key  (r) = Recorded By, (t) = Taken By, (c) = Cosigned By    Initials Name Provider Type    Marcelle Che OT Occupational Therapist               Outcome Measures     Row Name 10/08/22 4174           How much help from another is currently needed...    Putting on and taking off regular lower body clothing? 1  -AN     Bathing (including washing, rinsing, and drying) 2  -AN     Toileting (which includes using toilet bed pan or urinal) 1  -AN     Putting on and taking off regular upper body clothing 3  -AN     Taking care of personal grooming (such as brushing teeth) 3  -AN     Eating meals 3  -AN     AM-PAC 6 Clicks Score (OT) 13  -AN     Row Name 10/08/22 1546          Functional Assessment    Outcome Measure Options AM-PAC 6 Clicks Daily Activity (OT)  -AN           User Key  (r) = Recorded By, (t) = Taken By, (c) = Cosigned By    Initials Name Provider Type    Marcelle Che OT Occupational Therapist                Occupational Therapy Education     Title: PT OT SLP Therapies (In Progress)     Topic: Occupational Therapy (In Progress)     Point: ADL training (Done)     Description:   Instruct learner(s) on proper safety adaptation and remediation techniques during self care or transfers.   Instruct in proper use of assistive devices.              Learning Progress Summary           Patient Acceptance, E, VU,NR by AN at 10/8/2022 1547   Family Acceptance, E, VU,NR by AN at 10/8/2022 1547                   Point: Home exercise program (Not Started)     Description:   Instruct learner(s) on appropriate technique for monitoring, assisting and/or progressing therapeutic exercises/activities.              Learner Progress:  Not documented in this visit.          Point: Precautions (Done)     Description:   Instruct learner(s) on prescribed precautions during self-care and functional transfers.              Learning Progress Summary           Patient Acceptance, E, VU,NR by AN at 10/8/2022 1547   Family Acceptance, E, VU,NR by AN at 10/8/2022 1547                   Point: Body mechanics (Done)     Description:   Instruct learner(s) on proper positioning and spine alignment during self-care, functional  mobility activities and/or exercises.              Learning Progress Summary           Patient Acceptance, E, VU,NR by AN at 10/8/2022 1547   Family Acceptance, E, VU,NR by AN at 10/8/2022 1547                               User Key     Initials Effective Dates Name Provider Type Discipline    AN 09/21/21 -  Marcelle Crooks OT Occupational Therapist OT              OT Recommendation and Plan  Planned Therapy Interventions (OT): activity tolerance training, adaptive equipment training, BADL retraining, functional balance retraining, occupation/activity based interventions, patient/caregiver education/training, transfer/mobility retraining, strengthening exercise  Therapy Frequency (OT): daily  Plan of Care Review  Plan of Care Reviewed With: patient, spouse  Progress: no change  Outcome Evaluation: OT evaluation completed. Pt presents with generalized weakness, impaired mobility, AMS, and decreased activity tolerance limiting independence with ADLs. Pt performed bed mobility with Mod A x 2 and STS with Max A x 2. Unable to further progress mobility due to incontinent bowel episode in standing and LE weakness. Dep for layo care and LB dressing. OT rec IP OT and SNF at [place].     Time Calculation:    Time Calculation- OT     Row Name 10/08/22 1548             Time Calculation- OT    OT Start Time 1350  -AN      OT Received On 10/08/22  -AN      OT Goal Re-Cert Due Date 10/18/22  -AN         Untimed Charges    OT Eval/Re-eval Minutes 46  -AN         Total Minutes    Untimed Charges Total Minutes 46  -AN       Total Minutes 46  -AN            User Key  (r) = Recorded By, (t) = Taken By, (c) = Cosigned By    Initials Name Provider Type    AN Marcelle Crooks OT Occupational Therapist              Therapy Charges for Today     Code Description Service Date Service Provider Modifiers Qty    [id number]  OT THER SUPP EA 15 MIN 10/8/2022 Marcelle Crooks OT GO 2    [id number] HC OT EVAL MOD COMPLEXITY 4 10/8/2022  Marcelle Crooks, OT GO 1               Marcelle Crooks, [email or web address]  10/8/2022

## 2022-10-08 NOTE — H&P
[facility] Medicine Services  HISTORY AND PHYSICAL    Patient Name: Shantelle Decker  : 1940  MRN: [id number]  Primary Care Physician: Provider, No Known  Date of admission: 10/7/2022      Subjective   Subjective     Chief Complaint:  Fall    HPI:  Shantelle Decker is a 82 y.o. female patient with a PMH significant for advanced dementia, HTN, CAD, history of breast cancer who comes to the ED after a fall.  Patient has significant confusion, noncontributory to HPI.  HPI obtained from patient's spouse at bedside.  Patient says that he was trying to help the patient walk into her house today.  She lost her balance and they both fell to the ground.  They both came to the ED due to these concerns.  Patient has had constipation and complaints of abdominal pain when having a bowel movement.  Spouse also says that patient has had difficulty swallowing and recent vomiting.      Review of Systems   Unable to perform ROS: Dementia        All other systems reviewed and are negative.     Personal History     Past Medical History:   Diagnosis Date   • Breast cancer (CMS/HCC)    • Coronary artery disease    • Dementia (CMS/HCC)    • Dysrhythmia    • Hypertension              Past Surgical History:   Procedure Laterality Date   • ADENOIDECTOMY     • BREAST SURGERY     • CARDIAC DEFIBRILLATOR PLACEMENT     • EXPLORATORY LAPAROTOMY N/A 2016    Procedure: open ventral hernia repair with mesh;  Surgeon: Bright Buckley MD;  Location: [facility];  Service:    • MASTECTOMY Bilateral    • TONSILLECTOMY         Family History:  family history includes Alcohol abuse in her father; Heart disease in her father and mother. Otherwise pertinent FHx was reviewed and unremarkable.     Social History:  reports that she has quit smoking. Her smoking use included cigarettes and pipe. She has never used smokeless tobacco. She reports current alcohol use. She reports current drug use. Drug: Marijuana.  Social History      Social History Narrative   • Not on file       Medications:  Available home medication information reviewed.  Medications Prior to Admission   Medication Sig Dispense Refill Last Dose   • aspirin 81 MG EC tablet Take 81 mg by mouth daily.      • citalopram (CeleXA) 20 MG tablet Take 20 mg by mouth Daily.      • Cyanocobalamin 1000 MCG/ML kit Inject 1 mL as directed Every 30 (Thirty) Days.      • donepezil (ARICEPT) 10 MG tablet Take 10 mg by mouth every night.      • lidocaine (LIDODERM) 5 % Place 1 patch on the skin as directed by provider Daily. Remove & Discard patch within 12 hours or as directed by MD 30 patch 0    • Melatonin 10 MG tablet Take 1 tablet by mouth Every Night. 100 tablet 2    • memantine (NAMENDA) 10 MG tablet Take 10 mg by mouth 2 (Two) Times a Day.      • NIFEdipine XL (ADALAT CC) 60 MG 24 hr tablet Take 1 tablet by mouth Daily. 30 tablet 5    • PHARMACY MEDS TO BED CONSULT Daily.      • QUEtiapine (SEROquel) 25 MG tablet Take 50 mg by mouth Every Night.      • QUEtiapine (SEROquel) 25 MG tablet Take 0.5 tablets by mouth Every 8 (Eight) Hours As Needed (agitation). 30 tablet 1    • traZODone (DESYREL) 50 MG tablet Take 50 mg by mouth Every Night.          Allergies   Allergen Reactions   • Demerol [Meperidine] Anaphylaxis     And itching       Objective   Objective     Vital Signs:   Temp:  [97.5 °F (36.4 °C)-98.5 °F (36.9 °C)] 97.5 °F (36.4 °C)  Heart Rate:  [64-84] 84  Resp:  [16-18] 16  BP: ()/(79-84) 129/79       Physical Exam   Constitutional: Awake, alert  Eyes: PERRLA, sclerae anicteric, no conjunctival injection  HENT: NCAT, mucous membranes moist  Neck: Supple, no thyromegaly, no lymphadenopathy, trachea midline  Respiratory: Clear to auscultation bilaterally, nonlabored respirations   Cardiovascular: RRR, no murmurs, rubs, or gallops, palpable pedal pulses bilaterally  Gastrointestinal: Positive bowel sounds, soft, nontender, nondistended  Musculoskeletal: No bilateral ankle  edema, no clubbing or cyanosis to extremities  Psychiatric: Appropriate affect, cooperative  Neurologic: Oriented to person only, strength symmetric in all extremities, Cranial Nerves grossly intact to confrontation, speech clear  Skin: No rashes      Result Review:  I have personally reviewed the results from the time of this admission to 10/7/2022 23:41 EDT and agree with these findings:  [x]  Laboratory list / accordion  [x]  Microbiology  [x]  Radiology  [x]  EKG/Telemetry   []  Cardiology/Vascular   []  Pathology  [x]  Old records  []  Other:        LAB RESULTS:      Lab 10/07/22  1906   WBC 11.03*   HEMOGLOBIN 12.9   HEMATOCRIT 38.4   PLATELETS 214   NEUTROS ABS 8.22*   IMMATURE GRANS (ABS) 0.07*   LYMPHS ABS 1.62   MONOS ABS 1.08*   EOS ABS 0.01   MCV 87.7   PROCALCITONIN 0.06   LACTATE 2.4*         Lab 10/07/22  1906   SODIUM 138   POTASSIUM 4.1   CHLORIDE 103   CO2 20.0*   ANION GAP 15.0   BUN 26*   CREATININE 2.11*   EGFR 22.7*   GLUCOSE 106*   CALCIUM 8.8         Lab 10/07/22  1906   TOTAL PROTEIN 6.2   ALBUMIN 3.20*   GLOBULIN 3.0   ALT (SGPT) 6   AST (SGOT) 16   BILIRUBIN 1.2   ALK PHOS 64         Lab 10/07/22  1906   TROPONIN T 0.054*                 UA    Urinalysis 7/5/22 10/7/22 10/7/22     1917 1917   Squamous Epithelial Cells, UA 0-5  0-2   Specific Gravity, UA 1.016 1.022    Ketones, UA  15 mg/dL (1+) (A)    Blood, UA Trace (A) Negative    Leukocytes, UA Negative Trace (A)    Nitrite, UA  Negative    RBC, UA 2  0-2   WBC, UA   0-2   Bacteria, UA Negative  None Seen   (A) Abnormal value       Comments are available for some flowsheets but are not being displayed.             Microbiology Results (last 10 days)     Procedure Component Value - Date/Time    COVID PRE-OP / PRE-PROCEDURE SCREENING ORDER (NO ISOLATION) - Swab, Nasopharynx [[id number]]  (Normal) Collected: 10/07/22 1955    Lab Status: Final result Specimen: Swab from Nasopharynx Updated: 10/07/22 2039    Narrative:      The following  orders were created for panel order COVID PRE-OP / PRE-PROCEDURE SCREENING ORDER (NO ISOLATION) - Swab, Nasopharynx.  Procedure                               Abnormality         Status                     ---------                               -----------         ------                     COVID-19 and FLU A/B PCR...[[id number]]  Normal              Final result                 Please view results for these tests on the individual orders.    COVID-19 and FLU A/B PCR - Swab, Nasopharynx [[id number]]  (Normal) Collected: 10/07/22 1955    Lab Status: Final result Specimen: Swab from Nasopharynx Updated: 10/07/22 2039     COVID19 Not Detected     Influenza A PCR Not Detected     Influenza B PCR Not Detected    Narrative:      Fact sheet for providers: https://www.fda.gov/media/143526/download    Fact sheet for patients: https://www.fda.gov/media/746967/download    Test performed by PCR.          CT Abdomen Pelvis Without Contrast    Result Date: 10/7/2022   DATE OF EXAM: 10/7/2022 7:22 PM  PROCEDURE: CT ABDOMEN PELVIS WO CONTRAST-  INDICATIONS: Abdominal pain.  COMPARISON: 04/04/2021.  TECHNIQUE: Routine transaxial slices were obtained through the abdomen and pelvis without the administration of intravenous contrast. Reconstructed coronal and sagittal images were also obtained. Automated exposure control and iterative construction methods were used.  FINDINGS: There are tiny layering gallstones. The exam is limited by noncontrast technique. The liver, adrenal glands, pancreas, spleen, and left kidney are normal. There is suggestion of a benign cyst in the upper pole of the right kidney. There are atherosclerotic vascular calcifications throughout the abdomen and pelvis. The uterus has a slightly lobulated contour suggestive of fibroids. The adnexal structures and urinary bladder are normal. There is increased stool in the rectosigmoid colon consistent with fecal impaction. There is colonic diverticulosis without evidence  of acute diverticulitis. There is the appendix is either small in size or surgically absent. There are no dilated loops of bowel to indicate an obstructive process. The patient has a moderate-sized hiatal hernia. There is grade 1 anterior spondylolisthesis of L4 on L5. There are no suspicious osteolytic or sclerotic lesions within the bony structures. There are no acute findings beneath the hemidiaphragms.      Impression:  1. Cholelithiasis. 2. No definite acute findings. 3. Rectosigmoid colon fecal impaction. 4. Multiple incidental findings as noted above. The exam is limited by noncontrast technique.  This report was finalized on 10/7/2022 8:30 PM by Raghavendra Perea MD.      CT Head Without Contrast    Result Date: 10/7/2022   DATE OF EXAM: 10/7/2022 7:22 PM  PROCEDURE: CT HEAD WO CONTRAST-  INDICATIONS: Confusion.  COMPARISON: No Comparisons Available  TECHNIQUE: Routine transaxial cuts were obtained through the head without the administration of contrast. Automated exposure control and iterative reconstruction methods were used.  FINDINGS: There is enlargement of the sulci, fissures, ventricles, and basal cisterns indicating volume loss secondary to age-appropriate atrophy. There are areas of decreased density in the white matter tracts felt to represent chronic microvascular ischemia. There is no acute hemorrhage, midline shift, or suspicious extra-axial fluid collections. There is no skull fracture. The visualized paranasal and mastoid sinuses are clear. There are extensive atherosclerotic vascular calcifications in the cavernous carotid arteries and the distal vertebral arteries. The orbital contents are normal.      Impression:  1. No acute findings. 2. Volume loss secondary to age-appropriate atrophy. 3. Chronic ischemic changes.  This report was finalized on 10/7/2022 7:59 PM by Raghavendra Perea MD.      XR Chest 1 View    Result Date: 10/7/2022  DATE OF EXAM: 10/7/2022 8:57 PM  PROCEDURE: XR CHEST 1 VW-   INDICATIONS: Confusion  COMPARISON: 05/20/2022.  TECHNIQUE: Single radiographic view of the chest was obtained.  FINDINGS: The heart size is normal. The pulmonary vascular markings are normal. The lungs and pleural spaces are clear of active disease. There is a left-sided transvenous pacemaker in place. There are surgical clips in the left axilla.  There are chronic age-related changes involving the bony thorax and thoracic aorta.      Impression: No active disease.  This report was finalized on 10/7/2022 9:09 PM by Raghavendra Perea MD.      XR Hip With or Without Pelvis 2 - 3 View Right    Result Date: 10/7/2022  DATE OF EXAM: 10/7/2022 8:55 PM  PROCEDURE: XR HIP W OR WO PELVIS 2-3 VIEW RIGHT-  INDICATIONS: Fall, right hip pain.  COMPARISON: CT of the pelvis performed on 10/07/2022.  TECHNIQUE: AP and Lateral views of the right hip with Pelvis were obtained.  FINDINGS: The bony structures are demineralized. There is no acute fracture or dislocation. The pelvic bony structures were better evaluated on the CT exam. There are mild arthritic changes involving the hip joints and sacroiliac joints. There are vascular calcifications in the pelvis.      Impression: No acute findings.  This report was finalized on 10/7/2022 9:08 PM by Raghavendra Perea MD.        Results for orders placed during the hospital encounter of 05/20/22    Adult Transthoracic Echo Complete W/ Cont if Necessary Per Protocol    Interpretation Summary  · Left ventricular systolic function is normal. Estimated left ventricular EF = 65%.  · Left ventricular wall thickness is consistent with mild concentric hypertrophy.  · Left ventricular diastolic function is consistent with (grade I) impaired relaxation.  · The aortic valve is mildly calcified. Otherwise, the cardiac valves are anatomically and functionally normal.  · Estimated right ventricular systolic pressure from tricuspid regurgitation is normal (<35 mmHg).      Assessment & Plan   Assessment & Plan      Active Hospital Problems    Diagnosis  POA   • **Fecal impaction (HCC) [K56.41]  Yes   • Elevated lactic acid level [R79.89]  Unknown   • Elevated troponin [R77.8]  Unknown   • Difficulty swallowing [R13.10]  Unknown   • Leukocytosis [D72.829]  Unknown   • LIANNA (acute kidney injury) (HCC) [N17.9]  Yes   • Dementia with behavioral disturbance [F03.918]  Yes   • Essential hypertension [I10]  Yes   • Coronary artery disease without angina pectoris [I25.10]  Yes   Shantelle Decker is a 82 y.o. female patient with a PMH significant for advanced dementia, HTN, CAD, history of breast cancer who comes to the ED after a fall.     Fecal impaction  - Manually disimpact  - Start every 4-hour enemas  - Will need bowel regimen in place prior to discharge  -CT abdomen and pelvis noted above    Difficulty swallowing  - Cardiac soft diet  - SLP consult for a.m.  - Aspiration precautions    Leukocytosis  Elevated lactic acid  - No obvious source of infection  - Monitor for need for antibiotics  -IVF's  - Reflex lactic acid pending  - Procalcitonin negative, UA negative, CT abdomen pelvis without infectious process  - A.m. labs    Elevated troponin  - Trend  - Unable to view EKG in epic.  Repeat pending  - Hemoglobin A1c, FLP for a.m.  - Consider cardiology consult    LIANNA, POA  --IV fluids  -- Every 4 hours bladder scan  --urine sodium/urea nitrogen, urine creatinine  --CT noted above  --consider nephrology consult in am if no improvement with IV fluids  --Hold nephrotoxic medications  --A.m. labs    Dementia  Fall  - Fall precautions  - Continue home meds  - PT/OT  -Bed alarm    HTN  CAD  - Continue home meds    Home med list reviewed    DVT prophylaxis: Heparin      CODE STATUS: Full code, discussed with  at bedside  Code Status and Medical Interventions:   Ordered at: 10/07/22 5090     Level Of Support Discussed With:    Next of Kin (If No Surrogate)     Code Status (Patient has no pulse and is not breathing):    CPR  (Attempt to Resuscitate)     Medical Interventions (Patient has pulse or is breathing):    Full Support         Macarena Medina,   10/07/22

## 2022-10-08 NOTE — ED PROVIDER NOTES
Subjective   History of Present Illness  82-year-old female who presents for evaluation after a fall.  The patient reportedly was attempting to walk up some steps at her porch with her walker whenever she lost her balance and fell to the ground.  In the process she grabbed her  and actually pulled into the ground as well.  He has likewise presented as a patient with [facility].  The patient herself is demented and cannot provide significant history.  History is primarily obtained from the  who is the patient's caretaker.  The  states that the patient has had decreased oral intake, generalized abdominal pain, and decreased bowel movements for the last 2 or 3 days.  He reports the last time she had a bowel was yesterday and it was very small in size.  He is unsure if she is passing gas.  He denies any change in urine function.  He denies recorded fever.  He does report that the patient has not been sleeping well and been more confused for the last 2 days.  He states that she has been humming constantly.  No new medications reported.  No other acute complaints.        Review of Systems   Constitutional: Positive for activity change and appetite change. Negative for chills, fatigue and fever.   HENT: Negative for congestion, ear pain, postnasal drip, sinus pressure and sore throat.    Eyes: Negative for pain, redness and visual disturbance.   Respiratory: Negative for cough, chest tightness and shortness of breath.    Cardiovascular: Negative for chest pain, palpitations and leg swelling.   Gastrointestinal: Positive for abdominal pain, constipation and nausea. Negative for anal bleeding, blood in stool, diarrhea and vomiting.   Endocrine: Negative for polydipsia and polyuria.   Genitourinary: Negative for difficulty urinating, dysuria, frequency and urgency.   Musculoskeletal: Negative for arthralgias, back pain and neck pain.   Skin: Negative for pallor and rash.   Allergic/Immunologic:  Negative for environmental allergies and immunocompromised state.   Neurological: Negative for dizziness, weakness and headaches.   Hematological: Negative for adenopathy.   Psychiatric/Behavioral: Positive for confusion and decreased concentration. Negative for self-injury and suicidal ideas. The patient is not nervous/anxious.    All other systems reviewed and are negative.      Past Medical History:   Diagnosis Date   • Breast cancer (CMS/HCC)    • Coronary artery disease    • Dementia (CMS/HCC)    • Dysrhythmia    • Hypertension        Allergies   Allergen Reactions   • Demerol [Meperidine] Anaphylaxis     And itching       Past Surgical History:   Procedure Laterality Date   • ADENOIDECTOMY     • BREAST SURGERY     • CARDIAC DEFIBRILLATOR PLACEMENT     • EXPLORATORY LAPAROTOMY N/A 7/16/2016    Procedure: open ventral hernia repair with mesh;  Surgeon: Bright Buckley MD;  Location: [facility];  Service:    • MASTECTOMY Bilateral    • TONSILLECTOMY         Family History   Problem Relation Age of Onset   • Heart disease Mother    • Heart disease Father    • Alcohol abuse Father        Social History     Socioeconomic History   • Marital status:    Tobacco Use   • Smoking status: Former Smoker     Types: Cigarettes, Pipe   • Smokeless tobacco: Never Used   • Tobacco comment: quit over 20 years ago    Substance and Sexual Activity   • Alcohol use: Yes     Comment: glass of wine occasionally with dinner    • Drug use: Yes     Types: Marijuana     Comment: quit three years ago            Objective   Physical Exam  Vitals and nursing note reviewed.   Constitutional:       General: She is not in acute distress.     Appearance: She is well-developed. She is ill-appearing. She is not toxic-appearing or diaphoretic.   HENT:      Head: Normocephalic and atraumatic.      Right Ear: External ear normal.      Left Ear: External ear normal.      Nose: Nose normal.   Eyes:      General: Lids are normal.      Pupils:  Pupils are equal, round, and reactive to light.   Neck:      Trachea: No tracheal deviation.   Cardiovascular:      Rate and Rhythm: Normal rate and regular rhythm.      Pulses: No decreased pulses.      Heart sounds: Normal heart sounds. No murmur heard.    No friction rub. No gallop.   Pulmonary:      Effort: Pulmonary effort is normal. No respiratory distress.      Breath sounds: Normal breath sounds. No decreased breath sounds, wheezing, rhonchi or rales.   Abdominal:      General: Bowel sounds are normal.      Palpations: Abdomen is soft.      Tenderness: There is no abdominal tenderness. There is no guarding or rebound.       Musculoskeletal:         General: No deformity. Normal range of motion.      Cervical back: Normal range of motion and neck supple.        Legs:    Lymphadenopathy:      Cervical: No cervical adenopathy.   Skin:     General: Skin is warm and dry.      Findings: No rash.   Neurological:      Mental Status: She is disoriented and confused.      GCS: GCS eye subscore is 4. GCS verbal subscore is 4. GCS motor subscore is 6.      Cranial Nerves: No cranial nerve deficit.      Sensory: No sensory deficit.   Psychiatric:         Speech: Speech normal.         Behavior: Behavior normal.         Thought Content: Thought content normal.         Judgment: Judgment normal.         Procedures           ED Course                                           MDM  Number of Diagnoses or Management Options  Acute dehydration: new and requires workup  Acute renal insufficiency: new and requires workup  Elevated troponin: new and requires workup  Fecal impaction (HCC): new and requires workup  Diagnosis management comments: CT scan of the abdomen pelvis shows fecal impaction.  Enema has been ordered.    No bowel obstruction identified on the CT scan.  Lab evaluation is consistent with dehydration with acute renal insufficiency.  There is an elevated troponin.  EKG appears consistent with atrial paced rhythm  with premature beats.    Patient has known dementia and was given Haldol and Ativan to help calm her down.    IV fluids have also been given here in the ER.    I discussed the patient with hospitalist, Dr. Webster.  The hospital service will consult on the patient to determine status of admission.       Amount and/or Complexity of Data Reviewed  Clinical lab tests: ordered and reviewed  Tests in the radiology section of CPT®: ordered and reviewed  Decide to obtain previous medical records or to obtain history from someone other than the patient: yes  Obtain history from someone other than the patient: yes  Review and summarize past medical records: yes  Discuss the patient with other providers: yes  Independent visualization of images, tracings, or specimens: yes        Final diagnoses:   Fecal impaction (HCC)   Acute dehydration   Acute renal insufficiency   Elevated troponin       ED Disposition  ED Disposition     ED Disposition   Decision to Admit    Condition   --    Comment   Level of Care: Telemetry [5]   Diagnosis: Fecal impaction (HCC) [[id number]]   Admitting Physician: KALPESH WEBSTER [[id number]]   Attending Physician: KALPESH WEBSTER [[id number]]               No follow-up provider specified.       Medication List      No changes were made to your prescriptions during this visit.          Scooter Wesley MD  10/07/22 0964

## 2022-10-08 NOTE — PLAN OF CARE
Goal Outcome Evaluation:               Pt arrived from ED. She is alert only to self. Spouse at bedside. TAHIR DELGADO FIB on tele. Manual fecal disimpaction performed and large BM removed. Suppository was given after that. Large incontinent BM this morning.

## 2022-10-09 LAB
ANION GAP SERPL CALCULATED.3IONS-SCNC: 8 MMOL/L (ref 5–15)
BUN SERPL-MCNC: 17 MG/DL (ref 8–23)
BUN/CREAT SERPL: 11.6 (ref 7–25)
CALCIUM SPEC-SCNC: 7.7 MG/DL (ref 8.6–10.5)
CHLORIDE SERPL-SCNC: 112 MMOL/L (ref 98–107)
CO2 SERPL-SCNC: 22 MMOL/L (ref 22–29)
CREAT SERPL-MCNC: 1.46 MG/DL (ref 0.57–1)
EGFRCR SERPLBLD CKD-EPI 2021: 35.3 ML/MIN/1.73
GLUCOSE SERPL-MCNC: 91 MG/DL (ref 65–99)
POTASSIUM SERPL-SCNC: 4 MMOL/L (ref 3.5–5.2)
QT INTERVAL: 480 MS
QTC INTERVAL: 553 MS
SODIUM SERPL-SCNC: 142 MMOL/L (ref 136–145)

## 2022-10-09 PROCEDURE — 97162 PT EVAL MOD COMPLEX 30 MIN: CPT

## 2022-10-09 PROCEDURE — 99232 SBSQ HOSP IP/OBS MODERATE 35: CPT | Performed by: INTERNAL MEDICINE

## 2022-10-09 PROCEDURE — P9612 CATHETERIZE FOR URINE SPEC: HCPCS

## 2022-10-09 PROCEDURE — 25010000002 HEPARIN (PORCINE) PER 1000 UNITS: Performed by: INTERNAL MEDICINE

## 2022-10-09 PROCEDURE — 80048 BASIC METABOLIC PNL TOTAL CA: CPT | Performed by: INTERNAL MEDICINE

## 2022-10-09 RX ORDER — SODIUM CHLORIDE 9 MG/ML
100 INJECTION, SOLUTION INTRAVENOUS CONTINUOUS
Status: ACTIVE | OUTPATIENT
Start: 2022-10-09 | End: 2022-10-10

## 2022-10-09 RX ADMIN — HEPARIN SODIUM 5000 UNITS: 5000 INJECTION INTRAVENOUS; SUBCUTANEOUS at 20:34

## 2022-10-09 RX ADMIN — CITALOPRAM HYDROBROMIDE 20 MG: 20 TABLET ORAL at 08:18

## 2022-10-09 RX ADMIN — DOCUSATE SODIUM 100 MG: 100 CAPSULE, LIQUID FILLED ORAL at 08:18

## 2022-10-09 RX ADMIN — Medication 10 ML: at 08:20

## 2022-10-09 RX ADMIN — NIFEDIPINE 60 MG: 30 TABLET, FILM COATED, EXTENDED RELEASE ORAL at 08:18

## 2022-10-09 RX ADMIN — DOCUSATE SODIUM 100 MG: 100 CAPSULE, LIQUID FILLED ORAL at 20:35

## 2022-10-09 RX ADMIN — Medication 10 MG: at 20:35

## 2022-10-09 RX ADMIN — MEMANTINE 5 MG: 10 TABLET ORAL at 20:35

## 2022-10-09 RX ADMIN — TRAZODONE HYDROCHLORIDE 50 MG: 50 TABLET ORAL at 20:35

## 2022-10-09 RX ADMIN — MEMANTINE 5 MG: 10 TABLET ORAL at 08:18

## 2022-10-09 RX ADMIN — POLYETHYLENE GLYCOL 3350 17 G: 17 POWDER, FOR SOLUTION ORAL at 08:18

## 2022-10-09 RX ADMIN — QUETIAPINE FUMARATE 50 MG: 25 TABLET, FILM COATED ORAL at 20:35

## 2022-10-09 RX ADMIN — HEPARIN SODIUM 5000 UNITS: 5000 INJECTION INTRAVENOUS; SUBCUTANEOUS at 05:07

## 2022-10-09 RX ADMIN — ASPIRIN 81 MG: 81 TABLET, COATED ORAL at 08:18

## 2022-10-09 RX ADMIN — HEPARIN SODIUM 5000 UNITS: 5000 INJECTION INTRAVENOUS; SUBCUTANEOUS at 13:17

## 2022-10-09 RX ADMIN — DONEPEZIL HYDROCHLORIDE 10 MG: 10 TABLET, FILM COATED ORAL at 20:35

## 2022-10-09 RX ADMIN — SODIUM CHLORIDE 100 ML/HR: 9 INJECTION, SOLUTION INTRAVENOUS at 13:29

## 2022-10-09 NOTE — PROGRESS NOTES
[facility] Medicine Services  PROGRESS NOTE    Patient Name: Shantelle Decker  : 1940  MRN: [id number]    Date of Admission: 10/7/2022  Primary Care Physician: Provider, No Known    Subjective   Subjective     CC:  F/U fall, fecal impaction    HPI:  No bowel movement today and no urine output.  She is drinking more.    ROS:  Gen- No fevers  GI- As above    Objective   Objective     Vital Signs:   Temp:  [97.4 °F (36.3 °C)-98 °F (36.7 °C)] 97.5 °F (36.4 °C)  Heart Rate:  [60-86] 60  Resp:  [14-18] 14  BP: (105-133)/(56-73) 123/64     Physical Exam:  Constitutional: No acute distress, awake, laying in bed  HENT: NCAT, mucous membranes moist  Respiratory: Respiratory effort normal on room air  Cardiovascular: RRR  Psychiatric: Calm, cooperative  Neurologic: Speech clear  Skin: No rashes on exposed surfaces    Results Reviewed:  LAB RESULTS:      Lab 10/08/22  0541 10/08/22  0039 10/07/22  1906   WBC 8.52  --  11.03*   HEMOGLOBIN 11.8*  --  12.9   HEMATOCRIT 35.9  --  38.4   PLATELETS 186  --  214   NEUTROS ABS 6.16  --  8.22*   IMMATURE GRANS (ABS) 0.06*  --  0.07*   LYMPHS ABS 1.26  --  1.62   MONOS ABS 0.93*  --  1.08*   EOS ABS 0.07  --  0.01   MCV 90.7  --  87.7   PROCALCITONIN  --   --  0.06   LACTATE  --  1.5 2.4*         Lab 10/08/22  0541 10/07/22  1906   SODIUM 139 138   POTASSIUM 3.9 4.1   CHLORIDE 105 103   CO2 24.0 20.0*   ANION GAP 10.0 15.0   BUN 25* 26*   CREATININE 1.93* 2.11*   EGFR 25.3* 22.7*   GLUCOSE 101* 106*   CALCIUM 8.2* 8.8   MAGNESIUM 1.8  --    HEMOGLOBIN A1C 4.60*  --    TSH 5.900*  --          Lab 10/07/22  1906   TOTAL PROTEIN 6.2   ALBUMIN 3.20*   GLOBULIN 3.0   ALT (SGPT) 6   AST (SGOT) 16   BILIRUBIN 1.2   ALK PHOS 64         Lab 10/08/22  0541 10/08/22  0039 10/07/22  1906   TROPONIN T 0.053* 0.042* 0.054*         Lab 10/08/22  0541   CHOLESTEROL 163   LDL CHOL 101*   HDL CHOL 44   TRIGLYCERIDES 95             Brief Urine Lab Results  (Last result in  the past 365 days)      Color   Clarity   Blood   Leuk Est   Nitrite   Protein   CREAT   Urine HCG        10/07/22 1917 Yellow   Clear   Negative   Trace   Negative   30 mg/dL (1+)                 Microbiology Results Abnormal     Procedure Component Value - Date/Time    COVID PRE-OP / PRE-PROCEDURE SCREENING ORDER (NO ISOLATION) - Swab, Nasopharynx [[id number]]  (Normal) Collected: 10/07/22 1955    Lab Status: Final result Specimen: Swab from Nasopharynx Updated: 10/07/22 2039    Narrative:      The following orders were created for panel order COVID PRE-OP / PRE-PROCEDURE SCREENING ORDER (NO ISOLATION) - Swab, Nasopharynx.  Procedure                               Abnormality         Status                     ---------                               -----------         ------                     COVID-19 and FLU A/B PCR...[[id number]]  Normal              Final result                 Please view results for these tests on the individual orders.    COVID-19 and FLU A/B PCR - Swab, Nasopharynx [[id number]]  (Normal) Collected: 10/07/22 1955    Lab Status: Final result Specimen: Swab from Nasopharynx Updated: 10/07/22 2039     COVID19 Not Detected     Influenza A PCR Not Detected     Influenza B PCR Not Detected    Narrative:      Fact sheet for providers: https://www.fda.gov/media/228898/download    Fact sheet for patients: https://www.fda.gov/media/201188/download    Test performed by PCR.          CT Abdomen Pelvis Without Contrast    Result Date: 10/7/2022   DATE OF EXAM: 10/7/2022 7:22 PM  PROCEDURE: CT ABDOMEN PELVIS WO CONTRAST-  INDICATIONS: Abdominal pain.  COMPARISON: 04/04/2021.  TECHNIQUE: Routine transaxial slices were obtained through the abdomen and pelvis without the administration of intravenous contrast. Reconstructed coronal and sagittal images were also obtained. Automated exposure control and iterative construction methods were used.  FINDINGS: There are tiny layering gallstones. The exam is limited  by noncontrast technique. The liver, adrenal glands, pancreas, spleen, and left kidney are normal. There is suggestion of a benign cyst in the upper pole of the right kidney. There are atherosclerotic vascular calcifications throughout the abdomen and pelvis. The uterus has a slightly lobulated contour suggestive of fibroids. The adnexal structures and urinary bladder are normal. There is increased stool in the rectosigmoid colon consistent with fecal impaction. There is colonic diverticulosis without evidence of acute diverticulitis. There is the appendix is either small in size or surgically absent. There are no dilated loops of bowel to indicate an obstructive process. The patient has a moderate-sized hiatal hernia. There is grade 1 anterior spondylolisthesis of L4 on L5. There are no suspicious osteolytic or sclerotic lesions within the bony structures. There are no acute findings beneath the hemidiaphragms.      Impression:  1. Cholelithiasis. 2. No definite acute findings. 3. Rectosigmoid colon fecal impaction. 4. Multiple incidental findings as noted above. The exam is limited by noncontrast technique.  This report was finalized on 10/7/2022 8:30 PM by Raghavendra Perea MD.      CT Head Without Contrast    Result Date: 10/7/2022   DATE OF EXAM: 10/7/2022 7:22 PM  PROCEDURE: CT HEAD WO CONTRAST-  INDICATIONS: Confusion.  COMPARISON: No Comparisons Available  TECHNIQUE: Routine transaxial cuts were obtained through the head without the administration of contrast. Automated exposure control and iterative reconstruction methods were used.  FINDINGS: There is enlargement of the sulci, fissures, ventricles, and basal cisterns indicating volume loss secondary to age-appropriate atrophy. There are areas of decreased density in the white matter tracts felt to represent chronic microvascular ischemia. There is no acute hemorrhage, midline shift, or suspicious extra-axial fluid collections. There is no skull fracture. The  visualized paranasal and mastoid sinuses are clear. There are extensive atherosclerotic vascular calcifications in the cavernous carotid arteries and the distal vertebral arteries. The orbital contents are normal.      Impression:  1. No acute findings. 2. Volume loss secondary to age-appropriate atrophy. 3. Chronic ischemic changes.  This report was finalized on 10/7/2022 7:59 PM by Raghavendra Perea MD.      XR Chest 1 View    Result Date: 10/7/2022  DATE OF EXAM: 10/7/2022 8:57 PM  PROCEDURE: XR CHEST 1 VW-  INDICATIONS: Confusion  COMPARISON: 05/20/2022.  TECHNIQUE: Single radiographic view of the chest was obtained.  FINDINGS: The heart size is normal. The pulmonary vascular markings are normal. The lungs and pleural spaces are clear of active disease. There is a left-sided transvenous pacemaker in place. There are surgical clips in the left axilla.  There are chronic age-related changes involving the bony thorax and thoracic aorta.      Impression: No active disease.  This report was finalized on 10/7/2022 9:09 PM by Raghavendra Perea MD.      XR Hip With or Without Pelvis 2 - 3 View Right    Result Date: 10/7/2022  DATE OF EXAM: 10/7/2022 8:55 PM  PROCEDURE: XR HIP W OR WO PELVIS 2-3 VIEW RIGHT-  INDICATIONS: Fall, right hip pain.  COMPARISON: CT of the pelvis performed on 10/07/2022.  TECHNIQUE: AP and Lateral views of the right hip with Pelvis were obtained.  FINDINGS: The bony structures are demineralized. There is no acute fracture or dislocation. The pelvic bony structures were better evaluated on the CT exam. There are mild arthritic changes involving the hip joints and sacroiliac joints. There are vascular calcifications in the pelvis.      Impression: No acute findings.  This report was finalized on 10/7/2022 9:08 PM by Raghavendra Perea MD.        Results for orders placed during the hospital encounter of 05/20/22    Adult Transthoracic Echo Complete W/ Cont if Necessary Per Protocol    Interpretation Summary  · Left  ventricular systolic function is normal. Estimated left ventricular EF = 65%.  · Left ventricular wall thickness is consistent with mild concentric hypertrophy.  · Left ventricular diastolic function is consistent with (grade I) impaired relaxation.  · The aortic valve is mildly calcified. Otherwise, the cardiac valves are anatomically and functionally normal.  · Estimated right ventricular systolic pressure from tricuspid regurgitation is normal (<35 mmHg).      I have reviewed the medications:  Scheduled Meds:aspirin, 81 mg, Oral, Daily  citalopram, 20 mg, Oral, Daily  docusate sodium, 100 mg, Oral, BID  donepezil, 10 mg, Oral, Nightly  heparin (porcine), 5,000 Units, Subcutaneous, Q8H  melatonin, 10 mg, Oral, Nightly  memantine, 5 mg, Oral, BID  NIFEdipine XL, 60 mg, Oral, Q24H  polyethylene glycol, 17 g, Oral, Daily  QUEtiapine, 50 mg, Oral, Nightly  sodium chloride, 10 mL, Intravenous, Q12H  traZODone, 50 mg, Oral, Nightly      Continuous Infusions:sodium chloride, 100 mL/hr, Last Rate: 100 mL/hr (10/08/22 1843)      PRN Meds:.•  acetaminophen **OR** acetaminophen **OR** acetaminophen  •  ondansetron **OR** ondansetron  •  QUEtiapine  •  [COMPLETED] Insert peripheral IV **AND** sodium chloride  •  sodium chloride    Assessment & Plan   Assessment & Plan     Active Hospital Problems    Diagnosis  POA   • **Fecal impaction (HCC) [K56.41]  Yes   • Elevated lactic acid level [R79.89]  Unknown   • Elevated troponin [R77.8]  Unknown   • Difficulty swallowing [R13.10]  Unknown   • Leukocytosis [D72.829]  Unknown   • Fall [W19.XXXA]  Unknown   • LIANNA (acute kidney injury) (HCC) [N17.9]  Yes   • Dementia with behavioral disturbance [F03.918]  Yes   • Essential hypertension [I10]  Yes   • Coronary artery disease without angina pectoris [I25.10]  Yes      Resolved Hospital Problems   No resolved problems to display.        Brief Hospital Course to date:  Shantelle Decker is a 82 y.o. female with advanced dementia, HTN, CAD,  history of breast cancer who presented to the ED after a fall and was found to have fecal impaction and acute kidney injury.    This patient's problems and plans were partially entered by my partner and updated as appropriate by me 10/09/22.    Fecal impaction  - Now disimpacted   - Continue scheduled miralax and docusate     Difficulty swallowing  - SLP consulted     Leukocytosis  Elevated lactic acid  - Suspect related to dehydration  - Procalcitonin negative, UA negative, CT abdomen pelvis without infectious process, CXR without evidence of pneumonia     Elevated troponin  - Likely demand ischemia in setting of dehydration, LIANNA     LIANNA on CKD III  -- Creatinine 2.11 on admission (baseline 1.1-1.5)  -- Improving with IV fluids     Dementia  Fall  - Continue home meds  - PT/OT     HTN  CAD  - Continue home meds    Expected Discharge Location and Transportation: Home vs. SNF  Expected Discharge Date: 10/10    DVT prophylaxis:  Medical DVT prophylaxis orders are present.          CODE STATUS:   Code Status and Medical Interventions:   Ordered at: 10/07/22 2340     Level Of Support Discussed With:    Next of Kin (If No Surrogate)     Code Status (Patient has no pulse and is not breathing):    CPR (Attempt to Resuscitate)     Medical Interventions (Patient has pulse or is breathing):    Full Support       Denisa Duarte MD  10/09/22

## 2022-10-09 NOTE — THERAPY EVALUATION
Patient Name: Shantelle Decker  : 1940    MRN: [id number]                              Today's Date: 10/9/2022       Admit Date: 10/7/2022    Visit Dx:     ICD-10-CM ICD-9-CM   1. Fecal impaction (HCC)  K56.41 560.32   2. Acute dehydration  E86.0 276.51   3. Acute renal insufficiency  N28.9 593.9   4. Elevated troponin  R77.8 790.6     Patient Active Problem List   Diagnosis   • Incarcerated ventral hernia   • Coronary artery disease without angina pectoris   • Morbid obesity (HCC)   • Normocytic anemia   • Dementia with behavioral disturbance   • Essential hypertension   • History of breast cancer   • Thyroid mass   • LIANNA (acute kidney injury) (HCC)   • CKD (chronic kidney disease) stage 3, GFR 30-59 ml/min (HCC)   • Chest pain   • Metabolic acidosis   • Other chest pain   • Fecal impaction (HCC)   • Elevated lactic acid level   • Elevated troponin   • Difficulty swallowing   • Leukocytosis   • Fall     Past Medical History:   Diagnosis Date   • Breast cancer (CMS/HCC)    • Coronary artery disease    • Dementia (CMS/HCC)    • Dysrhythmia    • Hypertension      Past Surgical History:   Procedure Laterality Date   • ADENOIDECTOMY     • BREAST SURGERY     • CARDIAC DEFIBRILLATOR PLACEMENT     • EXPLORATORY LAPAROTOMY N/A 2016    Procedure: open ventral hernia repair with mesh;  Surgeon: Bright Buckley MD;  Location: [facility];  Service:    • MASTECTOMY Bilateral    • TONSILLECTOMY        General Information     Row Name 10/09/22 1551          Physical Therapy Time and Intention    Document Type evaluation  -HP     Mode of Treatment physical therapy  -     Row Name 10/09/22 1551          General Information    Patient Profile Reviewed yes  -HP     Prior Level of Function min assist:;mod assist:;all household mobility;gait;transfer;bed mobility;ADL's  per chart pt with advanced dementia and unable to provide PLOF; spouse reports that he assists with all mobility and ADLs at baseline, but pt is able to  ambulate  -     Existing Precautions/Restrictions fall;other (see comments)  advanced dementia  -     Row Name 10/09/22 1551          Home Main Entrance    Number of Stairs, Main Entrance other (see comments)  Pt questionable historian. Will clarify with family  -     Row Name 10/09/22 1551          Cognition    Orientation Status (Cognition) oriented to;person;disoriented to;place;situation;time;verbal cues/prompts needed for orientation  -     Row Name 10/09/22 1551          Safety Issues, Functional Mobility    Safety Issues Affecting Function (Mobility) insight into deficits/self-awareness;awareness of need for assistance;safety precaution awareness;problem-solving;positioning of assistive device;judgment;sequencing abilities  -     Impairments Affecting Function (Mobility) balance;cognition;pain;strength;postural/trunk control;endurance/activity tolerance  -           User Key  (r) = Recorded By, (t) = Taken By, (c) = Cosigned By    Initials Name Provider Type     Leticia Ghotra, [place] Physical Therapist               Mobility     Row Name 10/09/22 1552          Bed Mobility    Bed Mobility supine-sit;sit-supine  -     Supine-Sit [place] (Bed Mobility) verbal cues;moderate assist (50% patient effort);2 person assist;nonverbal cues (demo/gesture)  -     Sit-Supine [place] (Bed Mobility) maximum assist (25% patient effort);2 person assist;verbal cues  -     Assistive Device (Bed Mobility) draw sheet;bed rails  -     Comment, (Bed Mobility) Assist for trunk and BLE management. Pt able to initiate movement with frequent VC  -     Row Name 10/09/22 1552          Transfers    Comment, (Transfers) VC for hand placement and anterior weight shift. Pt required Mod A x2 with FWW. Pt able to march in place. Difficulty sequencing step taking. One LOB in the posterior direction noted. Activity limited by fatigue.  -     Row Name 10/09/22 1552          Sit-Stand Transfer    Sit-Stand  [place] (Transfers) verbal cues;2 person assist;moderate assist (50% patient effort)  -     Assistive Device (Sit-Stand Transfers) walker, 4-wheeled  -     Row Name 10/09/22 1552          Gait/Stairs (Locomotion)    [place] Level (Gait) unable to assess  -           User Key  (r) = Recorded By, (t) = Taken By, (c) = Cosigned By    Initials Name Provider Type     Leticia Ghotra PT Physical Therapist               Obj/Interventions     Row Name 10/09/22 1554          Range of Motion Comprehensive    General Range of Motion no range of motion deficits identified  -     Row Name 10/09/22 1554          Strength Comprehensive (MMT)    General Manual Muscle Testing (MMT) Assessment lower extremity strength deficits identified  -     Comment, General Manual Muscle Testing (MMT) Assessment BLE grossly 3+/5  -     Row Name 10/09/22 1554          Balance    Balance Assessment sitting static balance;sitting dynamic balance;sit to stand dynamic balance;standing static balance;standing dynamic balance  -     Static Sitting Balance contact guard  -     Dynamic Sitting Balance minimal assist;2-person assist  -     Position, Sitting Balance sitting edge of bed  -     Static Standing Balance moderate assist;1-person assist  -     Dynamic Standing Balance moderate assist;2-person assist  -     Position/Device Used, Standing Balance supported;walker, rolling  -     Balance Interventions sitting;standing;sit to stand;occupation based/functional task  -           User Key  (r) = Recorded By, (t) = Taken By, (c) = Cosigned By    Initials Name Provider Type     Leticia Ghotra PT Physical Therapist               Goals/Plan     Row Name 10/09/22 1553          Bed Mobility Goal 1 (PT)    Activity/Assistive Device (Bed Mobility Goal 1, PT) sit to supine/supine to sit  -     [place] Level/Cues Needed (Bed Mobility Goal 1, PT) contact guard required  -     Time Frame (Bed Mobility Goal 1, PT)  long term goal (LTG);10 days  -HP     Progress/Outcomes (Bed Mobility Goal 1, PT) goal ongoing  -HP     Row Name 10/09/22 1558          Transfer Goal 1 (PT)    Activity/Assistive Device (Transfer Goal 1, PT) sit-to-stand/stand-to-sit  -HP     [place] Level/Cues Needed (Transfer Goal 1, PT) contact guard required  -HP     Time Frame (Transfer Goal 1, PT) long term goal (LTG);10 days  -HP     Progress/Outcome (Transfer Goal 1, PT) goal ongoing  -HP     Row Name 10/09/22 1558          Gait Training Goal 1 (PT)    Activity/Assistive Device (Gait Training Goal 1, PT) gait (walking locomotion)  -HP     [place] Level (Gait Training Goal 1, PT) contact guard required  -HP     Distance (Gait Training Goal 1, PT) 100  -HP     Time Frame (Gait Training Goal 1, PT) long term goal (LTG);10 days  -HP     Progress/Outcome (Gait Training Goal 1, PT) goal ongoing  -     Row Name 10/09/22 1552          Therapy Assessment/Plan (PT)    Planned Therapy Interventions (PT) balance training;bed mobility training;gait training;home exercise program;patient/family education;transfer training;stretching;strengthening  -HP           User Key  (r) = Recorded By, (t) = Taken By, (c) = Cosigned By    Initials Name Provider Type    HP Leticia Ghotra, PT Physical Therapist               Clinical Impression     Row Name 10/09/22 1558          Pain Scale: FACES Pre/Post-Treatment    Pain: FACES Scale, Pretreatment 2-->hurts little bit  -HP     Posttreatment Pain Rating 2-->hurts little bit  -HP     Row Name 10/09/22 1553          Plan of Care Review    Plan of Care Reviewed With patient;spouse  -HP     Progress no change  -HP     Outcome Evaluation PT eval complete. Pt able to follow 50% of commands with difficulty sequencing mobility. Pt performed bed mobility with Max A x2. Pt marched in place with Mod A x2. Unable to sequence steps at this time. Activity limited by fatigue. Pt presents with decreased activity tolerance, generalized  weakness, balance impairments, and decreased awareness for assist limiting her functional mobility and putting her at high risk for falls. Recommend d/c to SNF when medically appropriate for best functional outcome.  -     Row Name 10/09/22 1555          Therapy Assessment/Plan (PT)    Rehab Potential (PT) good, to achieve stated therapy goals  -     Criteria for Skilled Interventions Met (PT) yes;meets criteria;skilled treatment is necessary  -     Therapy Frequency (PT) daily  -     Row Name 10/09/22 1555          Vital Signs    Pre Systolic BP Rehab --  VSS  -HP     Pre Patient Position Supine  -HP     Intra Patient Position Standing  -HP     Post Patient Position Supine  -HP     Row Name 10/09/22 1555          Positioning and Restraints    Pre-Treatment Position in bed  -HP     Post Treatment Position bed  -HP     In Bed notified nsg;supine;fowlers;call light within reach;encouraged to call for assist;exit alarm on;with family/caregiver;side rails up x2  -HP           User Key  (r) = Recorded By, (t) = Taken By, (c) = Cosigned By    Initials Name Provider Type     Leticia Ghotra, PT Physical Therapist               Outcome Measures     Row Name 10/09/22 1554          How much help from another person do you currently need...    Turning from your back to your side while in flat bed without using bedrails? 2  -HP     Moving from lying on back to sitting on the side of a flat bed without bedrails? 2  -HP     Moving to and from a bed to a chair (including a wheelchair)? 2  -HP     Standing up from a chair using your arms (e.g., wheelchair, bedside chair)? 2  -HP     Climbing 3-5 steps with a railing? 1  -HP     To walk in hospital room? 1  -HP     AM-PAC 6 Clicks Score (PT) 10  -HP     Highest level of mobility 4 --> Transferred to chair/commode  -     Row Name 10/09/22 1555          Functional Assessment    Outcome Measure Options AM-PAC 6 Clicks Basic Mobility (PT)  -HP           User Key  (r) =  Recorded By, (t) = Taken By, (c) = Cosigned By    Initials Name Provider Type     Leticia Ghotra PT Physical Therapist                             Physical Therapy Education     Title: PT OT SLP Therapies (In Progress)     Topic: Physical Therapy (In Progress)     Point: Mobility training (In Progress)     Learning Progress Summary           Patient Acceptance, E,D, NR by  at 10/9/2022 1559   Family Acceptance, E,D, NR by  at 10/9/2022 1559                   Point: Home exercise program (In Progress)     Learning Progress Summary           Patient Acceptance, E,D, NR by  at 10/9/2022 1559   Family Acceptance, E,D, NR by  at 10/9/2022 1559                   Point: Body mechanics (In Progress)     Learning Progress Summary           Patient Acceptance, E,D, NR by  at 10/9/2022 1559   Family Acceptance, E,D, NR by  at 10/9/2022 1559                   Point: Precautions (In Progress)     Learning Progress Summary           Patient Acceptance, E,D, NR by  at 10/9/2022 1559   Family Acceptance, E,D, NR by  at 10/9/2022 1559                               User Key     Initials Effective Dates Name Provider Type Discipline     06/01/21 -  Leticia Ghotra PT Physical Therapist PT              PT Recommendation and Plan  Planned Therapy Interventions (PT): balance training, bed mobility training, gait training, home exercise program, patient/family education, transfer training, stretching, strengthening  Plan of Care Reviewed With: patient, spouse  Progress: no change  Outcome Evaluation: PT eval complete. Pt able to follow 50% of commands with difficulty sequencing mobility. Pt performed bed mobility with Max A x2. Pt marched in place with Mod A x2. Unable to sequence steps at this time. Activity limited by fatigue. Pt presents with decreased activity tolerance, generalized weakness, balance impairments, and decreased awareness for assist limiting her functional mobility and putting her at high risk for  falls. Recommend d/c to SNF when medically appropriate for best functional outcome.     Time Calculation:    PT Charges     Row Name 10/09/22 1530             Time Calculation    Start Time 1530  -HP      PT Received On 10/09/22  -HP      PT Goal Re-Cert Due Date 10/19/22  -HP         Untimed Charges    PT Eval/Re-eval Minutes 47  -HP         Total Minutes    Untimed Charges Total Minutes 47  -HP       Total Minutes 47  -HP            User Key  (r) = Recorded By, (t) = Taken By, (c) = Cosigned By    Initials Name Provider Type    HP Leticia Ghotra, PT Physical Therapist              Therapy Charges for Today     Code Description Service Date Service Provider Modifiers Qty    68557295954 HC PT EVAL MOD COMPLEXITY 4 10/9/2022 Leticia Ghotra, PT GP 1    12383096114 HC PT THER SUPP EA 15 MIN 10/9/2022 Leticia Ghotra, PT GP 3          PT G-Codes  Outcome Measure Options: AM-PAC 6 Clicks Basic Mobility (PT)  AM-PAC 6 Clicks Score (PT): 10  AM-PAC 6 Clicks Score (OT): 13    Leticia Ghotra PT  10/9/2022

## 2022-10-09 NOTE — PLAN OF CARE
Goal Outcome Evaluation:  Plan of Care Reviewed With: patient        Progress: improving   Pt alert to self, confused at baseline, vss, room air, repositioned q2h, no c/o pain, stood up at bedside with PT this shift and tolerated well, poor po intake, Unable to void on own-md aware, I/o caths and bladder scanned prn, plan is SNF at [facility] following

## 2022-10-09 NOTE — PLAN OF CARE
Oriented only to self, staff met strong resistance with skin care. Abd folds severely excoriated, coccyx red/blanchable. VSS. A paced on cardiac mx. Bladder volume periodically assessed. Call light in reach Spouse at bedside.

## 2022-10-09 NOTE — PLAN OF CARE
Goal Outcome Evaluation:  Plan of Care Reviewed With: patient, spouse        Progress: no change  Outcome Evaluation: PT eval complete. Pt able to follow 50% of commands with difficulty sequencing mobility. Pt performed bed mobility with Max A x2. Pt marched in place with Mod A x2. Unable to sequence steps at this time. Activity limited by fatigue. Pt presents with decreased activity tolerance, generalized weakness, balance impairments, and decreased awareness for assist limiting her functional mobility and putting her at high risk for falls. Recommend d/c to SNF when medically appropriate for best functional outcome.

## 2022-10-10 ENCOUNTER — APPOINTMENT (OUTPATIENT)
Dept: GENERAL RADIOLOGY | Facility: HOSPITAL | Age: 82
End: 2022-10-10

## 2022-10-10 LAB
ANION GAP SERPL CALCULATED.3IONS-SCNC: 10 MMOL/L (ref 5–15)
BUN SERPL-MCNC: 15 MG/DL (ref 8–23)
BUN/CREAT SERPL: 12.3 (ref 7–25)
CALCIUM SPEC-SCNC: 7.8 MG/DL (ref 8.6–10.5)
CHLORIDE SERPL-SCNC: 110 MMOL/L (ref 98–107)
CO2 SERPL-SCNC: 20 MMOL/L (ref 22–29)
CREAT SERPL-MCNC: 1.22 MG/DL (ref 0.57–1)
EGFRCR SERPLBLD CKD-EPI 2021: 43.8 ML/MIN/1.73
GLUCOSE SERPL-MCNC: 83 MG/DL (ref 65–99)
POTASSIUM SERPL-SCNC: 3.7 MMOL/L (ref 3.5–5.2)
SODIUM SERPL-SCNC: 140 MMOL/L (ref 136–145)

## 2022-10-10 PROCEDURE — 99232 SBSQ HOSP IP/OBS MODERATE 35: CPT | Performed by: INTERNAL MEDICINE

## 2022-10-10 PROCEDURE — 80048 BASIC METABOLIC PNL TOTAL CA: CPT | Performed by: INTERNAL MEDICINE

## 2022-10-10 PROCEDURE — 74240 X-RAY XM UPR GI TRC 1CNTRST: CPT

## 2022-10-10 PROCEDURE — 92611 MOTION FLUOROSCOPY/SWALLOW: CPT

## 2022-10-10 PROCEDURE — 74230 X-RAY XM SWLNG FUNCJ C+: CPT

## 2022-10-10 PROCEDURE — 25010000002 HEPARIN (PORCINE) PER 1000 UNITS: Performed by: INTERNAL MEDICINE

## 2022-10-10 RX ORDER — SENNA PLUS 8.6 MG/1
2 TABLET ORAL 2 TIMES DAILY
Status: DISCONTINUED | OUTPATIENT
Start: 2022-10-10 | End: 2022-10-19 | Stop reason: HOSPADM

## 2022-10-10 RX ORDER — NYSTATIN 100000 [USP'U]/G
POWDER TOPICAL EVERY 12 HOURS SCHEDULED
Status: DISCONTINUED | OUTPATIENT
Start: 2022-10-10 | End: 2022-10-19 | Stop reason: HOSPADM

## 2022-10-10 RX ORDER — NYSTATIN 100000 [USP'U]/G
POWDER TOPICAL EVERY 12 HOURS SCHEDULED
Status: DISCONTINUED | OUTPATIENT
Start: 2022-10-10 | End: 2022-10-10 | Stop reason: SDUPTHER

## 2022-10-10 RX ADMIN — DOCUSATE SODIUM 100 MG: 100 CAPSULE, LIQUID FILLED ORAL at 21:59

## 2022-10-10 RX ADMIN — TRAZODONE HYDROCHLORIDE 50 MG: 50 TABLET ORAL at 21:58

## 2022-10-10 RX ADMIN — BARIUM SULFATE 20 ML: 400 PASTE ORAL at 10:31

## 2022-10-10 RX ADMIN — Medication 10 ML: at 09:14

## 2022-10-10 RX ADMIN — HEPARIN SODIUM 5000 UNITS: 5000 INJECTION INTRAVENOUS; SUBCUTANEOUS at 13:13

## 2022-10-10 RX ADMIN — BARIUM SULFATE 100 ML: 0.81 POWDER, FOR SUSPENSION ORAL at 10:31

## 2022-10-10 RX ADMIN — Medication 10 ML: at 21:59

## 2022-10-10 RX ADMIN — POLYETHYLENE GLYCOL 3350 17 G: 17 POWDER, FOR SOLUTION ORAL at 09:14

## 2022-10-10 RX ADMIN — Medication 10 MG: at 21:58

## 2022-10-10 RX ADMIN — DONEPEZIL HYDROCHLORIDE 10 MG: 10 TABLET, FILM COATED ORAL at 21:59

## 2022-10-10 RX ADMIN — NIFEDIPINE 60 MG: 30 TABLET, FILM COATED, EXTENDED RELEASE ORAL at 09:14

## 2022-10-10 RX ADMIN — SENNOSIDES 2 TABLET: 8.6 TABLET, FILM COATED ORAL at 21:58

## 2022-10-10 RX ADMIN — QUETIAPINE FUMARATE 50 MG: 25 TABLET, FILM COATED ORAL at 21:58

## 2022-10-10 RX ADMIN — HEPARIN SODIUM 5000 UNITS: 5000 INJECTION INTRAVENOUS; SUBCUTANEOUS at 05:32

## 2022-10-10 RX ADMIN — SENNOSIDES 2 TABLET: 8.6 TABLET, FILM COATED ORAL at 13:13

## 2022-10-10 RX ADMIN — DOCUSATE SODIUM 100 MG: 100 CAPSULE, LIQUID FILLED ORAL at 09:13

## 2022-10-10 RX ADMIN — MEMANTINE 5 MG: 10 TABLET ORAL at 09:13

## 2022-10-10 RX ADMIN — ASPIRIN 81 MG: 81 TABLET, COATED ORAL at 09:13

## 2022-10-10 RX ADMIN — HEPARIN SODIUM 5000 UNITS: 5000 INJECTION INTRAVENOUS; SUBCUTANEOUS at 22:00

## 2022-10-10 RX ADMIN — MEMANTINE 5 MG: 10 TABLET ORAL at 21:59

## 2022-10-10 RX ADMIN — CITALOPRAM HYDROBROMIDE 20 MG: 20 TABLET ORAL at 09:13

## 2022-10-10 RX ADMIN — NYSTATIN: 100000 POWDER TOPICAL at 21:59

## 2022-10-10 NOTE — PLAN OF CARE
Goal Outcome Evaluation:      SLP re-evaluation completed. Will continue to address diet tolerance/oral phase dysphagia. Please see note for further details and recommendations.

## 2022-10-10 NOTE — NURSING NOTE
"Reason for Wound, Ostomy and Continence (WOC) Nursing Consultation: \"Bottom excoriated red may be candidate for specialty bed\"    Spoke with nurse the phone who does endorse red bottom and folds especially under pannus.  Encouraged nurse to speak with MD in rounds for antifungal powder.  Also can place Interdry.    Will order patient specialty bed due to low Devin.    Recommendation(s) for management of wound:   -Refer to wound care orders for specific instructions on how to treat/manage wound.  -Cleanse folds and bottom with pH balance tolerance foam soap and blue wipes, apply barrier cream to bottom, apply Interdry and light dusting of antifungal powder to folds  -Practice pressure injury prevention protocol.    Most recent Devin Scale score:  Sensory Perception: 4-->no impairment  Moisture: 3-->occasionally moist  Activity: 2-->chairfast  Mobility: 2-->very limited  Nutrition: 2-->probably inadequate  Friction and Shear: 3-->no apparent problem  Devin Score: 16 (10/10/22 0400)   Specialty support surface: Low Air Loss (KYREE) Mattress   -ordered    Pressure Injury Prevention Protocol (initiate for Devin Score of 18 or less):   *Keep skin dry, turn q 2 hr, keep heels elevated and offloaded with offloading heel boots.    *Apply z-guard to bottom and bony prominences daily and as needed with incontinence episodes.  *Follow C.A.R.E protocol if medical devices (Bipap, hopper, Ng tube, etc) are being used.     WOC Team will sign off.  Please re consult if the wound(s) worsens.               "

## 2022-10-10 NOTE — MBS/VFSS/FEES
Acute Care - Speech Language Pathology   Swallow Re-Evaluation [facility]     Modified Barium Swallow Study (MBS)     Patient Name: Shantelle Decker  : 1940  MRN: [id number]  Today's Date: 10/10/2022               Admit Date: 10/7/2022    Visit Dx:     ICD-10-CM ICD-9-CM   1. Fecal impaction (HCC)  K56.41 560.32   2. Acute dehydration  E86.0 276.51   3. Acute renal insufficiency  N28.9 593.9   4. Elevated troponin  R77.8 790.6   5. Dysphagia, oral phase  R13.11 787.21     Patient Active Problem List   Diagnosis   • Incarcerated ventral hernia   • Coronary artery disease without angina pectoris   • Morbid obesity (HCC)   • Normocytic anemia   • Dementia with behavioral disturbance   • Essential hypertension   • History of breast cancer   • Thyroid mass   • LIANNA (acute kidney injury) (HCC)   • CKD (chronic kidney disease) stage 3, GFR 30-59 ml/min (HCC)   • Chest pain   • Metabolic acidosis   • Other chest pain   • Fecal impaction (HCC)   • Elevated lactic acid level   • Elevated troponin   • Difficulty swallowing   • Leukocytosis   • Fall     Past Medical History:   Diagnosis Date   • Breast cancer (CMS/HCC)    • Coronary artery disease    • Dementia (CMS/HCC)    • Dysrhythmia    • Hypertension      Past Surgical History:   Procedure Laterality Date   • ADENOIDECTOMY     • BREAST SURGERY     • CARDIAC DEFIBRILLATOR PLACEMENT     • EXPLORATORY LAPAROTOMY N/A 2016    Procedure: open ventral hernia repair with mesh;  Surgeon: Bright Buckley MD;  Location: [facility];  Service:    • MASTECTOMY Bilateral    • TONSILLECTOMY         SLP Recommendation and Plan  SLP Swallowing Diagnosis: mild, oral dysphagia (10/10/22 1000)  SLP Diet Recommendation: puree, thin liquids (10/10/22 1000)  Recommended Precautions and Strategies: general aspiration precautions (10/10/22 1000)  SLP Rec. for Method of Medication Administration: meds whole, as tolerated (10/10/22 1000)     Monitor for Signs of Aspiration: yes, notify  SLP if any concerns (10/10/22 1000)  Recommended Diagnostics: other (see comments) (diet tolerance, advancement as appropriate) (10/10/22 1000)  Swallow Criteria for Skilled Therapeutic Interventions Met: demonstrates skilled criteria (10/10/22 1000)  Anticipated Discharge Disposition (SLP): unknown (10/10/22 1000)  Rehab Potential/Prognosis, Swallowing: good, to achieve stated therapy goals (10/10/22 1000)  Therapy Frequency (Swallow): PRN (10/10/22 1000)  Predicted Duration Therapy Intervention (Days): until discharge (10/10/22 1000)                                            SWALLOW EVALUATION (last 72 hours)     SLP Adult Swallow Evaluation     Row Name 10/10/22 1000 10/08/22 1440                Rehab Evaluation    Document Type re-evaluation  - evaluation  -KL       Subjective Information no complaints  - no complaints  -KL       Patient Observations alert;cooperative;agree to therapy  - alert;cooperative;agree to therapy  -KL       Patient/Family/Caregiver Comments/Observations none presentf  - Pt's  present  -KL       Patient Effort adequate  -CH good  -KL       Comment [facility] completed in collaboration with   - --       Symptoms Noted During/After Treatment none  -CH none  -KL          General Information    Patient Profile Reviewed yes  -CH yes  -KL       Pertinent History Of Current Problem see prior evaluation  - Pt with PMH of advanced dementia, HTN, CAD, breast cancer. Pt adm after a fall at home w/ , constipation, abdominal pain, and recent vomitting. Pt's  reports pt has difficulty swallowing solids.  -KL       Current Method of Nutrition soft textures;thin liquids  - soft textures;thin liquids  -KL       Precautions/Limitations, Vision WFL;for purposes of eval  -CH WFL;for purposes of eval  -KL       Precautions/Limitations, Hearing WFL;for purposes of eval  -CH WFL;for purposes of eval  -KL       Prior Level of Function-Communication cognitive-linguistic impairment   - cognitive-linguistic impairment  -       Prior Level of Function-Swallowing mechanical soft textures;thin liquids  - mechanical soft textures;thin liquids  -       Plans/Goals Discussed with patient;agreed upon  - patient and family;agreed upon  -       Barriers to Rehab cognitive status;previous functional deficit  - cognitive status;previous functional deficit  -       Patient's Goals for Discharge patient did not state  - patient did not state  -          Pain    Additional Documentation Pain Scale: FACES Pre/Post-Treatment (Group)  - Pain Scale: FACES Pre/Post-Treatment (Group)  -KL          Pain Scale: FACES Pre/Post-Treatment    Pain: FACES Scale, Pretreatment 0-->no hurt  -CH 0-->no hurt  -KL       Posttreatment Pain Rating 0-->no hurt  -CH 0-->no hurt  -KL          Oral Motor Structure and Function    Dentition Assessment -- edentulous  -KL       Secretion Management -- WNL/WFL  -KL       Mucosal Quality -- moist, healthy  -       Volitional Swallow -- WFL  -       Volitional Cough -- [facility]  -          Oral Musculature and Cranial Nerve Assessment    Oral Motor General Assessment -- generalized oral motor weakness  -          General Eating/Swallowing Observations    Respiratory Support Currently in Use -- room air  -       Eating/Swallowing Skills -- fed by SLP;impulsive self-feeding behaviors  -       Positioning During Eating -- upright 90 degree;upright in bed  -       Utensils Used -- spoon;cup;straw  -       Consistencies Trialed -- soft textures;pureed;thin liquids  -          Respiratory    Respiratory Status -- L  -          Clinical Swallow Eval    Oral Prep Phase -- impaired  -       Oral Transit -- WFL  -       Oral Residue -- WFL  -KL       Pharyngeal Phase -- no overt signs/symptoms of pharyngeal impairment  -KL       Esophageal Phase -- unremarkable  -KL       Clinical Swallow Evaluation Summary -- CSE completed. Pt trialed with thin liquids via  cup/straw, puree, and soft solid. Pt with no overt clinical s/sx of aspiration with any consistency. Pt noted to have impulsive self-feeding behaviors requiring consistent cues with each trial to slow down, small bites/sips, and complete swallow before taking another bite. Recommend pt remain on current soft diet with thin liquids, PNA/aspiration precautions, consistent oral care, and feeding assistance to cue for strategies listed above. Recommend MBS w/ limited UGI to further assess oropharyngeal swallow and screen for esophageal dysphagia. ST to f/u with MBS on 10/10.  -KL          Oral Prep Concerns    Oral Prep Concerns -- prolonged mastication  -KL       Prolonged Mastication -- mechanical soft  -KL          MBS/VFSS    Utensils Used spoon;cup;straw  -CH --       Consistencies Trialed thin liquids;pureed;regular textures  -CH --          MBS/VFSS Interpretation    Oral Prep Phase impaired oral phase of swallowing  -CH --       Oral Transit Phase WFL  -CH --       Oral Residue impaired  -CH --       VFSS Summary Mild oral dysphagia with prolonged oral phase and inefficient mastication of solids d/t edentition.Pharyngeal phase grossly WFL. Recommend pureed, thin liquids. Meds whole with thin liquids or in pureed as tolerated  -CH --          Oral Preparatory Phase    Oral Preparatory Phase prolonged manipulation;bolus removed from mouth manually  -CH --       Prolonged Manipulation regular textures  -CH --       Bolus Removed from Mouth Manually regular textures  -CH --       Oral Preparatory Phase, Comment unable to masticate chewable solids d/t edentition  -CH --          Oral Residue    Impaired Oral Residue lingual residue  -CH --       Lingual Residue regular textures  -CH --       Response to Oral Residue other (see comments)  patient expectorated it  -CH --          Initiation of Pharyngeal Swallow    Initiation of Pharyngeal Swallow bolus in pyriform sinuses  -CH --       Pharyngeal Phase functional  pharyngeal phase of swallowing  - --          Esophageal Phase    Esophageal Phase no impairments;see radiology report for further details  - --          Swallowing Quality of Life Assessment    Education and counseling provided Safest diet options;Oral care recommendations and rationale  - --          SLP Evaluation Clinical Impression    SLP Swallowing Diagnosis mild;oral dysphagia  - mild;oral dysphagia;R/O pharyngeal dysphagia  -       Functional Impact risk of aspiration/pneumonia  - risk of aspiration/pneumonia  -       Rehab Potential/Prognosis, Swallowing good, to achieve stated therapy goals  - good, to achieve stated therapy goals  -       Swallow Criteria for Skilled Therapeutic Interventions Met demonstrates skilled criteria  - demonstrates skilled criteria  -          Recommendations    Therapy Frequency (Swallow) PRN  - --       Predicted Duration Therapy Intervention (Days) until discharge  - until discharge  -       SLP Diet Recommendation puree;thin liquids  - soft textures;thin liquids  -       Recommended Diagnostics other (see comments)  diet tolerance, advancement as appropriate  - --       Recommended Precautions and Strategies general aspiration precautions  - upright posture during/after eating;small bites of food and sips of liquid;general aspiration precautions;assist with feeding;other (see comments)  slow rate  -       Oral Care Recommendations Oral Care BID/PRN  - Oral Care BID/PRN  -       SLP Rec. for Method of Medication Administration meds whole;as tolerated  - meds whole;as tolerated  -       Monitor for Signs of Aspiration yes;notify SLP if any concerns  - yes;notify SLP if any concerns  -       Anticipated Discharge Disposition (SLP) unknown  - unknown  -             User Key  (r) = Recorded By, (t) = Taken By, (c) = Cosigned By    Initials Name Effective Dates     Kinga Forman, MS CCC-SLP 06/16/21 -      Dianne Hernandez  MS CCC-SLP 06/15/21 -                 EDUCATION  The patient has been educated in the following areas:   Dysphagia (Swallowing Impairment) Oral Care/Hydration Modified Diet Instruction.        SLP GOALS     Row Name 10/10/22 1000             (LTG) Patient will demonstrate functional swallow for    Diet Texture (Demonstrate functional swallow) soft chopped textures  -CH      Liquid viscosity (Demonstrate functional swallow) thin liquids  -CH      [place] (Demonstrate functional swallow) with minimal cues (75-90% accuracy)  -CH      Time Frame (Demonstrate functional swallow) by discharge  -CH         ([facility]) Patient will tolerate trials of    Consistencies Trialed (Tolerate trials) pureed/ mashed textures;thin liquids  -CH      Desired Outcome (Tolerate trials) without signs/symptoms of aspiration;with adequate oral prep/transit/clearance  -CH      [place] (Tolerate trials) with minimal cues (75-90% accuracy)  -CH      Time Frame (Tolerate trials) 1 week  -CH            User Key  (r) = Recorded By, (t) = Taken By, (c) = Cosigned By    Initials Name Provider Type     Kinga Forman MS CCC-SLP Speech and Language Pathologist                   Time Calculation:    Time Calculation- SLP     Row Name 10/10/22 1146             Time Calculation- SLP    SLP Start Time 1000  -CH      SLP Received On 10/10/22  -CH         Untimed Charges    05768-SV Motion Fluoro Eval Swallow Minutes 55  -CH         Total Minutes    Untimed Charges Total Minutes 55  -CH       Total Minutes 55  -CH            User Key  (r) = Recorded By, (t) = Taken By, (c) = Cosigned By    Initials Name Provider Type     Kinga Forman MS CCC-SLP Speech and Language Pathologist                Therapy Charges for Today     Code Description Service Date Service Provider Modifiers Qty    [id number]  ST MOTION FLUORO EVAL SWALLOW 4 10/10/2022 Kinga Forman MS CCC-SLP GN 1               Kinga Forman MS CCC-SLP  10/10/2022      Patient was not wearing a face mask and did exhibit coughing during this therapy encounter.  Procedure performed was aerosolizing, involved close contact (within 6 feet for at least 15 minutes or longer), and did not involve contact with infectious secretions or specimens.  Therapist used appropriate personal protective equipment including gloves, standard procedure mask and eye protection.  Appropriate PPE was worn during the entire therapy session.  Hand hygiene was completed before and after therapy session.

## 2022-10-10 NOTE — PLAN OF CARE
Goal Outcome Evaluation:  Plan of Care Reviewed With: patient        Progress: no change  Outcome Evaluation: vss, paced on tele.denies pain, follows commands. no BM today. medicated x's 3.

## 2022-10-10 NOTE — PROGRESS NOTES
[facility] Medicine Services  PROGRESS NOTE    Patient Name: Shantelle Decker  : 1940  MRN: [id number]    Date of Admission: 10/7/2022  Primary Care Physician: Provider, No Known    Subjective   Subjective     CC:  F/U fall, fecal impaction    HPI:  No complaints today.    ROS:  Unable to obtain full ROS due to dementia    Objective   Objective     Vital Signs:   Temp:  [98.1 °F (36.7 °C)-98.7 °F (37.1 °C)] 98.1 °F (36.7 °C)  Heart Rate:  [63-86] 73  Resp:  [16] 16  BP: (139-151)/(75-82) 151/77     Physical Exam:  Constitutional: No acute distress, awake, alert, sitting up in bed  HENT: NCAT, mucous membranes moist  Respiratory: Respiratory effort normal on room air, CTAB  GI: Soft, nontender  Cardiovascular: RRR, no murmurs  Psychiatric: Calm, cooperative  Neurologic: Speech clear  Skin: No rashes on exposed surfaces    Results Reviewed:  LAB RESULTS:      Lab 10/08/22  0541 10/08/22  0039 10/07/22  1906   WBC 8.52  --  11.03*   HEMOGLOBIN 11.8*  --  12.9   HEMATOCRIT 35.9  --  38.4   PLATELETS 186  --  214   NEUTROS ABS 6.16  --  8.22*   IMMATURE GRANS (ABS) 0.06*  --  0.07*   LYMPHS ABS 1.26  --  1.62   MONOS ABS 0.93*  --  1.08*   EOS ABS 0.07  --  0.01   MCV 90.7  --  87.7   PROCALCITONIN  --   --  0.06   LACTATE  --  1.5 2.4*         Lab 10/10/22  0436 10/09/22  1510 10/08/22  0541 10/07/22  1906   SODIUM 140 142 139 138   POTASSIUM 3.7 4.0 3.9 4.1   CHLORIDE 110* 112* 105 103   CO2 20.0* 22.0 24.0 20.0*   ANION GAP 10.0 8.0 10.0 15.0   BUN 15 17 25* 26*   CREATININE 1.22* 1.46* 1.93* 2.11*   EGFR 43.8* 35.3* 25.3* 22.7*   GLUCOSE 83 91 101* 106*   CALCIUM 7.8* 7.7* 8.2* 8.8   MAGNESIUM  --   --  1.8  --    HEMOGLOBIN A1C  --   --  4.60*  --    TSH  --   --  5.900*  --          Lab 10/07/22  1906   TOTAL PROTEIN 6.2   ALBUMIN 3.20*   GLOBULIN 3.0   ALT (SGPT) 6   AST (SGOT) 16   BILIRUBIN 1.2   ALK PHOS 64         Lab 10/08/22  0541 10/08/22  0039 10/07/22  1906   TROPONIN T  0.053* 0.042* 0.054*         Lab 10/08/22  0541   CHOLESTEROL 163   LDL CHOL 101*   HDL CHOL 44   TRIGLYCERIDES 95             Brief Urine Lab Results  (Last result in the past 365 days)      Color   Clarity   Blood   Leuk Est   Nitrite   Protein   CREAT   Urine HCG        10/07/22 1917 Yellow   Clear   Negative   Trace   Negative   30 mg/dL (1+)                 Microbiology Results Abnormal     Procedure Component Value - Date/Time    COVID PRE-OP / PRE-PROCEDURE SCREENING ORDER (NO ISOLATION) - Swab, Nasopharynx [[id number]]  (Normal) Collected: 10/07/22 1955    Lab Status: Final result Specimen: Swab from Nasopharynx Updated: 10/07/22 2039    Narrative:      The following orders were created for panel order COVID PRE-OP / PRE-PROCEDURE SCREENING ORDER (NO ISOLATION) - Swab, Nasopharynx.  Procedure                               Abnormality         Status                     ---------                               -----------         ------                     COVID-19 and FLU A/B PCR...[[id number]]  Normal              Final result                 Please view results for these tests on the individual orders.    COVID-19 and FLU A/B PCR - Swab, Nasopharynx [[id number]]  (Normal) Collected: 10/07/22 1955    Lab Status: Final result Specimen: Swab from Nasopharynx Updated: 10/07/22 2039     COVID19 Not Detected     Influenza A PCR Not Detected     Influenza B PCR Not Detected    Narrative:      Fact sheet for providers: https://www.fda.gov/media/677275/download    Fact sheet for patients: https://www.fda.gov/media/513317/download    Test performed by PCR.          No radiology results from the last 24 hrs    Results for orders placed during the hospital encounter of 05/20/22    Adult Transthoracic Echo Complete W/ Cont if Necessary Per Protocol    Interpretation Summary  · Left ventricular systolic function is normal. Estimated left ventricular EF = 65%.  · Left ventricular wall thickness is consistent with mild  concentric hypertrophy.  · Left ventricular diastolic function is consistent with (grade I) impaired relaxation.  · The aortic valve is mildly calcified. Otherwise, the cardiac valves are anatomically and functionally normal.  · Estimated right ventricular systolic pressure from tricuspid regurgitation is normal (<35 mmHg).      I have reviewed the medications:  Scheduled Meds:aspirin, 81 mg, Oral, Daily  citalopram, 20 mg, Oral, Daily  docusate sodium, 100 mg, Oral, BID  donepezil, 10 mg, Oral, Nightly  heparin (porcine), 5,000 Units, Subcutaneous, Q8H  melatonin, 10 mg, Oral, Nightly  memantine, 5 mg, Oral, BID  NIFEdipine XL, 60 mg, Oral, Q24H  polyethylene glycol, 17 g, Oral, Daily  QUEtiapine, 50 mg, Oral, Nightly  sodium chloride, 10 mL, Intravenous, Q12H  traZODone, 50 mg, Oral, Nightly      Continuous Infusions:sodium chloride, 100 mL/hr      PRN Meds:.•  acetaminophen **OR** acetaminophen **OR** acetaminophen  •  ondansetron **OR** ondansetron  •  QUEtiapine  •  [COMPLETED] Insert peripheral IV **AND** sodium chloride  •  sodium chloride    Assessment & Plan   Assessment & Plan     Active Hospital Problems    Diagnosis  POA   • **Fecal impaction (HCC) [K56.41]  Yes   • Elevated lactic acid level [R79.89]  Unknown   • Elevated troponin [R77.8]  Unknown   • Difficulty swallowing [R13.10]  Unknown   • Leukocytosis [D72.829]  Unknown   • Fall [W19.XXXA]  Unknown   • LIANNA (acute kidney injury) (HCC) [N17.9]  Yes   • Dementia with behavioral disturbance [F03.918]  Yes   • Essential hypertension [I10]  Yes   • Coronary artery disease without angina pectoris [I25.10]  Yes      Resolved Hospital Problems   No resolved problems to display.        Brief Hospital Course to date:  Shantelle Decker is a 82 y.o. female with advanced dementia, HTN, CAD, history of breast cancer who presented to the ED after a fall and was found to have fecal impaction and acute kidney injury.    This patient's problems and plans were partially  entered by my partner and updated as appropriate by me 10/10/22.    Fecal impaction  - Now disimpacted   - Continue scheduled miralax and docusate, add senna     Difficulty swallowing  - SLP following.  Placed on modified diet.     Leukocytosis  Elevated lactic acid  - Suspect related to dehydration  - Procalcitonin negative, UA negative, CT abdomen pelvis without infectious process, CXR without evidence of pneumonia     Elevated troponin  - Likely demand ischemia in setting of dehydration, LIANNA     LIANNA on CKD III  -- Creatinine 2.11 on admission (baseline 1.1-1.5)  -- Improving with IV fluids     Dementia  Fall  - Continue home meds  - PT/OT     HTN  CAD  - Continue home meds    Expected Discharge Location and Transportation: Home vs. SNF  Expected Discharge Date: 10/11    DVT prophylaxis:  Medical DVT prophylaxis orders are present.     AM-PAC 6 Clicks Score (PT): 10 (10/10/22 0815)    CODE STATUS:   Code Status and Medical Interventions:   Ordered at: 10/07/22 2340     Level Of Support Discussed With:    Next of Kin (If No Surrogate)     Code Status (Patient has no pulse and is not breathing):    CPR (Attempt to Resuscitate)     Medical Interventions (Patient has pulse or is breathing):    Full Support       Denisa Duarte MD  10/10/22

## 2022-10-10 NOTE — PLAN OF CARE
Goal Outcome Evaluation:                 Problem: Fall Injury Risk  Goal: Absence of Fall and Fall-Related Injury  Intervention: Identify and Manage Contributors  Recent Flowsheet Documentation  Taken 10/10/2022 0400 by Krunal Kebede RN  Medication Review/Management: medications reviewed  Taken 10/10/2022 0200 by Krunal Kebede RN  Medication Review/Management: medications reviewed  Taken 10/10/2022 0000 by Krunal Kebede RN  Medication Review/Management: medications reviewed  Taken 10/9/2022 2200 by Krunal Kebede RN  Medication Review/Management: medications reviewed  Taken 10/9/2022 2000 by Krunal Kebede RN  Medication Review/Management: medications reviewed  Intervention: Promote Injury-Free Environment  Recent Flowsheet Documentation  Taken 10/10/2022 0400 by Krunal Kebede RN  Safety Promotion/Fall Prevention:   activity supervised   safety round/check completed   toileting scheduled  Taken 10/10/2022 0200 by Krunal Kebede RN  Safety Promotion/Fall Prevention:   activity supervised   safety round/check completed   toileting scheduled  Taken 10/10/2022 0000 by Krunal Kebede RN  Safety Promotion/Fall Prevention:   activity supervised   safety round/check completed   toileting scheduled  Taken 10/9/2022 2200 by Krunal Kebede RN  Safety Promotion/Fall Prevention:   activity supervised   safety round/check completed   toileting scheduled  Taken 10/9/2022 2000 by Krunal Kebede RN  Safety Promotion/Fall Prevention:   activity supervised   safety round/check completed   toileting scheduled     Problem: Skin Injury Risk Increased  Goal: Skin Health and Integrity  Intervention: Optimize Skin Protection  Recent Flowsheet Documentation  Taken 10/10/2022 0400 by Krunal Kebede RN  Pressure Reduction Techniques: frequent weight shift encouraged  Pressure Reduction Devices: pressure-redistributing mattress utilized  Skin Protection: adhesive use limited  Taken 10/10/2022 0200 by Krunal Kebede  RN  Pressure Reduction Techniques: frequent weight shift encouraged  Head of Bed (HOB) Positioning: HOB at 30-45 degrees  Pressure Reduction Devices: pressure-redistributing mattress utilized  Skin Protection: adhesive use limited  Taken 10/10/2022 0000 by Krunal Kebede RN  Pressure Reduction Techniques: frequent weight shift encouraged  Head of Bed (HOB) Positioning: HOB at 30-45 degrees  Pressure Reduction Devices: pressure-redistributing mattress utilized  Skin Protection: adhesive use limited  Taken 10/9/2022 2200 by Krunal Kebede RN  Pressure Reduction Techniques: frequent weight shift encouraged  Pressure Reduction Devices: heel offloading device utilized  Skin Protection: adhesive use limited  Taken 10/9/2022 2000 by Krunal Kebede RN  Pressure Reduction Techniques: frequent weight shift encouraged  Head of Bed (HOB) Positioning: HOB at 30-45 degrees  Pressure Reduction Devices: heel offloading device utilized  Skin Protection: adhesive use limited     Problem: Adult Inpatient Plan of Care  Goal: Absence of Hospital-Acquired Illness or Injury  Intervention: Identify and Manage Fall Risk  Recent Flowsheet Documentation  Taken 10/10/2022 0400 by Krunal Kebede RN  Safety Promotion/Fall Prevention:   activity supervised   safety round/check completed   toileting scheduled  Taken 10/10/2022 0200 by Krunal Kebede RN  Safety Promotion/Fall Prevention:   activity supervised   safety round/check completed   toileting scheduled  Taken 10/10/2022 0000 by Krunal Kebede RN  Safety Promotion/Fall Prevention:   activity supervised   safety round/check completed   toileting scheduled  Taken 10/9/2022 2200 by Krunal Kebede RN  Safety Promotion/Fall Prevention:   activity supervised   safety round/check completed   toileting scheduled  Taken 10/9/2022 2000 by Krunal Kebede RN  Safety Promotion/Fall Prevention:   activity supervised   safety round/check completed   toileting scheduled  Intervention:  Prevent Skin Injury  Recent Flowsheet Documentation  Taken 10/10/2022 0400 by Krunal Kebede RN  Skin Protection: adhesive use limited  Taken 10/10/2022 0200 by Krunal Kebede RN  Body Position: supine  Skin Protection: adhesive use limited  Taken 10/10/2022 0000 by Krunal Kebede RN  Body Position: supine  Skin Protection: adhesive use limited  Taken 10/9/2022 2200 by Krunal Kebede RN  Skin Protection: adhesive use limited  Taken 10/9/2022 2000 by Krunal Kebede RN  Body Position: supine  Skin Protection: adhesive use limited  Intervention: Prevent and Manage VTE (Venous Thromboembolism) Risk  Recent Flowsheet Documentation  Taken 10/10/2022 0400 by Krunal Kebede RN  VTE Prevention/Management:   bilateral   sequential compression devices off  Taken 10/10/2022 0200 by Krunal Kebede RN  VTE Prevention/Management:   bilateral   sequential compression devices off  Taken 10/10/2022 0000 by Krunal Kebede RN  VTE Prevention/Management:   bilateral   sequential compression devices off  Taken 10/9/2022 2200 by Krunal Kebede RN  VTE Prevention/Management:   bilateral   sequential compression devices off  Intervention: Prevent Infection  Recent Flowsheet Documentation  Taken 10/10/2022 0400 by Krunal Kebede RN  Infection Prevention: environmental surveillance performed  Taken 10/10/2022 0200 by Krunal Kebede RN  Infection Prevention: environmental surveillance performed  Taken 10/10/2022 0000 by Krunal Kebede RN  Infection Prevention: environmental surveillance performed  Taken 10/9/2022 2200 by Krunal Kebede RN  Infection Prevention: environmental surveillance performed  Taken 10/9/2022 2000 by Krunal Kebede RN  Infection Prevention: environmental surveillance performed  Goal: Optimal Comfort and Wellbeing  Intervention: Monitor Pain and Promote Comfort  Recent Flowsheet Documentation  Taken 10/10/2022 0400 by Krunal Kebede RN  Pain Management Interventions: quiet environment  facilitated  Taken 10/10/2022 0200 by Krunal Kebede RN  Pain Management Interventions: quiet environment facilitated  Taken 10/10/2022 0000 by Krunal Kebede RN  Pain Management Interventions: quiet environment facilitated  Taken 10/9/2022 2200 by Krunal Kebede RN  Pain Management Interventions: quiet environment facilitated  Taken 10/9/2022 2000 by Krunal Kebede RN  Pain Management Interventions: quiet environment facilitated  Intervention: Provide Person-Centered Care  Recent Flowsheet Documentation  Taken 10/10/2022 0400 by Krunal Kebede, RN  Trust Relationship/Rapport: care explained  Taken 10/10/2022 0200 by Krunal Kebede RN  Trust Relationship/Rapport: care explained  Taken 10/10/2022 0000 by Krunal Kebede, RN  Trust Relationship/Rapport: care explained  Taken 10/9/2022 2200 by Krunal Kebede, RADHA  Trust Relationship/Rapport: care explained  Taken 10/9/2022 2000 by Krunal Kebede RN  Trust Relationship/Rapport: care explained     VSS, voids well, rested throughout the night, confused, will continue to monitor for changes.

## 2022-10-11 LAB
ANION GAP SERPL CALCULATED.3IONS-SCNC: 8 MMOL/L (ref 5–15)
BUN SERPL-MCNC: 18 MG/DL (ref 8–23)
BUN/CREAT SERPL: 17 (ref 7–25)
CALCIUM SPEC-SCNC: 8.5 MG/DL (ref 8.6–10.5)
CHLORIDE SERPL-SCNC: 108 MMOL/L (ref 98–107)
CO2 SERPL-SCNC: 24 MMOL/L (ref 22–29)
CREAT SERPL-MCNC: 1.06 MG/DL (ref 0.57–1)
EGFRCR SERPLBLD CKD-EPI 2021: 51.9 ML/MIN/1.73
GLUCOSE SERPL-MCNC: 93 MG/DL (ref 65–99)
POTASSIUM SERPL-SCNC: 3.8 MMOL/L (ref 3.5–5.2)
SODIUM SERPL-SCNC: 140 MMOL/L (ref 136–145)

## 2022-10-11 PROCEDURE — 80048 BASIC METABOLIC PNL TOTAL CA: CPT | Performed by: INTERNAL MEDICINE

## 2022-10-11 PROCEDURE — 97535 SELF CARE MNGMENT TRAINING: CPT

## 2022-10-11 PROCEDURE — 97110 THERAPEUTIC EXERCISES: CPT

## 2022-10-11 PROCEDURE — 92526 ORAL FUNCTION THERAPY: CPT

## 2022-10-11 PROCEDURE — 25010000002 HEPARIN (PORCINE) PER 1000 UNITS: Performed by: INTERNAL MEDICINE

## 2022-10-11 PROCEDURE — 97530 THERAPEUTIC ACTIVITIES: CPT

## 2022-10-11 PROCEDURE — 99232 SBSQ HOSP IP/OBS MODERATE 35: CPT | Performed by: INTERNAL MEDICINE

## 2022-10-11 RX ADMIN — ASPIRIN 81 MG: 81 TABLET, COATED ORAL at 09:06

## 2022-10-11 RX ADMIN — DOCUSATE SODIUM 100 MG: 100 CAPSULE, LIQUID FILLED ORAL at 09:06

## 2022-10-11 RX ADMIN — SENNOSIDES 2 TABLET: 8.6 TABLET, FILM COATED ORAL at 09:06

## 2022-10-11 RX ADMIN — POLYETHYLENE GLYCOL 3350 17 G: 17 POWDER, FOR SOLUTION ORAL at 09:06

## 2022-10-11 RX ADMIN — DONEPEZIL HYDROCHLORIDE 10 MG: 10 TABLET, FILM COATED ORAL at 20:37

## 2022-10-11 RX ADMIN — Medication 10 ML: at 09:07

## 2022-10-11 RX ADMIN — HEPARIN SODIUM 5000 UNITS: 5000 INJECTION INTRAVENOUS; SUBCUTANEOUS at 15:11

## 2022-10-11 RX ADMIN — NIFEDIPINE 60 MG: 30 TABLET, FILM COATED, EXTENDED RELEASE ORAL at 09:06

## 2022-10-11 RX ADMIN — Medication 10 MG: at 20:37

## 2022-10-11 RX ADMIN — HEPARIN SODIUM 5000 UNITS: 5000 INJECTION INTRAVENOUS; SUBCUTANEOUS at 20:36

## 2022-10-11 RX ADMIN — MEMANTINE 5 MG: 10 TABLET ORAL at 09:06

## 2022-10-11 RX ADMIN — DOCUSATE SODIUM 100 MG: 100 CAPSULE, LIQUID FILLED ORAL at 20:37

## 2022-10-11 RX ADMIN — SENNOSIDES 2 TABLET: 8.6 TABLET, FILM COATED ORAL at 20:37

## 2022-10-11 RX ADMIN — NYSTATIN: 100000 POWDER TOPICAL at 09:07

## 2022-10-11 RX ADMIN — HEPARIN SODIUM 5000 UNITS: 5000 INJECTION INTRAVENOUS; SUBCUTANEOUS at 05:49

## 2022-10-11 RX ADMIN — QUETIAPINE FUMARATE 50 MG: 25 TABLET, FILM COATED ORAL at 20:37

## 2022-10-11 RX ADMIN — TRAZODONE HYDROCHLORIDE 50 MG: 50 TABLET ORAL at 20:37

## 2022-10-11 RX ADMIN — CITALOPRAM HYDROBROMIDE 20 MG: 20 TABLET ORAL at 09:06

## 2022-10-11 RX ADMIN — MEMANTINE 5 MG: 10 TABLET ORAL at 20:37

## 2022-10-11 NOTE — PLAN OF CARE
Goal Outcome Evaluation:  Plan of Care Reviewed With: patient        Progress: improving  Outcome Evaluation: Pt alert and participatory in OT interventions, did present with some confusion during conversation and ADL completion. Pt req'd max A x 2 for supine < > sitting for necessary UB and LB supports. Pt initially presented with unsteadiness at EOB requiring upward of min A however intermittent instances of unsupported sitting during grooming and hygiene tasks with SUA and spv with cues for improved quality and follow through in tasks. Pt tolerated sitting EOB for ~ 8 mins before fatigue resulted in return to supine. Will continue to progress pt during hospitalization with continued recommendation for SNF at d/c.

## 2022-10-11 NOTE — THERAPY TREATMENT NOTE
Acute Care - Speech Language Pathology   Swallow Treatment Note [facility]     Patient Name: Shantelle Decker  : 1940  MRN: [id number]  Today's Date: 10/11/2022               Admit Date: 10/7/2022    Visit Dx:     ICD-10-CM ICD-9-CM   1. Fecal impaction (HCC)  K56.41 560.32   2. Acute dehydration  E86.0 276.51   3. Acute renal insufficiency  N28.9 593.9   4. Elevated troponin  R77.8 790.6   5. Dysphagia, oral phase  R13.11 787.21     Patient Active Problem List   Diagnosis   • Incarcerated ventral hernia   • Coronary artery disease without angina pectoris   • Morbid obesity (HCC)   • Normocytic anemia   • Dementia with behavioral disturbance   • Essential hypertension   • History of breast cancer   • Thyroid mass   • LIANNA (acute kidney injury) (HCC)   • CKD (chronic kidney disease) stage 3, GFR 30-59 ml/min (HCC)   • Chest pain   • Metabolic acidosis   • Other chest pain   • Fecal impaction (HCC)   • Elevated lactic acid level   • Elevated troponin   • Difficulty swallowing   • Leukocytosis   • Fall     Past Medical History:   Diagnosis Date   • Breast cancer (CMS/HCC)    • Coronary artery disease    • Dementia (CMS/HCC)    • Dysrhythmia    • Hypertension      Past Surgical History:   Procedure Laterality Date   • ADENOIDECTOMY     • BREAST SURGERY     • CARDIAC DEFIBRILLATOR PLACEMENT     • EXPLORATORY LAPAROTOMY N/A 2016    Procedure: open ventral hernia repair with mesh;  Surgeon: Bright Buckley MD;  Location: [facility];  Service:    • MASTECTOMY Bilateral    • TONSILLECTOMY         SLP Recommendation and Plan  SLP Swallowing Diagnosis: mild, oral dysphagia (10/11/22 1130)                    Swallow Criteria for Skilled Therapeutic Interventions Met: demonstrates skilled criteria (10/11/22 113)     Rehab Potential/Prognosis, Swallowing: good, to achieve stated therapy goals (10/11/22 113)              Daily Summary of Progress (SLP): progress toward functional goals as expected (10/11/22 1130)                Treatment Assessment (SLP): Patient tolerated trials of pureed and thin liquids without s/s of aspiration. Oral holding at times, requiring cues to swallow. Continue to follow to address oral dysphagia in tx. (10/11/22 1130)                   SWALLOW EVALUATION (last 72 hours)     SLP Adult Swallow Evaluation     Row Name 10/11/22 1130 10/10/22 1000 10/08/22 1440             Rehab Evaluation    Document Type -- re-evaluation  - evaluation  -      Subjective Information no complaints  -CH no complaints  -CH no complaints  -KL      Patient Observations cooperative;agree to therapy  -CH alert;cooperative;agree to therapy  -CH alert;cooperative;agree to therapy  -KL      Patient/Family/Caregiver Comments/Observations none  -CH none presentf  - Pt's  present  -KL      Patient Effort adequate  -CH adequate  -CH good  -KL      Comment -- MBS completed in collaboration with   - --      Symptoms Noted During/After Treatment none  -CH none  -CH none  -KL         General Information    Patient Profile Reviewed yes  -CH yes  -CH yes  -KL      Pertinent History Of Current Problem -- see prior evaluation  - Pt with PMH of advanced dementia, HTN, CAD, breast cancer. Pt adm after a fall at home w/ , constipation, abdominal pain, and recent vomitting. Pt's  reports pt has difficulty swallowing solids.  -KL      Current Method of Nutrition -- soft textures;thin liquids  - soft textures;thin liquids  -KL      Precautions/Limitations, Vision -- WFL;for purposes of eval  - WFL;for purposes of eval  -KL      Precautions/Limitations, Hearing -- WFL;for purposes of eval  - WFL;for purposes of eval  -KL      Prior Level of Function-Communication -- cognitive-linguistic impairment  -CH cognitive-linguistic impairment  -KL      Prior Level of Function-Swallowing -- mechanical soft textures;thin liquids  -CH mechanical soft textures;thin liquids  -KL      Plans/Goals Discussed with --  patient;agreed upon  - patient and family;agreed upon  -      Barriers to Rehab -- cognitive status;previous functional deficit  - cognitive status;previous functional deficit  -      Patient's Goals for Discharge -- patient did not state  - patient did not state  -         Pain    Additional Documentation Pain Scale: FACES Pre/Post-Treatment (Group)  - Pain Scale: FACES Pre/Post-Treatment (Group)  - Pain Scale: FACES Pre/Post-Treatment (Group)  -         Pain Scale: FACES Pre/Post-Treatment    Pain: FACES Scale, Pretreatment 0-->no hurt  -CH 0-->no hurt  -CH 0-->no hurt  -KL      Posttreatment Pain Rating 0-->no hurt  -CH 0-->no hurt  -CH 0-->no hurt  -KL         Oral Motor Structure and Function    Dentition Assessment -- -- edentulous  -      Secretion Management -- -- WNL/[facility]  -      Mucosal Quality -- -- moist, healthy  -      Volitional Swallow -- -- [facility]  -      Volitional Cough -- -- [facility]  -         Oral Musculature and Cranial Nerve Assessment    Oral Motor General Assessment -- -- generalized oral motor weakness  -         General Eating/Swallowing Observations    Respiratory Support Currently in Use -- -- room air  -      Eating/Swallowing Skills -- -- fed by SLP;impulsive self-feeding behaviors  -      Positioning During Eating -- -- upright 90 degree;upright in bed  -      Utensils Used -- -- spoon;cup;straw  -      Consistencies Trialed -- -- soft textures;pureed;thin liquids  -         Respiratory    Respiratory Status -- -- L  -         Clinical Swallow Eval    Oral Prep Phase -- -- impaired  -      Oral Transit -- -- WFL  -      Oral Residue -- -- WFL  -      Pharyngeal Phase -- -- no overt signs/symptoms of pharyngeal impairment  -      Esophageal Phase -- -- unremarkable  -      Clinical Swallow Evaluation Summary -- -- CSE completed. Pt trialed with thin liquids via cup/straw, puree, and soft solid. Pt with no overt clinical s/sx of aspiration  with any consistency. Pt noted to have impulsive self-feeding behaviors requiring consistent cues with each trial to slow down, small bites/sips, and complete swallow before taking another bite. Recommend pt remain on current soft diet with thin liquids, PNA/aspiration precautions, consistent oral care, and feeding assistance to cue for strategies listed above. Recommend MBS w/ limited UGI to further assess oropharyngeal swallow and screen for esophageal dysphagia. ST to f/u with MBS on 10/10.  -KL         Oral Prep Concerns    Oral Prep Concerns -- -- prolonged mastication  -KL      Prolonged Mastication -- -- mechanical soft  -KL         MBS/VFSS    Utensils Used -- spoon;cup;straw  -CH --      Consistencies Trialed -- thin liquids;pureed;regular textures  -CH --         MBS/VFSS Interpretation    Oral Prep Phase -- impaired oral phase of swallowing  -CH --      Oral Transit Phase -- WFL  -CH --      Oral Residue -- impaired  -CH --      VFSS Summary -- Mild oral dysphagia with prolonged oral phase and inefficient mastication of solids d/t edentition.Pharyngeal phase grossly WFL. Recommend pureed, thin liquids. Meds whole with thin liquids or in pureed as tolerated  -CH --         Oral Preparatory Phase    Oral Preparatory Phase -- prolonged manipulation;bolus removed from mouth manually  -CH --      Prolonged Manipulation -- regular textures  -CH --      Bolus Removed from Mouth Manually -- regular textures  -CH --      Oral Preparatory Phase, Comment -- unable to masticate chewable solids d/t edentition  -CH --         Oral Residue    Impaired Oral Residue -- lingual residue  -CH --      Lingual Residue -- regular textures  -CH --      Response to Oral Residue -- other (see comments)  patient expectorated it  -CH --         Initiation of Pharyngeal Swallow    Initiation of Pharyngeal Swallow -- bolus in pyriform sinuses  -CH --      Pharyngeal Phase -- functional pharyngeal phase of swallowing  -CH --          Esophageal Phase    Esophageal Phase -- no impairments;see radiology report for further details  - --         Swallowing Quality of Life Assessment    Education and counseling provided -- Safest diet options;Oral care recommendations and rationale  - --         SLP Evaluation Clinical Impression    SLP Swallowing Diagnosis mild;oral dysphagia  - mild;oral dysphagia  - mild;oral dysphagia;R/O pharyngeal dysphagia  -      Functional Impact risk of aspiration/pneumonia  - risk of aspiration/pneumonia  - risk of aspiration/pneumonia  -      Rehab Potential/Prognosis, Swallowing good, to achieve stated therapy goals  - good, to achieve stated therapy goals  - good, to achieve stated therapy goals  -      Swallow Criteria for Skilled Therapeutic Interventions Met demonstrates skilled criteria  - demonstrates skilled criteria  -CH demonstrates skilled criteria  -         SLP Treatment Clinical Impressions    Treatment Assessment (SLP) Patient tolerated trials of pureed and thin liquids without s/s of aspiration. Oral holding at times, requiring cues to swallow. Continue to follow to address oral dysphagia in tx.  -CH -- --      Daily Summary of Progress (SLP) progress toward functional goals as expected  - -- --         Recommendations    Therapy Frequency (Swallow) -- PRN  - --      Predicted Duration Therapy Intervention (Days) -- until discharge  - until discharge  -      SLP Diet Recommendation -- puree;thin liquids  - soft textures;thin liquids  -      Recommended Diagnostics -- other (see comments)  diet tolerance, advancement as appropriate  - --      Recommended Precautions and Strategies -- general aspiration precautions  - upright posture during/after eating;small bites of food and sips of liquid;general aspiration precautions;assist with feeding;other (see comments)  slow rate  -      Oral Care Recommendations -- Oral Care BID/PRN  - Oral Care BID/PRN  -      SLP  Rec. for Method of Medication Administration -- meds whole;as tolerated  -CH meds whole;as tolerated  -KL      Monitor for Signs of Aspiration -- yes;notify SLP if any concerns  -CH yes;notify SLP if any concerns  -KL      Anticipated Discharge Disposition (SLP) -- unknown  -CH unknown  -            User Key  (r) = Recorded By, (t) = Taken By, (c) = Cosigned By    Initials Name Effective Dates     FormanAngela, MS CCC-SLP 06/16/21 -     REJI Hernandez Dianne, MS CCC-SLP 06/15/21 -                 EDUCATION  The patient has been educated in the following areas:   Dysphagia (Swallowing Impairment) Oral Care/Hydration Modified Diet Instruction.        SLP GOALS     Row Name 10/11/22 1130 10/10/22 1000          (LTG) Patient will demonstrate functional swallow for    Diet Texture (Demonstrate functional swallow) soft chopped textures  -CH soft chopped textures  -CH     Liquid viscosity (Demonstrate functional swallow) thin liquids  -CH thin liquids  -CH     [place] (Demonstrate functional swallow) with minimal cues (75-90% accuracy)  -CH with minimal cues (75-90% accuracy)  -CH     Time Frame (Demonstrate functional swallow) by discharge  -CH by discharge  -CH     Progress/Outcomes (Demonstrate functional swallow) continuing progress toward goal  -CH --        (STG) Patient will tolerate trials of    Consistencies Trialed (Tolerate trials) pureed/ mashed textures;thin liquids  -CH pureed/ mashed textures;thin liquids  -CH     Desired Outcome (Tolerate trials) without signs/symptoms of aspiration;with adequate oral prep/transit/clearance  -CH without signs/symptoms of aspiration;with adequate oral prep/transit/clearance  -CH     [place] (Tolerate trials) with minimal cues (75-90% accuracy)  -CH with minimal cues (75-90% accuracy)  -CH     Time Frame (Tolerate trials) 1 week  -CH 1 week  -CH     Progress/Outcomes (Tolerate trials) continuing progress toward goal  -CH --     Comment (Tolerate trials) oral  holding at time requiring cues for swallow with pureed textures. No s/s of aspiration  -CH --           User Key  (r) = Recorded By, (t) = Taken By, (c) = Cosigned By    Initials Name Provider Type    Kinga Quintanilla MS CCC-SLP Speech and Language Pathologist                   Time Calculation:    Time Calculation- SLP     Row Name 10/11/22 1210             Time Calculation- SLP    SLP Start Time 1130  -CH      SLP Received On 10/11/22  -CH         Untimed Charges    69527-VD Treatment Swallow Minutes 30  -CH         Total Minutes    Untimed Charges Total Minutes 30  -CH       Total Minutes 30  -CH            User Key  (r) = Recorded By, (t) = Taken By, (c) = Cosigned By    Initials Name Provider Type    Kinga Quintanilla MS CCC-SLP Speech and Language Pathologist                Therapy Charges for Today     Code Description Service Date Service Provider Modifiers Qty    22834024765 HC ST MOTION FLUORO EVAL SWALLOW 4 10/10/2022 Kinga Forman MS CCC-SLP GN 1    [id number] HC ST TREATMENT SWALLOW 2 10/11/2022 Kinga Forman MS CCC-SLP GN 1               MS GERALDINE Eddy  10/11/2022       Patient was not wearing a face mask and did not exhibit coughing during this therapy encounter.  Procedure performed was aerosolizing, involved close contact (within 6 feet for at least 15 minutes or longer), and did not involve contact with infectious secretions or specimens.  Therapist used appropriate personal protective equipment including gloves, standard procedure mask and eye protection.  Appropriate PPE was worn during the entire therapy session.  Hand hygiene was completed before and after therapy session.

## 2022-10-11 NOTE — THERAPY TREATMENT NOTE
Patient Name: Shantelle Decker  : 1940    MRN: [id number]                              Today's Date: 10/11/2022       Admit Date: 10/7/2022    Visit Dx:     ICD-10-CM ICD-9-CM   1. Fecal impaction (HCC)  K56.41 560.32   2. Acute dehydration  E86.0 276.51   3. Acute renal insufficiency  N28.9 593.9   4. Elevated troponin  R77.8 790.6   5. Dysphagia, oral phase  R13.11 787.21     Patient Active Problem List   Diagnosis   • Incarcerated ventral hernia   • Coronary artery disease without angina pectoris   • Morbid obesity (HCC)   • Normocytic anemia   • Dementia with behavioral disturbance   • Essential hypertension   • History of breast cancer   • Thyroid mass   • LIANNA (acute kidney injury) (HCC)   • CKD (chronic kidney disease) stage 3, GFR 30-59 ml/min (HCC)   • Chest pain   • Metabolic acidosis   • Other chest pain   • Fecal impaction (HCC)   • Elevated lactic acid level   • Elevated troponin   • Difficulty swallowing   • Leukocytosis   • Fall     Past Medical History:   Diagnosis Date   • Breast cancer (CMS/HCC)    • Coronary artery disease    • Dementia (CMS/HCC)    • Dysrhythmia    • Hypertension      Past Surgical History:   Procedure Laterality Date   • ADENOIDECTOMY     • BREAST SURGERY     • CARDIAC DEFIBRILLATOR PLACEMENT     • EXPLORATORY LAPAROTOMY N/A 2016    Procedure: open ventral hernia repair with mesh;  Surgeon: Bright Buckley MD;  Location: [facility];  Service:    • MASTECTOMY Bilateral    • TONSILLECTOMY        General Information     Row Name 10/11/22 1527          Physical Therapy Time and Intention    Document Type therapy note (daily note)  -ES     Mode of Treatment physical therapy  -ES     Row Name 10/11/22 1527          General Information    Patient Profile Reviewed yes  -ES     Existing Precautions/Restrictions fall;other (see comments)  advanced dementia, hx breast cancer  -ES     Barriers to Rehab medically complex;previous functional deficit;cognitive status  -ES     Row  Name 10/11/22 1527          Cognition    Orientation Status (Cognition) oriented to;person;disoriented to;place;time;situation  -ES     Row Name 10/11/22 1527          Safety Issues, Functional Mobility    Safety Issues Affecting Function (Mobility) insight into deficits/self-awareness;safety precaution awareness;safety precautions follow-through/compliance;awareness of need for assistance  -ES     Impairments Affecting Function (Mobility) balance;cognition;pain;strength;postural/trunk control;endurance/activity tolerance  -ES     Cognitive Impairments, Mobility Safety/Performance attention;awareness, need for assistance;insight into deficits/self-awareness;problem-solving/reasoning;safety precaution awareness;safety precaution follow-through  -ES           User Key  (r) = Recorded By, (t) = Taken By, (c) = Cosigned By    Initials Name Provider Type    ES Debbie William PT Physical Therapist               Mobility     Row Name 10/11/22 1528          Bed Mobility    Bed Mobility supine-sit;sit-supine;scooting/bridging  -ES     Supine-Sit [place] (Bed Mobility) maximum assist (25% patient effort);2 person assist;verbal cues  -ES     Sit-Supine [place] (Bed Mobility) maximum assist (25% patient effort);2 person assist;verbal cues  -ES     Assistive Device (Bed Mobility) draw sheet;bed rails  -ES     Row Name 10/11/22 1528          Sit-Stand Transfer    Sit-Stand [place] (Transfers) unable to assess  -ES           User Key  (r) = Recorded By, (t) = Taken By, (c) = Cosigned By    Initials Name Provider Type    Debbie Wilson PT Physical Therapist               Obj/Interventions     Row Name 10/11/22 1528          Motor Skills    Therapeutic Exercise knee;ankle;other (see comments)  dynamic sitting balance activities including reaching across midline and outside MARK  -ES     Row Name 10/11/22 1528          Knee (Therapeutic Exercise)    Knee (Therapeutic Exercise) strengthening exercise  -ES      Knee Strengthening (Therapeutic Exercise) bilateral;LAQ (long arc quad);10 repetitions  -ES     Row Name 10/11/22 1528          Ankle (Therapeutic Exercise)    Ankle (Therapeutic Exercise) AROM (active range of motion)  -ES     Ankle AROM (Therapeutic Exercise) bilateral;dorsiflexion;plantarflexion;10 repetitions  -ES     Row Name 10/11/22 1528          Balance    Balance Assessment sitting static balance;sitting dynamic balance  -ES     Static Sitting Balance contact guard  -ES     Dynamic Sitting Balance minimal assist;verbal cues  -ES     Position, Sitting Balance supported;unsupported;sitting edge of bed  -ES     Comment, Balance Pt able to sit EOB with intermittent support ~10 mins for dynamic balance activity. denied dizziness/lightheadedness  -ES           User Key  (r) = Recorded By, (t) = Taken By, (c) = Cosigned By    Initials Name Provider Type    ES Debbie William, PT Physical Therapist               Goals/Plan    No documentation.                Clinical Impression     Row Name 10/11/22 1530          Pain Scale: FACES Pre/Post-Treatment    Pain: FACES Scale, Pretreatment 0-->no hurt  -ES     Posttreatment Pain Rating 0-->no hurt  -ES     Pre/Posttreatment Pain Comment No indication of pain throughout session  -ES     Row Name 10/11/22 1530          Plan of Care Review    Plan of Care Reviewed With patient;spouse  -ES     Progress improving  -ES     Outcome Evaluation Pt pleasantly confused throughout session and able to follow simple commands with mod verbal and tactile cues. Pt tolerated sitting EOB with intermittent assist for dynamic reaching activites and BLE strengthening exercises. Pt visibly fatigued at end of session. PT continue to rec SNF at d/c.  -ES     Row Name 10/11/22 0396          Therapy Assessment/Plan (PT)    Rehab Potential (PT) fair, will monitor progress closely  -ES     Criteria for Skilled Interventions Met (PT) yes;meets criteria;skilled treatment is necessary  -ES     Therapy  Frequency (PT) daily  -ES     Row Name 10/11/22 1530          Vital Signs    Pre Systolic BP Rehab --  VSS. cleared by RN.  -ES     Pre Patient Position Supine  -ES     Intra Patient Position Sitting  -ES     Post Patient Position Supine  -ES     Row Name 10/11/22 1530          Positioning and Restraints    Pre-Treatment Position in bed  -ES     Post Treatment Position bed  -ES     In Bed notified nsg;supine;with family/caregiver;exit alarm on;encouraged to call for assist;call light within reach  -ES           User Key  (r) = Recorded By, (t) = Taken By, (c) = Cosigned By    Initials Name Provider Type    Debbie Wilson, PT Physical Therapist               Outcome Measures     Row Name 10/11/22 1532          How much help from another person do you currently need...    Turning from your back to your side while in flat bed without using bedrails? 2  -ES     Moving from lying on back to sitting on the side of a flat bed without bedrails? 2  -ES     Moving to and from a bed to a chair (including a wheelchair)? 2  -ES     Standing up from a chair using your arms (e.g., wheelchair, bedside chair)? 2  -ES     Climbing 3-5 steps with a railing? 1  -ES     To walk in hospital room? 1  -ES     AM-PAC 6 Clicks Score (PT) 10  -ES     Highest level of mobility 4 --> Transferred to chair/commode  -ES     Row Name 10/11/22 1532 10/11/22 1522       Functional Assessment    Outcome Measure Options AM-PAC 6 Clicks Basic Mobility (PT)  -ES AM-PAC 6 Clicks Daily Activity (OT)  -JY          User Key  (r) = Recorded By, (t) = Taken By, (c) = Cosigned By    Initials Name Provider Type    Rosa Rdz OT Occupational Therapist    Debbie Wilson PT Physical Therapist                             Physical Therapy Education     Title: PT OT SLP Therapies (In Progress)     Topic: Physical Therapy (In Progress)     Point: Mobility training (In Progress)     Learning Progress Summary           Patient Acceptance, E,D, NR by HP  at 10/9/2022 1559   Family Acceptance, E,D, NR by  at 10/9/2022 1559                   Point: Home exercise program (In Progress)     Learning Progress Summary           Patient Acceptance, E,D, NR by  at 10/9/2022 1559   Family Acceptance, E,D, NR by HP at 10/9/2022 1559                   Point: Body mechanics (In Progress)     Learning Progress Summary           Patient Acceptance, E,D, NR by HP at 10/9/2022 1559   Family Acceptance, E,D, NR by HP at 10/9/2022 1559                   Point: Precautions (In Progress)     Learning Progress Summary           Patient Acceptance, E,D, NR by HP at 10/9/2022 1559   Family Acceptance, E,D, NR by  at 10/9/2022 1559                               User Key     Initials Effective Dates Name Provider Type Discipline     06/01/21 -  Leticia Ghotra, [place] Physical Therapist PT              PT Recommendation and Plan     Plan of Care Reviewed With: patient, spouse  Progress: improving  Outcome Evaluation: Pt pleasantly confused throughout session and able to follow simple commands with mod verbal and tactile cues. Pt tolerated sitting EOB with intermittent assist for dynamic reaching activites and BLE strengthening exercises. Pt visibly fatigued at end of session. PT continue to rec SNF at d/c.     Time Calculation:    PT Charges     Row Name 10/11/22 1534             Time Calculation    Start Time 1507  -ES      PT Received On 10/11/22  -ES      PT Goal Re-Cert Due Date 10/19/22  -ES         Time Calculation- PT    Total Timed Code Minutes- PT 16 minute(s)  -ES         Timed Charges    01134 - PT Therapeutic Exercise Minutes 16  -ES         Total Minutes    Timed Charges Total Minutes 16  -ES       Total Minutes 16  -ES            User Key  (r) = Recorded By, (t) = Taken By, (c) = Cosigned By    Initials Name Provider Type    ES Debbie William PT Physical Therapist              Therapy Charges for Today     Code Description Service Date Service Provider Modifiers Qty     [id number]  PT THER SUPP EA 15 MIN 10/11/2022 Debbie William, PT GP 1    83847907555 HC PT THER PROC EA 15 MIN 10/11/2022 Debbie William, PT GP 1          PT G-Codes  Outcome Measure Options: AM-PAC 6 Clicks Basic Mobility (PT)  AM-PAC 6 Clicks Score (PT): 10  AM-PAC 6 Clicks Score (OT): 13    Debbie William PT  10/11/2022

## 2022-10-11 NOTE — PLAN OF CARE
Goal Outcome Evaluation:  Plan of Care Reviewed With: patient        Progress: no change  Outcome Evaluation: vss, Paced on tele, denies pain, large BM today.

## 2022-10-11 NOTE — PLAN OF CARE
Goal Outcome Evaluation:  Plan of Care Reviewed With: patient, spouse        Progress: improving  Outcome Evaluation: Pt pleasantly confused throughout session and able to follow simple commands with mod verbal and tactile cues. Pt tolerated sitting EOB with intermittent assist for dynamic reaching activites and BLE strengthening exercises. Pt visibly fatigued at end of session. PT continue to rec SNF at d/c.

## 2022-10-11 NOTE — CASE MANAGEMENT/SOCIAL WORK
"Discharge Planning Assessment  [facility]     Patient Name: Shantelle Decker  MRN: [id number]  Today's Date: 10/11/2022    Admit Date: 10/7/2022    Plan: Home   Discharge Needs Assessment     Row Name 10/11/22 1614       Living Environment    People in Home spouse    Current Living Arrangements home    Primary Care Provided by self    Provides Primary Care For no one    Family Caregiver if Needed spouse;child(guillermina), adult    Able to Return to Prior Arrangements yes       Transition Planning    Patient/Family Anticipates Transition to home    Transportation Anticipated family or friend will provide       Discharge Needs Assessment    Readmission Within the Last 30 Days no previous admission in last 30 days    Equipment Currently Used at Home walker, rolling               Discharge Plan     Row Name 10/11/22 1615       Plan    Plan Home    Patient/Family in Agreement with Plan yes    Plan Comments I spoke with Mrs. Dceker's , Sheldon, on the phone and he gives all following information. Mrs. Decker and Sheldon live together in [facility]. She uses a walker to help with mobility and activities of daily living. Sheldon drives Mrs. Decker when leaving the home and she is not current with any home or outpatient services. Sheldon denies any difficulty with obtaining or affording her medications. Therapy is recommending rehab for Mrs. Decker but Sheldon states that she would not want to go and they \"couldn't afford it anyways\". Sheldon tells me that he plans on taking Mrs. Decker home at discharge and he will transport her home. I have asked social work to speak with patient and family regarding this discharge disposition. Case management will continue to follow.    Final Discharge Disposition Code 01 - home or self-care              Continued Care and Services - Admitted Since 10/7/2022    Coordination has not been started for this encounter.       Expected Discharge Date and Time     Expected Discharge Date Expected " Discharge Time    Oct 14, 2022          Demographic Summary     Row Name 10/11/22 1613       General Information    General Information Comments I confirmed PCP to be Nancy Montiel and Medicare to be insurer.               Functional Status     Row Name 10/11/22 1613       Functional Status, IADL    Medications independent    Meal Preparation independent    Housekeeping independent    Laundry independent    Shopping independent       Employment/    Employment Status retired    Employment/ Comments She is a retired  - per .               Psychosocial    No documentation.                Abuse/Neglect    No documentation.                Legal    No documentation.                Substance Abuse    No documentation.                Patient Forms    No documentation.                   Ronnie Raymundo RN

## 2022-10-11 NOTE — PROGRESS NOTES
[facility]  PROGRESS NOTE    Patient Name: Shantelle Decker  : 1940  MRN: [id number]    Date of Admission: 10/7/2022  Primary Care Physician: Provider, No Known    Subjective   Subjective     CC:  F/U fall, fecal impaction    HPI:  Had several bowel movements per nursing.    ROS:  Unable to obtain full ROS due to dementia    Objective   Objective     Vital Signs:   Temp:  [97 °F (36.1 °C)-98.1 °F (36.7 °C)] 97.6 °F (36.4 °C)  Heart Rate:  [] 68  Resp:  [16-18] 18  BP: (119-165)/(61-95) 165/95     Physical Exam:  Constitutional: No acute distress, sleeping but awakens easily, laying in bed  HENT: NCAT, mucous membranes moist  Respiratory: Respiratory effort normal on room air  GI: Soft, nontender  Cardiovascular: RRR, no murmurs  Psychiatric: Calm, cooperative  Neurologic: Speech clear  Skin: No rashes on exposed surfaces    Results Reviewed:  LAB RESULTS:      Lab 10/08/22  0541 10/08/22  0039 10/07/22  1906   WBC 8.52  --  11.03*   HEMOGLOBIN 11.8*  --  12.9   HEMATOCRIT 35.9  --  38.4   PLATELETS 186  --  214   NEUTROS ABS 6.16  --  8.22*   IMMATURE GRANS (ABS) 0.06*  --  0.07*   LYMPHS ABS 1.26  --  1.62   MONOS ABS 0.93*  --  1.08*   EOS ABS 0.07  --  0.01   MCV 90.7  --  87.7   PROCALCITONIN  --   --  0.06   LACTATE  --  1.5 2.4*         Lab 10/11/22  1126 10/10/22  0436 10/09/22  1510 10/08/22  0541 10/07/22  1906   SODIUM 140 140 142 139 138   POTASSIUM 3.8 3.7 4.0 3.9 4.1   CHLORIDE 108* 110* 112* 105 103   CO2 24.0 20.0* 22.0 24.0 20.0*   ANION GAP 8.0 10.0 8.0 10.0 15.0   BUN 18 15 17 25* 26*   CREATININE 1.06* 1.22* 1.46* 1.93* 2.11*   EGFR 51.9* 43.8* 35.3* 25.3* 22.7*   GLUCOSE 93 83 91 101* 106*   CALCIUM 8.5* 7.8* 7.7* 8.2* 8.8   MAGNESIUM  --   --   --  1.8  --    HEMOGLOBIN A1C  --   --   --  4.60*  --    TSH  --   --   --  5.900*  --          Lab 10/07/22  1906   TOTAL PROTEIN 6.2   ALBUMIN 3.20*   GLOBULIN 3.0   ALT (SGPT) 6   AST (SGOT) 16    BILIRUBIN 1.2   ALK PHOS 64         Lab 10/08/22  0541 10/08/22  0039 10/07/22  1906   TROPONIN T 0.053* 0.042* 0.054*         Lab 10/08/22  0541   CHOLESTEROL 163   LDL CHOL 101*   HDL CHOL 44   TRIGLYCERIDES 95             Brief Urine Lab Results  (Last result in the past 365 days)      Color   Clarity   Blood   Leuk Est   Nitrite   Protein   CREAT   Urine HCG        10/07/22 1917 Yellow   Clear   Negative   Trace   Negative   30 mg/dL (1+)                 Microbiology Results Abnormal     Procedure Component Value - Date/Time    COVID PRE-OP / PRE-PROCEDURE SCREENING ORDER (NO ISOLATION) - Swab, Nasopharynx [[id number]]  (Normal) Collected: 10/07/22 1955    Lab Status: Final result Specimen: Swab from Nasopharynx Updated: 10/07/22 2039    Narrative:      The following orders were created for panel order COVID PRE-OP / PRE-PROCEDURE SCREENING ORDER (NO ISOLATION) - Swab, Nasopharynx.  Procedure                               Abnormality         Status                     ---------                               -----------         ------                     COVID-19 and FLU A/B PCR...[[id number]]  Normal              Final result                 Please view results for these tests on the individual orders.    COVID-19 and FLU A/B PCR - Swab, Nasopharynx [[id number]]  (Normal) Collected: 10/07/22 1955    Lab Status: Final result Specimen: Swab from Nasopharynx Updated: 10/07/22 2039     COVID19 Not Detected     Influenza A PCR Not Detected     Influenza B PCR Not Detected    Narrative:      Fact sheet for providers: https://www.fda.gov/media/599739/download    Fact sheet for patients: https://www.fda.gov/media/762207/download    Test performed by PCR.          FL Video Swallow With Speech Single Contrast    Result Date: 10/10/2022  EXAMINATION: FL VIDEO SWALLOW W SPEECH SINGLE-CONTRAST-, FL LIMITED UGI FOR MBS REFLUX SINGLE-CONTRAST-  INDICATION: Assess oropharyngeal/esophageal swallow; K56.41-Fecal impaction;  E86.0-Dehydration; N28.9-Disorder of kidney and ureter, unspecified; R77.8-Other specified abnormalities of plasma proteins  TECHNIQUE: 48 seconds of fluoroscopic time was used for this exam. 7 associated fluoroscopic loops were saved. The patient was evaluated in the seated lateral position while taking a variety of consistencies of barium by mouth under the direction of speech pathology.  COMPARISON: NONE  FINDINGS: 48 seconds of fluoroscopy provided for a modified barium swallow. Please see speech therapy report for full details and recommendations. Limited upper GI series demonstrated no signs of a focal esophageal stricture, or significant gastroesophageal reflux.       Impression: Fluoroscopy provided for a modified barium swallow, and limited upper GI series. Please see speech therapy report for full details and recommendations. Limited upper GI series appeared within normal limits.    This report was finalized on 10/10/2022 9:16 PM by Dr. Cameron Smith MD.      FL Limited Ugi For Mbs Reflux Single-Contrast    Result Date: 10/10/2022  EXAMINATION: FL VIDEO SWALLOW W SPEECH SINGLE-CONTRAST-, FL LIMITED UGI FOR MBS REFLUX SINGLE-CONTRAST-  INDICATION: Assess oropharyngeal/esophageal swallow; K56.41-Fecal impaction; E86.0-Dehydration; N28.9-Disorder of kidney and ureter, unspecified; R77.8-Other specified abnormalities of plasma proteins  TECHNIQUE: 48 seconds of fluoroscopic time was used for this exam. 7 associated fluoroscopic loops were saved. The patient was evaluated in the seated lateral position while taking a variety of consistencies of barium by mouth under the direction of speech pathology.  COMPARISON: NONE  FINDINGS: 48 seconds of fluoroscopy provided for a modified barium swallow. Please see speech therapy report for full details and recommendations. Limited upper GI series demonstrated no signs of a focal esophageal stricture, or significant gastroesophageal reflux.       Impression: Fluoroscopy  provided for a modified barium swallow, and limited upper GI series. Please see speech therapy report for full details and recommendations. Limited upper GI series appeared within normal limits.    This report was finalized on 10/10/2022 9:16 PM by Dr. Cameron Smith MD.        Results for orders placed during the hospital encounter of 05/20/22    Adult Transthoracic Echo Complete W/ Cont if Necessary Per Protocol    Interpretation Summary  · Left ventricular systolic function is normal. Estimated left ventricular EF = 65%.  · Left ventricular wall thickness is consistent with mild concentric hypertrophy.  · Left ventricular diastolic function is consistent with (grade I) impaired relaxation.  · The aortic valve is mildly calcified. Otherwise, the cardiac valves are anatomically and functionally normal.  · Estimated right ventricular systolic pressure from tricuspid regurgitation is normal (<35 mmHg).      I have reviewed the medications:  Scheduled Meds:aspirin, 81 mg, Oral, Daily  citalopram, 20 mg, Oral, Daily  docusate sodium, 100 mg, Oral, BID  donepezil, 10 mg, Oral, Nightly  heparin (porcine), 5,000 Units, Subcutaneous, Q8H  melatonin, 10 mg, Oral, Nightly  memantine, 5 mg, Oral, BID  NIFEdipine XL, 60 mg, Oral, Q24H  nystatin, , Topical, Q12H  polyethylene glycol, 17 g, Oral, Daily  QUEtiapine, 50 mg, Oral, Nightly  senna, 2 tablet, Oral, BID  sodium chloride, 10 mL, Intravenous, Q12H  traZODone, 50 mg, Oral, Nightly      Continuous Infusions:   PRN Meds:.•  acetaminophen **OR** acetaminophen **OR** acetaminophen  •  ondansetron **OR** ondansetron  •  QUEtiapine  •  [COMPLETED] Insert peripheral IV **AND** sodium chloride  •  sodium chloride    Assessment & Plan   Assessment & Plan     Active Hospital Problems    Diagnosis  POA   • **Fecal impaction (HCC) [K56.41]  Yes   • Elevated lactic acid level [R79.89]  Unknown   • Elevated troponin [R77.8]  Unknown   • Difficulty swallowing [R13.10]  Unknown   •  Leukocytosis [D72.829]  Unknown   • Fall [W19.XXXA]  Unknown   • LIANNA (acute kidney injury) (HCC) [N17.9]  Yes   • Dementia with behavioral disturbance [F03.918]  Yes   • Essential hypertension [I10]  Yes   • Coronary artery disease without angina pectoris [I25.10]  Yes      Resolved Hospital Problems   No resolved problems to display.        Brief Hospital Course to date:  Shantelle Decker is a 82 y.o. female with advanced dementia, HTN, CAD, history of breast cancer who presented to the ED after a fall and was found to have fecal impaction and acute kidney injury.    This patient's problems and plans were partially entered by my partner and updated as appropriate by me 10/11/22.    Fecal impaction  - Now disimpacted, having bowel movements  - Continue scheduled miralax and docusate, senna     Difficulty swallowing  - SLP following.  Placed on modified diet.     Leukocytosis  Elevated lactic acid  - Suspect related to dehydration  - Procalcitonin negative, UA negative, CT abdomen pelvis without infectious process, CXR without evidence of pneumonia     Elevated troponin  - Likely demand ischemia in setting of dehydration, LIANNA     LIANNA on CKD III  -- Creatinine 2.11 on admission (baseline 1.1-1.5)  -- Improving with IV fluids     Dementia  Fall  - Continue home meds  - PT/OT     HTN  CAD  - Continue home meds    Expected Discharge Location and Transportation: Home vs. SNF.  Social work to see due to concerns about 's ability to safely take her home.  Expected Discharge Date: 10/12    DVT prophylaxis:  Medical DVT prophylaxis orders are present.     AM-PAC 6 Clicks Score (PT): 10 (10/10/22 7170)    CODE STATUS:   Code Status and Medical Interventions:   Ordered at: 10/07/22 8924     Level Of Support Discussed With:    Next of Kin (If No Surrogate)     Code Status (Patient has no pulse and is not breathing):    CPR (Attempt to Resuscitate)     Medical Interventions (Patient has pulse or is breathing):    Full Support        Denisa Duarte MD  10/11/22

## 2022-10-11 NOTE — THERAPY TREATMENT NOTE
Patient Name: Shantelle Decker  : 1940    MRN: [id number]                              Today's Date: 10/11/2022       Admit Date: 10/7/2022    Visit Dx:     ICD-10-CM ICD-9-CM   1. Fecal impaction (HCC)  K56.41 560.32   2. Acute dehydration  E86.0 276.51   3. Acute renal insufficiency  N28.9 593.9   4. Elevated troponin  R77.8 790.6   5. Dysphagia, oral phase  R13.11 787.21     Patient Active Problem List   Diagnosis   • Incarcerated ventral hernia   • Coronary artery disease without angina pectoris   • Morbid obesity (HCC)   • Normocytic anemia   • Dementia with behavioral disturbance   • Essential hypertension   • History of breast cancer   • Thyroid mass   • LIANNA (acute kidney injury) (HCC)   • CKD (chronic kidney disease) stage 3, GFR 30-59 ml/min (HCC)   • Chest pain   • Metabolic acidosis   • Other chest pain   • Fecal impaction (HCC)   • Elevated lactic acid level   • Elevated troponin   • Difficulty swallowing   • Leukocytosis   • Fall     Past Medical History:   Diagnosis Date   • Breast cancer (CMS/HCC)    • Coronary artery disease    • Dementia (CMS/HCC)    • Dysrhythmia    • Hypertension      Past Surgical History:   Procedure Laterality Date   • ADENOIDECTOMY     • BREAST SURGERY     • CARDIAC DEFIBRILLATOR PLACEMENT     • EXPLORATORY LAPAROTOMY N/A 2016    Procedure: open ventral hernia repair with mesh;  Surgeon: Bright Buckley MD;  Location: [facility];  Service:    • MASTECTOMY Bilateral    • TONSILLECTOMY        General Information     Row Name 10/11/22 1508          OT Time and Intention    Document Type therapy note (daily note)  -JY     Mode of Treatment occupational therapy;individual therapy  -JY     Row Name 10/11/22 1508          General Information    Patient Profile Reviewed yes  -JY     Existing Precautions/Restrictions fall;other (see comments)  advanced dementia, hx breast cancer  -JY     Barriers to Rehab medically complex;previous functional deficit;cognitive status  -JY      Row Name 10/11/22 1508          Cognition    Orientation Status (Cognition) oriented to;person;disoriented to;place;time;situation  -JY     Row Name 10/11/22 1508          Safety Issues, Functional Mobility    Safety Issues Affecting Function (Mobility) insight into deficits/self-awareness;awareness of need for assistance;safety precaution awareness;safety precautions follow-through/compliance;positioning of assistive device;judgment;sequencing abilities;problem-solving  -JY     Impairments Affecting Function (Mobility) balance;cognition;pain;strength;postural/trunk control;endurance/activity tolerance  -JY     Cognitive Impairments, Mobility Safety/Performance awareness, need for assistance;insight into deficits/self-awareness;judgment;problem-solving/reasoning;safety precaution awareness;safety precaution follow-through;sequencing abilities  -JY     Comment, Safety Issues/Impairments (Mobility) pt alert, confused, able to follow simple commands with variable simple cues and commands often with visual demo and expected return demo  -JY           User Key  (r) = Recorded By, (t) = Taken By, (c) = Cosigned By    Initials Name Provider Type    Rosa Rdz OT Occupational Therapist                 Mobility/ADL's     Row Name 10/11/22 1510          Bed Mobility    Bed Mobility supine-sit;sit-supine;scooting/bridging  -JY     Supine-Sit [place] (Bed Mobility) maximum assist (25% patient effort);2 person assist;verbal cues  -JMG     Sit-Supine [place] (Bed Mobility) maximum assist (25% patient effort);2 person assist;verbal cues  -JY     Bed Mobility, Safety Issues cognitive deficits limit understanding;decreased use of arms for pushing/pulling;decreased use of legs for bridging/pushing;impaired trunk control for bed mobility  -JY     Assistive Device (Bed Mobility) draw sheet;bed rails  -JY     Comment, (Bed Mobility) A x 2 for UB and LB mgmt, cues for supporting self at EOB with BUEs and to advance  LEs to EOB initially; pt w/ difficulty in uprighting trunk and initially sustaining balance at eOB, able to sit unsupported at EOB with increased time  -JY     Row Name 10/11/22 1510          Sit-Stand Transfer    Sit-Stand [place] (Transfers) unable to assess  -JY     Row Name 10/11/22 1510          Functional Mobility    Functional Mobility- Comment defer to PT for specifics  -JY     Row Name 10/11/22 1510          Activities of Daily Living    BADL Assessment/Intervention grooming;lower body dressing;toileting  -JY     Row Name 10/11/22 1510          Lower Body Dressing Assessment/Training    [place] Level (Lower Body Dressing) doff;don;socks;dependent (less than 25% patient effort)  -     Position (Lower Body Dressing) supine  -JY     Row Name 10/11/22 1510          Toileting Assessment/Training    [place] Level (Toileting) adjust/manage clothing;change pad/brief;perform perineal hygiene;dependent (less than 25% patient effort)  -JY     Position (Toileting) supine  -JY     Comment, (Toileting) dep care by staff, therapy for all toileting tasks at bed levl  -JY     Row Name 10/11/22 1510          Grooming Assessment/Training    [place] Level (Grooming) wash face, hands;hair care, combing/brushing;supervision;verbal cues;set up  -     Position (Grooming) edge of bed sitting  -     Comment, (Grooming) did not require physical A for hair grooming and face/hand washing, did required prepared materials and frequent cues for follow through in tasks and quality  -           User Key  (r) = Recorded By, (t) = Taken By, (c) = Cosigned By    Initials Name Provider Type    Rosa Rdz OT Occupational Therapist               Obj/Interventions     [facility] Name 10/11/22 1514          Motor Skills    Motor Skills functional endurance  -J     Functional Endurance decreased activity tolerance toward more dynamic tasks however improving with sitting tolerance at EOB larely unsupported ~ 8 mins while  completing ADL tasks  [facility] 10[facility]          Balance    Balance Assessment sitting static balance;sitting dynamic balance  -JY     Static Sitting Balance standby assist  -JY     Dynamic Sitting Balance minimal assist;verbal cues  -J     Position, Sitting Balance supported;unsupported;sitting edge of bed  -JY     Static Standing Balance not tested  -JY     Dynamic Standing Balance not tested  -J     Balance Interventions sitting;static;dynamic;occupation based/functional task  -J     Comment, Balance pt initially with more balance deficits sitting at EOB after coming from supine, improved w/ increased time sitting, cued to support self with BUEs when at EOB and more unsteady, able to sit supported/unsupported for ~ 8 mins  -           User Key  (r) = Recorded By, (t) = Taken By, (c) = Cosigned By    Initials Name Provider Type    Rosa Rdz, OT Occupational Therapist               Goals/Plan    No documentation.                Clinical Impression     [facility] Name 10/11/22 1516          Pain Assessment    Additional Documentation Pain Scale: FACES Pre/Post-Treatment (Group)  -JY     Row Name 10/11/22 1516          Pain Scale: FACES Pre/Post-Treatment    Pain: FACES Scale, Pretreatment 0-->no hurt  -JY     Posttreatment Pain Rating 0-->no hurt  -JY     Pre/Posttreatment Pain Comment did not present with any verbal or nonverbal indicators of pain, tolerated OT interventions  -[facility] Name 10/11/22 1516          Plan of Care Review    Plan of Care Reviewed With patient  -OLESYA     Progress improving  -J     Outcome Evaluation Pt alert and participatory in OT interventions, did present with some confusion during conversation and ADL completion. Pt req'd max A x 2 for supine < > sitting for necessary UB and LB supports. Pt initially presented with unsteadiness at EOB requiring upward of min A however intermittent instances of unsupported sitting during grooming and hygiene tasks with SUA and spv  with cues for improved quality and follow through in tasks. Pt tolerated sitting EOB for ~ 8 mins before fatigue resulted in return to supine. Will continue to progress pt during hospitalization with continued recommendation for SNF at d/c.  -JMG     Row Name 10/11/22 1516          Therapy Assessment/Plan (OT)    Rehab Potential (OT) good, to achieve stated therapy goals  -JY     Criteria for Skilled Therapeutic Interventions Met (OT) yes;skilled treatment is necessary  -JY     Therapy Frequency (OT) daily  -JY     Row Name 10/11/22 1516          Therapy Plan Review/Discharge Plan (OT)    Anticipated Discharge Disposition (OT) skilled nursing facility  -JY     Row Name 10/11/22 1516          Vital Signs    Pre Systolic BP Rehab 104  -JY     Pre Treatment Diastolic BP 83  -JY     Pretreatment Heart Rate (beats/min) 106  -JY     Posttreatment Heart Rate (beats/min) 100  -JY     Pre SpO2 (%) 99  -JY     O2 Delivery Pre Treatment room air  -JY     O2 Delivery Intra Treatment room air  -JY     Post SpO2 (%) 98  -JY     O2 Delivery Post Treatment room air  -JY     Pre Patient Position Supine  -JY     Intra Patient Position Sitting  -JY     Post Patient Position Supine  -JY     Row Name 10/11/22 1516          Positioning and Restraints    Pre-Treatment Position in bed  -JY     Post Treatment Position bed  -JY     In Bed notified nsg;fowlers;call light within reach;encouraged to call for assist;exit alarm on;with family/caregiver;side rails up x3  -JY           User Key  (r) = Recorded By, (t) = Taken By, (c) = Cosigned By    Initials Name Provider Type    Rosa Rdz, JESIKA Occupational Therapist               Outcome Measures     Row Name 10/11/22 1522          How much help from another is currently needed...    Putting on and taking off regular lower body clothing? 1  -JY     Bathing (including washing, rinsing, and drying) 2  -JY     Toileting (which includes using toilet bed pan or urinal) 1  -JY     Putting on and  taking off regular upper body clothing 3  -JY     Taking care of personal grooming (such as brushing teeth) 3  -JY     Eating meals 3  -JY     AM-PAC 6 Clicks Score (OT) 13  -JY     Row Name 10/11/22 1522          Functional Assessment    Outcome Measure Options AM-PAC 6 Clicks Daily Activity (OT)  -JY           User Key  (r) = Recorded By, (t) = Taken By, (c) = Cosigned By    Initials Name Provider Type    Rosa Rdz OT Occupational Therapist                Occupational Therapy Education     Title: PT OT SLP Therapies (In Progress)     Topic: Occupational Therapy (In Progress)     Point: ADL training (In Progress)     Description:   Instruct learner(s) on proper safety adaptation and remediation techniques during self care or transfers.   Instruct in proper use of assistive devices.              Learning Progress Summary           Patient Acceptance, E,D, NR by OLESYA at 10/11/2022 1425    Acceptance, E, VU,NR by AN at 10/8/2022 1547   Family Acceptance, E,D, NR by OLESYA at 10/11/2022 1425    Acceptance, E, VU,NR by AN at 10/8/2022 1547                   Point: Home exercise program (Not Started)     Description:   Instruct learner(s) on appropriate technique for monitoring, assisting and/or progressing therapeutic exercises/activities.              Learner Progress:  Not documented in this visit.          Point: Precautions (In Progress)     Description:   Instruct learner(s) on prescribed precautions during self-care and functional transfers.              Learning Progress Summary           Patient Acceptance, E,D, NR by OLESYA at 10/11/2022 1425    Acceptance, E, VU,NR by AN at 10/8/2022 1547   Family Acceptance, E,D, NR by OLESYA at 10/11/2022 1425    Acceptance, E, VU,NR by AN at 10/8/2022 1547                   Point: Body mechanics (In Progress)     Description:   Instruct learner(s) on proper positioning and spine alignment during self-care, functional mobility activities and/or exercises.              Learning  Progress Summary           Patient Acceptance, E,D, NR by OLESYA at 10/11/2022 1425    Acceptance, E, VU,NR by AN at 10/8/2022 1547   Family Acceptance, E,D, NR by OLESYA at 10/11/2022 1425    Acceptance, E, VU,NR by AN at 10/8/2022 1547                               User Key     Initials Effective Dates Name Provider Type Discipline     06/16/21 -  Rosa Turner OT Occupational Therapist OT    MEGA 09/21/21 -  Marcelle Crooks OT Occupational Therapist OT              OT Recommendation and Plan  Therapy Frequency (OT): daily  Plan of Care Review  Plan of Care Reviewed With: patient  Progress: improving  Outcome Evaluation: Pt alert and participatory in OT interventions, did present with some confusion during conversation and ADL completion. Pt req'd max A x 2 for supine < > sitting for necessary UB and LB supports. Pt initially presented with unsteadiness at EOB requiring upward of min A however intermittent instances of unsupported sitting during grooming and hygiene tasks with SUA and spv with cues for improved quality and follow through in tasks. Pt tolerated sitting EOB for ~ 8 mins before fatigue resulted in return to supine. Will continue to progress pt during hospitalization with continued recommendation for SNF at d/c.     Time Calculation:    Time Calculation- OT     Row Name 10/11/22 1523             Time Calculation- OT    OT Start Time 1425  -JY      OT Received On 10/11/22  -JY      OT Goal Re-Cert Due Date 10/18/22  -JY         Timed Charges    73670 - OT Therapeutic Activity Minutes 13  -JY      27107 - OT Self Care/Mgmt Minutes 10  -JY         Total Minutes    Timed Charges Total Minutes 23  -JY       Total Minutes 23  -JY            User Key  (r) = Recorded By, (t) = Taken By, (c) = Cosigned By    Initials Name Provider Type    Rosa Rdz OT Occupational Therapist              Therapy Charges for Today     Code Description Service Date Service Provider Modifiers Qty    42855112364  OT  THERAPEUTIC ACT EA 15 MIN 10/11/2022 Rosa Turner OT GO 1    72437219578 HC OT SELF CARE/MGMT/TRAIN EA 15 MIN 10/11/2022 Rosa Turner OT GO 1    29349058057 HC OT THER SUPP EA 15 MIN 10/11/2022 Rosa Turner OT GO 2               Rosa Turner OT  10/11/2022

## 2022-10-11 NOTE — PLAN OF CARE
Goal Outcome Evaluation:   Patient has been resting comfortably with no acute signs of distress. VSS. No complains of pain overnight. Transferred with an A x1 to bedside commode, patient had a large BM and also had another small one overnight. Morning labs delayed due to patients combative nature with lab draws. Will continue to monitor as patient progresses towards discharge.

## 2022-10-12 PROCEDURE — 25010000002 HEPARIN (PORCINE) PER 1000 UNITS: Performed by: INTERNAL MEDICINE

## 2022-10-12 PROCEDURE — 99233 SBSQ HOSP IP/OBS HIGH 50: CPT | Performed by: HOSPITALIST

## 2022-10-12 PROCEDURE — 92526 ORAL FUNCTION THERAPY: CPT

## 2022-10-12 RX ADMIN — DOCUSATE SODIUM 100 MG: 100 CAPSULE, LIQUID FILLED ORAL at 08:33

## 2022-10-12 RX ADMIN — SENNOSIDES 2 TABLET: 8.6 TABLET, FILM COATED ORAL at 08:33

## 2022-10-12 RX ADMIN — TRAZODONE HYDROCHLORIDE 50 MG: 50 TABLET ORAL at 20:29

## 2022-10-12 RX ADMIN — NYSTATIN: 100000 POWDER TOPICAL at 10:15

## 2022-10-12 RX ADMIN — POLYETHYLENE GLYCOL 3350 17 G: 17 POWDER, FOR SOLUTION ORAL at 08:33

## 2022-10-12 RX ADMIN — SENNOSIDES 2 TABLET: 8.6 TABLET, FILM COATED ORAL at 20:29

## 2022-10-12 RX ADMIN — DONEPEZIL HYDROCHLORIDE 10 MG: 10 TABLET, FILM COATED ORAL at 20:29

## 2022-10-12 RX ADMIN — ASPIRIN 81 MG: 81 TABLET, COATED ORAL at 08:33

## 2022-10-12 RX ADMIN — CITALOPRAM HYDROBROMIDE 20 MG: 20 TABLET ORAL at 08:33

## 2022-10-12 RX ADMIN — MEMANTINE 5 MG: 10 TABLET ORAL at 08:32

## 2022-10-12 RX ADMIN — HEPARIN SODIUM 5000 UNITS: 5000 INJECTION INTRAVENOUS; SUBCUTANEOUS at 20:28

## 2022-10-12 RX ADMIN — NIFEDIPINE 60 MG: 30 TABLET, FILM COATED, EXTENDED RELEASE ORAL at 08:33

## 2022-10-12 RX ADMIN — DOCUSATE SODIUM 100 MG: 100 CAPSULE, LIQUID FILLED ORAL at 20:29

## 2022-10-12 RX ADMIN — HEPARIN SODIUM 5000 UNITS: 5000 INJECTION INTRAVENOUS; SUBCUTANEOUS at 06:11

## 2022-10-12 RX ADMIN — HEPARIN SODIUM 5000 UNITS: 5000 INJECTION INTRAVENOUS; SUBCUTANEOUS at 15:36

## 2022-10-12 RX ADMIN — QUETIAPINE FUMARATE 50 MG: 25 TABLET, FILM COATED ORAL at 20:29

## 2022-10-12 RX ADMIN — Medication 10 MG: at 20:29

## 2022-10-12 RX ADMIN — MEMANTINE 5 MG: 10 TABLET ORAL at 20:29

## 2022-10-12 NOTE — PROGRESS NOTES
[facility] Medicine Services  PROGRESS NOTE    Patient Name: Shantelle Decker  : 1940  MRN: [id number]    Date of Admission: 10/7/2022  Primary Care Physician: Nancy Montiel APRN    Subjective   Subjective     CC:  F/U fall, fecal impaction    HPI:  Patient resting in bed, no events overnight. Did not awaken the patient. No family at bedside.    ROS:  Unable to obtain full ROS due to dementia/sleeping    Objective   Objective     Vital Signs:   Temp:  [97 °F (36.1 °C)-98.2 °F (36.8 °C)] 97.7 °F (36.5 °C)  Heart Rate:  [61-92] 92  Resp:  [16-18] 18  BP: ()/() 178/78     Physical Exam:  Constitutional: No acute distress, sleeping, laying in bed  HENT: NCAT, mucous membranes moist  Respiratory: Clear to auscultation bilaterally, respiratory effort normal   Cardiovascular: RRR, no murmurs, rubs, or gallops  Gastrointestinal: Positive bowel sounds, soft, nontender, nondistended  Musculoskeletal: No bilateral ankle edema  Psychiatric: calm, cooperative  Neurologic: speech clear  Skin: No rashes    Results Reviewed:  LAB RESULTS:      Lab 10/08/22  0541 10/08/22  0039 10/07/22  1906   WBC 8.52  --  11.03*   HEMOGLOBIN 11.8*  --  12.9   HEMATOCRIT 35.9  --  38.4   PLATELETS 186  --  214   NEUTROS ABS 6.16  --  8.22*   IMMATURE GRANS (ABS) 0.06*  --  0.07*   LYMPHS ABS 1.26  --  1.62   MONOS ABS 0.93*  --  1.08*   EOS ABS 0.07  --  0.01   MCV 90.7  --  87.7   PROCALCITONIN  --   --  0.06   LACTATE  --  1.5 2.4*         Lab 10/11/22  1126 10/10/22  0436 10/09/22  1510 10/08/22  0541 10/07/22  1906   SODIUM 140 140 142 139 138   POTASSIUM 3.8 3.7 4.0 3.9 4.1   CHLORIDE 108* 110* 112* 105 103   CO2 24.0 20.0* 22.0 24.0 20.0*   ANION GAP 8.0 10.0 8.0 10.0 15.0   BUN 18 15 17 25* 26*   CREATININE 1.06* 1.22* 1.46* 1.93* 2.11*   EGFR 51.9* 43.8* 35.3* 25.3* 22.7*   GLUCOSE 93 83 91 101* 106*   CALCIUM 8.5* 7.8* 7.7* 8.2* 8.8   MAGNESIUM  --   --   --  1.8  --    HEMOGLOBIN A1C  --    --   --  4.60*  --    TSH  --   --   --  5.900*  --          Lab 10/07/22  1906   TOTAL PROTEIN 6.2   ALBUMIN 3.20*   GLOBULIN 3.0   ALT (SGPT) 6   AST (SGOT) 16   BILIRUBIN 1.2   ALK PHOS 64         Lab 10/08/22  0541 10/08/22  0039 10/07/22  1906   TROPONIN T 0.053* 0.042* 0.054*         Lab 10/08/22  0541   CHOLESTEROL 163   LDL CHOL 101*   HDL CHOL 44   TRIGLYCERIDES 95             Brief Urine Lab Results  (Last result in the past 365 days)      Color   Clarity   Blood   Leuk Est   Nitrite   Protein   CREAT   Urine HCG        10/07/22 1917 Yellow   Clear   Negative   Trace   Negative   30 mg/dL (1+)                 Microbiology Results Abnormal     Procedure Component Value - Date/Time    COVID PRE-OP / PRE-PROCEDURE SCREENING ORDER (NO ISOLATION) - Swab, Nasopharynx [[id number]]  (Normal) Collected: 10/07/22 1955    Lab Status: Final result Specimen: Swab from Nasopharynx Updated: 10/07/22 2039    Narrative:      The following orders were created for panel order COVID PRE-OP / PRE-PROCEDURE SCREENING ORDER (NO ISOLATION) - Swab, Nasopharynx.  Procedure                               Abnormality         Status                     ---------                               -----------         ------                     COVID-19 and FLU A/B PCR...[[id number]]  Normal              Final result                 Please view results for these tests on the individual orders.    COVID-19 and FLU A/B PCR - Swab, Nasopharynx [[id number]]  (Normal) Collected: 10/07/22 1955    Lab Status: Final result Specimen: Swab from Nasopharynx Updated: 10/07/22 2039     COVID19 Not Detected     Influenza A PCR Not Detected     Influenza B PCR Not Detected    Narrative:      Fact sheet for providers: https://www.fda.gov/media/947503/download    Fact sheet for patients: https://www.fda.gov/media/869612/download    Test performed by PCR.          FL Video Swallow With Speech Single Contrast    Result Date: 10/10/2022  EXAMINATION: FL VIDEO  SWALLOW W SPEECH SINGLE-CONTRAST-, FL LIMITED UGI FOR MBS REFLUX SINGLE-CONTRAST-  INDICATION: Assess oropharyngeal/esophageal swallow; K56.41-Fecal impaction; E86.0-Dehydration; N28.9-Disorder of kidney and ureter, unspecified; R77.8-Other specified abnormalities of plasma proteins  TECHNIQUE: 48 seconds of fluoroscopic time was used for this exam. 7 associated fluoroscopic loops were saved. The patient was evaluated in the seated lateral position while taking a variety of consistencies of barium by mouth under the direction of speech pathology.  COMPARISON: NONE  FINDINGS: 48 seconds of fluoroscopy provided for a modified barium swallow. Please see speech therapy report for full details and recommendations. Limited upper GI series demonstrated no signs of a focal esophageal stricture, or significant gastroesophageal reflux.       Impression: Fluoroscopy provided for a modified barium swallow, and limited upper GI series. Please see speech therapy report for full details and recommendations. Limited upper GI series appeared within normal limits.    This report was finalized on 10/10/2022 9:16 PM by Dr. Cameron Smith MD.      FL Limited Ugi For Mbs Reflux Single-Contrast    Result Date: 10/10/2022  EXAMINATION: FL VIDEO SWALLOW W SPEECH SINGLE-CONTRAST-, FL LIMITED UGI FOR MBS REFLUX SINGLE-CONTRAST-  INDICATION: Assess oropharyngeal/esophageal swallow; K56.41-Fecal impaction; E86.0-Dehydration; N28.9-Disorder of kidney and ureter, unspecified; R77.8-Other specified abnormalities of plasma proteins  TECHNIQUE: 48 seconds of fluoroscopic time was used for this exam. 7 associated fluoroscopic loops were saved. The patient was evaluated in the seated lateral position while taking a variety of consistencies of barium by mouth under the direction of speech pathology.  COMPARISON: NONE  FINDINGS: 48 seconds of fluoroscopy provided for a modified barium swallow. Please see speech therapy report for full details and  recommendations. Limited upper GI series demonstrated no signs of a focal esophageal stricture, or significant gastroesophageal reflux.       Impression: Fluoroscopy provided for a modified barium swallow, and limited upper GI series. Please see speech therapy report for full details and recommendations. Limited upper GI series appeared within normal limits.    This report was finalized on 10/10/2022 9:16 PM by Dr. Cameron Smith MD.        Results for orders placed during the hospital encounter of 05/20/22    Adult Transthoracic Echo Complete W/ Cont if Necessary Per Protocol    Interpretation Summary  · Left ventricular systolic function is normal. Estimated left ventricular EF = 65%.  · Left ventricular wall thickness is consistent with mild concentric hypertrophy.  · Left ventricular diastolic function is consistent with (grade I) impaired relaxation.  · The aortic valve is mildly calcified. Otherwise, the cardiac valves are anatomically and functionally normal.  · Estimated right ventricular systolic pressure from tricuspid regurgitation is normal (<35 mmHg).      I have reviewed the medications:  Scheduled Meds:aspirin, 81 mg, Oral, Daily  citalopram, 20 mg, Oral, Daily  docusate sodium, 100 mg, Oral, BID  donepezil, 10 mg, Oral, Nightly  heparin (porcine), 5,000 Units, Subcutaneous, Q8H  melatonin, 10 mg, Oral, Nightly  memantine, 5 mg, Oral, BID  NIFEdipine XL, 60 mg, Oral, Q24H  nystatin, , Topical, Q12H  polyethylene glycol, 17 g, Oral, Daily  QUEtiapine, 50 mg, Oral, Nightly  senna, 2 tablet, Oral, BID  sodium chloride, 10 mL, Intravenous, Q12H  traZODone, 50 mg, Oral, Nightly      Continuous Infusions:   PRN Meds:.•  acetaminophen **OR** acetaminophen **OR** acetaminophen  •  ondansetron **OR** ondansetron  •  QUEtiapine  •  [COMPLETED] Insert peripheral IV **AND** sodium chloride  •  sodium chloride    Assessment & Plan   Assessment & Plan     Active Hospital Problems    Diagnosis  POA   • **Fecal impaction  (HCC) [K56.41]  Yes   • Elevated lactic acid level [R79.89]  Unknown   • Elevated troponin [R77.8]  Unknown   • Difficulty swallowing [R13.10]  Unknown   • Leukocytosis [D72.829]  Unknown   • Fall [W19.XXXA]  Unknown   • LIANNA (acute kidney injury) (HCC) [N17.9]  Yes   • Dementia with behavioral disturbance [F03.918]  Yes   • Essential hypertension [I10]  Yes   • Coronary artery disease without angina pectoris [I25.10]  Yes      Resolved Hospital Problems   No resolved problems to display.        Brief Hospital Course to date:  Shantelle Decker is a 82 y.o. female with advanced dementia, HTN, CAD, history of breast cancer who presented to the ED after a fall and was found to have fecal impaction and acute kidney injury.    This patient's problems and plans were partially entered by my partner and updated as appropriate by me 10/12/22.    All problems are new to me today.    Fecal impaction  - Now disimpacted, having bowel movements  - Continue scheduled miralax and docusate, senna     Difficulty swallowing  - SLP following.  Placed on modified diet.     Leukocytosis  Elevated lactic acid  - Suspect related to dehydration  - Procalcitonin negative, UA negative, CT abdomen pelvis without infectious process, CXR without evidence of pneumonia     Elevated troponin  - Likely demand ischemia in setting of dehydration, LIANNA     LIANNA on CKD III  -- Creatinine 2.11 on admission (baseline 1.1-1.5)  -- Improving with IV fluids-at baseline now     Dementia  Fall  - Continue home meds  - PT/OT     HTN  CAD  - Continue home meds    Expected Discharge Location and Transportation: Home vs. SNF.  Social work to see due to concerns about 's ability to safely take her home. Patient's  wants to take her home.  Expected Discharge Date: 10/13    DVT prophylaxis:  Medical DVT prophylaxis orders are present.     AM-PAC 6 Clicks Score (PT): 10 (10/11/22 8761)    CODE STATUS:   Code Status and Medical Interventions:   Ordered at:  10/07/22 5554     Level Of Support Discussed With:    Next of Kin (If No Surrogate)     Code Status (Patient has no pulse and is not breathing):    CPR (Attempt to Resuscitate)     Medical Interventions (Patient has pulse or is breathing):    Full Support       Yue Elmore DO  10/12/22

## 2022-10-12 NOTE — PLAN OF CARE
Goal Outcome Evaluation:  Plan of Care Reviewed With: patient, spouse      SLP treatment completed. Will sign-off dysphagia. Please see note for further details and recommendations.

## 2022-10-12 NOTE — CASE MANAGEMENT/SOCIAL WORK
Continued Stay Note  [facility]     Patient Name: Shantelle Decker  MRN: [id number]  Today's Date: 10/12/2022    Admit Date: 10/7/2022    Plan: [facility] Swing Bed   Discharge Plan     Row Name 10/12/22 1430       Plan    Plan [facility]    Patient/Family in Agreement with Plan yes    Plan Comments Per MSW note, Mrs. Decker's , Sheldon, is now agreeable to Mrs. Decker going to rehab and would like a referral to [facility]ing Bed Unit. I have made this referral and spoke with their liaison, Saundra, on the phone today. Saundra would like to see Mrs. Decker take steps with therapy prior to offering her a bed. I will update therapy on this request. Case management will continue to follow.    Final Discharge Disposition Code 61 - hospital-based swing bed                Discharge Codes    No documentation.               Expected Discharge Date and Time     Expected Discharge Date Expected Discharge Time    Oct 14, 2022             Ronnie Raymundo RN

## 2022-10-12 NOTE — PLAN OF CARE
Goal Outcome Evaluation:                 Problem: Fall Injury Risk  Goal: Absence of Fall and Fall-Related Injury  Intervention: Identify and Manage Contributors  Recent Flowsheet Documentation  Taken 10/12/2022 0400 by Krunal Kebede RN  Medication Review/Management: medications reviewed  Taken 10/12/2022 0200 by Krunal Kebede RN  Medication Review/Management: medications reviewed  Taken 10/12/2022 0000 by Krunal Kebede RN  Medication Review/Management: medications reviewed  Taken 10/11/2022 2200 by Krunal Kebede RN  Medication Review/Management: medications reviewed  Taken 10/11/2022 2000 by Krunal Kebede RN  Medication Review/Management: medications reviewed  Intervention: Promote Injury-Free Environment  Recent Flowsheet Documentation  Taken 10/12/2022 0400 by Krunal Kebede RN  Safety Promotion/Fall Prevention:   activity supervised   safety round/check completed   toileting scheduled  Taken 10/12/2022 0200 by Krunal Kebede RN  Safety Promotion/Fall Prevention:   activity supervised   safety round/check completed   toileting scheduled  Taken 10/12/2022 0000 by Krunal Kebede RN  Safety Promotion/Fall Prevention:   activity supervised   safety round/check completed   toileting scheduled  Taken 10/11/2022 2200 by Krunal Kebede RN  Safety Promotion/Fall Prevention:   activity supervised   safety round/check completed   toileting scheduled  Taken 10/11/2022 2000 by Krunal Kebede RN  Safety Promotion/Fall Prevention:   activity supervised   safety round/check completed   toileting scheduled     Problem: Skin Injury Risk Increased  Goal: Skin Health and Integrity  Intervention: Optimize Skin Protection  Recent Flowsheet Documentation  Taken 10/12/2022 0400 by Krunal Kebede RN  Pressure Reduction Techniques: frequent weight shift encouraged  Head of Bed (HOB) Positioning: HOB at 30-45 degrees  Pressure Reduction Devices: pressure-redistributing mattress utilized  Skin Protection: adhesive  use limited  Taken 10/12/2022 0200 by Krunal Kebede RN  Pressure Reduction Techniques: frequent weight shift encouraged  Head of Bed (HOB) Positioning: HOB at 30-45 degrees  Pressure Reduction Devices: pressure-redistributing mattress utilized  Skin Protection: adhesive use limited  Taken 10/12/2022 0000 by Krunal Kebede RN  Pressure Reduction Techniques: frequent weight shift encouraged  Head of Bed (HOB) Positioning: HOB at 30-45 degrees  Pressure Reduction Devices: pressure-redistributing mattress utilized  Skin Protection: adhesive use limited  Taken 10/11/2022 2200 by Krunal Kebede RN  Pressure Reduction Techniques: frequent weight shift encouraged  Head of Bed (HOB) Positioning: HOB at 30-45 degrees  Pressure Reduction Devices: pressure-redistributing mattress utilized  Skin Protection: adhesive use limited  Taken 10/11/2022 2000 by Krunal Kebede RN  Pressure Reduction Techniques: frequent weight shift encouraged  Head of Bed (HOB) Positioning: HOB at 30-45 degrees  Pressure Reduction Devices: heel offloading device utilized  Skin Protection: adhesive use limited     Problem: Adult Inpatient Plan of Care  Goal: Absence of Hospital-Acquired Illness or Injury  Intervention: Identify and Manage Fall Risk  Recent Flowsheet Documentation  Taken 10/12/2022 0400 by Krunal Kebede RN  Safety Promotion/Fall Prevention:   activity supervised   safety round/check completed   toileting scheduled  Taken 10/12/2022 0200 by Krunal Kebede RN  Safety Promotion/Fall Prevention:   activity supervised   safety round/check completed   toileting scheduled  Taken 10/12/2022 0000 by Krunal Kebede RN  Safety Promotion/Fall Prevention:   activity supervised   safety round/check completed   toileting scheduled  Taken 10/11/2022 2200 by Krunal Kebede RN  Safety Promotion/Fall Prevention:   activity supervised   safety round/check completed   toileting scheduled  Taken 10/11/2022 2000 by Krunal Kebede RN  Safety  Promotion/Fall Prevention:   activity supervised   safety round/check completed   toileting scheduled  Intervention: Prevent Skin Injury  Recent Flowsheet Documentation  Taken 10/12/2022 0400 by Krunal Kebede RN  Body Position: supine  Skin Protection: adhesive use limited  Taken 10/12/2022 0200 by Krunal Kebede RN  Body Position: supine  Skin Protection: adhesive use limited  Taken 10/12/2022 0000 by Krunal Kebede RN  Body Position: supine  Skin Protection: adhesive use limited  Taken 10/11/2022 2200 by Krunal Kebede RN  Body Position: supine  Skin Protection: adhesive use limited  Taken 10/11/2022 2000 by Krunal Kebede RN  Body Position: supine  Skin Protection: adhesive use limited  Intervention: Prevent and Manage VTE (Venous Thromboembolism) Risk  Recent Flowsheet Documentation  Taken 10/12/2022 0400 by Krunal Kebede RN  VTE Prevention/Management:   bilateral   sequential compression devices off  Taken 10/12/2022 0200 by Krunal Kebede RN  VTE Prevention/Management:   bilateral   sequential compression devices off  Taken 10/12/2022 0000 by Krunal Kebede RN  VTE Prevention/Management:   bilateral   sequential compression devices off  Taken 10/11/2022 2200 by Krunal Kebede RN  VTE Prevention/Management:   bilateral   sequential compression devices off  Taken 10/11/2022 2000 by Krunal Kebede RN  VTE Prevention/Management:   bilateral   sequential compression devices off   patient refused intervention  Intervention: Prevent Infection  Recent Flowsheet Documentation  Taken 10/12/2022 0400 by Krunal Kebede RN  Infection Prevention: environmental surveillance performed  Taken 10/12/2022 0200 by Krunal Kebede RN  Infection Prevention: environmental surveillance performed  Taken 10/12/2022 0000 by Krunal Kebede RN  Infection Prevention: environmental surveillance performed  Taken 10/11/2022 2200 by Krunal Kebede RN  Infection Prevention: environmental surveillance  performed  Taken 10/11/2022 2000 by Krunal Kebede RN  Infection Prevention: environmental surveillance performed  Goal: Optimal Comfort and Wellbeing  Intervention: Monitor Pain and Promote Comfort  Recent Flowsheet Documentation  Taken 10/12/2022 0400 by Krunal Kebede RN  Pain Management Interventions: quiet environment facilitated  Taken 10/12/2022 0200 by Krunal Kebede RN  Pain Management Interventions: quiet environment facilitated  Taken 10/12/2022 0000 by Krunal Kebede RN  Pain Management Interventions: quiet environment facilitated  Taken 10/11/2022 2200 by Krunal Kebede RN  Pain Management Interventions: quiet environment facilitated  Taken 10/11/2022 2000 by Krunal Kebede RN  Pain Management Interventions: quiet environment facilitated  Intervention: Provide Person-Centered Care  Recent Flowsheet Documentation  Taken 10/12/2022 0400 by Krunal Kebede RN  Trust Relationship/Rapport: care explained  Taken 10/12/2022 0200 by Krunal Kebede RN  Trust Relationship/Rapport: care explained  Taken 10/12/2022 0000 by Krunal Kebede RN  Trust Relationship/Rapport: care explained  Taken 10/11/2022 2200 by Krunal Kebede RN  Trust Relationship/Rapport: care explained  Taken 10/11/2022 2000 by Krunal Kebede RN  Trust Relationship/Rapport: care explained     VSS, voids well, rested throughout the night, will continue to monitor for changes.

## 2022-10-12 NOTE — DISCHARGE PLACEMENT REQUEST
"Ronnie MAGANA, RN-BC  Case Management  P: [phone]  F: [phone]  Looking for short term rehab    Farshad Dial (82 y.o. Female)     Date of Birth   1940    Social Security Number       [facility] [address]    Home Phone   [phone]    MRN   [id number]       Christian   [facility]    Marital Status                               Admission Date   10/7/22    Admission Type   Emergency    Admitting Provider   Yue Elmore DO    Attending Provider   Yue Elmore DO    Department, Room/Bed   [facility] 3G, S361/1       Discharge Date       Discharge Disposition       Discharge Destination                               Attending Provider: Yue Elmore DO    Allergies: Demerol [Meperidine]    Isolation: None   Infection: None   Code Status: CPR    Ht: 162.6 cm (64\")   Wt: 87.4 kg (192 lb 11.2 oz)    Admission Cmt: None   Principal Problem: Fecal impaction (HCC) [K56.41]                 Active Insurance as of 10/7/2022     Primary Coverage     Payor Plan Insurance Group Employer/Plan Group    MEDICARE MEDICARE A & B      Payor Plan Address Payor Plan Phone Number Payor Plan Fax Number Effective Dates    [address] [phone]  1996 - None Entered    [place] [address]       Subscriber Name Subscriber Birth Date Member ID       FARSHAD DIAL 1940 [id number]                 Emergency Contacts      (Rel.) Home Phone Work Phone Mobile Phone    Sheldon Dial (Spouse) [phone] -- [phone]    Christa Camilo (Daughter) -- -- [phone]    JeronimoLeoIris Maggy (Daughter) -- -- [phone]               History & Physical      Macarena Medina DO at 10/07/22 2340              [facility] Medicine Services  HISTORY AND PHYSICAL    Patient Name: Farshad Dial  : 1940  MRN: [id number]  Primary Care Physician: Provider, No Known  Date of admission: 10/7/2022      Subjective  "   Subjective     Chief Complaint:  Fall    HPI:  Shantelle Decker is a 82 y.o. female patient with a PMH significant for advanced dementia, HTN, CAD, history of breast cancer who comes to the ED after a fall.  Patient has significant confusion, noncontributory to HPI.  HPI obtained from patient's spouse at bedside.  Patient says that he was trying to help the patient walk into her house today.  She lost her balance and they both fell to the ground.  They both came to the ED due to these concerns.  Patient has had constipation and complaints of abdominal pain when having a bowel movement.  Spouse also says that patient has had difficulty swallowing and recent vomiting.      Review of Systems   Unable to perform ROS: Dementia        All other systems reviewed and are negative.     Personal History     Past Medical History:   Diagnosis Date   • Breast cancer (CMS/HCC)    • Coronary artery disease    • Dementia (CMS/HCC)    • Dysrhythmia    • Hypertension              Past Surgical History:   Procedure Laterality Date   • ADENOIDECTOMY     • BREAST SURGERY     • CARDIAC DEFIBRILLATOR PLACEMENT     • EXPLORATORY LAPAROTOMY N/A 7/16/2016    Procedure: open ventral hernia repair with mesh;  Surgeon: Bright Buckley MD;  Location: [facility];  Service:    • MASTECTOMY Bilateral    • TONSILLECTOMY         Family History:  family history includes Alcohol abuse in her father; Heart disease in her father and mother. Otherwise pertinent FHx was reviewed and unremarkable.     Social History:  reports that she has quit smoking. Her smoking use included cigarettes and pipe. She has never used smokeless tobacco. She reports current alcohol use. She reports current drug use. Drug: Marijuana.  Social History     Social History Narrative   • Not on file       Medications:  Available home medication information reviewed.  Medications Prior to Admission   Medication Sig Dispense Refill Last Dose   • aspirin 81 MG EC tablet Take 81 mg by  mouth daily.      • citalopram (CeleXA) 20 MG tablet Take 20 mg by mouth Daily.      • Cyanocobalamin 1000 MCG/ML kit Inject 1 mL as directed Every 30 (Thirty) Days.      • donepezil (ARICEPT) 10 MG tablet Take 10 mg by mouth every night.      • lidocaine (LIDODERM) 5 % Place 1 patch on the skin as directed by provider Daily. Remove & Discard patch within 12 hours or as directed by MD 30 patch 0    • Melatonin 10 MG tablet Take 1 tablet by mouth Every Night. 100 tablet 2    • memantine (NAMENDA) 10 MG tablet Take 10 mg by mouth 2 (Two) Times a Day.      • NIFEdipine XL (ADALAT CC) 60 MG 24 hr tablet Take 1 tablet by mouth Daily. 30 tablet 5    • PHARMACY MEDS TO BED CONSULT Daily.      • QUEtiapine (SEROquel) 25 MG tablet Take 50 mg by mouth Every Night.      • QUEtiapine (SEROquel) 25 MG tablet Take 0.5 tablets by mouth Every 8 (Eight) Hours As Needed (agitation). 30 tablet 1    • traZODone (DESYREL) 50 MG tablet Take 50 mg by mouth Every Night.          Allergies   Allergen Reactions   • Demerol [Meperidine] Anaphylaxis     And itching       Objective    Objective     Vital Signs:   Temp:  [97.5 °F (36.4 °C)-98.5 °F (36.9 °C)] 97.5 °F (36.4 °C)  Heart Rate:  [64-84] 84  Resp:  [16-18] 16  BP: ()/(79-84) 129/79       Physical Exam   Constitutional: Awake, alert  Eyes: PERRLA, sclerae anicteric, no conjunctival injection  HENT: NCAT, mucous membranes moist  Neck: Supple, no thyromegaly, no lymphadenopathy, trachea midline  Respiratory: Clear to auscultation bilaterally, nonlabored respirations   Cardiovascular: RRR, no murmurs, rubs, or gallops, palpable pedal pulses bilaterally  Gastrointestinal: Positive bowel sounds, soft, nontender, nondistended  Musculoskeletal: No bilateral ankle edema, no clubbing or cyanosis to extremities  Psychiatric: Appropriate affect, cooperative  Neurologic: Oriented to person only, strength symmetric in all extremities, Cranial Nerves grossly intact to confrontation, speech  clear  Skin: No rashes      Result Review:  I have personally reviewed the results from the time of this admission to 10/7/2022 23:41 EDT and agree with these findings:  [x]  Laboratory list / accordion  [x]  Microbiology  [x]  Radiology  [x]  EKG/Telemetry   []  Cardiology/Vascular   []  Pathology  [x]  Old records  []  Other:        LAB RESULTS:      Lab 10/07/22  1906   WBC 11.03*   HEMOGLOBIN 12.9   HEMATOCRIT 38.4   PLATELETS 214   NEUTROS ABS 8.22*   IMMATURE GRANS (ABS) 0.07*   LYMPHS ABS 1.62   MONOS ABS 1.08*   EOS ABS 0.01   MCV 87.7   PROCALCITONIN 0.06   LACTATE 2.4*         Lab 10/07/22  1906   SODIUM 138   POTASSIUM 4.1   CHLORIDE 103   CO2 20.0*   ANION GAP 15.0   BUN 26*   CREATININE 2.11*   EGFR 22.7*   GLUCOSE 106*   CALCIUM 8.8         Lab 10/07/22  1906   TOTAL PROTEIN 6.2   ALBUMIN 3.20*   GLOBULIN 3.0   ALT (SGPT) 6   AST (SGOT) 16   BILIRUBIN 1.2   ALK PHOS 64         Lab 10/07/22  1906   TROPONIN T 0.054*                 UA    Urinalysis 7/5/22 10/7/22 10/7/22     1917 1917   Squamous Epithelial Cells, UA 0-5  0-2   Specific Gravity, UA 1.016 1.022    Ketones, UA  15 mg/dL (1+) (A)    Blood, UA Trace (A) Negative    Leukocytes, UA Negative Trace (A)    Nitrite, UA  Negative    RBC, UA 2  0-2   WBC, UA   0-2   Bacteria, UA Negative  None Seen   (A) Abnormal value       Comments are available for some flowsheets but are not being displayed.             Microbiology Results (last 10 days)     Procedure Component Value - Date/Time    COVID PRE-OP / PRE-PROCEDURE SCREENING ORDER (NO ISOLATION) - Swab, Nasopharynx [[id number]]  (Normal) Collected: 10/07/22 1955    Lab Status: Final result Specimen: Swab from Nasopharynx Updated: 10/07/22 2039    Narrative:      The following orders were created for panel order COVID PRE-OP / PRE-PROCEDURE SCREENING ORDER (NO ISOLATION) - Swab, Nasopharynx.  Procedure                               Abnormality         Status                     ---------                                -----------         ------                     COVID-19 and FLU A/B PCR...[[id number]]  Normal              Final result                 Please view results for these tests on the individual orders.    COVID-19 and FLU A/B PCR - Swab, Nasopharynx [[id number]]  (Normal) Collected: 10/07/22 1955    Lab Status: Final result Specimen: Swab from Nasopharynx Updated: 10/07/22 2039     COVID19 Not Detected     Influenza A PCR Not Detected     Influenza B PCR Not Detected    Narrative:      Fact sheet for providers: https://www.fda.gov/media/299346/download    Fact sheet for patients: https://www.fda.gov/media/413583/download    Test performed by PCR.          CT Abdomen Pelvis Without Contrast    Result Date: 10/7/2022   DATE OF EXAM: 10/7/2022 7:22 PM  PROCEDURE: CT ABDOMEN PELVIS WO CONTRAST-  INDICATIONS: Abdominal pain.  COMPARISON: 04/04/2021.  TECHNIQUE: Routine transaxial slices were obtained through the abdomen and pelvis without the administration of intravenous contrast. Reconstructed coronal and sagittal images were also obtained. Automated exposure control and iterative construction methods were used.  FINDINGS: There are tiny layering gallstones. The exam is limited by noncontrast technique. The liver, adrenal glands, pancreas, spleen, and left kidney are normal. There is suggestion of a benign cyst in the upper pole of the right kidney. There are atherosclerotic vascular calcifications throughout the abdomen and pelvis. The uterus has a slightly lobulated contour suggestive of fibroids. The adnexal structures and urinary bladder are normal. There is increased stool in the rectosigmoid colon consistent with fecal impaction. There is colonic diverticulosis without evidence of acute diverticulitis. There is the appendix is either small in size or surgically absent. There are no dilated loops of bowel to indicate an obstructive process. The patient has a moderate-sized hiatal hernia. There is grade  1 anterior spondylolisthesis of L4 on L5. There are no suspicious osteolytic or sclerotic lesions within the bony structures. There are no acute findings beneath the hemidiaphragms.      Impression:  1. Cholelithiasis. 2. No definite acute findings. 3. Rectosigmoid colon fecal impaction. 4. Multiple incidental findings as noted above. The exam is limited by noncontrast technique.  This report was finalized on 10/7/2022 8:30 PM by Raghavendra Perea MD.      CT Head Without Contrast    Result Date: 10/7/2022   DATE OF EXAM: 10/7/2022 7:22 PM  PROCEDURE: CT HEAD WO CONTRAST-  INDICATIONS: Confusion.  COMPARISON: No Comparisons Available  TECHNIQUE: Routine transaxial cuts were obtained through the head without the administration of contrast. Automated exposure control and iterative reconstruction methods were used.  FINDINGS: There is enlargement of the sulci, fissures, ventricles, and basal cisterns indicating volume loss secondary to age-appropriate atrophy. There are areas of decreased density in the white matter tracts felt to represent chronic microvascular ischemia. There is no acute hemorrhage, midline shift, or suspicious extra-axial fluid collections. There is no skull fracture. The visualized paranasal and mastoid sinuses are clear. There are extensive atherosclerotic vascular calcifications in the cavernous carotid arteries and the distal vertebral arteries. The orbital contents are normal.      Impression:  1. No acute findings. 2. Volume loss secondary to age-appropriate atrophy. 3. Chronic ischemic changes.  This report was finalized on 10/7/2022 7:59 PM by Raghavendra Perea MD.      XR Chest 1 View    Result Date: 10/7/2022  DATE OF EXAM: 10/7/2022 8:57 PM  PROCEDURE: XR CHEST 1 VW-  INDICATIONS: Confusion  COMPARISON: 05/20/2022.  TECHNIQUE: Single radiographic view of the chest was obtained.  FINDINGS: The heart size is normal. The pulmonary vascular markings are normal. The lungs and pleural spaces are clear of  active disease. There is a left-sided transvenous pacemaker in place. There are surgical clips in the left axilla.  There are chronic age-related changes involving the bony thorax and thoracic aorta.      Impression: No active disease.  This report was finalized on 10/7/2022 9:09 PM by Raghavendra Perea MD.      XR Hip With or Without Pelvis 2 - 3 View Right    Result Date: 10/7/2022  DATE OF EXAM: 10/7/2022 8:55 PM  PROCEDURE: XR HIP W OR WO PELVIS 2-3 VIEW RIGHT-  INDICATIONS: Fall, right hip pain.  COMPARISON: CT of the pelvis performed on 10/07/2022.  TECHNIQUE: AP and Lateral views of the right hip with Pelvis were obtained.  FINDINGS: The bony structures are demineralized. There is no acute fracture or dislocation. The pelvic bony structures were better evaluated on the CT exam. There are mild arthritic changes involving the hip joints and sacroiliac joints. There are vascular calcifications in the pelvis.      Impression: No acute findings.  This report was finalized on 10/7/2022 9:08 PM by Raghavendra Perea MD.        Results for orders placed during the hospital encounter of 05/20/22    Adult Transthoracic Echo Complete W/ Cont if Necessary Per Protocol    Interpretation Summary  · Left ventricular systolic function is normal. Estimated left ventricular EF = 65%.  · Left ventricular wall thickness is consistent with mild concentric hypertrophy.  · Left ventricular diastolic function is consistent with (grade I) impaired relaxation.  · The aortic valve is mildly calcified. Otherwise, the cardiac valves are anatomically and functionally normal.  · Estimated right ventricular systolic pressure from tricuspid regurgitation is normal (<35 mmHg).      Assessment & Plan   Assessment & Plan     Active Hospital Problems    Diagnosis  POA   • **Fecal impaction (HCC) [K56.41]  Yes   • Elevated lactic acid level [R79.89]  Unknown   • Elevated troponin [R77.8]  Unknown   • Difficulty swallowing [R13.10]  Unknown   • Leukocytosis  [D72.829]  Unknown   • LIANNA (acute kidney injury) (HCC) [N17.9]  Yes   • Dementia with behavioral disturbance [F03.918]  Yes   • Essential hypertension [I10]  Yes   • Coronary artery disease without angina pectoris [I25.10]  Yes   Shantelle Decker is a 82 y.o. female patient with a PMH significant for advanced dementia, HTN, CAD, history of breast cancer who comes to the ED after a fall.     Fecal impaction  - Manually disimpact  - Start every 4-hour enemas  - Will need bowel regimen in place prior to discharge  -CT abdomen and pelvis noted above    Difficulty swallowing  - Cardiac soft diet  - SLP consult for a.m.  - Aspiration precautions    Leukocytosis  Elevated lactic acid  - No obvious source of infection  - Monitor for need for antibiotics  -IVF's  - Reflex lactic acid pending  - Procalcitonin negative, UA negative, CT abdomen pelvis without infectious process  - A.m. labs    Elevated troponin  - Trend  - Unable to view EKG in epic.  Repeat pending  - Hemoglobin A1c, FLP for a.m.  - Consider cardiology consult    LIANNA, POA  --IV fluids  -- Every 4 hours bladder scan  --urine sodium/urea nitrogen, urine creatinine  --CT noted above  --consider nephrology consult in am if no improvement with IV fluids  --Hold nephrotoxic medications  --A.m. labs    Dementia  Fall  - Fall precautions  - Continue home meds  - PT/OT  -Bed alarm    HTN  CAD  - Continue home meds    Home med list reviewed    DVT prophylaxis: Heparin      CODE STATUS: Full code, discussed with  at bedside  Code Status and Medical Interventions:   Ordered at: 10/07/22 2340     Level Of Support Discussed With:    Next of Kin (If No Surrogate)     Code Status (Patient has no pulse and is not breathing):    CPR (Attempt to Resuscitate)     Medical Interventions (Patient has pulse or is breathing):    Full Support         Macarena Medina DO  10/07/22        Electronically signed by Macarena Medina DO at 10/07/22 4886          Physical Therapy Notes  (most recent note)      Debbie William, PT at 10/11/22 1507  Version 1 of 1         Patient Name: Shantelle Decker  : 1940    MRN: [id number]                              Today's Date: 10/11/2022       Admit Date: 10/7/2022    Visit Dx:     ICD-10-CM ICD-9-CM   1. Fecal impaction (HCC)  K56.41 560.32   2. Acute dehydration  E86.0 276.51   3. Acute renal insufficiency  N28.9 593.9   4. Elevated troponin  R77.8 790.6   5. Dysphagia, oral phase  R13.11 787.21     Patient Active Problem List   Diagnosis   • Incarcerated ventral hernia   • Coronary artery disease without angina pectoris   • Morbid obesity (HCC)   • Normocytic anemia   • Dementia with behavioral disturbance   • Essential hypertension   • History of breast cancer   • Thyroid mass   • LIANNA (acute kidney injury) (HCC)   • CKD (chronic kidney disease) stage 3, GFR 30-59 ml/min (HCC)   • Chest pain   • Metabolic acidosis   • Other chest pain   • Fecal impaction (HCC)   • Elevated lactic acid level   • Elevated troponin   • Difficulty swallowing   • Leukocytosis   • Fall     Past Medical History:   Diagnosis Date   • Breast cancer (CMS/HCC)    • Coronary artery disease    • Dementia (CMS/HCC)    • Dysrhythmia    • Hypertension      Past Surgical History:   Procedure Laterality Date   • ADENOIDECTOMY     • BREAST SURGERY     • CARDIAC DEFIBRILLATOR PLACEMENT     • EXPLORATORY LAPAROTOMY N/A 2016    Procedure: open ventral hernia repair with mesh;  Surgeon: Bright Buckley MD;  [facility];  Service:    • MASTECTOMY Bilateral    • TONSILLECTOMY        General Information     Row Name 10/11/22 1527          Physical Therapy Time and Intention    Document Type therapy note (daily note)  -ES     Mode of Treatment physical therapy  -ES     Row Name 10/11/22 1527          General Information    Patient Profile Reviewed yes  -ES     Existing Precautions/Restrictions fall;other (see comments)  advanced dementia, hx breast cancer  -ES     Barriers to  Rehab medically complex;previous functional deficit;cognitive status  -ES     Row Name 10/11/22 1527          Cognition    Orientation Status (Cognition) oriented to;person;disoriented to;place;time;situation  -ES     Row Name 10/11/22 1527          Safety Issues, Functional Mobility    Safety Issues Affecting Function (Mobility) insight into deficits/self-awareness;safety precaution awareness;safety precautions follow-through/compliance;awareness of need for assistance  -ES     Impairments Affecting Function (Mobility) balance;cognition;pain;strength;postural/trunk control;endurance/activity tolerance  -ES     Cognitive Impairments, Mobility Safety/Performance attention;awareness, need for assistance;insight into deficits/self-awareness;problem-solving/reasoning;safety precaution awareness;safety precaution follow-through  -ES           User Key  (r) = Recorded By, (t) = Taken By, (c) = Cosigned By    Initials Name Provider Type    Debbie Wilson PT Physical Therapist               Mobility     Row Name 10/11/22 1528          Bed Mobility    Bed Mobility supine-sit;sit-supine;scooting/bridging  -ES     Supine-Sit Morrison (Bed Mobility) maximum assist (25% patient effort);2 person assist;verbal cues  -ES     Sit-Supine Morrison (Bed Mobility) maximum assist (25% patient effort);2 person assist;verbal cues  -ES     Assistive Device (Bed Mobility) draw sheet;bed rails  -ES     Row Name 10/11/22 1528          Sit-Stand Transfer    Sit-Stand Morrison (Transfers) unable to assess  -ES           User Key  (r) = Recorded By, (t) = Taken By, (c) = Cosigned By    Initials Name Provider Type    Debbie Wilson PT Physical Therapist               Obj/Interventions     Row Name 10/11/22 1528          Motor Skills    Therapeutic Exercise knee;ankle;other (see comments)  dynamic sitting balance activities including reaching across midline and outside MARK  -ES     Row Name 10/11/22 1528          Knee  (Therapeutic Exercise)    Knee (Therapeutic Exercise) strengthening exercise  -ES     Knee Strengthening (Therapeutic Exercise) bilateral;LAQ (long arc quad);10 repetitions  -ES     Row Name 10/11/22 1523          Ankle (Therapeutic Exercise)    Ankle (Therapeutic Exercise) AROM (active range of motion)  -ES     Ankle AROM (Therapeutic Exercise) bilateral;dorsiflexion;plantarflexion;10 repetitions  -ES     Row Name 10/11/22 1528          Balance    Balance Assessment sitting static balance;sitting dynamic balance  -ES     Static Sitting Balance contact guard  -ES     Dynamic Sitting Balance minimal assist;verbal cues  -ES     Position, Sitting Balance supported;unsupported;sitting edge of bed  -ES     Comment, Balance Pt able to sit EOB with intermittent support ~10 mins for dynamic balance activity. denied dizziness/lightheadedness  -ES           User Key  (r) = Recorded By, (t) = Taken By, (c) = Cosigned By    Initials Name Provider Type    ES Debbie William, PT Physical Therapist               Goals/Plan    No documentation.                Clinical Impression     Row Name 10/11/22 5310          Pain Scale: FACES Pre/Post-Treatment    Pain: FACES Scale, Pretreatment 0-->no hurt  -ES     Posttreatment Pain Rating 0-->no hurt  -ES     Pre/Posttreatment Pain Comment No indication of pain throughout session  -ES     Row Name 10/11/22 9500          Plan of Care Review    Plan of Care Reviewed With patient;spouse  -ES     Progress improving  -ES     Outcome Evaluation Pt pleasantly confused throughout session and able to follow simple commands with mod verbal and tactile cues. Pt tolerated sitting EOB with intermittent assist for dynamic reaching activites and BLE strengthening exercises. Pt visibly fatigued at end of session. PT continue to rec SNF at d/c.  -ES     Row Name 10/11/22 7807          Therapy Assessment/Plan (PT)    Rehab Potential (PT) fair, will monitor progress closely  -ES     Criteria for Skilled  Interventions Met (PT) yes;meets criteria;skilled treatment is necessary  -ES     Therapy Frequency (PT) daily  -ES     Row Name 10/11/22 1530          Vital Signs    Pre Systolic BP Rehab --  VSS. cleared by RN.  -ES     Pre Patient Position Supine  -ES     Intra Patient Position Sitting  -ES     Post Patient Position Supine  -ES     Row Name 10/11/22 1530          Positioning and Restraints    Pre-Treatment Position in bed  -ES     Post Treatment Position bed  -ES     In Bed notified nsg;supine;with family/caregiver;exit alarm on;encouraged to call for assist;call light within reach  -ES           User Key  (r) = Recorded By, (t) = Taken By, (c) = Cosigned By    Initials Name Provider Type    Debbie Wilson PT Physical Therapist               Outcome Measures     Row Name 10/11/22 1532          How much help from another person do you currently need...    Turning from your back to your side while in flat bed without using bedrails? 2  -ES     Moving from lying on back to sitting on the side of a flat bed without bedrails? 2  -ES     Moving to and from a bed to a chair (including a wheelchair)? 2  -ES     Standing up from a chair using your arms (e.g., wheelchair, bedside chair)? 2  -ES     Climbing 3-5 steps with a railing? 1  -ES     To walk in hospital room? 1  -ES     AM-PAC 6 Clicks Score (PT) 10  -ES     Highest level of mobility 4 --> Transferred to chair/commode  -ES     Row Name 10/11/22 1532 10/11/22 1522       Functional Assessment    Outcome Measure Options AM-PAC 6 Clicks Basic Mobility (PT)  -ES AM-PAC 6 Clicks Daily Activity (OT)  -JY          User Key  (r) = Recorded By, (t) = Taken By, (c) = Cosigned By    Initials Name Provider Type    Rosa Rdz OT Occupational Therapist    Debbie Wilson PT Physical Therapist                             Physical Therapy Education     Title: PT OT SLP Therapies (In Progress)     Topic: Physical Therapy (In Progress)     Point: Mobility  training (In Progress)     Learning Progress Summary           Patient Acceptance, E,D, NR by  at 10/9/2022 1559   Family Acceptance, E,D, NR by HP at 10/9/2022 1559                   Point: Home exercise program (In Progress)     Learning Progress Summary           Patient Acceptance, E,D, NR by HP at 10/9/2022 1559   Family Acceptance, E,D, NR by HP at 10/9/2022 1559                   Point: Body mechanics (In Progress)     Learning Progress Summary           Patient Acceptance, E,D, NR by HP at 10/9/2022 1559   Family Acceptance, E,D, NR by HP at 10/9/2022 1559                   Point: Precautions (In Progress)     Learning Progress Summary           Patient Acceptance, E,D, NR by  at 10/9/2022 1559   Family Acceptance, E,D, NR by  at 10/9/2022 1559                               User Key     Initials Effective Dates Name Provider Type Discipline     06/01/21 -  Leticia Ghotra, PT Physical Therapist PT              PT Recommendation and Plan     Plan of Care Reviewed With: patient, spouse  Progress: improving  Outcome Evaluation: Pt pleasantly confused throughout session and able to follow simple commands with mod verbal and tactile cues. Pt tolerated sitting EOB with intermittent assist for dynamic reaching activites and BLE strengthening exercises. Pt visibly fatigued at end of session. PT continue to rec SNF at d/c.     Time Calculation:    PT Charges     Row Name 10/11/22 1534             Time Calculation    Start Time 1507  -ES      PT Received On 10/11/22  -ES      PT Goal Re-Cert Due Date 10/19/22  -ES         Time Calculation- PT    Total Timed Code Minutes- PT 16 minute(s)  -ES         Timed Charges    59712 - PT Therapeutic Exercise Minutes 16  -ES         Total Minutes    Timed Charges Total Minutes 16  -ES       Total Minutes 16  -ES            User Key  (r) = Recorded By, (t) = Taken By, (c) = Cosigned By    Initials Name Provider Type    Debbie Wilson PT Physical Therapist               Therapy Charges for Today     Code Description Service Date Service Provider Modifiers Qty    59121964087  PT THER SUPP EA 15 MIN 10/11/2022 Debbie William, PT GP 1    49910950511 HC PT THER PROC EA 15 MIN 10/11/2022 Debbie William PT GP 1          PT G-Codes  Outcome Measure Options: AM-PAC 6 Clicks Basic Mobility (PT)  AM-PAC 6 Clicks Score (PT): 10  AM-PAC 6 Clicks Score (OT): 13    Debbie William PT  10/11/2022      Electronically signed by Debbie William, PT at 10/11/22 1535          Occupational Therapy Notes (most recent note)      Rosa Turner, OT at 10/11/22 1425          Patient Name: Shantelle Decker  : 1940    MRN: [id number]                              Today's Date: 10/11/2022       Admit Date: 10/7/2022    Visit Dx:     ICD-10-CM ICD-9-CM   1. Fecal impaction (HCC)  K56.41 560.32   2. Acute dehydration  E86.0 276.51   3. Acute renal insufficiency  N28.9 593.9   4. Elevated troponin  R77.8 790.6   5. Dysphagia, oral phase  R13.11 787.21     Patient Active Problem List   Diagnosis   • Incarcerated ventral hernia   • Coronary artery disease without angina pectoris   • Morbid obesity (HCC)   • Normocytic anemia   • Dementia with behavioral disturbance   • Essential hypertension   • History of breast cancer   • Thyroid mass   • LIANNA (acute kidney injury) (HCC)   • CKD (chronic kidney disease) stage 3, GFR 30-59 ml/min (HCC)   • Chest pain   • Metabolic acidosis   • Other chest pain   • Fecal impaction (HCC)   • Elevated lactic acid level   • Elevated troponin   • Difficulty swallowing   • Leukocytosis   • Fall     Past Medical History:   Diagnosis Date   • Breast cancer (CMS/HCC)    • Coronary artery disease    • Dementia (CMS/HCC)    • Dysrhythmia    • Hypertension      Past Surgical History:   Procedure Laterality Date   • ADENOIDECTOMY     • BREAST SURGERY     • CARDIAC DEFIBRILLATOR PLACEMENT     • EXPLORATORY LAPAROTOMY N/A 2016    Procedure: open ventral hernia repair with  mesh;  Surgeon: Bright Buckley MD;  Location: [facility];  Service:    • MASTECTOMY Bilateral    • TONSILLECTOMY        General Information     Row Name 10/11/22 1508          OT Time and Intention    Document Type therapy note (daily note)  -JY     Mode of Treatment occupational therapy;individual therapy  -JY     Row Name 10/11/22 1507          General Information    Patient Profile Reviewed yes  -JY     Existing Precautions/Restrictions fall;other (see comments)  advanced dementia, hx breast cancer  -JY     Barriers to Rehab medically complex;previous functional deficit;cognitive status  -JY     Row Name 10/11/22 1508          Cognition    Orientation Status (Cognition) oriented to;person;disoriented to;place;time;situation  -JY     Row Name 10/11/22 150          Safety Issues, Functional Mobility    Safety Issues Affecting Function (Mobility) insight into deficits/self-awareness;awareness of need for assistance;safety precaution awareness;safety precautions follow-through/compliance;positioning of assistive device;judgment;sequencing abilities;problem-solving  -JY     Impairments Affecting Function (Mobility) balance;cognition;pain;strength;postural/trunk control;endurance/activity tolerance  -JY     Cognitive Impairments, Mobility Safety/Performance awareness, need for assistance;insight into deficits/self-awareness;judgment;problem-solving/reasoning;safety precaution awareness;safety precaution follow-through;sequencing abilities  -JY     Comment, Safety Issues/Impairments (Mobility) pt alert, confused, able to follow simple commands with variable simple cues and commands often with visual demo and expected return demo  -JY           User Key  (r) = Recorded By, (t) = Taken By, (c) = Cosigned By    Initials Name Provider Type    Rosa Rdz OT Occupational Therapist                 Mobility/ADL's     Row Name 10/11/22 1519          Bed Mobility    Bed Mobility supine-sit;sit-supine;scooting/bridging   -JY     Supine-Sit [place] (Bed Mobility) maximum assist (25% patient effort);2 person assist;verbal cues  -JY     Sit-Supine [place] (Bed Mobility) maximum assist (25% patient effort);2 person assist;verbal cues  -JY     Bed Mobility, Safety Issues cognitive deficits limit understanding;decreased use of arms for pushing/pulling;decreased use of legs for bridging/pushing;impaired trunk control for bed mobility  -JY     Assistive Device (Bed Mobility) draw sheet;bed rails  -JY     Comment, (Bed Mobility) A x 2 for UB and LB mgmt, cues for supporting self at EOB with BUEs and to advance LEs to EOB initially; pt w/ difficulty in uprighting trunk and initially sustaining balance at eOB, able to sit unsupported at EOB with increased time  -[facility] Name 10/11/22 1510          Sit-Stand Transfer    Sit-Stand [place] (Transfers) unable to assess  -JY     Row Name 10/11/22 1510          Functional Mobility    Functional Mobility- Comment defer to PT for specifics  -[facility] Name 10/11/22 1510          Activities of Daily Living    BADL Assessment/Intervention grooming;lower body dressing;toileting  -[facility] Name 10/11/22 1510          Lower Body Dressing Assessment/Training    [place] Level (Lower Body Dressing) doff;don;socks;dependent (less than 25% patient effort)  -JY     Position (Lower Body Dressing) supine  -     Row Name 10/11/22 1510          Toileting Assessment/Training    [place] Level (Toileting) adjust/manage clothing;change pad/brief;perform perineal hygiene;dependent (less than 25% patient effort)  -JY     Position (Toileting) supine  -JY     Comment, (Toileting) dep care by staff, therapy for all toileting tasks at bed levl  -     Row Name 10/11/22 1510          Grooming Assessment/Training    [place] Level (Grooming) wash face, hands;hair care, combing/brushing;supervision;verbal cues;set up  -JY     Position (Grooming) edge of bed sitting  -JY     Comment,  (Grooming) did not require physical A for hair grooming and face/hand washing, did required prepared materials and frequent cues for follow through in tasks and quality  -JY           User Key  (r) = Recorded By, (t) = Taken By, (c) = Cosigned By    Initials Name Provider Type    Rosa Rdz OT Occupational Therapist               Obj/Interventions     Row Name 10/11/22 1514          Motor Skills    Motor Skills functional endurance  -JY     Functional Endurance decreased activity tolerance toward more dynamic tasks however improving with sitting tolerance at EOB larely unsupported ~ 8 mins while completing ADL tasks  -JY     Row Name 10/11/22 1514          Balance    Balance Assessment sitting static balance;sitting dynamic balance  -JY     Static Sitting Balance standby assist  -JY     Dynamic Sitting Balance minimal assist;verbal cues  -JY     Position, Sitting Balance supported;unsupported;sitting edge of bed  -JY     Static Standing Balance not tested  -JY     Dynamic Standing Balance not tested  -JY     Balance Interventions sitting;static;dynamic;occupation based/functional task  -JY     Comment, Balance pt initially with more balance deficits sitting at EOB after coming from supine, improved w/ increased time sitting, cued to support self with BUEs when at EOB and more unsteady, able to sit supported/unsupported for ~ 8 mins  -JY           User Key  (r) = Recorded By, (t) = Taken By, (c) = Cosigned By    Initials Name Provider Type    Rosa Rdz OT Occupational Therapist               Goals/Plan    No documentation.                Clinical Impression     [facility] Name 10/11/22 1516          Pain Assessment    Additional Documentation Pain Scale: FACES Pre/Post-Treatment (Group)  -OLESYA     Row Name 10/11/22 1516          Pain Scale: FACES Pre/Post-Treatment    Pain: FACES Scale, Pretreatment 0-->no hurt  -JY     Posttreatment Pain Rating 0-->no hurt  -JY     Pre/Posttreatment Pain Comment did not  present with any verbal or nonverbal indicators of pain, tolerated OT interventions  -OLESYA     Row Name 10/11/22 1516          Plan of Care Review    Plan of Care Reviewed With patient  -JY     Progress improving  -JY     Outcome Evaluation Pt alert and participatory in OT interventions, did present with some confusion during conversation and ADL completion. Pt req'd max A x 2 for supine < > sitting for necessary UB and LB supports. Pt initially presented with unsteadiness at EOB requiring upward of min A however intermittent instances of unsupported sitting during grooming and hygiene tasks with SUA and spv with cues for improved quality and follow through in tasks. Pt tolerated sitting EOB for ~ 8 mins before fatigue resulted in return to supine. Will continue to progress pt during hospitalization with continued recommendation for SNF at d/c.  -OLESYA     Row Name 10/11/22 1516          Therapy Assessment/Plan (OT)    Rehab Potential (OT) good, to achieve stated therapy goals  -J     Criteria for Skilled Therapeutic Interventions Met (OT) yes;skilled treatment is necessary  -JY     Therapy Frequency (OT) daily  -OLESYA     Row Name 10/11/22 1516          Therapy Plan Review/Discharge Plan (OT)    Anticipated Discharge Disposition (OT) skilled nursing facility  -     Row Name 10/11/22 1516          Vital Signs    Pre Systolic BP Rehab 104  -JY     Pre Treatment Diastolic BP 83  -JY     Pretreatment Heart Rate (beats/min) 106  -JY     Posttreatment Heart Rate (beats/min) 100  -JY     Pre SpO2 (%) 99  -JY     O2 Delivery Pre Treatment room air  -JY     O2 Delivery Intra Treatment room air  -JY     Post SpO2 (%) 98  -JY     O2 Delivery Post Treatment room air  -JY     Pre Patient Position Supine  -JY     Intra Patient Position Sitting  -JY     Post Patient Position Supine  -JY     Row Name 10/11/22 1516          Positioning and Restraints    Pre-Treatment Position in bed  -JY     Post Treatment Position bed  -JY     In Bed  notified nsg;fowlers;call light within reach;encouraged to call for assist;exit alarm on;with family/caregiver;side rails up x3  -JY           User Key  (r) = Recorded By, (t) = Taken By, (c) = Cosigned By    Initials Name Provider Type    Rosa Rdz OT Occupational Therapist               Outcome Measures     Row Name 10/11/22 1522          How much help from another is currently needed...    Putting on and taking off regular lower body clothing? 1  -JY     Bathing (including washing, rinsing, and drying) 2  -JY     Toileting (which includes using toilet bed pan or urinal) 1  -JY     Putting on and taking off regular upper body clothing 3  -JY     Taking care of personal grooming (such as brushing teeth) 3  -JY     Eating meals 3  -JY     AM-PAC 6 Clicks Score (OT) 13  -JY     Row Name 10/11/22 1522          Functional Assessment    Outcome Measure Options AM-PAC 6 Clicks Daily Activity (OT)  -JY           User Key  (r) = Recorded By, (t) = Taken By, (c) = Cosigned By    Initials Name Provider Type    Rosa Rdz OT Occupational Therapist                Occupational Therapy Education     Title: PT OT SLP Therapies (In Progress)     Topic: Occupational Therapy (In Progress)     Point: ADL training (In Progress)     Description:   Instruct learner(s) on proper safety adaptation and remediation techniques during self care or transfers.   Instruct in proper use of assistive devices.              Learning Progress Summary           Patient Acceptance, E,D, NR by OLESYA at 10/11/2022 1425    Acceptance, E, VU,NR by AN at 10/8/2022 1547   Family Acceptance, E,D, NR by OLESYA at 10/11/2022 1425    Acceptance, E, VU,NR by AN at 10/8/2022 1547                   Point: Home exercise program (Not Started)     Description:   Instruct learner(s) on appropriate technique for monitoring, assisting and/or progressing therapeutic exercises/activities.              Learner Progress:  Not documented in this visit.           Point: Precautions (In Progress)     Description:   Instruct learner(s) on prescribed precautions during self-care and functional transfers.              Learning Progress Summary           Patient Acceptance, E,D, NR by OLESYA at 10/11/2022 1425    Acceptance, E, VU,NR by AN at 10/8/2022 1547   Family Acceptance, E,D, NR by JY at 10/11/2022 1425    Acceptance, E, VU,NR by AN at 10/8/2022 1547                   Point: Body mechanics (In Progress)     Description:   Instruct learner(s) on proper positioning and spine alignment during self-care, functional mobility activities and/or exercises.              Learning Progress Summary           Patient Acceptance, E,D, NR by JMG at 10/11/2022 1425    Acceptance, E, VU,NR by AN at 10/8/2022 1547   Family Acceptance, E,D, NR by JY at 10/11/2022 1425    Acceptance, E, VU,NR by AN at 10/8/2022 1547                               User Key     Initials Effective Dates Name Provider Type Discipline    OLESYA 06/16/21 -  Rosa Turner, OT Occupational Therapist OT    AN 09/21/21 -  Marcelle Crooks OT Occupational Therapist OT              OT Recommendation and Plan  Therapy Frequency (OT): daily  Plan of Care Review  Plan of Care Reviewed With: patient  Progress: improving  Outcome Evaluation: Pt alert and participatory in OT interventions, did present with some confusion during conversation and ADL completion. Pt req'd max A x 2 for supine < > sitting for necessary UB and LB supports. Pt initially presented with unsteadiness at EOB requiring upward of min A however intermittent instances of unsupported sitting during grooming and hygiene tasks with SUA and spv with cues for improved quality and follow through in tasks. Pt tolerated sitting EOB for ~ 8 mins before fatigue resulted in return to supine. Will continue to progress pt during hospitalization with continued recommendation for SNF at d/c.     Time Calculation:    Time Calculation- OT     Row Name 10/11/22 1521              Time Calculation- OT    OT Start Time 1425  -JY      OT Received On 10/11/22  -JY      OT Goal Re-Cert Due Date 10/18/22  -JY         Timed Charges    72986 - OT Therapeutic Activity Minutes 13  -JY      95844 - OT Self Care/Mgmt Minutes 10  -JY         Total Minutes    Timed Charges Total Minutes 23  -JY       Total Minutes 23  -JY            User Key  (r) = Recorded By, (t) = Taken By, (c) = Cosigned By    Initials Name Provider Type    Rosa Rdz OT Occupational Therapist              Therapy Charges for Today     Code Description Service Date Service Provider Modifiers Qty    87719143518 HC OT THERAPEUTIC ACT EA 15 MIN 10/11/2022 Rosa Turner OT GO 1    55692978450 HC OT SELF CARE/MGMT/TRAIN EA 15 MIN 10/11/2022 Rosa Turner OT GO 1    28452992512 HC OT THER SUPP EA 15 MIN 10/11/2022 Rosa Turner OT GO 2               Rosa Turner OT  10/11/2022    Electronically signed by Rosa Turner OT at 10/11/22 1526

## 2022-10-12 NOTE — PLAN OF CARE
Problem: Fall Injury Risk  Goal: Absence of Fall and Fall-Related Injury  Outcome: Ongoing, Progressing  Intervention: Promote Injury-Free Environment  Recent Flowsheet Documentation  Taken 10/12/2022 1800 by Kilo Hester, RN  Safety Promotion/Fall Prevention:   activity supervised   assistive device/personal items within reach   elopement precautions   fall prevention program maintained   lighting adjusted   mobility aid in reach   clutter free environment maintained   gait belt   muscle strengthening facilitated   nonskid shoes/slippers when out of bed   safety round/check completed   room organization consistent  Taken 10/12/2022 1600 by Kilo Hester, RN  Safety Promotion/Fall Prevention:   activity supervised   clutter free environment maintained   elopement precautions   gait belt   lighting adjusted   mobility aid in reach   assistive device/personal items within reach   fall prevention program maintained   muscle strengthening facilitated   nonskid shoes/slippers when out of bed   safety round/check completed   room organization consistent  Taken 10/12/2022 1400 by Kilo Hester RN  Safety Promotion/Fall Prevention:   activity supervised   assistive device/personal items within reach   clutter free environment maintained   elopement precautions   gait belt   lighting adjusted   mobility aid in reach   muscle strengthening facilitated   fall prevention program maintained   nonskid shoes/slippers when out of bed   room organization consistent   safety round/check completed  Taken 10/12/2022 1200 by Kilo Hester, RN  Safety Promotion/Fall Prevention:   activity supervised   assistive device/personal items within reach   clutter free environment maintained   elopement precautions   fall prevention program maintained   gait belt   muscle strengthening facilitated   nonskid shoes/slippers when out of bed   mobility aid in reach   lighting adjusted   room organization consistent   safety  round/check completed  Taken 10/12/2022 1000 by Kilo Hester RN  Safety Promotion/Fall Prevention:   activity supervised   assistive device/personal items within reach   clutter free environment maintained   fall prevention program maintained   gait belt   elopement precautions   lighting adjusted   mobility aid in reach   muscle strengthening facilitated   nonskid shoes/slippers when out of bed   room organization consistent   safety round/check completed  Taken 10/12/2022 0800 by Kilo Hester RN  Safety Promotion/Fall Prevention:   activity supervised   assistive device/personal items within reach   clutter free environment maintained   fall prevention program maintained   elopement precautions   gait belt   lighting adjusted   mobility aid in reach   muscle strengthening facilitated   nonskid shoes/slippers when out of bed   room organization consistent   safety round/check completed     Problem: Skin Injury Risk Increased  Goal: Skin Health and Integrity  Outcome: Ongoing, Progressing  Intervention: Optimize Skin Protection  Recent Flowsheet Documentation  Taken 10/12/2022 1800 by Kilo Hester, RN  Pressure Reduction Techniques: frequent weight shift encouraged  Head of Bed (HOB) Positioning: HOB at 30-45 degrees  Pressure Reduction Devices: pressure-redistributing mattress utilized  Skin Protection:   adhesive use limited   transparent dressing maintained   tubing/devices free from skin contact  Taken 10/12/2022 1600 by Kilo Hester RN  Pressure Reduction Techniques: frequent weight shift encouraged  Head of Bed (HOB) Positioning: HOB at 30-45 degrees  Pressure Reduction Devices: pressure-redistributing mattress utilized  Skin Protection:   adhesive use limited   transparent dressing maintained   tubing/devices free from skin contact  Taken 10/12/2022 1400 by Kilo Hester, RN  Pressure Reduction Techniques: frequent weight shift encouraged  Head of Bed (HOB) Positioning: HOB at  30-45 degrees  Pressure Reduction Devices: pressure-redistributing mattress utilized  Skin Protection:   adhesive use limited   tubing/devices free from skin contact   transparent dressing maintained  Taken 10/12/2022 1200 by Kilo Hester RN  Pressure Reduction Techniques: frequent weight shift encouraged  Head of Bed (HOB) Positioning: HOB at 30-45 degrees  Pressure Reduction Devices: pressure-redistributing mattress utilized  Skin Protection:   adhesive use limited   transparent dressing maintained   tubing/devices free from skin contact  Taken 10/12/2022 1000 by Kilo Hester RN  Pressure Reduction Techniques: frequent weight shift encouraged  Head of Bed (HOB) Positioning: HOB at 30-45 degrees  Pressure Reduction Devices: pressure-redistributing mattress utilized  Skin Protection:   adhesive use limited   tubing/devices free from skin contact   transparent dressing maintained  Taken 10/12/2022 0800 by Kilo Hester RN  Pressure Reduction Techniques: frequent weight shift encouraged  Head of Bed (HOB) Positioning: HOB at 30-45 degrees  Pressure Reduction Devices: pressure-redistributing mattress utilized  Skin Protection:   adhesive use limited   transparent dressing maintained   tubing/devices free from skin contact     Problem: Adult Inpatient Plan of Care  Goal: Plan of Care Review  Outcome: Ongoing, Progressing  Flowsheets (Taken 10/12/2022 1947)  Plan of Care Reviewed With: patient  Goal: Patient-Specific Goal (Individualized)  Outcome: Ongoing, Progressing  Goal: Absence of Hospital-Acquired Illness or Injury  Outcome: Ongoing, Progressing  Intervention: Identify and Manage Fall Risk  Recent Flowsheet Documentation  Taken 10/12/2022 1800 by Kilo Hester RN  Safety Promotion/Fall Prevention:   activity supervised   assistive device/personal items within reach   elopement precautions   fall prevention program maintained   lighting adjusted   mobility aid in reach   clutter free  environment maintained   gait belt   muscle strengthening facilitated   nonskid shoes/slippers when out of bed   safety round/check completed   room organization consistent  Taken 10/12/2022 1600 by Kilo Hester RN  Safety Promotion/Fall Prevention:   activity supervised   clutter free environment maintained   elopement precautions   gait belt   lighting adjusted   mobility aid in reach   assistive device/personal items within reach   fall prevention program maintained   muscle strengthening facilitated   nonskid shoes/slippers when out of bed   safety round/check completed   room organization consistent  Taken 10/12/2022 1400 by Kilo Hester RN  Safety Promotion/Fall Prevention:   activity supervised   assistive device/personal items within reach   clutter free environment maintained   elopement precautions   gait belt   lighting adjusted   mobility aid in reach   muscle strengthening facilitated   fall prevention program maintained   nonskid shoes/slippers when out of bed   room organization consistent   safety round/check completed  Taken 10/12/2022 1200 by Kilo Hester, RN  Safety Promotion/Fall Prevention:   activity supervised   assistive device/personal items within reach   clutter free environment maintained   elopement precautions   fall prevention program maintained   gait belt   muscle strengthening facilitated   nonskid shoes/slippers when out of bed   mobility aid in reach   lighting adjusted   room organization consistent   safety round/check completed  Taken 10/12/2022 1000 by Kilo Hester, RN  Safety Promotion/Fall Prevention:   activity supervised   assistive device/personal items within reach   clutter free environment maintained   fall prevention program maintained   gait belt   elopement precautions   lighting adjusted   mobility aid in reach   muscle strengthening facilitated   nonskid shoes/slippers when out of bed   room organization consistent   safety round/check  completed  Taken 10/12/2022 0800 by Kilo Hester RN  Safety Promotion/Fall Prevention:   activity supervised   assistive device/personal items within reach   clutter free environment maintained   fall prevention program maintained   elopement precautions   gait belt   lighting adjusted   mobility aid in reach   muscle strengthening facilitated   nonskid shoes/slippers when out of bed   room organization consistent   safety round/check completed  Intervention: Prevent Skin Injury  Recent Flowsheet Documentation  Taken 10/12/2022 1800 by Kilo Hester RN  Body Position: neutral body alignment  Skin Protection:   adhesive use limited   transparent dressing maintained   tubing/devices free from skin contact  Taken 10/12/2022 1600 by Kilo Hester RN  Body Position: neutral body alignment  Skin Protection:   adhesive use limited   transparent dressing maintained   tubing/devices free from skin contact  Taken 10/12/2022 1400 by Kilo Hester RN  Body Position: weight shifting  Skin Protection:   adhesive use limited   tubing/devices free from skin contact   transparent dressing maintained  Taken 10/12/2022 1200 by Kilo Hester RN  Body Position: neutral body alignment  Skin Protection:   adhesive use limited   transparent dressing maintained   tubing/devices free from skin contact  Taken 10/12/2022 1000 by Kilo Hester RN  Body Position: neutral body alignment  Skin Protection:   adhesive use limited   tubing/devices free from skin contact   transparent dressing maintained  Taken 10/12/2022 0800 by Kilo Hester RN  Body Position: neutral body alignment  Skin Protection:   adhesive use limited   transparent dressing maintained   tubing/devices free from skin contact  Intervention: Prevent and Manage VTE (Venous Thromboembolism) Risk  Recent Flowsheet Documentation  Taken 10/12/2022 1800 by Kilo Hester RN  Activity Management:   activity adjusted per tolerance   activity  encouraged  VTE Prevention/Management:   bilateral   sequential compression devices off  Taken 10/12/2022 1600 by Kilo Hester, RN  Activity Management:   activity adjusted per tolerance   activity encouraged  VTE Prevention/Management:   bilateral   sequential compression devices off  Taken 10/12/2022 1400 by Kilo Hester, RN  Activity Management:   activity adjusted per tolerance   activity encouraged  Taken 10/12/2022 1200 by Kilo eHster RN  Activity Management:   activity adjusted per tolerance   activity encouraged  VTE Prevention/Management:   bilateral   sequential compression devices off  Taken 10/12/2022 1000 by Kilo Hester RN  Activity Management:   activity adjusted per tolerance   activity encouraged  VTE Prevention/Management:   bilateral   sequential compression devices off  Taken 10/12/2022 0800 by Kilo Hester RN  Activity Management:   activity adjusted per tolerance   activity encouraged  VTE Prevention/Management:   bilateral   sequential compression devices off  Goal: Optimal Comfort and Wellbeing  Outcome: Ongoing, Progressing  Intervention: Provide Person-Centered Care  Recent Flowsheet Documentation  Taken 10/12/2022 0800 by Kilo Hester RN  Trust Relationship/Rapport: care explained  Goal: Readiness for Transition of Care  Outcome: Ongoing, Progressing   Goal Outcome Evaluation:  Plan of Care Reviewed With: patient         VSS. Pt with intermittent confusion. Pt has very good appetite and is pleasantly confused. Will monitor.

## 2022-10-12 NOTE — THERAPY DISCHARGE NOTE
Acute Care - Speech Language Pathology   Swallow Treatment Note/Discharge [facility]     Patient Name: Shantelle Decker  : 1940  MRN: [id number]  Today's Date: 10/12/2022               Admit Date: 10/7/2022    Visit Dx:    ICD-10-CM ICD-9-CM   1. Fecal impaction (HCC)  K56.41 560.32   2. Acute dehydration  E86.0 276.51   3. Acute renal insufficiency  N28.9 593.9   4. Elevated troponin  R77.8 790.6   5. Dysphagia, oral phase  R13.11 787.21     Patient Active Problem List   Diagnosis   • Incarcerated ventral hernia   • Coronary artery disease without angina pectoris   • Morbid obesity (HCC)   • Normocytic anemia   • Dementia with behavioral disturbance   • Essential hypertension   • History of breast cancer   • Thyroid mass   • LIANNA (acute kidney injury) (HCC)   • CKD (chronic kidney disease) stage 3, GFR 30-59 ml/min (HCC)   • Chest pain   • Metabolic acidosis   • Other chest pain   • Fecal impaction (HCC)   • Elevated lactic acid level   • Elevated troponin   • Difficulty swallowing   • Leukocytosis   • Fall     Past Medical History:   Diagnosis Date   • Breast cancer (CMS/HCC)    • Coronary artery disease    • Dementia (CMS/HCC)    • Dysrhythmia    • Hypertension      Past Surgical History:   Procedure Laterality Date   • ADENOIDECTOMY     • BREAST SURGERY     • CARDIAC DEFIBRILLATOR PLACEMENT     • EXPLORATORY LAPAROTOMY N/A 2016    Procedure: open ventral hernia repair with mesh;  Surgeon: Bright Buckley MD;  Location: [facility];  Service:    • MASTECTOMY Bilateral    • TONSILLECTOMY         SLP Recommendation and Plan  SLP Swallowing Diagnosis: mild, oral dysphagia (10/12/22 1530)  SLP Diet Recommendation: puree, thin liquids (10/12/22 1530)     Monitor for Signs of Aspiration: yes, notify SLP if any concerns (10/12/22 1530)     Swallow Criteria for Skilled Therapeutic Interventions Met: demonstrates skilled criteria (10/12/22 1530)  Anticipated Discharge Disposition (SLP): unknown (10/12/22  1530)  Rehab Potential/Prognosis, Swallowing: good, to achieve stated therapy goals (10/12/22 1530)           Daily Summary of Progress (SLP): progress toward functional goals as expected (10/12/22 1530)     Anticipated Discharge Disposition (SLP): unknown (10/12/22 1530)                 Treatment Assessment (SLP): Patient tolerated trials of pureed and thin liquids without s/s of aspiration. Prolonged mastication with soft solid trials. Oral holding at times, requiring cues to swallow d/t cognition. It appears that pureed is safest consistency at this time as spouse reported patient has no dentures. (10/12/22 1530)  Plan for Continued Treatment (SLP): continue treatment per plan of care (10/12/22 1530)         SWALLOW EVALUATION (last 72 hours)     SLP Adult Swallow Evaluation     Row Name 10/12/22 1530 10/11/22 1130 10/10/22 1000             Rehab Evaluation    Document Type -- -- re-evaluation  -CH      Subjective Information no complaints  -CH no complaints  -CH no complaints  -CH      Patient Observations alert;cooperative;agree to therapy  -CH cooperative;agree to therapy  -CH alert;cooperative;agree to therapy  -CH      Patient/Family/Caregiver Comments/Observations spouse present  -CH none  -CH none presentf  -CH      Patient Effort adequate  -CH adequate  -CH adequate  -CH      Comment -- -- MBS completed in collaboration with   -      Symptoms Noted During/After Treatment none  -CH none  -CH none  -CH         General Information    Patient Profile Reviewed yes  -CH yes  -CH yes  -CH      Pertinent History Of Current Problem -- -- see prior evaluation  -      Current Method of Nutrition -- -- soft textures;thin liquids  -CH      Precautions/Limitations, Vision -- -- WFL;for purposes of eval  -CH      Precautions/Limitations, Hearing -- -- WFL;for purposes of eval  -CH      Prior Level of Function-Communication -- -- cognitive-linguistic impairment  -CH      Prior Level of Function-Swallowing -- --  mechanical soft textures;thin liquids  -      Plans/Goals Discussed with -- -- patient;agreed upon  -      Barriers to Rehab -- -- cognitive status;previous functional deficit  -      Patient's Goals for Discharge -- -- patient did not state  -         Pain    Additional Documentation Pain Scale: FACES Pre/Post-Treatment (Group)  - Pain Scale: FACES Pre/Post-Treatment (Group)  - Pain Scale: FACES Pre/Post-Treatment (Group)  -         Pain Scale: FACES Pre/Post-Treatment    Pain: FACES Scale, Pretreatment 0-->no hurt  -CH 0-->no hurt  -CH 0-->no hurt  -CH      Posttreatment Pain Rating 0-->no hurt  -CH 0-->no hurt  -CH 0-->no hurt  -CH         MBS/VFSS    Utensils Used -- -- spoon;cup;straw  -      Consistencies Trialed -- -- thin liquids;pureed;regular textures  -         MBS/VFSS Interpretation    Oral Prep Phase -- -- impaired oral phase of swallowing  -      Oral Transit Phase -- -- WFL  -      Oral Residue -- -- impaired  -      VFSS Summary -- -- Mild oral dysphagia with prolonged oral phase and inefficient mastication of solids d/t edentition.Pharyngeal phase grossly WFL. Recommend pureed, thin liquids. Meds whole with thin liquids or in pureed as tolerated  -         Oral Preparatory Phase    Oral Preparatory Phase -- -- prolonged manipulation;bolus removed from mouth manually  -      Prolonged Manipulation -- -- regular textures  -      Bolus Removed from Mouth Manually -- -- regular textures  -      Oral Preparatory Phase, Comment -- -- unable to masticate chewable solids d/t edentition  -         Oral Residue    Impaired Oral Residue -- -- lingual residue  -      Lingual Residue -- -- regular textures  -      Response to Oral Residue -- -- other (see comments)  patient expectorated it  -         Initiation of Pharyngeal Swallow    Initiation of Pharyngeal Swallow -- -- bolus in pyriform sinuses  -      Pharyngeal Phase -- -- functional pharyngeal phase of  swallowing  -CH         Esophageal Phase    Esophageal Phase -- -- no impairments;see radiology report for further details  -         Swallowing Quality of Life Assessment    Education and counseling provided -- -- Safest diet options;Oral care recommendations and rationale  -         SLP Evaluation Clinical Impression    SLP Swallowing Diagnosis mild;oral dysphagia  -CH mild;oral dysphagia  -CH mild;oral dysphagia  -CH      Functional Impact risk of aspiration/pneumonia  -CH risk of aspiration/pneumonia  -CH risk of aspiration/pneumonia  -CH      Rehab Potential/Prognosis, Swallowing good, to achieve stated therapy goals  -CH good, to achieve stated therapy goals  -CH good, to achieve stated therapy goals  -CH      Swallow Criteria for Skilled Therapeutic Interventions Met demonstrates skilled criteria  -CH demonstrates skilled criteria  -CH demonstrates skilled criteria  -CH         SLP Treatment Clinical Impressions    Treatment Assessment (SLP) Patient tolerated trials of pureed and thin liquids without s/s of aspiration. Prolonged mastication with soft solid trials. Oral holding at times, requiring cues to swallow d/t cognition. It appears that pureed is safest consistency at this time as spouse reported patient has no dentures.  - Patient tolerated trials of pureed and thin liquids without s/s of aspiration. Oral holding at times, requiring cues to swallow. Continue to follow to address oral dysphagia in tx.  -CH --      Daily Summary of Progress (SLP) progress toward functional goals as expected  - progress toward functional goals as expected  - --      Barriers to Overall Progress (SLP) Cognitive status;Baseline deficits  -CH -- --      Plan for Continued Treatment (SLP) continue treatment per plan of care  - -- --      Care Plan Review evaluation/treatment results reviewed;care plan/treatment goals reviewed  - -- --      Care Plan Review, Other Participant(s) spouse  -CH -- --          Recommendations    Therapy Frequency (Swallow) -- -- PRN  -CH      Predicted Duration Therapy Intervention (Days) -- -- until discharge  -CH      SLP Diet Recommendation puree;thin liquids  - -- puree;thin liquids  -      Recommended Diagnostics -- -- other (see comments)  diet tolerance, advancement as appropriate  -      Recommended Precautions and Strategies general aspiration precautions  -CH -- general aspiration precautions  -      Oral Care Recommendations Oral Care BID/PRN  -CH -- Oral Care BID/PRN  -CH      SLP Rec. for Method of Medication Administration meds whole;with pudding or applesauce;as tolerated  -CH -- meds whole;as tolerated  -      Monitor for Signs of Aspiration yes;notify SLP if any concerns  - -- yes;notify SLP if any concerns  -      Anticipated Discharge Disposition (SLP) unknown  - -- unknown  -            User Key  (r) = Recorded By, (t) = Taken By, (c) = Cosigned By    Initials Name Effective Dates    CH Kinga Forman MS CCC-SLP 06/16/21 -                 EDUCATION  The patient has been educated in the following areas:   Dysphagia (Swallowing Impairment) Oral Care/Hydration Modified Diet Instruction.         SLP GOALS     Row Name 10/12/22 1530 10/11/22 1130 10/10/22 1000       (LTG) Patient will demonstrate functional swallow for    Diet Texture (Demonstrate functional swallow) soft chopped textures  -CH soft chopped textures  -CH soft chopped textures  -CH    Liquid viscosity (Demonstrate functional swallow) thin liquids  -CH thin liquids  -CH thin liquids  -CH    [place] (Demonstrate functional swallow) with minimal cues (75-90% accuracy)  -CH with minimal cues (75-90% accuracy)  -CH with minimal cues (75-90% accuracy)  -CH    Time Frame (Demonstrate functional swallow) by discharge  -CH by discharge  -CH by discharge  -CH    Progress/Outcomes (Demonstrate functional swallow) goal no longer appropriate  - continuing progress toward goal  - --        (STG) Patient will tolerate trials of    Consistencies Trialed (Tolerate trials) pureed/ mashed textures;thin liquids  -CH pureed/ mashed textures;thin liquids  -CH pureed/ mashed textures;thin liquids  -CH    Desired Outcome (Tolerate trials) without signs/symptoms of aspiration;with adequate oral prep/transit/clearance  -CH without signs/symptoms of aspiration;with adequate oral prep/transit/clearance  -CH without signs/symptoms of aspiration;with adequate oral prep/transit/clearance  -CH    [place] (Tolerate trials) with minimal cues (75-90% accuracy)  -CH with minimal cues (75-90% accuracy)  -CH with minimal cues (75-90% accuracy)  -CH    Time Frame (Tolerate trials) 1 week  -CH 1 week  -CH 1 week  -CH    Progress/Outcomes (Tolerate trials) goal partially met  -CH continuing progress toward goal  -CH --    Comment (Tolerate trials) oral holding at times requiring cues for swallow with pureed textures. No s/s of aspiration  -CH oral holding at time requiring cues for swallow with pureed textures. No s/s of aspiration  -CH --          User Key  (r) = Recorded By, (t) = Taken By, (c) = Cosigned By    Initials Name Provider Type     Kinga Forman MS CCC-SLP Speech and Language Pathologist                     Time Calculation:    Time Calculation- SLP     Row Name 10/12/22 1612             Time Calculation- SLP    SLP Start Time 1530  -CH      SLP Received On 10/12/22  -CH         Untimed Charges    [id number] Treatment Swallow Minutes 39  -CH         Total Minutes    Untimed Charges Total Minutes 39  -CH       Total Minutes 39  -CH            User Key  (r) = Recorded By, (t) = Taken By, (c) = Cosigned By    Initials Name Provider Type     Kinga Forman MS CCC-SLP Speech and Language Pathologist                Therapy Charges for Today     Code Description Service Date Service Provider Modifiers Qty    [id number]  ST TREATMENT SWALLOW 2 10/11/2022 Kinga Forman MS CCC-SLP GN 1    [id number]  HC ST TREATMENT SWALLOW 3 10/12/2022 Kinga Forman MS CCC-SLP GN 1               SLP Discharge Summary  Anticipated Discharge Disposition (SLP): unknown    Kinga Forman MS CCC-SLP  10/12/2022       Patient was not wearing a face mask and did exhibit coughing during this therapy encounter.  Procedure performed was aerosolizing, involved close contact (within 6 feet for at least 15 minutes or longer), and did not involve contact with infectious secretions or specimens.  Therapist used appropriate personal protective equipment including gloves, standard procedure mask and eye protection.  Appropriate PPE was worn during the entire therapy session.  Hand hygiene was completed before and after therapy session.

## 2022-10-12 NOTE — CASE MANAGEMENT/SOCIAL WORK
Continued Stay Note  [facility]     Patient Name: Shantelle Decker  MRN: [id number]  Today's Date: 10/12/2022    Admit Date: 10/7/2022    Plan: Home   Discharge Plan     Row Name 10/12/22 1238       Plan    Plan Comments MSW attempted to speak with pt at bedside this AM. MSW called pt's  to discuss safe discharge plan. Pt's  explained that he thinks pt will not want to go to rehab and he would like her to discharge home. MSW explained that pt has been fairly weak and requiring assistance at times of two people and rehab is going to be the safest option. After a few minutes of MSW speaking with pt's  and answering his questions, pt's  agreeable to rehab referral being sent to [facility] swing bed for short term rehab. Pt and pt's hsuband live in [place] and so the swing bed facility in [place] will be closer for him to visit her. Pt's  agreeable to rehab at this time.               Discharge Codes    No documentation.               Expected Discharge Date and Time     Expected Discharge Date Expected Discharge Time    Oct 14, 2022             [facility]

## 2022-10-13 LAB
ALBUMIN SERPL-MCNC: 2.6 G/DL (ref 3.5–5.2)
ALBUMIN/GLOB SERPL: 1.3 G/DL
ALP SERPL-CCNC: 50 U/L (ref 39–117)
ALT SERPL W P-5'-P-CCNC: 5 U/L (ref 1–33)
ANION GAP SERPL CALCULATED.3IONS-SCNC: 9 MMOL/L (ref 5–15)
AST SERPL-CCNC: 9 U/L (ref 1–32)
BILIRUB SERPL-MCNC: 0.3 MG/DL (ref 0–1.2)
BUN SERPL-MCNC: 21 MG/DL (ref 8–23)
BUN/CREAT SERPL: 16.3 (ref 7–25)
CALCIUM SPEC-SCNC: 7.8 MG/DL (ref 8.6–10.5)
CHLORIDE SERPL-SCNC: 111 MMOL/L (ref 98–107)
CO2 SERPL-SCNC: 22 MMOL/L (ref 22–29)
CREAT SERPL-MCNC: 1.29 MG/DL (ref 0.57–1)
DEPRECATED RDW RBC AUTO: 44.5 FL (ref 37–54)
EGFRCR SERPLBLD CKD-EPI 2021: 41 ML/MIN/1.73
ERYTHROCYTE [DISTWIDTH] IN BLOOD BY AUTOMATED COUNT: 13.6 % (ref 12.3–15.4)
GLOBULIN UR ELPH-MCNC: 2 GM/DL
GLUCOSE SERPL-MCNC: 79 MG/DL (ref 65–99)
HCT VFR BLD AUTO: 33.4 % (ref 34–46.6)
HGB BLD-MCNC: 11.1 G/DL (ref 12–15.9)
MCH RBC QN AUTO: 29.6 PG (ref 26.6–33)
MCHC RBC AUTO-ENTMCNC: 33.2 G/DL (ref 31.5–35.7)
MCV RBC AUTO: 89.1 FL (ref 79–97)
PLATELET # BLD AUTO: 228 10*3/MM3 (ref 140–450)
PMV BLD AUTO: 10.9 FL (ref 6–12)
POTASSIUM SERPL-SCNC: 3.9 MMOL/L (ref 3.5–5.2)
PROT SERPL-MCNC: 4.6 G/DL (ref 6–8.5)
RBC # BLD AUTO: 3.75 10*6/MM3 (ref 3.77–5.28)
SODIUM SERPL-SCNC: 142 MMOL/L (ref 136–145)
WBC NRBC COR # BLD: 8.87 10*3/MM3 (ref 3.4–10.8)

## 2022-10-13 PROCEDURE — 97530 THERAPEUTIC ACTIVITIES: CPT

## 2022-10-13 PROCEDURE — 99231 SBSQ HOSP IP/OBS SF/LOW 25: CPT | Performed by: HOSPITALIST

## 2022-10-13 PROCEDURE — 80053 COMPREHEN METABOLIC PANEL: CPT | Performed by: HOSPITALIST

## 2022-10-13 PROCEDURE — 97535 SELF CARE MNGMENT TRAINING: CPT

## 2022-10-13 PROCEDURE — 85027 COMPLETE CBC AUTOMATED: CPT | Performed by: HOSPITALIST

## 2022-10-13 PROCEDURE — 25010000002 HEPARIN (PORCINE) PER 1000 UNITS: Performed by: INTERNAL MEDICINE

## 2022-10-13 RX ADMIN — ASPIRIN 81 MG: 81 TABLET, COATED ORAL at 09:31

## 2022-10-13 RX ADMIN — NIFEDIPINE 60 MG: 30 TABLET, FILM COATED, EXTENDED RELEASE ORAL at 09:31

## 2022-10-13 RX ADMIN — NYSTATIN: 100000 POWDER TOPICAL at 11:10

## 2022-10-13 RX ADMIN — MEMANTINE 5 MG: 10 TABLET ORAL at 09:33

## 2022-10-13 RX ADMIN — HEPARIN SODIUM 5000 UNITS: 5000 INJECTION INTRAVENOUS; SUBCUTANEOUS at 15:30

## 2022-10-13 RX ADMIN — NYSTATIN 1 APPLICATION: 100000 POWDER TOPICAL at 21:18

## 2022-10-13 RX ADMIN — Medication 10 MG: at 21:18

## 2022-10-13 RX ADMIN — CITALOPRAM HYDROBROMIDE 20 MG: 20 TABLET ORAL at 09:32

## 2022-10-13 RX ADMIN — QUETIAPINE FUMARATE 50 MG: 25 TABLET, FILM COATED ORAL at 21:18

## 2022-10-13 RX ADMIN — SODIUM CHLORIDE 1000 ML: 9 INJECTION, SOLUTION INTRAVENOUS at 10:33

## 2022-10-13 RX ADMIN — DONEPEZIL HYDROCHLORIDE 10 MG: 10 TABLET, FILM COATED ORAL at 21:18

## 2022-10-13 RX ADMIN — HEPARIN SODIUM 5000 UNITS: 5000 INJECTION INTRAVENOUS; SUBCUTANEOUS at 21:18

## 2022-10-13 RX ADMIN — HEPARIN SODIUM 5000 UNITS: 5000 INJECTION INTRAVENOUS; SUBCUTANEOUS at 05:37

## 2022-10-13 RX ADMIN — TRAZODONE HYDROCHLORIDE 50 MG: 50 TABLET ORAL at 21:18

## 2022-10-13 RX ADMIN — SENNOSIDES 2 TABLET: 8.6 TABLET, FILM COATED ORAL at 21:18

## 2022-10-13 RX ADMIN — MEMANTINE 5 MG: 10 TABLET ORAL at 21:18

## 2022-10-13 NOTE — PLAN OF CARE
Goal Outcome Evaluation:                 Problem: Fall Injury Risk  Goal: Absence of Fall and Fall-Related Injury  Intervention: Identify and Manage Contributors  Recent Flowsheet Documentation  Taken 10/13/2022 0200 by Krunal Kebede RN  Medication Review/Management: medications reviewed  Taken 10/13/2022 0000 by Krunal Kebede RN  Medication Review/Management: medications reviewed  Taken 10/12/2022 2200 by Krunal Kebede RN  Medication Review/Management: medications reviewed  Taken 10/12/2022 2000 by Krunal Kebede RN  Medication Review/Management: medications reviewed  Intervention: Promote Injury-Free Environment  Recent Flowsheet Documentation  Taken 10/13/2022 0200 by Krunal Kebede RN  Safety Promotion/Fall Prevention:   activity supervised   safety round/check completed   toileting scheduled  Taken 10/13/2022 0000 by Krunal Kebede RN  Safety Promotion/Fall Prevention:   activity supervised   safety round/check completed   toileting scheduled  Taken 10/12/2022 2200 by Krunal Kebede RN  Safety Promotion/Fall Prevention:   activity supervised   safety round/check completed   toileting scheduled  Taken 10/12/2022 2000 by Krunal Kebede RN  Safety Promotion/Fall Prevention:   activity supervised   safety round/check completed   toileting scheduled     Problem: Skin Injury Risk Increased  Goal: Skin Health and Integrity  Intervention: Optimize Skin Protection  Recent Flowsheet Documentation  Taken 10/13/2022 0200 by Krunal Kebede RN  Pressure Reduction Techniques: frequent weight shift encouraged  Head of Bed (HOB) Positioning: HOB at 30-45 degrees  Pressure Reduction Devices: pressure-redistributing mattress utilized  Skin Protection: adhesive use limited  Taken 10/13/2022 0000 by Krunal Kebede RN  Pressure Reduction Techniques: frequent weight shift encouraged  Head of Bed (HOB) Positioning: HOB at 30-45 degrees  Pressure Reduction Devices: pressure-redistributing mattress utilized  Skin  Protection: adhesive use limited  Taken 10/12/2022 2200 by Krunal Kebede RN  Pressure Reduction Techniques: frequent weight shift encouraged  Head of Bed (HOB) Positioning: HOB at 30-45 degrees  Pressure Reduction Devices: pressure-redistributing mattress utilized  Skin Protection: adhesive use limited  Taken 10/12/2022 2000 by Krunal Kebede RN  Pressure Reduction Techniques: frequent weight shift encouraged  Head of Bed (HOB) Positioning: HOB at 30-45 degrees  Pressure Reduction Devices: pressure-redistributing mattress utilized  Skin Protection: adhesive use limited     Problem: Adult Inpatient Plan of Care  Goal: Absence of Hospital-Acquired Illness or Injury  Intervention: Identify and Manage Fall Risk  Recent Flowsheet Documentation  Taken 10/13/2022 0200 by Krunal Kebede RN  Safety Promotion/Fall Prevention:   activity supervised   safety round/check completed   toileting scheduled  Taken 10/13/2022 0000 by Krunal Kebede RN  Safety Promotion/Fall Prevention:   activity supervised   safety round/check completed   toileting scheduled  Taken 10/12/2022 2200 by Krunal Kebede RN  Safety Promotion/Fall Prevention:   activity supervised   safety round/check completed   toileting scheduled  Taken 10/12/2022 2000 by Krunal Kebede RN  Safety Promotion/Fall Prevention:   activity supervised   safety round/check completed   toileting scheduled  Intervention: Prevent Skin Injury  Recent Flowsheet Documentation  Taken 10/13/2022 0200 by Krunal Kebede RN  Body Position: supine  Skin Protection: adhesive use limited  Taken 10/13/2022 0000 by Krunal Kebede RN  Body Position: supine  Skin Protection: adhesive use limited  Taken 10/12/2022 2200 by Krunal Kebede RN  Body Position: supine  Skin Protection: adhesive use limited  Taken 10/12/2022 2000 by Krunal Kebede RN  Body Position: supine  Skin Protection: adhesive use limited  Intervention: Prevent and Manage VTE (Venous Thromboembolism)  Risk  Recent Flowsheet Documentation  Taken 10/13/2022 0200 by Krunal Kebede RN  VTE Prevention/Management:   bilateral   sequential compression devices off  Taken 10/13/2022 0000 by Krunla Kebede RN  VTE Prevention/Management:   bilateral   sequential compression devices off  Taken 10/12/2022 2200 by Krunal Kebede RN  VTE Prevention/Management:   bilateral   sequential compression devices off  Taken 10/12/2022 2000 by Krunal Kebede RN  VTE Prevention/Management:   bilateral   sequential compression devices off  Intervention: Prevent Infection  Recent Flowsheet Documentation  Taken 10/13/2022 0200 by Krunal Kebede RN  Infection Prevention: environmental surveillance performed  Taken 10/13/2022 0000 by Krunal Kebede RN  Infection Prevention: environmental surveillance performed  Taken 10/12/2022 2200 by Krunal Kebede RN  Infection Prevention: environmental surveillance performed  Taken 10/12/2022 2000 by Krunal Kebede RN  Infection Prevention: environmental surveillance performed  Goal: Optimal Comfort and Wellbeing  Intervention: Monitor Pain and Promote Comfort  Recent Flowsheet Documentation  Taken 10/13/2022 0200 by Krunal Kebede RN  Pain Management Interventions: quiet environment facilitated  Taken 10/13/2022 0000 by Krunal Kebede RN  Pain Management Interventions: quiet environment facilitated  Taken 10/12/2022 2200 by Krunal Kebede RN  Pain Management Interventions: quiet environment facilitated  Taken 10/12/2022 2000 by Krunal Kebede RN  Pain Management Interventions: quiet environment facilitated  Intervention: Provide Person-Centered Care  Recent Flowsheet Documentation  Taken 10/13/2022 0200 by Krunal Kebede RN  Trust Relationship/Rapport: care explained  Taken 10/13/2022 0000 by Krunal Kebede RN  Trust Relationship/Rapport: care explained  Taken 10/12/2022 2200 by Krunal Kebede RN  Trust Relationship/Rapport: care explained  Taken 10/12/2022 2000 by Yandy  Krunal RAMIREZ RN  Trust Relationship/Rapport: care explained     VSS, voids well, rested throughout the night, will continue to monitor for changes.

## 2022-10-13 NOTE — PROGRESS NOTES
[facility]  PROGRESS NOTE    Patient Name: Shantelle Decker  : 1940  MRN: [id number]    Date of Admission: 10/7/2022  Primary Care Physician: Nancy Montiel APRN    Subjective   Subjective     CC:  F/U fall, fecal impaction    HPI:  Patient resting in bed, no events overnight. Did not awaken the patient. No family at bedside.    ROS:  Unable to obtain full ROS due to dementia/sleeping    Objective   Objective     Vital Signs:   Temp:  [97.5 °F (36.4 °C)-98.7 °F (37.1 °C)] 97.7 °F (36.5 °C)  Heart Rate:  [61-90] 62  Resp:  [18] 18  BP: ()/(28-69) 110/52     Physical Exam:  Constitutional: No acute distress, sleeping, laying in bed  HENT: NCAT, mucous membranes moist  Respiratory: Clear to auscultation bilaterally, respiratory effort normal   Cardiovascular: RRR, no murmurs, rubs, or gallops  Gastrointestinal: Positive bowel sounds, soft, nontender, nondistended  Musculoskeletal: No bilateral ankle edema  Psychiatric: calm, cooperative  Neurologic: speech clear  Skin: No rashes    Results Reviewed:  LAB RESULTS:      Lab 10/13/22  0432 10/08/22  0541 10/08/22  0039 10/07/22  1906   WBC 8.87 8.52  --  11.03*   HEMOGLOBIN 11.1* 11.8*  --  12.9   HEMATOCRIT 33.4* 35.9  --  38.4   PLATELETS 228 186  --  214   NEUTROS ABS  --  6.16  --  8.22*   IMMATURE GRANS (ABS)  --  0.06*  --  0.07*   LYMPHS ABS  --  1.26  --  1.62   MONOS ABS  --  0.93*  --  1.08*   EOS ABS  --  0.07  --  0.01   MCV 89.1 90.7  --  87.7   PROCALCITONIN  --   --   --  0.06   LACTATE  --   --  1.5 2.4*         Lab 10/13/22  0432 10/11/22  1126 10/10/22  0436 10/09/22  1510 10/08/22  0541   SODIUM 142 140 140 142 139   POTASSIUM 3.9 3.8 3.7 4.0 3.9   CHLORIDE 111* 108* 110* 112* 105   CO2 22.0 24.0 20.0* 22.0 24.0   ANION GAP 9.0 8.0 10.0 8.0 10.0   BUN 21 18 15 17 25*   CREATININE 1.29* 1.06* 1.22* 1.46* 1.93*   EGFR 41.0* 51.9* 43.8* 35.3* 25.3*   GLUCOSE 79 93 83 91 101*   CALCIUM 7.8* 8.5*  7.8* 7.7* 8.2*   MAGNESIUM  --   --   --   --  1.8   HEMOGLOBIN A1C  --   --   --   --  4.60*   TSH  --   --   --   --  5.900*         Lab 10/13/22  0432 10/07/22  1906   TOTAL PROTEIN 4.6* 6.2   ALBUMIN 2.60* 3.20*   GLOBULIN 2.0 3.0   ALT (SGPT) 5 6   AST (SGOT) 9 16   BILIRUBIN 0.3 1.2   ALK PHOS 50 64         Lab 10/08/22  0541 10/08/22  0039 10/07/22  1906   TROPONIN T 0.053* 0.042* 0.054*         Lab 10/08/22  0541   CHOLESTEROL 163   LDL CHOL 101*   HDL CHOL 44   TRIGLYCERIDES 95             Brief Urine Lab Results  (Last result in the past 365 days)      Color   Clarity   Blood   Leuk Est   Nitrite   Protein   CREAT   Urine HCG        10/07/22 1917 Yellow   Clear   Negative   Trace   Negative   30 mg/dL (1+)                 Microbiology Results Abnormal     Procedure Component Value - Date/Time    COVID PRE-OP / PRE-PROCEDURE SCREENING ORDER (NO ISOLATION) - Swab, Nasopharynx [[id number]]  (Normal) Collected: 10/07/22 1955    Lab Status: Final result Specimen: Swab from Nasopharynx Updated: 10/07/22 2039    Narrative:      The following orders were created for panel order COVID PRE-OP / PRE-PROCEDURE SCREENING ORDER (NO ISOLATION) - Swab, Nasopharynx.  Procedure                               Abnormality         Status                     ---------                               -----------         ------                     COVID-19 and FLU A/B PCR...[[id number]]  Normal              Final result                 Please view results for these tests on the individual orders.    COVID-19 and FLU A/B PCR - Swab, Nasopharynx [[id number]]  (Normal) Collected: 10/07/22 1955    Lab Status: Final result Specimen: Swab from Nasopharynx Updated: 10/07/22 2039     COVID19 Not Detected     Influenza A PCR Not Detected     Influenza B PCR Not Detected    Narrative:      Fact sheet for providers: https://www.fda.gov/media/839664/download    Fact sheet for patients: https://www.fda.gov/media/755400/download    Test performed by  PCR.          No radiology results from the last 24 hrs    Results for orders placed during the hospital encounter of 05/20/22    Adult Transthoracic Echo Complete W/ Cont if Necessary Per Protocol    Interpretation Summary  · Left ventricular systolic function is normal. Estimated left ventricular EF = 65%.  · Left ventricular wall thickness is consistent with mild concentric hypertrophy.  · Left ventricular diastolic function is consistent with (grade I) impaired relaxation.  · The aortic valve is mildly calcified. Otherwise, the cardiac valves are anatomically and functionally normal.  · Estimated right ventricular systolic pressure from tricuspid regurgitation is normal (<35 mmHg).      I have reviewed the medications:  Scheduled Meds:aspirin, 81 mg, Oral, Daily  citalopram, 20 mg, Oral, Daily  docusate sodium, 100 mg, Oral, BID  donepezil, 10 mg, Oral, Nightly  heparin (porcine), 5,000 Units, Subcutaneous, Q8H  melatonin, 10 mg, Oral, Nightly  memantine, 5 mg, Oral, BID  NIFEdipine XL, 60 mg, Oral, Q24H  nystatin, , Topical, Q12H  polyethylene glycol, 17 g, Oral, Daily  QUEtiapine, 50 mg, Oral, Nightly  senna, 2 tablet, Oral, BID  sodium chloride, 10 mL, Intravenous, Q12H  traZODone, 50 mg, Oral, Nightly      Continuous Infusions:   PRN Meds:.•  acetaminophen **OR** acetaminophen **OR** acetaminophen  •  ondansetron **OR** ondansetron  •  QUEtiapine  •  [COMPLETED] Insert peripheral IV **AND** sodium chloride  •  sodium chloride    Assessment & Plan   Assessment & Plan     Active Hospital Problems    Diagnosis  POA   • **Fecal impaction (HCC) [K56.41]  Yes   • Elevated lactic acid level [R79.89]  Unknown   • Elevated troponin [R77.8]  Unknown   • Difficulty swallowing [R13.10]  Unknown   • Leukocytosis [D72.829]  Unknown   • Fall [W19.XXXA]  Unknown   • LIANNA (acute kidney injury) (HCC) [N17.9]  Yes   • Dementia with behavioral disturbance [F03.918]  Yes   • Essential hypertension [I10]  Yes   • Coronary artery  disease without angina pectoris [I25.10]  Yes      Resolved Hospital Problems   No resolved problems to display.        Brief Hospital Course to date:  Shantelle Decker is a 82 y.o. female with advanced dementia, HTN, CAD, history of breast cancer who presented to the ED after a fall and was found to have fecal impaction and acute kidney injury.    This patient's problems and plans were partially entered by my partner and updated as appropriate by me 10/13/22.    Fecal impaction  - Now disimpacted, having bowel movements  - Continue scheduled miralax and docusate, senna     Difficulty swallowing  - SLP following.  Placed on modified diet- puree, thin liquids for mild, oral dysphagia     Leukocytosis  Elevated lactic acid  - Suspect related to dehydration  - Procalcitonin negative, UA negative, CT abdomen pelvis without infectious process, CXR without evidence of pneumonia     Elevated troponin  - Likely demand ischemia in setting of dehydration, LIANNA     LIANNA on CKD III  -- Creatinine 2.11 on admission (baseline 1.1-1.5)  -- Improving with IV fluids-at baseline now     Dementia  Fall  - Continue home meds  - PT/OT     HTN  CAD  - Continue home meds    Expected Discharge Location and Transportation:  agreeable to rehab- referral sent to [facility] in [place]- pending  Expected Discharge Date: 10/14    DVT prophylaxis:  Medical DVT prophylaxis orders are present.     AM-PAC 6 Clicks Score (PT): 10 (10/11/22 6719)    CODE STATUS:   Code Status and Medical Interventions:   Ordered at: 10/07/22 0231     Level Of Support Discussed With:    Next of Kin (If No Surrogate)     Code Status (Patient has no pulse and is not breathing):    CPR (Attempt to Resuscitate)     Medical Interventions (Patient has pulse or is breathing):    Full Support       Yue Elmore,   10/13/22

## 2022-10-13 NOTE — THERAPY TREATMENT NOTE
Patient Name: Shantelle Decker  : 1940    MRN: [id number]                              Today's Date: 10/13/2022       Admit Date: 10/7/2022    Visit Dx:     ICD-10-CM ICD-9-CM   1. Fecal impaction (HCC)  K56.41 560.32   2. Acute dehydration  E86.0 276.51   3. Acute renal insufficiency  N28.9 593.9   4. Elevated troponin  R77.8 790.6   5. Dysphagia, oral phase  R13.11 787.21     Patient Active Problem List   Diagnosis   • Incarcerated ventral hernia   • Coronary artery disease without angina pectoris   • Morbid obesity (HCC)   • Normocytic anemia   • Dementia with behavioral disturbance   • Essential hypertension   • History of breast cancer   • Thyroid mass   • LIANNA (acute kidney injury) (HCC)   • CKD (chronic kidney disease) stage 3, GFR 30-59 ml/min (HCC)   • Chest pain   • Metabolic acidosis   • Other chest pain   • Fecal impaction (HCC)   • Elevated lactic acid level   • Elevated troponin   • Difficulty swallowing   • Leukocytosis   • Fall     Past Medical History:   Diagnosis Date   • Breast cancer (CMS/HCC)    • Coronary artery disease    • Dementia (CMS/HCC)    • Dysrhythmia    • Hypertension      Past Surgical History:   Procedure Laterality Date   • ADENOIDECTOMY     • BREAST SURGERY     • CARDIAC DEFIBRILLATOR PLACEMENT     • EXPLORATORY LAPAROTOMY N/A 2016    Procedure: open ventral hernia repair with mesh;  Surgeon: Bright Buckley MD;  Location: [facility];  Service:    • MASTECTOMY Bilateral    • TONSILLECTOMY        General Information     Row Name 10/13/22 1328          OT Time and Intention    Document Type therapy note (daily note)  -AN     Mode of Treatment occupational therapy  -AN     Row Name 10/13/22 1328          General Information    Patient Profile Reviewed yes  -AN     Existing Precautions/Restrictions fall;other (see comments)  advanced dementia  -AN     Barriers to Rehab medically complex;previous functional deficit;cognitive status  -AN     Row Name 10/13/22 1327           Cognition    Orientation Status (Cognition) oriented to;person;disoriented to;place;time;situation;verbal cues/prompts needed for orientation  -AN     Row Name 10/13/22 1328          Safety Issues, Functional Mobility    Safety Issues Affecting Function (Mobility) awareness of need for assistance;insight into deficits/self-awareness;judgment;problem-solving;safety precaution awareness;safety precautions follow-through/compliance;sequencing abilities  -AN     Impairments Affecting Function (Mobility) balance;cognition;pain;strength;postural/trunk control;endurance/activity tolerance  -AN     Cognitive Impairments, Mobility Safety/Performance attention;awareness, need for assistance;insight into deficits/self-awareness;judgment;problem-solving/reasoning;sequencing abilities;safety precaution follow-through;safety precaution awareness  -AN     Comment, Safety Issues/Impairments (Mobility) pt requires frequent redirection to task throughout session due to cognitive deficits, but able to follow one step commands  -AN           User Key  (r) = Recorded By, (t) = Taken By, (c) = Cosigned By    Initials Name Provider Type    AN Marcelle Crooks OT Occupational Therapist                 Mobility/ADL's     Row Name 10/13/22 132          Bed Mobility    Bed Mobility rolling left;rolling right  -AN     Rolling Left [place] (Bed Mobility) maximum assist (25% patient effort);1 person assist;verbal cues;nonverbal cues (demo/gesture)  -AN     Rolling Right [place] (Bed Mobility) nonverbal cues (demo/gesture);verbal cues;maximum assist (25% patient effort);1 person assist  -AN     Bed Mobility, Safety Issues cognitive deficits limit understanding;decreased use of arms for pushing/pulling;decreased use of legs for bridging/pushing;impaired trunk control for bed mobility  -AN     Assistive Device (Bed Mobility) bed rails;draw sheet  -AN     Comment, (Bed Mobility) verbal and tactile cues to initiate reaching for bed rails  during rolling  -AN     Row Name 10/13/22 1329          Transfers    Transfers sit-stand transfer;stand-sit transfer;bed-chair transfer  -AN     Comment, (Transfers) cues for hand placement and transfer technique; pt tolerated weight shifting L and R with Mod A x 2, unable to initiate steps d/t b/l knee buckling and 1 posterior LOB  -AN     Row Name 10/13/22 1329          Sit-Stand Transfer    Sit-Stand [place] (Transfers) verbal cues;nonverbal cues (demo/gesture);maximum assist (25% patient effort);2 person assist  -AN     Assistive Device (Sit-Stand Transfers) other (see comments)  UE support  -AN     Row Name 10/13/22 1329          Stand-Sit Transfer    Stand-Sit [place] (Transfers) verbal cues;nonverbal cues (demo/gesture);maximum assist (25% patient effort);2 person assist  -AN     Assistive Device (Stand-Sit Transfers) other (see comments)  UE support  -AN     Row Name 10/13/22 1329          Functional Mobility    Functional Mobility- Ind. Level unable to perform  -AN     [facility] Name 10/13/22 1329          Activities of Daily Living    BADL Assessment/Intervention lower body dressing;feeding  -AN     Row Name 10/13/22 1329          Lower Body Dressing Assessment/Training    [place] Level (Lower Body Dressing) doff;don;socks;dependent (less than 25% patient effort)  -AN     Position (Lower Body Dressing) supine  -AN     [facility] Name 10/13/22 1329          Upper Body Dressing Assessment/Training    Position (Upper Body Dressing) supported sitting  -AN     [facility] Name 10/13/22 1329          Self-Feeding Assessment/Training    [place] Level (Feeding) prepare tray/open items;scoop food and bring to mouth;supervision  -AN     Position (Self-Feeding) supported sitting  -AN           User Key  (r) = Recorded By, (t) = Taken By, (c) = Cosigned By    Initials Name Provider Type    Marcelle Che OT Occupational Therapist               Obj/Interventions     Row Name 10/13/22 1339          Balance     Balance Assessment sitting static balance;sitting dynamic balance;sit to stand dynamic balance;standing static balance  -AN     Static Sitting Balance supervision  -AN     Dynamic Sitting Balance contact guard  -AN     Position, Sitting Balance sitting in chair  -AN     Sit to Stand Dynamic Balance verbal cues;maximum assist;2-person assist  -AN     Static Standing Balance moderate assist;2-person assist;verbal cues  -AN     Position/Device Used, Standing Balance supported  -AN     Balance Interventions standing;sit to stand;supported;static;highly challenging;narrowed base of support  -AN           User Key  (r) = Recorded By, (t) = Taken By, (c) = Cosigned By    Initials Name Provider Type    AN Marcelle Crooks OT Occupational Therapist               Goals/Plan    No documentation.                Clinical Impression     Row Name 10/13/22 5430          Pain Assessment    Pretreatment Pain Rating 0/10 - no pain  -AN     Posttreatment Pain Rating 0/10 - no pain  -AN     Pre/Posttreatment Pain Comment asymptomatic  -AN     Pain Intervention(s) Repositioned;Ambulation/increased activity  -AN     Row Name 10/13/22 6731          Plan of Care Review    Plan of Care Reviewed With patient;spouse  -AN     Progress improving  -AN     Outcome Evaluation Pt continues to be oriented to self only, however very pleasant with all activities. Pt tolerated STS with Max A x 2, maintaining static standing with Mod A x 2 for ~30 sec each trial. Pt motivated with encouragement from spouse. Unable to initiate steps at this time due to b/l buckling and poor command following. OT cont to rec SNF at [place].  -AN     Row Name 10/13/22 134          Therapy Plan Review/Discharge Plan (OT)    Anticipated Discharge Disposition (OT) skilled nursing facility  -AN     Row Name 10/13/22 1340          Vital Signs    Pre Systolic BP Rehab --  VSS  -AN     O2 Delivery Pre Treatment room air  -AN     O2 Delivery Intra Treatment room air  -AN     O2  Delivery Post Treatment room air  -AN     Pre Patient Position Supine  -AN     Intra Patient Position Standing  -AN     Post Patient Position Sitting  -AN     Row Name 10/13/22 1340          Positioning and Restraints    Pre-Treatment Position in bed  -AN     Post Treatment Position chair  -AN     In Chair notified nsg;reclined;encouraged to call for assist;call light within reach;exit alarm on;with family/caregiver;waffle cushion;on mechanical lift sling;legs elevated  -AN           User Key  (r) = Recorded By, (t) = Taken By, (c) = Cosigned By    Initials Name Provider Type    Marcelle Che OT Occupational Therapist               Outcome Measures     Row Name 10/13/22 1343          How much help from another is currently needed...    Putting on and taking off regular lower body clothing? 1  -AN     Bathing (including washing, rinsing, and drying) 2  -AN     Toileting (which includes using toilet bed pan or urinal) 1  -AN     Putting on and taking off regular upper body clothing 3  -AN     Taking care of personal grooming (such as brushing teeth) 3  -AN     Eating meals 3  -AN     AM-PAC 6 Clicks Score (OT) 13  -AN     Row Name 10/13/22 1343          Functional Assessment    Outcome Measure Options AM-PAC 6 Clicks Daily Activity (OT)  -AN           User Key  (r) = Recorded By, (t) = Taken By, (c) = Cosigned By    Initials Name Provider Type    Marcelle Che OT Occupational Therapist                Occupational Therapy Education     Title: PT OT SLP Therapies (In Progress)     Topic: Occupational Therapy (In Progress)     Point: ADL training (Done)     Description:   Instruct learner(s) on proper safety adaptation and remediation techniques during self care or transfers.   Instruct in proper use of assistive devices.              Learning Progress Summary           Patient Acceptance, E, VU,NR by MEGA at 10/13/2022 1343    Acceptance, E,D, NR by OLESYA at 10/11/2022 1425    Acceptance, E, VU,NR by MEGA at  10/8/2022 1547   Family Acceptance, E, VU,NR by AN at 10/13/2022 1343    Acceptance, E,D, NR by OLESYA at 10/11/2022 1425    Acceptance, E, VU,NR by AN at 10/8/2022 1547                   Point: Home exercise program (Not Started)     Description:   Instruct learner(s) on appropriate technique for monitoring, assisting and/or progressing therapeutic exercises/activities.              Learner Progress:  Not documented in this visit.          Point: Precautions (Done)     Description:   Instruct learner(s) on prescribed precautions during self-care and functional transfers.              Learning Progress Summary           Patient Acceptance, E, VU,NR by AN at 10/13/2022 1343    Acceptance, E,D, NR by OLESYA at 10/11/2022 1425    Acceptance, E, VU,NR by AN at 10/8/2022 1547   Family Acceptance, E, VU,NR by AN at 10/13/2022 1343    Acceptance, E,D, NR by OLESYA at 10/11/2022 1425    Acceptance, E, VU,NR by AN at 10/8/2022 1547                   Point: Body mechanics (Done)     Description:   Instruct learner(s) on proper positioning and spine alignment during self-care, functional mobility activities and/or exercises.              Learning Progress Summary           Patient Acceptance, E, VU,NR by AN at 10/13/2022 1343    Acceptance, E,D, NR by OLESYA at 10/11/2022 1425    Acceptance, E, VU,NR by AN at 10/8/2022 1547   Family Acceptance, E, VU,NR by AN at 10/13/2022 1343    Acceptance, E,D, NR by JMG at 10/11/2022 1425    Acceptance, E, VU,NR by AN at 10/8/2022 1547                               User Key     Initials Effective Dates Name Provider Type Discipline    FELICIA 06/16/21 -  Rosa Turner OT Occupational Therapist OT    MEGA 09/21/21 -  Marcelle Crooks OT Occupational Therapist OT              OT Recommendation and Plan  Planned Therapy Interventions (OT): activity tolerance training, adaptive equipment training, BADL retraining, functional balance retraining, occupation/activity based interventions, patient/caregiver  education/training, transfer/mobility retraining, strengthening exercise  Therapy Frequency (OT): daily  Plan of Care Review  Plan of Care Reviewed With: patient, spouse  Progress: improving  Outcome Evaluation: Pt continues to be oriented to self only, however very pleasant with all activities. Pt tolerated STS with Max A x 2, maintaining static standing with Mod A x 2 for ~30 sec each trial. Pt motivated with encouragement from spouse. Unable to initiate steps at this time due to b/l buckling and poor command following. OT cont to rec SNF at [place].     Time Calculation:    Time Calculation- OT     Row Name 10/13/22 1344             Time Calculation- OT    OT Start Time 1145  -AN      OT Received On 10/13/22  -AN         Timed Charges    99534 - OT Therapeutic Activity Minutes 15  -AN      68320 - OT Self Care/Mgmt Minutes 10  -AN         Total Minutes    Timed Charges Total Minutes 25  -AN       Total Minutes 25  -AN            User Key  (r) = Recorded By, (t) = Taken By, (c) = Cosigned By    Initials Name Provider Type    AN Marcelle Crooks OT Occupational Therapist              Therapy Charges for Today     Code Description Service Date Service Provider Modifiers Qty    67997356818 HC OT THERAPEUTIC ACT EA 15 MIN 10/13/2022 Marcelle Crooks OT GO 1    57620891439 HC OT SELF CARE/MGMT/TRAIN EA 15 MIN 10/13/2022 Marcelle Crooks OT GO 1    42956098217 HC OT THER SUPP EA 15 MIN 10/13/2022 Marcelle Crooks OT GO 2               Marcelle Crooks OT  10/13/2022

## 2022-10-13 NOTE — PLAN OF CARE
Goal Outcome Evaluation:  Plan of Care Reviewed With: patient, spouse        Progress: improving  Outcome Evaluation: Pt continues to be oriented to self only, however very pleasant with all activities. Pt tolerated STS with Max A x 2, maintaining static standing with Mod A x 2 for ~30 sec each trial. Pt motivated with encouragement from spouse. Unable to initiate steps at this time due to b/l buckling and poor command following. OT cont to rec SNF at [place].

## 2022-10-14 LAB
ALBUMIN SERPL-MCNC: 2.9 G/DL (ref 3.5–5.2)
ALBUMIN/GLOB SERPL: 1.3 G/DL
ALP SERPL-CCNC: 53 U/L (ref 39–117)
ALT SERPL W P-5'-P-CCNC: 6 U/L (ref 1–33)
ANION GAP SERPL CALCULATED.3IONS-SCNC: 9 MMOL/L (ref 5–15)
AST SERPL-CCNC: 12 U/L (ref 1–32)
BILIRUB SERPL-MCNC: 0.4 MG/DL (ref 0–1.2)
BUN SERPL-MCNC: 25 MG/DL (ref 8–23)
BUN/CREAT SERPL: 20.2 (ref 7–25)
CALCIUM SPEC-SCNC: 8.1 MG/DL (ref 8.6–10.5)
CHLORIDE SERPL-SCNC: 106 MMOL/L (ref 98–107)
CO2 SERPL-SCNC: 22 MMOL/L (ref 22–29)
CREAT SERPL-MCNC: 1.24 MG/DL (ref 0.57–1)
DEPRECATED RDW RBC AUTO: 45.8 FL (ref 37–54)
EGFRCR SERPLBLD CKD-EPI 2021: 43 ML/MIN/1.73
ERYTHROCYTE [DISTWIDTH] IN BLOOD BY AUTOMATED COUNT: 13.6 % (ref 12.3–15.4)
GLOBULIN UR ELPH-MCNC: 2.3 GM/DL
GLUCOSE BLDC GLUCOMTR-MCNC: 110 MG/DL (ref 70–130)
GLUCOSE BLDC GLUCOMTR-MCNC: 89 MG/DL (ref 70–130)
GLUCOSE SERPL-MCNC: 97 MG/DL (ref 65–99)
HCT VFR BLD AUTO: 36.7 % (ref 34–46.6)
HGB BLD-MCNC: 12 G/DL (ref 12–15.9)
MCH RBC QN AUTO: 30 PG (ref 26.6–33)
MCHC RBC AUTO-ENTMCNC: 32.7 G/DL (ref 31.5–35.7)
MCV RBC AUTO: 91.8 FL (ref 79–97)
PLATELET # BLD AUTO: 264 10*3/MM3 (ref 140–450)
PMV BLD AUTO: 11.1 FL (ref 6–12)
POTASSIUM SERPL-SCNC: 3.9 MMOL/L (ref 3.5–5.2)
PROT SERPL-MCNC: 5.2 G/DL (ref 6–8.5)
RBC # BLD AUTO: 4 10*6/MM3 (ref 3.77–5.28)
SODIUM SERPL-SCNC: 137 MMOL/L (ref 136–145)
WBC NRBC COR # BLD: 11.07 10*3/MM3 (ref 3.4–10.8)

## 2022-10-14 PROCEDURE — 97116 GAIT TRAINING THERAPY: CPT

## 2022-10-14 PROCEDURE — 25010000002 HEPARIN (PORCINE) PER 1000 UNITS: Performed by: INTERNAL MEDICINE

## 2022-10-14 PROCEDURE — 80053 COMPREHEN METABOLIC PANEL: CPT | Performed by: HOSPITALIST

## 2022-10-14 PROCEDURE — 82962 GLUCOSE BLOOD TEST: CPT

## 2022-10-14 PROCEDURE — 99231 SBSQ HOSP IP/OBS SF/LOW 25: CPT | Performed by: HOSPITALIST

## 2022-10-14 PROCEDURE — 97110 THERAPEUTIC EXERCISES: CPT

## 2022-10-14 PROCEDURE — 85027 COMPLETE CBC AUTOMATED: CPT | Performed by: HOSPITALIST

## 2022-10-14 RX ORDER — ASPIRIN 81 MG/1
81 TABLET, CHEWABLE ORAL DAILY
Status: DISCONTINUED | OUTPATIENT
Start: 2022-10-14 | End: 2022-10-19 | Stop reason: HOSPADM

## 2022-10-14 RX ADMIN — MEMANTINE 5 MG: 10 TABLET ORAL at 21:16

## 2022-10-14 RX ADMIN — HEPARIN SODIUM 5000 UNITS: 5000 INJECTION INTRAVENOUS; SUBCUTANEOUS at 21:07

## 2022-10-14 RX ADMIN — MEMANTINE 5 MG: 10 TABLET ORAL at 09:32

## 2022-10-14 RX ADMIN — CITALOPRAM HYDROBROMIDE 20 MG: 20 TABLET ORAL at 09:32

## 2022-10-14 RX ADMIN — HEPARIN SODIUM 5000 UNITS: 5000 INJECTION INTRAVENOUS; SUBCUTANEOUS at 06:04

## 2022-10-14 RX ADMIN — HEPARIN SODIUM 5000 UNITS: 5000 INJECTION INTRAVENOUS; SUBCUTANEOUS at 13:57

## 2022-10-14 RX ADMIN — SENNOSIDES 2 TABLET: 8.6 TABLET, FILM COATED ORAL at 09:30

## 2022-10-14 RX ADMIN — ASPIRIN 81 MG CHEWABLE TABLET 81 MG: 81 TABLET CHEWABLE at 09:32

## 2022-10-14 RX ADMIN — NIFEDIPINE 60 MG: 30 TABLET, FILM COATED, EXTENDED RELEASE ORAL at 09:30

## 2022-10-14 RX ADMIN — DONEPEZIL HYDROCHLORIDE 10 MG: 10 TABLET, FILM COATED ORAL at 21:06

## 2022-10-14 RX ADMIN — Medication 10 MG: at 21:16

## 2022-10-14 RX ADMIN — NYSTATIN 1 APPLICATION: 100000 POWDER TOPICAL at 21:16

## 2022-10-14 RX ADMIN — TRAZODONE HYDROCHLORIDE 50 MG: 50 TABLET ORAL at 21:07

## 2022-10-14 RX ADMIN — QUETIAPINE FUMARATE 50 MG: 25 TABLET, FILM COATED ORAL at 21:07

## 2022-10-14 RX ADMIN — NYSTATIN: 100000 POWDER TOPICAL at 09:33

## 2022-10-14 NOTE — CASE MANAGEMENT/SOCIAL WORK
Continued Stay Note   Jennifer     Patient Name: Shantelle Decker  MRN: [id number]  Today's Date: 10/14/2022    Admit Date: 10/7/2022    Plan: rehab   Discharge Plan     Row Name 10/14/22 1311       Plan    Plan rehab    Plan Comments I spoke with patient and her spouse, speaking in loud voice so she could hear me. I explained to them she will need to be up out of bed and ahowing progress when she is working with our therapy department  in order for [facility] to even consider bringing her in for rehab. Patient told me she will try to do this. I spoke with Mary Ellen at [facility] and she will follow up with us on Monday.    Final Discharge Disposition Code 61 - hospital-based swing bed               Discharge Codes    No documentation.               Expected Discharge Date and Time     Expected Discharge Date Expected Discharge Time    Oct 18, 2022             Nina Brennan RN

## 2022-10-14 NOTE — PLAN OF CARE
Goal Outcome Evaluation:  Plan of Care Reviewed With: patient        Progress: improving   Pt. Has been alert to self only. VSS, on room air paced on the monitor. She worked with therapy and sat up in the chair for several hours. Lifted back to bed. Pt. Has been incontinent on a specialty bed. Awaiting rehab placement.

## 2022-10-14 NOTE — PROGRESS NOTES
[facility]  PROGRESS NOTE    Patient Name: Shantelle Decker  : 1940  MRN: [id number]    Date of Admission: 10/7/2022  Primary Care Physician: Nancy Montiel APRN    Subjective   Subjective     CC:  F/U fall, fecal impaction    HPI:  Patient resting in bed, no events overnight. Patient oriented to self only. Per nursing, she becomes combative and agitated when they try to move her.     ROS:  Unable to obtain full ROS due to confusion    Objective   Objective     Vital Signs:   Temp:  [97.8 °F (36.6 °C)-98.3 °F (36.8 °C)] 98.1 °F (36.7 °C)  Heart Rate:  [] 62  Resp:  [18] 18  BP: (111-162)/(70-93) 162/86  Flow (L/min):  [2] 2     Physical Exam:  Constitutional: No acute distress, sleeping, laying in bed  HENT: NCAT, mucous membranes moist  Respiratory: Clear to auscultation bilaterally, respiratory effort normal   Cardiovascular: RRR, no murmurs, rubs, or gallops  Gastrointestinal: Positive bowel sounds, soft, nontender, nondistended  Musculoskeletal: No bilateral ankle edema  Psychiatric: calm, cooperative  Neurologic: A and O x 1, speech clear  Skin: No rashes    Results Reviewed:  LAB RESULTS:      Lab 10/14/22  0449 10/13/22  0432 10/08/22  0541 10/08/22  0039 10/07/22  1906   WBC 11.07* 8.87 8.52  --  11.03*   HEMOGLOBIN 12.0 11.1* 11.8*  --  12.9   HEMATOCRIT 36.7 33.4* 35.9  --  38.4   PLATELETS 264 228 186  --  214   NEUTROS ABS  --   --  6.16  --  8.22*   IMMATURE GRANS (ABS)  --   --  0.06*  --  0.07*   LYMPHS ABS  --   --  1.26  --  1.62   MONOS ABS  --   --  0.93*  --  1.08*   EOS ABS  --   --  0.07  --  0.01   MCV 91.8 89.1 90.7  --  87.7   PROCALCITONIN  --   --   --   --  0.06   LACTATE  --   --   --  1.5 2.4*         Lab 10/14/22  0449 10/13/22  0432 10/11/22  1126 10/10/22  0436 10/09/22  1510 10/08/22  0541   SODIUM 137 142 140 140 142 139   POTASSIUM 3.9 3.9 3.8 3.7 4.0 3.9   CHLORIDE 106 111* 108* 110* 112* 105   CO2 22.0 22.0 24.0 20.0*  22.0 24.0   ANION GAP 9.0 9.0 8.0 10.0 8.0 10.0   BUN 25* 21 18 15 17 25*   CREATININE 1.24* 1.29* 1.06* 1.22* 1.46* 1.93*   EGFR 43.0* 41.0* 51.9* 43.8* 35.3* 25.3*   GLUCOSE 97 79 93 83 91 101*   CALCIUM 8.1* 7.8* 8.5* 7.8* 7.7* 8.2*   MAGNESIUM  --   --   --   --   --  1.8   HEMOGLOBIN A1C  --   --   --   --   --  4.60*   TSH  --   --   --   --   --  5.900*         Lab 10/14/22  0449 10/13/22  0432 10/07/22  1906   TOTAL PROTEIN 5.2* 4.6* 6.2   ALBUMIN 2.90* 2.60* 3.20*   GLOBULIN 2.3 2.0 3.0   ALT (SGPT) 6 5 6   AST (SGOT) 12 9 16   BILIRUBIN 0.4 0.3 1.2   ALK PHOS 53 50 64         Lab 10/08/22  0541 10/08/22  0039 10/07/22  1906   TROPONIN T 0.053* 0.042* 0.054*         Lab 10/08/22  0541   CHOLESTEROL 163   LDL CHOL 101*   HDL CHOL 44   TRIGLYCERIDES 95             Brief Urine Lab Results  (Last result in the past 365 days)      Color   Clarity   Blood   Leuk Est   Nitrite   Protein   CREAT   Urine HCG        10/07/22 1917 Yellow   Clear   Negative   Trace   Negative   30 mg/dL (1+)                 Microbiology Results Abnormal     Procedure Component Value - Date/Time    COVID PRE-OP / PRE-PROCEDURE SCREENING ORDER (NO ISOLATION) - Swab, Nasopharynx [[id number]]  (Normal) Collected: 10/07/22 1955    Lab Status: Final result Specimen: Swab from Nasopharynx Updated: 10/07/22 2039    Narrative:      The following orders were created for panel order COVID PRE-OP / PRE-PROCEDURE SCREENING ORDER (NO ISOLATION) - Swab, Nasopharynx.  Procedure                               Abnormality         Status                     ---------                               -----------         ------                     COVID-19 and FLU A/B PCR...[[id number]]  Normal              Final result                 Please view results for these tests on the individual orders.    COVID-19 and FLU A/B PCR - Swab, Nasopharynx [[id number]]  (Normal) Collected: 10/07/22 1955    Lab Status: Final result Specimen: Swab from Nasopharynx Updated:  10/07/22 2039     COVID19 Not Detected     Influenza A PCR Not Detected     Influenza B PCR Not Detected    Narrative:      Fact sheet for providers: https://www.fda.gov/media/072607/download    Fact sheet for patients: https://www.fda.gov/media/098121/download    Test performed by PCR.          No radiology results from the last 24 hrs    Results for orders placed during the hospital encounter of 05/20/22    Adult Transthoracic Echo Complete W/ Cont if Necessary Per Protocol    Interpretation Summary  · Left ventricular systolic function is normal. Estimated left ventricular EF = 65%.  · Left ventricular wall thickness is consistent with mild concentric hypertrophy.  · Left ventricular diastolic function is consistent with (grade I) impaired relaxation.  · The aortic valve is mildly calcified. Otherwise, the cardiac valves are anatomically and functionally normal.  · Estimated right ventricular systolic pressure from tricuspid regurgitation is normal (<35 mmHg).      I have reviewed the medications:  Scheduled Meds:aspirin, 81 mg, Oral, Daily  citalopram, 20 mg, Oral, Daily  docusate sodium, 100 mg, Oral, BID  donepezil, 10 mg, Oral, Nightly  heparin (porcine), 5,000 Units, Subcutaneous, Q8H  melatonin, 10 mg, Oral, Nightly  memantine, 5 mg, Oral, BID  NIFEdipine XL, 60 mg, Oral, Q24H  nystatin, , Topical, Q12H  polyethylene glycol, 17 g, Oral, Daily  QUEtiapine, 50 mg, Oral, Nightly  senna, 2 tablet, Oral, BID  sodium chloride, 10 mL, Intravenous, Q12H  traZODone, 50 mg, Oral, Nightly      Continuous Infusions:   PRN Meds:.•  acetaminophen **OR** acetaminophen **OR** acetaminophen  •  ondansetron **OR** ondansetron  •  QUEtiapine  •  [COMPLETED] Insert peripheral IV **AND** sodium chloride  •  sodium chloride    Assessment & Plan   Assessment & Plan     Active Hospital Problems    Diagnosis  POA   • **Fecal impaction (HCC) [K56.41]  Yes   • Elevated lactic acid level [R79.89]  Unknown   • Elevated troponin [R77.8]   Unknown   • Difficulty swallowing [R13.10]  Unknown   • Leukocytosis [D72.829]  Unknown   • Fall [W19.XXXA]  Unknown   • LIANNA (acute kidney injury) (HCC) [N17.9]  Yes   • Dementia with behavioral disturbance [F03.918]  Yes   • Essential hypertension [I10]  Yes   • Coronary artery disease without angina pectoris [I25.10]  Yes      Resolved Hospital Problems   No resolved problems to display.        Brief Hospital Course to date:  Shantelle Decker is a 82 y.o. female with advanced dementia, HTN, CAD, history of breast cancer who presented to the ED after a fall and was found to have fecal impaction and acute kidney injury.    This patient's problems and plans were partially entered by my partner and updated as appropriate by me 10/14/22.    Fecal impaction  - Now disimpacted, having bowel movements  - Continue scheduled miralax and docusate, senna     Difficulty swallowing  - SLP following.  Placed on modified diet- puree, thin liquids for mild, oral dysphagia     Leukocytosis  Elevated lactic acid  - Suspect related to dehydration  - Procalcitonin negative, UA negative, CT abdomen pelvis without infectious process, CXR without evidence of pneumonia     Elevated troponin  - Likely demand ischemia in setting of dehydration, LIANNA     LIANNA on CKD III  -- Creatinine 2.11 on admission (baseline 1.1-1.5)  -- Improving with IV fluids-at baseline now     Dementia  Fall  - Continue home meds  - PT/OT     HTN  CAD  - Continue home meds    Expected Discharge Location and Transportation:  agreeable to rehab- referral sent to [facility] in [place]- pending  Expected Discharge Date: 10/17    DVT prophylaxis:  Medical DVT prophylaxis orders are present.     AM-PAC 6 Clicks Score (PT): 10 (10/11/22 3782)    CODE STATUS:   Code Status and Medical Interventions:   Ordered at: 10/07/22 7880     Level Of Support Discussed With:    Next of Kin (If No Surrogate)     Code Status (Patient has no pulse and is not breathing):    CPR  (Attempt to Resuscitate)     Medical Interventions (Patient has pulse or is breathing):    Full Support       Yue Elmore DO  10/14/22

## 2022-10-14 NOTE — PLAN OF CARE
Goal Outcome Evaluation:  Plan of Care Reviewed With: patient        Progress: no change   Pt is alert and oriented to self; confused. VSS on room air. Paced on tele. Pt has slept off and on throughout the shift. Gets agitated and yells out when touched or repositioned. Specialty bed in place and turned q2hrs. Incontinent of B&B. Pt had several Bms this shift. Waiting on placement. Will continue to monitor.

## 2022-10-14 NOTE — PLAN OF CARE
Goal Outcome Evaluation:  Plan of Care Reviewed With: patient, spouse        Progress: improving  Outcome Evaluation: PT DEMONSTRATED IMPROVED TRANSFERS AND SITTING/STANDING BALANCE.  INITIATED GAIT X 25 FEET WITH R WALKER AND MIN ASSIST. PT NEEDS CUES FOR SAFETY AWARENESS WITH TRANSFERS AND WALKER PLACEMENT DURING GAIT. PT PLEASANTLY CONFUSED BUT FOLLOWS COMMANDS. RECOMMEND SNF AT D/C PRIOR TO HOME WITH ELDERLY SPOUSE.

## 2022-10-14 NOTE — THERAPY TREATMENT NOTE
Patient Name: Shantelle Decker  : 1940    MRN: [id number]                              Today's Date: 10/14/2022       Admit Date: 10/7/2022    Visit Dx:     ICD-10-CM ICD-9-CM   1. Fecal impaction (HCC)  K56.41 560.32   2. Acute dehydration  E86.0 276.51   3. Acute renal insufficiency  N28.9 593.9   4. Elevated troponin  R77.8 790.6   5. Dysphagia, oral phase  R13.11 787.21     Patient Active Problem List   Diagnosis   • Incarcerated ventral hernia   • Coronary artery disease without angina pectoris   • Morbid obesity (HCC)   • Normocytic anemia   • Dementia with behavioral disturbance   • Essential hypertension   • History of breast cancer   • Thyroid mass   • LIANNA (acute kidney injury) (HCC)   • CKD (chronic kidney disease) stage 3, GFR 30-59 ml/min (HCC)   • Chest pain   • Metabolic acidosis   • Other chest pain   • Fecal impaction (HCC)   • Elevated lactic acid level   • Elevated troponin   • Difficulty swallowing   • Leukocytosis   • Fall     Past Medical History:   Diagnosis Date   • Breast cancer (CMS/HCC)    • Coronary artery disease    • Dementia (CMS/HCC)    • Dysrhythmia    • Hypertension      Past Surgical History:   Procedure Laterality Date   • ADENOIDECTOMY     • BREAST SURGERY     • CARDIAC DEFIBRILLATOR PLACEMENT     • EXPLORATORY LAPAROTOMY N/A 2016    Procedure: open ventral hernia repair with mesh;  Surgeon: Bright Buckley MD;  Location: [facility];  Service:    • MASTECTOMY Bilateral    • TONSILLECTOMY        General Information     Row Name 10/14/22 6386          Physical Therapy Time and Intention    Document Type therapy note (daily note)  -CD     Row Name 10/14/22 6659          General Information    Patient Profile Reviewed yes  -CD     Existing Precautions/Restrictions fall  advanced dementia.  -CD     Barriers to Rehab medically complex;previous functional deficit;cognitive status  -CD     Row Name 10/14/22 1416          Cognition    Orientation Status (Cognition) oriented  to;person;disoriented to;place;situation;time  -CD     Row Name 10/14/22 1337          Safety Issues, Functional Mobility    Safety Issues Affecting Function (Mobility) safety precaution awareness;safety precautions follow-through/compliance;sequencing abilities  -CD     Impairments Affecting Function (Mobility) balance;cognition;endurance/activity tolerance;postural/trunk control;strength;pain  -CD     Cognitive Impairments, Mobility Safety/Performance awareness, need for assistance;insight into deficits/self-awareness;safety precaution awareness;safety precaution follow-through;sequencing abilities  -CD     Comment, Safety Issues/Impairments (Mobility) PLEASANTLY CONFUSED. EXCITED TO SEE HER . NEEDS CUES FOR SAFETY AWARENESS WITH MOBILITY BUT COOPERATIVE.  -CD           User Key  (r) = Recorded By, (t) = Taken By, (c) = Cosigned By    Initials Name Provider Type    CD Franca Reveles, PT Physical Therapist               Mobility     Row Name 10/14/22 1339          Bed Mobility    Supine-Sit [place] (Bed Mobility) moderate assist (50% patient effort);2 person assist;verbal cues  -CD     Assistive Device (Bed Mobility) bed rails;draw sheet;head of bed elevated  -CD     Comment, (Bed Mobility) MANUAL ASSIST VIA DRAW SHEET TO SCOOT TO EDGE OF SPECIALTY BED. ONCE SITTING ABLE TO MAINTAIN STATIC SITTING BALANCE WITH CGA.  -CD     Row Name 10/14/22 1339          Transfers    Comment, (Transfers) CUES FOR HAND PLACEMENT. STS FROM EOB AND RECLINER. STAND STEP PIVOT BED TO RECLINER.  -CD     Row Name 10/14/22 1339          Bed-Chair Transfer    Bed-Chair [place] (Transfers) moderate assist (50% patient effort);2 person assist;verbal cues  -CD     Assistive Device (Bed-Chair Transfers) --  VIA B UE SUPPORT.  -CD     Row Name 10/14/22 1339          Sit-Stand Transfer    Sit-Stand [place] (Transfers) verbal cues;minimum assist (75% patient effort);2 person assist  -CD     Assistive Device (Sit-Stand  Transfers) walker, front-wheeled  -CD     Row Name 10/14/22 1339          Gait/Stairs (Locomotion)    [place] Level (Gait) minimum assist (75% patient effort);verbal cues  -CD     Assistive Device (Gait) walker, front-wheeled  -CD     Distance in Feet (Gait) 25 FEET  -CD     Deviations/Abnormal Patterns (Gait) stride length decreased;weight shifting decreased;gait speed decreased;deyanira decreased  -CD     Bilateral Gait Deviations forward flexed posture;heel strike decreased  -CD     Comment, (Gait/Stairs) MAX CUES FOR UPRIGHT POSTURE AND WALKER PLACEMENT. TENDS TO PUSH WALKER TOO FAR AHEAD.  -CD           User Key  (r) = Recorded By, (t) = Taken By, (c) = Cosigned By    Initials Name Provider Type    CD Franca Reveles PT Physical Therapist               Obj/Interventions     Row Name 10/14/22 1342          Motor Skills    Therapeutic Exercise --  SEATED B UE/LE THER EX, SEATED: UE PUNCHES WITH TRUNK ROTATION, SHOULDER FLEX/EXT, LAQ, HIP FLEX- 1 SET OF 10 REPS  -CD     Row Name 10/14/22 1342          Balance    Static Sitting Balance contact guard  -CD     Dynamic Sitting Balance contact guard  -CD     Position, Sitting Balance supported;sitting edge of bed  -CD     Sit to Stand Dynamic Balance minimal assist;verbal cues;2-person assist  -CD     Static Standing Balance verbal cues;contact guard  -CD     Dynamic Standing Balance minimal assist;verbal cues  -CD     Position/Device Used, Standing Balance supported;walker, front-wheeled  -CD     Balance Interventions sitting;standing;sit to stand;supported;static;dynamic  -CD     Comment, Balance SAT EOB FOR THER EX WITH CGA. AMBULATED 25 FEET WITH R WALKER AND MIN ASSIST.  -CD           User Key  (r) = Recorded By, (t) = Taken By, (c) = Cosigned By    Initials Name Provider Type    CD Franca Reveles PT Physical Therapist               Goals/Plan    No documentation.                Clinical Impression     Row Name 10/14/22 1347          Pain    Pretreatment Pain  Rating 0/10 - no pain  -CD     Posttreatment Pain Rating 0/10 - no pain  -CD     Pre/Posttreatment Pain Comment PT DENIED PAIN OR DIZZINESS.  -CD     Row Name 10/14/22 1346          Plan of Care Review    Plan of Care Reviewed With patient;spouse  -CD     Progress improving  -CD     Outcome Evaluation PT DEMONSTRATED IMPROVED TRANSFERS AND SITTING/STANDING BALANCE.  INITIATED GAIT X 25 FEET WITH R WALKER AND MIN ASSIST. PT NEEDS CUES FOR SAFETY AWARENESS WITH TRANSFERS AND WALKER PLACEMENT DURING GAIT. PT PLEASANTLY CONFUSED BUT FOLLOWS COMMANDS. RECOMMEND SNF AT D/C PRIOR TO HOME WITH ELDERLY SPOUSE.  -CD     Row Name 10/14/22 1346          Therapy Assessment/Plan (PT)    Patient/Family Therapy Goals Statement (PT) PER SPOUSE TO BE UP AND WALKING.  -CD     Rehab Potential (PT) good, to achieve stated therapy goals  -CD     Criteria for Skilled Interventions Met (PT) yes;meets criteria;skilled treatment is necessary  -CD     Therapy Frequency (PT) daily  -CD     Row Name 10/14/22 1346          Vital Signs    Pre Systolic BP Rehab --  VVS. NSG CLEARED FOR TREATMENT.  -CD     Pre Patient Position Supine  -CD     Intra Patient Position Standing  -CD     Post Patient Position Sitting  -CD     Row Name 10/14/22 1346          Positioning and Restraints    Pre-Treatment Position in bed  -CD     Post Treatment Position chair  -CD     In Chair reclined;call light within reach;encouraged to call for assist;exit alarm on;legs elevated;notified nsg;RUE elevated;LUE elevated;with family/caregiver;waffle cushion;on mechanical lift sling  NSG NOTIFIED OF CURRENT MOBILITY STATUS AND REC'S FOR TRANSFERS.  -CD           User Key  (r) = Recorded By, (t) = Taken By, (c) = Cosigned By    Initials Name Provider Type    CD Franca Reveles, PT Physical Therapist               Outcome Measures     Row Name 10/14/22 1350 10/14/22 0855       How much help from another person do you currently need...    Turning from your back to your side while  in flat bed without using bedrails? 2  -CD 2  -OD    Moving from lying on back to sitting on the side of a flat bed without bedrails? 2  -CD 2  -OD    Moving to and from a bed to a chair (including a wheelchair)? 2  -CD 2  -OD    Standing up from a chair using your arms (e.g., wheelchair, bedside chair)? 3  -CD 2  -OD    Climbing 3-5 steps with a railing? 1  -CD 1  -OD    To walk in hospital room? 3  -CD 1  -OD    AM-PAC 6 Clicks Score (PT) 13  -CD 10  -OD    Highest level of mobility 4 --> Transferred to chair/commode  -CD 4 --> Transferred to chair/commode  -OD    Row Name 10/14/22 1350          Functional Assessment    Outcome Measure Options AM-PAC 6 Clicks Basic Mobility (PT)  -CD           User Key  (r) = Recorded By, (t) = Taken By, (c) = Cosigned By    Initials Name Provider Type    CD Franca Reveles, PT Physical Therapist    OD Bettie Farris, RN Registered Nurse                             Physical Therapy Education     Title: PT OT SLP Therapies (In Progress)     Topic: Physical Therapy (In Progress)     Point: Mobility training (In Progress)     Learning Progress Summary           Patient Acceptance, E, VU,NR by CD at 10/14/2022 1350    Comment: SEE FLOWSHEET    Acceptance, E,D, NR by HP at 10/9/2022 1559   Family Acceptance, E,D, NR by HP at 10/9/2022 1559   Significant Other Acceptance, E, VU,NR by CD at 10/14/2022 1350    Comment: SEE FLOWSHEET                   Point: Home exercise program (In Progress)     Learning Progress Summary           Patient Acceptance, E, VU,NR by CD at 10/14/2022 1350    Comment: SEE FLOWSHEET    Acceptance, E,D, NR by HP at 10/9/2022 1559   Family Acceptance, E,D, NR by HP at 10/9/2022 1559   Significant Other Acceptance, E, VU,NR by CD at 10/14/2022 1350    Comment: SEE FLOWSHEET                   Point: Body mechanics (In Progress)     Learning Progress Summary           Patient Acceptance, E, VU,NR by CD at 10/14/2022 1350    Comment: SEE FLOWSHEET    Acceptance,  E,D, NR by  at 10/9/2022 1559   Family Acceptance, E,D, NR by  at 10/9/2022 1559   Significant Other Acceptance, E, VU,NR by  at 10/14/2022 1350    Comment: SEE FLOWSHEET                   Point: Precautions (In Progress)     Learning Progress Summary           Patient Acceptance, E, VU,NR by  at 10/14/2022 1350    Comment: SEE FLOWSHEET    Acceptance, E,D, NR by  at 10/9/2022 1559   Family Acceptance, E,D, NR by  at 10/9/2022 1559   Significant Other Acceptance, E, VU,NR by  at 10/14/2022 1350    Comment: SEE FLOWSHEET                               User Key     Initials Effective Dates Name Provider Type Discipline    CD 06/16/21 -  Franca Reveles PT Physical Therapist PT     06/01/21 -  Leticia Ghotra PT Physical Therapist PT              PT Recommendation and Plan     Plan of Care Reviewed With: patient, spouse  Progress: improving  Outcome Evaluation: PT DEMONSTRATED IMPROVED TRANSFERS AND SITTING/STANDING BALANCE.  INITIATED GAIT X 25 FEET WITH R WALKER AND MIN ASSIST. PT NEEDS CUES FOR SAFETY AWARENESS WITH TRANSFERS AND WALKER PLACEMENT DURING GAIT. PT PLEASANTLY CONFUSED BUT FOLLOWS COMMANDS. RECOMMEND SNF AT D/C PRIOR TO HOME WITH ELDERLY SPOUSE.     Time Calculation:    PT Charges     Row Name 10/14/22 1351             Time Calculation    Start Time 1304  -CD      PT Received On 10/14/22  -CD      PT Goal Re-Cert Due Date 10/19/22  -CD         Time Calculation- PT    Total Timed Code Minutes- PT 30 minute(s)  -CD         Timed Charges    61907 - PT Therapeutic Exercise Minutes 10  -CD      45374 - Gait Training Minutes  10  -CD      92665 - PT Therapeutic Activity Minutes 10  -CD         Total Minutes    Timed Charges Total Minutes 30  -CD       Total Minutes 30  -CD            User Key  (r) = Recorded By, (t) = Taken By, (c) = Cosigned By    Initials Name Provider Type    CD Franca Reveles PT Physical Therapist              Therapy Charges for Today     Code Description Service Date  Service Provider Modifiers Qty    35305064649  PT THER PROC EA 15 MIN 10/14/2022 Franca Reveles, PT GP 1    [id number] HC GAIT TRAINING EA 15 MIN 10/14/2022 Franca Reveles, PT GP 1    [id number]  PT THER SUPP EA 15 MIN 10/14/2022 Franca Reveles, PT GP 2          PT G-Codes  Outcome Measure Options: AM-PAC 6 Clicks Basic Mobility (PT)  AM-PAC 6 Clicks Score (PT): 13  AM-PAC 6 Clicks Score (OT): 13    Franca Reveles, PT  10/14/2022

## 2022-10-15 PROCEDURE — 99231 SBSQ HOSP IP/OBS SF/LOW 25: CPT | Performed by: HOSPITALIST

## 2022-10-15 PROCEDURE — 25010000002 HEPARIN (PORCINE) PER 1000 UNITS: Performed by: INTERNAL MEDICINE

## 2022-10-15 RX ADMIN — HEPARIN SODIUM 5000 UNITS: 5000 INJECTION INTRAVENOUS; SUBCUTANEOUS at 05:40

## 2022-10-15 RX ADMIN — QUETIAPINE FUMARATE 50 MG: 25 TABLET, FILM COATED ORAL at 19:37

## 2022-10-15 RX ADMIN — SENNOSIDES 2 TABLET: 8.6 TABLET, FILM COATED ORAL at 19:37

## 2022-10-15 RX ADMIN — SENNOSIDES 2 TABLET: 8.6 TABLET, FILM COATED ORAL at 08:00

## 2022-10-15 RX ADMIN — HEPARIN SODIUM 5000 UNITS: 5000 INJECTION INTRAVENOUS; SUBCUTANEOUS at 14:08

## 2022-10-15 RX ADMIN — ASPIRIN 81 MG CHEWABLE TABLET 81 MG: 81 TABLET CHEWABLE at 08:00

## 2022-10-15 RX ADMIN — MEMANTINE 5 MG: 10 TABLET ORAL at 08:00

## 2022-10-15 RX ADMIN — TRAZODONE HYDROCHLORIDE 50 MG: 50 TABLET ORAL at 19:38

## 2022-10-15 RX ADMIN — NYSTATIN: 100000 POWDER TOPICAL at 19:37

## 2022-10-15 RX ADMIN — DOCUSATE SODIUM 100 MG: 100 CAPSULE, LIQUID FILLED ORAL at 19:37

## 2022-10-15 RX ADMIN — CITALOPRAM HYDROBROMIDE 20 MG: 20 TABLET ORAL at 08:00

## 2022-10-15 RX ADMIN — DOCUSATE SODIUM 100 MG: 100 CAPSULE, LIQUID FILLED ORAL at 08:00

## 2022-10-15 RX ADMIN — POLYETHYLENE GLYCOL 3350 17 G: 17 POWDER, FOR SOLUTION ORAL at 08:00

## 2022-10-15 RX ADMIN — NIFEDIPINE 60 MG: 30 TABLET, FILM COATED, EXTENDED RELEASE ORAL at 08:00

## 2022-10-15 RX ADMIN — Medication 10 MG: at 19:39

## 2022-10-15 RX ADMIN — DONEPEZIL HYDROCHLORIDE 10 MG: 10 TABLET, FILM COATED ORAL at 19:38

## 2022-10-15 RX ADMIN — MEMANTINE 5 MG: 10 TABLET ORAL at 19:38

## 2022-10-15 RX ADMIN — NYSTATIN: 100000 POWDER TOPICAL at 08:01

## 2022-10-15 NOTE — PLAN OF CARE
Goal Outcome Evaluation:      Pt is Oriented to self. PT is up with lift. Q2 turns. LBM 10/15/2022, incont x2. All meds given crush in apple sauce. Vss on room air. No acute events over night. Will continue to monitor.     Thank you,  LS

## 2022-10-15 NOTE — PROGRESS NOTES
[facility] Medicine Services  PROGRESS NOTE    Patient Name: Shantelle Decker  : 1940  MRN: [id number]    Date of Admission: 10/7/2022  Primary Care Physician: Nancy Montiel APRN    Subjective   Subjective     CC:  F/U fall, fecal impaction    HPI:  Patient resting in bed, no events overnight. Patient oriented to self only.     ROS:  Unable to obtain full ROS due to confusion    Objective   Objective     Vital Signs:   Temp:  [97.8 °F (36.6 °C)-99.1 °F (37.3 °C)] 97.9 °F (36.6 °C)  Heart Rate:  [59-73] 61  Resp:  [16-18] 17  BP: (104-138)/(56-82) 137/82     Physical Exam:  Constitutional: No acute distress, sleeping, laying in bed  HENT: NCAT, mucous membranes moist  Respiratory: Clear to auscultation bilaterally, respiratory effort normal   Cardiovascular: RRR, no murmurs, rubs, or gallops  Gastrointestinal: Positive bowel sounds, soft, nontender, nondistended  Musculoskeletal: No bilateral ankle edema  Psychiatric: calm, cooperative  Neurologic: A and O x 1, speech clear  Skin: No rashes    Results Reviewed:  LAB RESULTS:      Lab 10/14/22  0449 10/13/22  0432   WBC 11.07* 8.87   HEMOGLOBIN 12.0 11.1*   HEMATOCRIT 36.7 33.4*   PLATELETS 264 228   MCV 91.8 89.1         Lab 10/14/22  0449 10/13/22  0432 10/11/22  1126 10/10/22  0436 10/09/22  1510   SODIUM 137 142 140 140 142   POTASSIUM 3.9 3.9 3.8 3.7 4.0   CHLORIDE 106 111* 108* 110* 112*   CO2 22.0 22.0 24.0 20.0* 22.0   ANION GAP 9.0 9.0 8.0 10.0 8.0   BUN 25* 21 18 15 17   CREATININE 1.24* 1.29* 1.06* 1.22* 1.46*   EGFR 43.0* 41.0* 51.9* 43.8* 35.3*   GLUCOSE 97 79 93 83 91   CALCIUM 8.1* 7.8* 8.5* 7.8* 7.7*         Lab 10/14/22  0449 10/13/22  0432   TOTAL PROTEIN 5.2* 4.6*   ALBUMIN 2.90* 2.60*   GLOBULIN 2.3 2.0   ALT (SGPT) 6 5   AST (SGOT) 12 9   BILIRUBIN 0.4 0.3   ALK PHOS 53 50                     Brief Urine Lab Results  (Last result in the past 365 days)      Color   Clarity   Blood   Leuk Est   Nitrite    Protein   CREAT   Urine HCG        10/07/22 1917 Yellow   Clear   Negative   Trace   Negative   30 mg/dL (1+)                 Microbiology Results Abnormal     Procedure Component Value - Date/Time    COVID PRE-OP / PRE-PROCEDURE SCREENING ORDER (NO ISOLATION) - Swab, Nasopharynx [[id number]]  (Normal) Collected: 10/07/22 1955    Lab Status: Final result Specimen: Swab from Nasopharynx Updated: 10/07/22 2039    Narrative:      The following orders were created for panel order COVID PRE-OP / PRE-PROCEDURE SCREENING ORDER (NO ISOLATION) - Swab, Nasopharynx.  Procedure                               Abnormality         Status                     ---------                               -----------         ------                     COVID-19 and FLU A/B PCR...[[id number]]  Normal              Final result                 Please view results for these tests on the individual orders.    COVID-19 and FLU A/B PCR - Swab, Nasopharynx [[id number]]  (Normal) Collected: 10/07/22 1955    Lab Status: Final result Specimen: Swab from Nasopharynx Updated: 10/07/22 2039     COVID19 Not Detected     Influenza A PCR Not Detected     Influenza B PCR Not Detected    Narrative:      Fact sheet for providers: https://www.fda.gov/media/155816/download    Fact sheet for patients: https://www.fda.gov/media/614356/download    Test performed by PCR.          No radiology results from the last 24 hrs    Results for orders placed during the hospital encounter of 05/20/22    Adult Transthoracic Echo Complete W/ Cont if Necessary Per Protocol    Interpretation Summary  · Left ventricular systolic function is normal. Estimated left ventricular EF = 65%.  · Left ventricular wall thickness is consistent with mild concentric hypertrophy.  · Left ventricular diastolic function is consistent with (grade I) impaired relaxation.  · The aortic valve is mildly calcified. Otherwise, the cardiac valves are anatomically and functionally normal.  · Estimated right  ventricular systolic pressure from tricuspid regurgitation is normal (<35 mmHg).      I have reviewed the medications:  Scheduled Meds:aspirin, 81 mg, Oral, Daily  citalopram, 20 mg, Oral, Daily  docusate sodium, 100 mg, Oral, BID  donepezil, 10 mg, Oral, Nightly  heparin (porcine), 5,000 Units, Subcutaneous, Q8H  melatonin, 10 mg, Oral, Nightly  memantine, 5 mg, Oral, BID  NIFEdipine XL, 60 mg, Oral, Q24H  nystatin, , Topical, Q12H  polyethylene glycol, 17 g, Oral, Daily  QUEtiapine, 50 mg, Oral, Nightly  senna, 2 tablet, Oral, BID  sodium chloride, 10 mL, Intravenous, Q12H  traZODone, 50 mg, Oral, Nightly      Continuous Infusions:   PRN Meds:.•  acetaminophen **OR** acetaminophen **OR** acetaminophen  •  ondansetron **OR** ondansetron  •  QUEtiapine  •  [COMPLETED] Insert peripheral IV **AND** sodium chloride  •  sodium chloride    Assessment & Plan   Assessment & Plan     Active Hospital Problems    Diagnosis  POA   • **Fecal impaction (HCC) [K56.41]  Yes   • Elevated lactic acid level [R79.89]  Unknown   • Elevated troponin [R77.8]  Unknown   • Difficulty swallowing [R13.10]  Unknown   • Leukocytosis [D72.829]  Unknown   • Fall [W19.XXXA]  Unknown   • LIANNA (acute kidney injury) (HCC) [N17.9]  Yes   • Dementia with behavioral disturbance [F03.918]  Yes   • Essential hypertension [I10]  Yes   • Coronary artery disease without angina pectoris [I25.10]  Yes      Resolved Hospital Problems   No resolved problems to display.        Brief Hospital Course to date:  Shantelle Decker is a 82 y.o. female with advanced dementia, HTN, CAD, history of breast cancer who presented to the ED after a fall and was found to have fecal impaction and acute kidney injury.    This patient's problems and plans were partially entered by my partner and updated as appropriate by me 10/15/22.    Fecal impaction  - Now disimpacted, having bowel movements  - Continue scheduled miralax and docusate, senna     Difficulty swallowing  - SLP  following.  Placed on modified diet- puree, thin liquids for mild, oral dysphagia     Leukocytosis  Elevated lactic acid  - Suspect related to dehydration  - Procalcitonin negative, UA negative, CT abdomen pelvis without infectious process, CXR without evidence of pneumonia     Elevated troponin  - Likely demand ischemia in setting of dehydration, LIANNA     LIANNA on CKD III  -- Creatinine 2.11 on admission (baseline 1.1-1.5)  -- Improving with IV fluids-at baseline now     Dementia  Fall  - Continue home meds  - PT/OT     HTN  CAD  - Continue home meds    Expected Discharge Location and Transportation:  agreeable to rehab- referral sent to [facility] in [place]- pending  Expected Discharge Date: 10/17    DVT prophylaxis:  Medical DVT prophylaxis orders are present.     AM-PAC 6 Clicks Score (PT): 13 (10/14/22 1350)    CODE STATUS:   Code Status and Medical Interventions:   Ordered at: 10/07/22 2340     Level Of Support Discussed With:    Next of Kin (If No Surrogate)     Code Status (Patient has no pulse and is not breathing):    CPR (Attempt to Resuscitate)     Medical Interventions (Patient has pulse or is breathing):    Full Support       Yue Elmore DO  10/15/22

## 2022-10-16 LAB
ALBUMIN SERPL-MCNC: 2.5 G/DL (ref 3.5–5.2)
ALBUMIN/GLOB SERPL: 1.3 G/DL
ALP SERPL-CCNC: 43 U/L (ref 39–117)
ALT SERPL W P-5'-P-CCNC: 7 U/L (ref 1–33)
ANION GAP SERPL CALCULATED.3IONS-SCNC: 6 MMOL/L (ref 5–15)
AST SERPL-CCNC: 13 U/L (ref 1–32)
BILIRUB SERPL-MCNC: 0.3 MG/DL (ref 0–1.2)
BUN SERPL-MCNC: 29 MG/DL (ref 8–23)
BUN/CREAT SERPL: 22.8 (ref 7–25)
CALCIUM SPEC-SCNC: 7.9 MG/DL (ref 8.6–10.5)
CHLORIDE SERPL-SCNC: 110 MMOL/L (ref 98–107)
CO2 SERPL-SCNC: 25 MMOL/L (ref 22–29)
CREAT SERPL-MCNC: 1.27 MG/DL (ref 0.57–1)
DEPRECATED RDW RBC AUTO: 47 FL (ref 37–54)
EGFRCR SERPLBLD CKD-EPI 2021: 41.8 ML/MIN/1.73
ERYTHROCYTE [DISTWIDTH] IN BLOOD BY AUTOMATED COUNT: 14 % (ref 12.3–15.4)
GLOBULIN UR ELPH-MCNC: 2 GM/DL
GLUCOSE SERPL-MCNC: 79 MG/DL (ref 65–99)
HCT VFR BLD AUTO: 31.7 % (ref 34–46.6)
HGB BLD-MCNC: 10.2 G/DL (ref 12–15.9)
MCH RBC QN AUTO: 29.7 PG (ref 26.6–33)
MCHC RBC AUTO-ENTMCNC: 32.2 G/DL (ref 31.5–35.7)
MCV RBC AUTO: 92.2 FL (ref 79–97)
PLATELET # BLD AUTO: 257 10*3/MM3 (ref 140–450)
PMV BLD AUTO: 11.6 FL (ref 6–12)
POTASSIUM SERPL-SCNC: 4.6 MMOL/L (ref 3.5–5.2)
PROT SERPL-MCNC: 4.5 G/DL (ref 6–8.5)
RBC # BLD AUTO: 3.44 10*6/MM3 (ref 3.77–5.28)
SODIUM SERPL-SCNC: 141 MMOL/L (ref 136–145)
WBC NRBC COR # BLD: 7.62 10*3/MM3 (ref 3.4–10.8)

## 2022-10-16 PROCEDURE — 25010000002 HEPARIN (PORCINE) PER 1000 UNITS: Performed by: INTERNAL MEDICINE

## 2022-10-16 PROCEDURE — 80053 COMPREHEN METABOLIC PANEL: CPT | Performed by: HOSPITALIST

## 2022-10-16 PROCEDURE — 85027 COMPLETE CBC AUTOMATED: CPT | Performed by: HOSPITALIST

## 2022-10-16 RX ADMIN — QUETIAPINE FUMARATE 50 MG: 25 TABLET, FILM COATED ORAL at 21:05

## 2022-10-16 RX ADMIN — CITALOPRAM HYDROBROMIDE 20 MG: 20 TABLET ORAL at 08:58

## 2022-10-16 RX ADMIN — MEMANTINE 5 MG: 10 TABLET ORAL at 21:05

## 2022-10-16 RX ADMIN — HEPARIN SODIUM 5000 UNITS: 5000 INJECTION INTRAVENOUS; SUBCUTANEOUS at 13:20

## 2022-10-16 RX ADMIN — TRAZODONE HYDROCHLORIDE 50 MG: 50 TABLET ORAL at 21:05

## 2022-10-16 RX ADMIN — NIFEDIPINE 60 MG: 30 TABLET, FILM COATED, EXTENDED RELEASE ORAL at 08:58

## 2022-10-16 RX ADMIN — Medication 10 MG: at 21:05

## 2022-10-16 RX ADMIN — ASPIRIN 81 MG CHEWABLE TABLET 81 MG: 81 TABLET CHEWABLE at 08:58

## 2022-10-16 RX ADMIN — SENNOSIDES 2 TABLET: 8.6 TABLET, FILM COATED ORAL at 08:58

## 2022-10-16 RX ADMIN — SENNOSIDES 2 TABLET: 8.6 TABLET, FILM COATED ORAL at 21:05

## 2022-10-16 RX ADMIN — POLYETHYLENE GLYCOL 3350 17 G: 17 POWDER, FOR SOLUTION ORAL at 08:58

## 2022-10-16 RX ADMIN — HEPARIN SODIUM 5000 UNITS: 5000 INJECTION INTRAVENOUS; SUBCUTANEOUS at 05:07

## 2022-10-16 RX ADMIN — DOCUSATE SODIUM 100 MG: 100 CAPSULE, LIQUID FILLED ORAL at 21:06

## 2022-10-16 RX ADMIN — DOCUSATE SODIUM 100 MG: 100 CAPSULE, LIQUID FILLED ORAL at 08:58

## 2022-10-16 RX ADMIN — MEMANTINE 5 MG: 10 TABLET ORAL at 08:58

## 2022-10-16 RX ADMIN — NYSTATIN: 100000 POWDER TOPICAL at 21:07

## 2022-10-16 RX ADMIN — HEPARIN SODIUM 5000 UNITS: 5000 INJECTION INTRAVENOUS; SUBCUTANEOUS at 21:05

## 2022-10-16 RX ADMIN — DONEPEZIL HYDROCHLORIDE 10 MG: 10 TABLET, FILM COATED ORAL at 21:05

## 2022-10-16 RX ADMIN — NYSTATIN: 100000 POWDER TOPICAL at 08:59

## 2022-10-16 NOTE — PLAN OF CARE
Goal Outcome Evaluation:              Outcome Evaluation: Pt only oriented to self at times. Confused x4. Able to feed self today no issues. Turns q2. Pt has no other needs at this time.

## 2022-10-16 NOTE — PROGRESS NOTES
[facility] Medicine Services  PROGRESS NOTE    Patient Name: Shantelle Decker  : 1940  MRN: [id number]    Date of Admission: 10/7/2022  Primary Care Physician: Nancy Montiel APRN    Subjective   Subjective     CC:  F/U fall, fecal impaction    HPI:  Patient resting in bed, remains disoriented and confused.    ROS:  Unable to obtain full ROS due to confusion    Objective   Objective     Vital Signs:   Temp:  [98 °F (36.7 °C)-98.5 °F (36.9 °C)] 98 °F (36.7 °C)  Heart Rate:  [65-72] 70  Resp:  [17-18] 17  BP: ()/(53-89) 105/56     Physical Exam:  Constitutional: No acute distress, sleeping, laying in bed  HENT: NCAT, mucous membranes moist  Respiratory: Clear to auscultation bilaterally, respiratory effort normal   Cardiovascular: RRR, no murmurs, rubs, or gallops  Gastrointestinal: Positive bowel sounds, soft, nontender, nondistended  Musculoskeletal: No bilateral ankle edema  Psychiatric: calm, cooperative  Neurologic: confused and disoriented, speech clear  Skin: No rashes    Results Reviewed:  LAB RESULTS:      Lab 10/16/22  0427 10/14/22  0449 10/13/22  0432   WBC 7.62 11.07* 8.87   HEMOGLOBIN 10.2* 12.0 11.1*   HEMATOCRIT 31.7* 36.7 33.4*   PLATELETS 257 264 228   MCV 92.2 91.8 89.1         Lab 10/16/22  0427 10/14/22  0449 10/13/22  0432 10/11/22  1126 10/10/22  0436   SODIUM 141 137 142 140 140   POTASSIUM 4.6 3.9 3.9 3.8 3.7   CHLORIDE 110* 106 111* 108* 110*   CO2 25.0 22.0 22.0 24.0 20.0*   ANION GAP 6.0 9.0 9.0 8.0 10.0   BUN 29* 25* 21 18 15   CREATININE 1.27* 1.24* 1.29* 1.06* 1.22*   EGFR 41.8* 43.0* 41.0* 51.9* 43.8*   GLUCOSE 79 97 79 93 83   CALCIUM 7.9* 8.1* 7.8* 8.5* 7.8*         Lab 10/16/22  0427 10/14/22  0449 10/13/22  0432   TOTAL PROTEIN 4.5* 5.2* 4.6*   ALBUMIN 2.50* 2.90* 2.60*   GLOBULIN 2.0 2.3 2.0   ALT (SGPT) 7 6 5   AST (SGOT) 13 12 9   BILIRUBIN 0.3 0.4 0.3   ALK PHOS 43 53 50                     Brief Urine Lab Results  (Last result in the  past 365 days)      Color   Clarity   Blood   Leuk Est   Nitrite   Protein   CREAT   Urine HCG        10/07/22 1917 Yellow   Clear   Negative   Trace   Negative   30 mg/dL (1+)                 Microbiology Results Abnormal     Procedure Component Value - Date/Time    COVID PRE-OP / PRE-PROCEDURE SCREENING ORDER (NO ISOLATION) - Swab, Nasopharynx [[id number]]  (Normal) Collected: 10/07/22 1955    Lab Status: Final result Specimen: Swab from Nasopharynx Updated: 10/07/22 2039    Narrative:      The following orders were created for panel order COVID PRE-OP / PRE-PROCEDURE SCREENING ORDER (NO ISOLATION) - Swab, Nasopharynx.  Procedure                               Abnormality         Status                     ---------                               -----------         ------                     COVID-19 and FLU A/B PCR...[[id number]]  Normal              Final result                 Please view results for these tests on the individual orders.    COVID-19 and FLU A/B PCR - Swab, Nasopharynx [[id number]]  (Normal) Collected: 10/07/22 1955    Lab Status: Final result Specimen: Swab from Nasopharynx Updated: 10/07/22 2039     COVID19 Not Detected     Influenza A PCR Not Detected     Influenza B PCR Not Detected    Narrative:      Fact sheet for providers: https://www.fda.gov/media/119004/download    Fact sheet for patients: https://www.fda.gov/media/563107/download    Test performed by PCR.          No radiology results from the last 24 hrs    Results for orders placed during the hospital encounter of 05/20/22    Adult Transthoracic Echo Complete W/ Cont if Necessary Per Protocol    Interpretation Summary  · Left ventricular systolic function is normal. Estimated left ventricular EF = 65%.  · Left ventricular wall thickness is consistent with mild concentric hypertrophy.  · Left ventricular diastolic function is consistent with (grade I) impaired relaxation.  · The aortic valve is mildly calcified. Otherwise, the cardiac  valves are anatomically and functionally normal.  · Estimated right ventricular systolic pressure from tricuspid regurgitation is normal (<35 mmHg).      I have reviewed the medications:  Scheduled Meds:aspirin, 81 mg, Oral, Daily  citalopram, 20 mg, Oral, Daily  docusate sodium, 100 mg, Oral, BID  donepezil, 10 mg, Oral, Nightly  heparin (porcine), 5,000 Units, Subcutaneous, Q8H  melatonin, 10 mg, Oral, Nightly  memantine, 5 mg, Oral, BID  NIFEdipine XL, 60 mg, Oral, Q24H  nystatin, , Topical, Q12H  polyethylene glycol, 17 g, Oral, Daily  QUEtiapine, 50 mg, Oral, Nightly  senna, 2 tablet, Oral, BID  sodium chloride, 10 mL, Intravenous, Q12H  traZODone, 50 mg, Oral, Nightly      Continuous Infusions:   PRN Meds:.•  acetaminophen **OR** acetaminophen **OR** acetaminophen  •  ondansetron **OR** ondansetron  •  QUEtiapine  •  [COMPLETED] Insert peripheral IV **AND** sodium chloride  •  sodium chloride    Assessment & Plan   Assessment & Plan     Active Hospital Problems    Diagnosis  POA   • **Fecal impaction (HCC) [K56.41]  Yes   • Elevated lactic acid level [R79.89]  Unknown   • Elevated troponin [R77.8]  Unknown   • Difficulty swallowing [R13.10]  Unknown   • Leukocytosis [D72.829]  Unknown   • Fall [W19.XXXA]  Unknown   • LIANNA (acute kidney injury) (HCC) [N17.9]  Yes   • Dementia with behavioral disturbance [F03.918]  Yes   • Essential hypertension [I10]  Yes   • Coronary artery disease without angina pectoris [I25.10]  Yes      Resolved Hospital Problems   No resolved problems to display.        Brief Hospital Course to date:  Shantelle Decker is a 82 y.o. female with advanced dementia, HTN, CAD, history of breast cancer who presented to the ED after a fall and was found to have fecal impaction and acute kidney injury.    This patient's problems and plans were partially entered by my partner and updated as appropriate by me 10/16/22.    Fecal impaction  - Now disimpacted, having bowel movements  - Continue scheduled  miralax and docusate, senna     Difficulty swallowing  - SLP following.  Placed on modified diet- puree, thin liquids for mild, oral dysphagia     Leukocytosis  Elevated lactic acid  - Suspect related to dehydration  - Procalcitonin negative, UA negative, CT abdomen pelvis without infectious process, CXR without evidence of pneumonia     Elevated troponin  - Likely demand ischemia in setting of dehydration, LIANNA     LIANNA on CKD III  -- Creatinine 2.11 on admission (baseline 1.1-1.5)  -- Improving with IV fluids-at baseline now     Dementia  Fall  - Continue home meds  - PT/OT     HTN  CAD  - Continue home meds    Expected Discharge Location and Transportation:  agreeable to rehab- referral sent to [facility] in [place]- pending  Expected Discharge Date: 10/17    DVT prophylaxis:  Medical DVT prophylaxis orders are present.     AM-PAC 6 Clicks Score (PT): 11 (10/15/22 0800)    CODE STATUS:   Code Status and Medical Interventions:   Ordered at: 10/07/22 2340     Level Of Support Discussed With:    Next of Kin (If No Surrogate)     Code Status (Patient has no pulse and is not breathing):    CPR (Attempt to Resuscitate)     Medical Interventions (Patient has pulse or is breathing):    Full Support       Yue Elmore DO  10/16/22

## 2022-10-16 NOTE — PLAN OF CARE
Frequent productive cough noted. A fib on cardiac mx. BP WNL. Skin care provided as ordered by WOC. Q2T&R in place. Will cont to mx. Call light in reach.

## 2022-10-16 NOTE — PROGRESS NOTES
"                    Clinical Nutrition     Patient Name: Shantelle Decker  YOB: 1940  MRN: [id number]  Date of Encounter: 10/16/22 14:56 EDT  Admission date: 10/7/2022    Reason for Visit   Identified at risk by screening criteria: Difficulty chewing/swallowing, reduced oral intake over the last month     EMR Reviewed: Yes     Diet Nutrition Related History:      Pt admitted d/t fall. Diet tech visited pt. Pt with a PMH of advanced dementia, diet tech unable to obtain wt hx, food allergies, or intake hx. Pt was eating lunch and cleared plate. Diet tech helped pt open applesauce on tray for pt. Diet tech observed pt ate 90% of lunch tray, lunch documented at 75% in intakes. Family member at bedside does not know pt's UBW but does not think she has lost wt. Family member also unsure if pt has food allergies. Diet tech asked pt if they have any difficulties with chewing/swallowing and pt stated no. SLP signed off, recommends puree, thin liquids. Per EMR pt has had wt gain and not wt loss.     Anthropometrics   Height: Height: 162.6 cm (64\")  Admission Weight:   Flowsheet Rows    Flowsheet Row First Filed Value   Admission Height 157.5 cm (62\") Documented at 10/07/2022 1832   Admission Weight 88.5 kg (195 lb) Documented at 10/07/2022 1832        Last Filed Weight:Weight: 88.9 kg (196 lb) (10/16/22 0600)  Weight Method: Bed scale  BMI: BMI (Calculated): 33.6  BMI classification: Obese Class I: 30-34.9kg/m2   IBW: Ideal Body Weight (IBW) (kg): 55     Current Nutrition Prescription     Diet Dysphagia; II - Pureed; Thin; Cardiac    Orders Placed This Encounter      Dietary Nutrition Supplements Boost Plus    Average Intake from [employer].75% x 4 meals    Intake History:     Intake Category  Unable to determine at this time.    Actions   Appropriateness for MSA:  Criteria for further malnutrition exam not met at this time. Follow per protocol.     Interventions:   Follow treatment progress, Care plan reviewed, " Advise alternate selection, Menu provided, Encourage intake    Nina Gutierrez,   Time Spent: 20 min

## 2022-10-16 NOTE — PLAN OF CARE
Disorientedx4, patient remains confused. Speech clear/illogical, follows some command. Skin care provided as ordered by WOC. Q2T&R in place. VSS. A paced on cardiac mx. Frequent nursing round in place.

## 2022-10-17 LAB
ALBUMIN SERPL-MCNC: 3.1 G/DL (ref 3.5–5.2)
ALBUMIN/GLOB SERPL: 1.5 G/DL
ALP SERPL-CCNC: 50 U/L (ref 39–117)
ALT SERPL W P-5'-P-CCNC: 8 U/L (ref 1–33)
ANION GAP SERPL CALCULATED.3IONS-SCNC: 10 MMOL/L (ref 5–15)
AST SERPL-CCNC: 15 U/L (ref 1–32)
BILIRUB SERPL-MCNC: 0.5 MG/DL (ref 0–1.2)
BUN SERPL-MCNC: 32 MG/DL (ref 8–23)
BUN/CREAT SERPL: 26.2 (ref 7–25)
CALCIUM SPEC-SCNC: 8.4 MG/DL (ref 8.6–10.5)
CHLORIDE SERPL-SCNC: 108 MMOL/L (ref 98–107)
CO2 SERPL-SCNC: 24 MMOL/L (ref 22–29)
CREAT SERPL-MCNC: 1.22 MG/DL (ref 0.57–1)
DEPRECATED RDW RBC AUTO: 47.8 FL (ref 37–54)
EGFRCR SERPLBLD CKD-EPI 2021: 43.8 ML/MIN/1.73
ERYTHROCYTE [DISTWIDTH] IN BLOOD BY AUTOMATED COUNT: 14.4 % (ref 12.3–15.4)
GLOBULIN UR ELPH-MCNC: 2.1 GM/DL
GLUCOSE SERPL-MCNC: 137 MG/DL (ref 65–99)
HCT VFR BLD AUTO: 36.2 % (ref 34–46.6)
HGB BLD-MCNC: 11.7 G/DL (ref 12–15.9)
MAGNESIUM SERPL-MCNC: 1.9 MG/DL (ref 1.6–2.4)
MCH RBC QN AUTO: 29.7 PG (ref 26.6–33)
MCHC RBC AUTO-ENTMCNC: 32.3 G/DL (ref 31.5–35.7)
MCV RBC AUTO: 91.9 FL (ref 79–97)
PLATELET # BLD AUTO: 337 10*3/MM3 (ref 140–450)
PMV BLD AUTO: 11.5 FL (ref 6–12)
POTASSIUM SERPL-SCNC: 4.6 MMOL/L (ref 3.5–5.2)
PROT SERPL-MCNC: 5.2 G/DL (ref 6–8.5)
RBC # BLD AUTO: 3.94 10*6/MM3 (ref 3.77–5.28)
SODIUM SERPL-SCNC: 142 MMOL/L (ref 136–145)
WBC NRBC COR # BLD: 10.57 10*3/MM3 (ref 3.4–10.8)

## 2022-10-17 PROCEDURE — 97530 THERAPEUTIC ACTIVITIES: CPT

## 2022-10-17 PROCEDURE — 25010000002 HEPARIN (PORCINE) PER 1000 UNITS: Performed by: INTERNAL MEDICINE

## 2022-10-17 PROCEDURE — 97116 GAIT TRAINING THERAPY: CPT

## 2022-10-17 PROCEDURE — 99231 SBSQ HOSP IP/OBS SF/LOW 25: CPT | Performed by: HOSPITALIST

## 2022-10-17 PROCEDURE — 80053 COMPREHEN METABOLIC PANEL: CPT | Performed by: HOSPITALIST

## 2022-10-17 PROCEDURE — 85027 COMPLETE CBC AUTOMATED: CPT | Performed by: HOSPITALIST

## 2022-10-17 PROCEDURE — 83735 ASSAY OF MAGNESIUM: CPT | Performed by: INTERNAL MEDICINE

## 2022-10-17 RX ADMIN — NIFEDIPINE 60 MG: 30 TABLET, FILM COATED, EXTENDED RELEASE ORAL at 09:19

## 2022-10-17 RX ADMIN — QUETIAPINE FUMARATE 50 MG: 25 TABLET, FILM COATED ORAL at 20:56

## 2022-10-17 RX ADMIN — HEPARIN SODIUM 5000 UNITS: 5000 INJECTION INTRAVENOUS; SUBCUTANEOUS at 06:18

## 2022-10-17 RX ADMIN — POLYETHYLENE GLYCOL 3350 17 G: 17 POWDER, FOR SOLUTION ORAL at 09:19

## 2022-10-17 RX ADMIN — DOCUSATE SODIUM 100 MG: 100 CAPSULE, LIQUID FILLED ORAL at 20:56

## 2022-10-17 RX ADMIN — TRAZODONE HYDROCHLORIDE 50 MG: 50 TABLET ORAL at 20:56

## 2022-10-17 RX ADMIN — HEPARIN SODIUM 5000 UNITS: 5000 INJECTION INTRAVENOUS; SUBCUTANEOUS at 21:02

## 2022-10-17 RX ADMIN — CITALOPRAM HYDROBROMIDE 20 MG: 20 TABLET ORAL at 09:20

## 2022-10-17 RX ADMIN — HEPARIN SODIUM 5000 UNITS: 5000 INJECTION INTRAVENOUS; SUBCUTANEOUS at 14:03

## 2022-10-17 RX ADMIN — MEMANTINE 5 MG: 10 TABLET ORAL at 20:56

## 2022-10-17 RX ADMIN — NYSTATIN: 100000 POWDER TOPICAL at 09:20

## 2022-10-17 RX ADMIN — NYSTATIN: 100000 POWDER TOPICAL at 20:58

## 2022-10-17 RX ADMIN — DOCUSATE SODIUM 100 MG: 100 CAPSULE, LIQUID FILLED ORAL at 09:20

## 2022-10-17 RX ADMIN — SENNOSIDES 2 TABLET: 8.6 TABLET, FILM COATED ORAL at 20:56

## 2022-10-17 RX ADMIN — Medication 10 MG: at 20:56

## 2022-10-17 RX ADMIN — ASPIRIN 81 MG CHEWABLE TABLET 81 MG: 81 TABLET CHEWABLE at 09:19

## 2022-10-17 RX ADMIN — DONEPEZIL HYDROCHLORIDE 10 MG: 10 TABLET, FILM COATED ORAL at 20:56

## 2022-10-17 RX ADMIN — SENNOSIDES 2 TABLET: 8.6 TABLET, FILM COATED ORAL at 09:20

## 2022-10-17 RX ADMIN — MEMANTINE 5 MG: 10 TABLET ORAL at 09:19

## 2022-10-17 NOTE — CASE MANAGEMENT/SOCIAL WORK
Continued Stay Note  [facility]     Patient Name: Shantelle Decker  MRN: [id number]  Today's Date: 10/17/2022    Admit Date: 10/7/2022    Plan: [facility]   Discharge Plan     Row Name 10/17/22 1540       Plan    Plan [facility]    Plan Comments [facility] has accepted for SNF level of care in one of their swing beds.   I am asking for ambulance for Wednesday. Patient will need covid test.     Final Discharge Disposition Code 61 - hospital-based swing bed               Discharge Codes    No documentation.               Expected Discharge Date and Time     Expected Discharge Date Expected Discharge Time    Oct 19, 2022             Nina Brennan RN

## 2022-10-17 NOTE — DISCHARGE PLACEMENT REQUEST
"Farshad Dial (82 y.o. Female)     Nurse tells me she cooperates with cares, stays in bed, self feeder.  She does become noisy at times, but no hitting.    Thanks  Nina DUN, RN, [facility]  [phone]    Date of Birth   1940    Social Security Number       Address   [facility] [address] [facility] [address]    Home Phone   [phone]    MRN   [id number]       Catholic   Ad[employer]ist    Marital Status                               Admission Date   10/7/22    Admission Type   Emergency    Admitting Provider   Yue Elmore DO    Attending Provider   Yue Elmore DO    Department, Room/Bed   [facility] 3G, S361/1       Discharge Date       Discharge Disposition       Discharge Destination                               Attending Provider: Yue Elmore DO    Allergies: Demerol [Meperidine]    Isolation: None   Infection: None   Code Status: CPR    Ht: 162.6 cm (64\")   Wt: 90.5 kg (199 lb 8 oz)    Admission Cmt: None   Principal Problem: Fecal impaction (HCC) [K56.41]                 Active Insurance as of 10/7/2022     Primary Coverage     Payor Plan Insurance Group Employer/Plan Group    MEDICARE MEDICARE A & B      Payor Plan Address Payor Plan Phone Number Payor Plan Fax Number Effective Dates    [address] [phone]  4/1/1996 - None Entered    [place] [address]       Subscriber Name Subscriber Birth Date Member ID       FARSHAD DIAL 1940 [id number]                 Emergency Contacts      (Rel.) Home Phone Work Phone Mobile Phone    Sheldon Dial (Spouse) [phone] -- [phone]    Camilo,Lynn (Daughter) -- -- [phone]    JeronimoIris (Daughter) -- -- [phone]            Insurance Information                MEDICARE/MEDICARE A & B Phone: [phone]    Subscriber: Farshad Dial Subscriber#: [id number]    Group#: -- Precert#: --         Franca Reveles, PT   Physical Therapist  Physical Therapy  Plan of Care    "   Signed  Date of Service:  10/14/22 1304  Creation Time:  10/14/22 1351     Signed        Goal Outcome Evaluation:  Plan of Care Reviewed With: patient, spouse  Progress: improving  Outcome Evaluation: PT DEMONSTRATED IMPROVED TRANSFERS AND SITTING/STANDING BALANCE.  INITIATED GAIT X 25 FEET WITH R WALKER AND MIN ASSIST. PT NEEDS CUES FOR SAFETY AWARENESS WITH TRANSFERS AND WALKER PLACEMENT DURING GAIT. PT PLEASANTLY CONFUSED BUT FOLLOWS COMMANDS. RECOMMEND SNF AT D/C PRIOR TO HOME WITH ELDERLY SPOUSE.                 Marcelle Crooks OT   Occupational Therapist  Specialty:  Occupational Therapy  Plan of Care      Signed  Date of Service:  10/13/22 1145  Creation Time:  10/13/22 1343     Signed        Goal Outcome Evaluation:  Plan of Care Reviewed With: patient, spouse  Progress: improving  Outcome Evaluation: Pt continues to be oriented to self only, however very pleasant with all activities. Pt tolerated STS with Max A x 2, maintaining static standing with Mod A x 2 for ~30 sec each trial. Pt motivated with encouragement from spouse. Unable to initiate steps at this time due to b/l buckling and poor command following. OT cont to rec SNF at [place].                     History & Physical      Macarena Medina DO at 10/07/22 2340              [facility] Medicine Services  HISTORY AND PHYSICAL    Patient Name: Shantelle Decker  : 1940  MRN: [id number]  Primary Care Physician: Provider, No Known  Date of admission: 10/7/2022      Subjective    Subjective     Chief Complaint:  Fall    HPI:  Shantelle Decker is a 82 y.o. female patient with a PMH significant for advanced dementia, HTN, CAD, history of breast cancer who comes to the ED after a fall.  Patient has significant confusion, noncontributory to HPI.  HPI obtained from patient's spouse at bedside.  Patient says that he was trying to help the patient walk into her house today.  She lost her balance and they both fell to the ground.  They  both came to the ED due to these concerns.  Patient has had constipation and complaints of abdominal pain when having a bowel movement.  Spouse also says that patient has had difficulty swallowing and recent vomiting.      Review of Systems   Unable to perform ROS: Dementia        All other systems reviewed and are negative.     Personal History     Past Medical History:   Diagnosis Date   • Breast cancer (CMS/HCC)    • Coronary artery disease    • Dementia (CMS/HCC)    • Dysrhythmia    • Hypertension              Past Surgical History:   Procedure Laterality Date   • ADENOIDECTOMY     • BREAST SURGERY     • CARDIAC DEFIBRILLATOR PLACEMENT     • EXPLORATORY LAPAROTOMY N/A 7/16/2016    Procedure: open ventral hernia repair with mesh;  Surgeon: Bright Buckley MD;  Location: [facility];  Service:    • MASTECTOMY Bilateral    • TONSILLECTOMY         Family History:  family history includes Alcohol abuse in her father; Heart disease in her father and mother. Otherwise pertinent FHx was reviewed and unremarkable.     Social History:  reports that she has quit smoking. Her smoking use included cigarettes and pipe. She has never used smokeless tobacco. She reports current alcohol use. She reports current drug use. Drug: Marijuana.  Social History     Social History Narrative   • Not on file       Medications:  Available home medication information reviewed.  Medications Prior to Admission   Medication Sig Dispense Refill Last Dose   • aspirin 81 MG EC tablet Take 81 mg by mouth daily.      • citalopram (CeleXA) 20 MG tablet Take 20 mg by mouth Daily.      • Cyanocobalamin 1000 MCG/ML kit Inject 1 mL as directed Every 30 (Thirty) Days.      • donepezil (ARICEPT) 10 MG tablet Take 10 mg by mouth every night.      • lidocaine (LIDODERM) 5 % Place 1 patch on the skin as directed by provider Daily. Remove & Discard patch within 12 hours or as directed by MD 30 patch 0    • Melatonin 10 MG tablet Take 1 tablet by mouth Every  Night. 100 tablet 2    • memantine (NAMENDA) 10 MG tablet Take 10 mg by mouth 2 (Two) Times a Day.      • NIFEdipine XL (ADALAT CC) 60 MG 24 hr tablet Take 1 tablet by mouth Daily. 30 tablet 5    • PHARMACY MEDS TO BED CONSULT Daily.      • QUEtiapine (SEROquel) 25 MG tablet Take 50 mg by mouth Every Night.      • QUEtiapine (SEROquel) 25 MG tablet Take 0.5 tablets by mouth Every 8 (Eight) Hours As Needed (agitation). 30 tablet 1    • traZODone (DESYREL) 50 MG tablet Take 50 mg by mouth Every Night.          Allergies   Allergen Reactions   • Demerol [Meperidine] Anaphylaxis     And itching       Objective    Objective     Vital Signs:   Temp:  [97.5 °F (36.4 °C)-98.5 °F (36.9 °C)] 97.5 °F (36.4 °C)  Heart Rate:  [64-84] 84  Resp:  [16-18] 16  BP: ()/(79-84) 129/79       Physical Exam   Constitutional: Awake, alert  Eyes: PERRLA, sclerae anicteric, no conjunctival injection  HENT: NCAT, mucous membranes moist  Neck: Supple, no thyromegaly, no lymphadenopathy, trachea midline  Respiratory: Clear to auscultation bilaterally, nonlabored respirations   Cardiovascular: RRR, no murmurs, rubs, or gallops, palpable pedal pulses bilaterally  Gastrointestinal: Positive bowel sounds, soft, nontender, nondistended  Musculoskeletal: No bilateral ankle edema, no clubbing or cyanosis to extremities  Psychiatric: Appropriate affect, cooperative  Neurologic: Oriented to person only, strength symmetric in all extremities, Cranial Nerves grossly intact to confrontation, speech clear  Skin: No rashes      Result Review:  I have personally reviewed the results from the time of this admission to 10/7/2022 23:41 EDT and agree with these findings:  [x]  Laboratory list / accordion  [x]  Microbiology  [x]  Radiology  [x]  EKG/Telemetry   []  Cardiology/Vascular   []  Pathology  [x]  Old records  []  Other:        LAB RESULTS:      Lab 10/07/22  1906   WBC 11.03*   HEMOGLOBIN 12.9   HEMATOCRIT 38.4   PLATELETS 214   NEUTROS ABS 8.22*    IMMATURE GRANS (ABS) 0.07*   LYMPHS ABS 1.62   MONOS ABS 1.08*   EOS ABS 0.01   MCV 87.7   PROCALCITONIN 0.06   LACTATE 2.4*         Lab 10/07/22  1906   SODIUM 138   POTASSIUM 4.1   CHLORIDE 103   CO2 20.0*   ANION GAP 15.0   BUN 26*   CREATININE 2.11*   EGFR 22.7*   GLUCOSE 106*   CALCIUM 8.8         Lab 10/07/22  1906   TOTAL PROTEIN 6.2   ALBUMIN 3.20*   GLOBULIN 3.0   ALT (SGPT) 6   AST (SGOT) 16   BILIRUBIN 1.2   ALK PHOS 64         Lab 10/07/22  1906   TROPONIN T 0.054*                 UA    Urinalysis 7/5/22 10/7/22 10/7/22     1917 1917   Squamous Epithelial Cells, UA 0-5  0-2   Specific Gravity, UA 1.016 1.022    Ketones, UA  15 mg/dL (1+) (A)    Blood, UA Trace (A) Negative    Leukocytes, UA Negative Trace (A)    Nitrite, UA  Negative    RBC, UA 2  0-2   WBC, UA   0-2   Bacteria, UA Negative  None Seen   (A) Abnormal value       Comments are available for some flowsheets but are not being displayed.             Microbiology Results (last 10 days)     Procedure Component Value - Date/Time    COVID PRE-OP / PRE-PROCEDURE SCREENING ORDER (NO ISOLATION) - Swab, Nasopharynx [[id number]]  (Normal) Collected: 10/07/22 1955    Lab Status: Final result Specimen: Swab from Nasopharynx Updated: 10/07/22 2039    Narrative:      The following orders were created for panel order COVID PRE-OP / PRE-PROCEDURE SCREENING ORDER (NO ISOLATION) - Swab, Nasopharynx.  Procedure                               Abnormality         Status                     ---------                               -----------         ------                     COVID-19 and FLU A/B PCR...[[id number]]  Normal              Final result                 Please view results for these tests on the individual orders.    COVID-19 and FLU A/B PCR - Swab, Nasopharynx [[id number]]  (Normal) Collected: 10/07/22 1955    Lab Status: Final result Specimen: Swab from Nasopharynx Updated: 10/07/22 2039     COVID19 Not Detected     Influenza A PCR Not Detected      Influenza B PCR Not Detected    Narrative:      Fact sheet for providers: https://www.fda.gov/media/959061/download    Fact sheet for patients: https://www.fda.gov/media/122414/download    Test performed by PCR.          CT Abdomen Pelvis Without Contrast    Result Date: 10/7/2022   DATE OF EXAM: 10/7/2022 7:22 PM  PROCEDURE: CT ABDOMEN PELVIS WO CONTRAST-  INDICATIONS: Abdominal pain.  COMPARISON: 04/04/2021.  TECHNIQUE: Routine transaxial slices were obtained through the abdomen and pelvis without the administration of intravenous contrast. Reconstructed coronal and sagittal images were also obtained. Automated exposure control and iterative construction methods were used.  FINDINGS: There are tiny layering gallstones. The exam is limited by noncontrast technique. The liver, adrenal glands, pancreas, spleen, and left kidney are normal. There is suggestion of a benign cyst in the upper pole of the right kidney. There are atherosclerotic vascular calcifications throughout the abdomen and pelvis. The uterus has a slightly lobulated contour suggestive of fibroids. The adnexal structures and urinary bladder are normal. There is increased stool in the rectosigmoid colon consistent with fecal impaction. There is colonic diverticulosis without evidence of acute diverticulitis. There is the appendix is either small in size or surgically absent. There are no dilated loops of bowel to indicate an obstructive process. The patient has a moderate-sized hiatal hernia. There is grade 1 anterior spondylolisthesis of L4 on L5. There are no suspicious osteolytic or sclerotic lesions within the bony structures. There are no acute findings beneath the hemidiaphragms.      Impression:  1. Cholelithiasis. 2. No definite acute findings. 3. Rectosigmoid colon fecal impaction. 4. Multiple incidental findings as noted above. The exam is limited by noncontrast technique.  This report was finalized on 10/7/2022 8:30 PM by Raghavendra Perea MD.       CT Head Without Contrast    Result Date: 10/7/2022   DATE OF EXAM: 10/7/2022 7:22 PM  PROCEDURE: CT HEAD WO CONTRAST-  INDICATIONS: Confusion.  COMPARISON: No Comparisons Available  TECHNIQUE: Routine transaxial cuts were obtained through the head without the administration of contrast. Automated exposure control and iterative reconstruction methods were used.  FINDINGS: There is enlargement of the sulci, fissures, ventricles, and basal cisterns indicating volume loss secondary to age-appropriate atrophy. There are areas of decreased density in the white matter tracts felt to represent chronic microvascular ischemia. There is no acute hemorrhage, midline shift, or suspicious extra-axial fluid collections. There is no skull fracture. The visualized paranasal and mastoid sinuses are clear. There are extensive atherosclerotic vascular calcifications in the cavernous carotid arteries and the distal vertebral arteries. The orbital contents are normal.      Impression:  1. No acute findings. 2. Volume loss secondary to age-appropriate atrophy. 3. Chronic ischemic changes.  This report was finalized on 10/7/2022 7:59 PM by Raghavendra Perea MD.      XR Chest 1 View    Result Date: 10/7/2022  DATE OF EXAM: 10/7/2022 8:57 PM  PROCEDURE: XR CHEST 1 VW-  INDICATIONS: Confusion  COMPARISON: 05/20/2022.  TECHNIQUE: Single radiographic view of the chest was obtained.  FINDINGS: The heart size is normal. The pulmonary vascular markings are normal. The lungs and pleural spaces are clear of active disease. There is a left-sided transvenous pacemaker in place. There are surgical clips in the left axilla.  There are chronic age-related changes involving the bony thorax and thoracic aorta.      Impression: No active disease.  This report was finalized on 10/7/2022 9:09 PM by Raghavendra Perea MD.      XR Hip With or Without Pelvis 2 - 3 View Right    Result Date: 10/7/2022  DATE OF EXAM: 10/7/2022 8:55 PM  PROCEDURE: XR HIP W OR WO PELVIS 2-3  VIEW RIGHT-  INDICATIONS: Fall, right hip pain.  COMPARISON: CT of the pelvis performed on 10/07/2022.  TECHNIQUE: AP and Lateral views of the right hip with Pelvis were obtained.  FINDINGS: The bony structures are demineralized. There is no acute fracture or dislocation. The pelvic bony structures were better evaluated on the CT exam. There are mild arthritic changes involving the hip joints and sacroiliac joints. There are vascular calcifications in the pelvis.      Impression: No acute findings.  This report was finalized on 10/7/2022 9:08 PM by Raghavendra Perea MD.        Results for orders placed during the hospital encounter of 05/20/22    Adult Transthoracic Echo Complete W/ Cont if Necessary Per Protocol    Interpretation Summary  · Left ventricular systolic function is normal. Estimated left ventricular EF = 65%.  · Left ventricular wall thickness is consistent with mild concentric hypertrophy.  · Left ventricular diastolic function is consistent with (grade I) impaired relaxation.  · The aortic valve is mildly calcified. Otherwise, the cardiac valves are anatomically and functionally normal.  · Estimated right ventricular systolic pressure from tricuspid regurgitation is normal (<35 mmHg).      Assessment & Plan   Assessment & Plan     Active Hospital Problems    Diagnosis  POA   • **Fecal impaction (HCC) [K56.41]  Yes   • Elevated lactic acid level [R79.89]  Unknown   • Elevated troponin [R77.8]  Unknown   • Difficulty swallowing [R13.10]  Unknown   • Leukocytosis [D72.829]  Unknown   • LIANNA (acute kidney injury) (HCC) [N17.9]  Yes   • Dementia with behavioral disturbance [F03.918]  Yes   • Essential hypertension [I10]  Yes   • Coronary artery disease without angina pectoris [I25.10]  Yes   Shantelle Decker is a 82 y.o. female patient with a PMH significant for advanced dementia, HTN, CAD, history of breast cancer who comes to the ED after a fall.     Fecal impaction  - Manually disimpact  - Start every 4-hour  enemas  - Will need bowel regimen in place prior to discharge  -CT abdomen and pelvis noted above    Difficulty swallowing  - Cardiac soft diet  - SLP consult for a.m.  - Aspiration precautions    Leukocytosis  Elevated lactic acid  - No obvious source of infection  - Monitor for need for antibiotics  -IVF's  - Reflex lactic acid pending  - Procalcitonin negative, UA negative, CT abdomen pelvis without infectious process  - A.m. labs    Elevated troponin  - Trend  - Unable to view EKG in epic.  Repeat pending  - Hemoglobin A1c, FLP for a.m.  - Consider cardiology consult    LIANNA, POA  --IV fluids  -- Every 4 hours bladder scan  --urine sodium/urea nitrogen, urine creatinine  --CT noted above  --consider nephrology consult in am if no improvement with IV fluids  --Hold nephrotoxic medications  --A.m. labs    Dementia  Fall  - Fall precautions  - Continue home meds  - PT/OT  -Bed alarm    HTN  CAD  - Continue home meds    Home med list reviewed    DVT prophylaxis: Heparin      CODE STATUS: Full code, discussed with  at bedside  Code Status and Medical Interventions:   Ordered at: 10/07/22 2340     Level Of Support Discussed With:    Next of Kin (If No Surrogate)     Code Status (Patient has no pulse and is not breathing):    CPR (Attempt to Resuscitate)     Medical Interventions (Patient has pulse or is breathing):    Full Support         Macarena Medina DO  10/07/22        Electronically signed by Macarena Medina DO at 10/07/22 6109         Current Facility-Administered Medications   Medication Dose Route Frequency Provider Last Rate Last Admin   • acetaminophen (TYLENOL) tablet 650 mg  650 mg Oral Q4H PRN Macarena Medina DO        Or   • acetaminophen (TYLENOL) 160 MG/5ML solution 650 mg  650 mg Oral Q4H PRN Macarena Medina DO        Or   • acetaminophen (TYLENOL) suppository 650 mg  650 mg Rectal Q4H PRN Macarena Medina DO       • aspirin chewable tablet 81 mg  81 mg Oral Daily Yue Elmore DO   81 mg  at 10/17/22 0919   • citalopram (CeleXA) tablet 20 mg  20 mg Oral Daily Carol, Macarena G, DO   20 mg at 10/17/22 0920   • docusate sodium (COLACE) capsule 100 mg  100 mg Oral BID Denisa Duarte MD   100 mg at 10/17/22 0920   • donepezil (ARICEPT) tablet 10 mg  10 mg Oral Nightly Carol, Macarena G, DO   10 mg at 10/16/22 2105   • heparin (porcine) 5000 UNIT/ML injection 5,000 Units  5,000 Units Subcutaneous Q8H Carol, Macarena G, DO   5,000 Units at 10/17/22 0618   • melatonin tablet 10 mg  10 mg Oral Nightly Carol, Macarena G, DO   10 mg at 10/16/22 2105   • memantine (NAMENDA) tablet 5 mg  5 mg Oral BID Carol, Macarena G, DO   5 mg at 10/17/22 0919   • NIFEdipine XL (PROCARDIA XL) 24 hr tablet 60 mg  60 mg Oral Q24H Carol, Macarena G, DO   60 mg at 10/17/22 0919   • nystatin (MYCOSTATIN) powder   Topical Q12H Denisa Duarte MD   Given at 10/17/22 0920   • ondansetron (ZOFRAN) tablet 4 mg  4 mg Oral Q6H PRN Carol, Macarena G, DO        Or   • ondansetron (ZOFRAN) injection 4 mg  4 mg Intravenous Q6H PRN Carol, Macarena G, DO       • polyethylene glycol (MIRALAX) packet 17 g  17 g Oral Daily Denisa Duarte MD   17 g at 10/17/22 0919   • QUEtiapine (SEROquel) tablet 12.5 mg  12.5 mg Oral Q8H PRN Carol, Macarena G, DO       • QUEtiapine (SEROquel) tablet 50 mg  50 mg Oral Nightly Carol, Macarena G, DO   50 mg at 10/16/22 2105   • senna (SENOKOT) tablet 2 tablet  2 tablet Oral BID Denisa Duarte MD   2 tablet at 10/17/22 0920   • sodium chloride 0.9 % flush 10 mL  10 mL Intravenous PRN Carol Macarena G, DO       • sodium chloride 0.9 % flush 10 mL  10 mL Intravenous Q12H Carol, Macarena G, DO   10 mL at 10/11/22 0907   • sodium chloride 0.9 % flush 10 mL  10 mL Intravenous PRN Carol, Macarena G, DO       • traZODone (DESYREL) tablet 50 mg  50 mg Oral Nightly Carol, Macarena G, DO   50 mg at 10/16/22 5319        Physical Therapy Notes (most recent note)      Franca Reveles, PT at 10/14/22 1304  Version 1 of 1         Patient Name: Shantelle  Jeronimo  : 1940    MRN: [id number]                              Today's Date: 10/14/2022       Admit Date: 10/7/2022    Visit Dx:     ICD-10-CM ICD-9-CM   1. Fecal impaction (HCC)  K56.41 560.32   2. Acute dehydration  E86.0 276.51   3. Acute renal insufficiency  N28.9 593.9   4. Elevated troponin  R77.8 790.6   5. Dysphagia, oral phase  R13.11 787.21     Patient Active Problem List   Diagnosis   • Incarcerated ventral hernia   • Coronary artery disease without angina pectoris   • Morbid obesity (HCC)   • Normocytic anemia   • Dementia with behavioral disturbance   • Essential hypertension   • History of breast cancer   • Thyroid mass   • LIANNA (acute kidney injury) (HCC)   • CKD (chronic kidney disease) stage 3, GFR 30-59 ml/min (HCC)   • Chest pain   • Metabolic acidosis   • Other chest pain   • Fecal impaction (HCC)   • Elevated lactic acid level   • Elevated troponin   • Difficulty swallowing   • Leukocytosis   • Fall     Past Medical History:   Diagnosis Date   • Breast cancer (CMS/HCC)    • Coronary artery disease    • Dementia (CMS/HCC)    • Dysrhythmia    • Hypertension      Past Surgical History:   Procedure Laterality Date   • ADENOIDECTOMY     • BREAST SURGERY     • CARDIAC DEFIBRILLATOR PLACEMENT     • EXPLORATORY LAPAROTOMY N/A 2016    Procedure: open ventral hernia repair with mesh;  Surgeon: Bright Buckley MD;  Location: [facility];  Service:    • MASTECTOMY Bilateral    • TONSILLECTOMY        General Information     Row Name 10/14/22 4551          Physical Therapy Time and Intention    Document Type therapy note (daily note)  -CD     Row Name 10/14/22 0638          General Information    Patient Profile Reviewed yes  -CD     Existing Precautions/Restrictions fall  advanced dementia.  -CD     Barriers to Rehab medically complex;previous functional deficit;cognitive status  -CD     Row Name 10/14/22 3753          Cognition    Orientation Status (Cognition) oriented to;person;disoriented  to;place;situation;time  -CD     Row Name 10/14/22 1337          Safety Issues, Functional Mobility    Safety Issues Affecting Function (Mobility) safety precaution awareness;safety precautions follow-through/compliance;sequencing abilities  -CD     Impairments Affecting Function (Mobility) balance;cognition;endurance/activity tolerance;postural/trunk control;strength;pain  -CD     Cognitive Impairments, Mobility Safety/Performance awareness, need for assistance;insight into deficits/self-awareness;safety precaution awareness;safety precaution follow-through;sequencing abilities  -CD     Comment, Safety Issues/Impairments (Mobility) PLEASANTLY CONFUSED. EXCITED TO SEE HER . NEEDS CUES FOR SAFETY AWARENESS WITH MOBILITY BUT COOPERATIVE.  -CD           User Key  (r) = Recorded By, (t) = Taken By, (c) = Cosigned By    Initials Name Provider Type    CD Franca Reveles PT Physical Therapist               Mobility     Row Name 10/14/22 1339          Bed Mobility    Supine-Sit [place] (Bed Mobility) moderate assist (50% patient effort);2 person assist;verbal cues  -CD     Assistive Device (Bed Mobility) bed rails;draw sheet;head of bed elevated  -CD     Comment, (Bed Mobility) MANUAL ASSIST VIA DRAW SHEET TO SCOOT TO EDGE OF SPECIALTY BED. ONCE SITTING ABLE TO MAINTAIN STATIC SITTING BALANCE WITH CGA.  -CD     Row Name 10/14/22 1339          Transfers    Comment, (Transfers) CUES FOR HAND PLACEMENT. STS FROM EOB AND RECLINER. STAND STEP PIVOT BED TO RECLINER.  -CD     Row Name 10/14/22 1339          Bed-Chair Transfer    Bed-Chair [place] (Transfers) moderate assist (50% patient effort);2 person assist;verbal cues  -CD     Assistive Device (Bed-Chair Transfers) --  VIA B UE SUPPORT.  -CD     Row Name 10/14/22 1339          Sit-Stand Transfer    Sit-Stand [place] (Transfers) verbal cues;minimum assist (75% patient effort);2 person assist  -CD     Assistive Device (Sit-Stand Transfers) walker,  front-wheeled  -CD     Row Name 10/14/22 1339          Gait/Stairs (Locomotion)    [place] Level (Gait) minimum assist (75% patient effort);verbal cues  -CD     Assistive Device (Gait) walker, front-wheeled  -CD     Distance in Feet (Gait) 25 FEET  -CD     Deviations/Abnormal Patterns (Gait) stride length decreased;weight shifting decreased;gait speed decreased;deyanira decreased  -CD     Bilateral Gait Deviations forward flexed posture;heel strike decreased  -CD     Comment, (Gait/Stairs) MAX CUES FOR UPRIGHT POSTURE AND WALKER PLACEMENT. TENDS TO PUSH WALKER TOO FAR AHEAD.  -CD           User Key  (r) = Recorded By, (t) = Taken By, (c) = Cosigned By    Initials Name Provider Type    CD Franca Reveles, PT Physical Therapist               Obj/Interventions     Row Name 10/14/22 1342          Motor Skills    Therapeutic Exercise --  SEATED B UE/LE THER EX, SEATED: UE PUNCHES WITH TRUNK ROTATION, SHOULDER FLEX/EXT, LAQ, HIP FLEX- 1 SET OF 10 REPS  -CD     Row Name 10/14/22 1342          Balance    Static Sitting Balance contact guard  -CD     Dynamic Sitting Balance contact guard  -CD     Position, Sitting Balance supported;sitting edge of bed  -CD     Sit to Stand Dynamic Balance minimal assist;verbal cues;2-person assist  -CD     Static Standing Balance verbal cues;contact guard  -CD     Dynamic Standing Balance minimal assist;verbal cues  -CD     Position/Device Used, Standing Balance supported;walker, front-wheeled  -CD     Balance Interventions sitting;standing;sit to stand;supported;static;dynamic  -CD     Comment, Balance SAT EOB FOR THER EX WITH CGA. AMBULATED 25 FEET WITH R WALKER AND MIN ASSIST.  -CD           User Key  (r) = Recorded By, (t) = Taken By, (c) = Cosigned By    Initials Name Provider Type    CD Franca Reveles, PT Physical Therapist               Goals/Plan    No documentation.                Clinical Impression     Row Name 10/14/22 1346          Pain    Pretreatment Pain Rating 0/10 - no  pain  -CD     Posttreatment Pain Rating 0/10 - no pain  -CD     Pre/Posttreatment Pain Comment PT DENIED PAIN OR DIZZINESS.  -CD     Row Name 10/14/22 1346          Plan of Care Review    Plan of Care Reviewed With patient;spouse  -CD     Progress improving  -CD     Outcome Evaluation PT DEMONSTRATED IMPROVED TRANSFERS AND SITTING/STANDING BALANCE.  INITIATED GAIT X 25 FEET WITH R WALKER AND MIN ASSIST. PT NEEDS CUES FOR SAFETY AWARENESS WITH TRANSFERS AND WALKER PLACEMENT DURING GAIT. PT PLEASANTLY CONFUSED BUT FOLLOWS COMMANDS. RECOMMEND SNF AT D/C PRIOR TO HOME WITH ELDERLY SPOUSE.  -CD     Row Name 10/14/22 1346          Therapy Assessment/Plan (PT)    Patient/Family Therapy Goals Statement (PT) PER SPOUSE TO BE UP AND WALKING.  -CD     Rehab Potential (PT) good, to achieve stated therapy goals  -CD     Criteria for Skilled Interventions Met (PT) yes;meets criteria;skilled treatment is necessary  -CD     Therapy Frequency (PT) daily  -CD     Row Name 10/14/22 1346          Vital Signs    Pre Systolic BP Rehab --  VVS. NSG CLEARED FOR TREATMENT.  -CD     Pre Patient Position Supine  -CD     Intra Patient Position Standing  -CD     Post Patient Position Sitting  -CD     Row Name 10/14/22 1346          Positioning and Restraints    Pre-Treatment Position in bed  -CD     Post Treatment Position chair  -CD     In Chair reclined;call light within reach;encouraged to call for assist;exit alarm on;legs elevated;notified nsg;RUE elevated;LUE elevated;with family/caregiver;waffle cushion;on mechanical lift sling  NSG NOTIFIED OF CURRENT MOBILITY STATUS AND REC'S FOR TRANSFERS.  -CD           User Key  (r) = Recorded By, (t) = Taken By, (c) = Cosigned By    Initials Name Provider Type    CD Franca Reveles, PT Physical Therapist               Outcome Measures     Row Name 10/14/22 1350 10/14/22 0862       How much help from another person do you currently need...    Turning from your back to your side while in flat bed  without using bedrails? 2  -CD 2  -OD    Moving from lying on back to sitting on the side of a flat bed without bedrails? 2  -CD 2  -OD    Moving to and from a bed to a chair (including a wheelchair)? 2  -CD 2  -OD    Standing up from a chair using your arms (e.g., wheelchair, bedside chair)? 3  -CD 2  -OD    Climbing 3-5 steps with a railing? 1  -CD 1  -OD    To walk in hospital room? 3  -CD 1  -OD    AM-PAC 6 Clicks Score (PT) 13  -CD 10  -OD    Highest level of mobility 4 --> Transferred to chair/commode  -CD 4 --> Transferred to chair/commode  -OD    Row Name 10/14/22 1350          Functional Assessment    Outcome Measure Options AM-PAC 6 Clicks Basic Mobility (PT)  -CD           User Key  (r) = Recorded By, (t) = Taken By, (c) = Cosigned By    Initials Name Provider Type    CD Franca Reveles, PT Physical Therapist    OD Bettie Farris, RN Registered Nurse                             Physical Therapy Education     Title: PT OT SLP Therapies (In Progress)     Topic: Physical Therapy (In Progress)     Point: Mobility training (In Progress)     Learning Progress Summary           Patient Acceptance, E, VU,NR by CD at 10/14/2022 1350    Comment: SEE FLOWSHEET    Acceptance, E,D, NR by HP at 10/9/2022 1559   Family Acceptance, E,D, NR by HP at 10/9/2022 1559   Significant Other Acceptance, E, VU,NR by CD at 10/14/2022 1350    Comment: SEE FLOWSHEET                   Point: Home exercise program (In Progress)     Learning Progress Summary           Patient Acceptance, E, VU,NR by CD at 10/14/2022 1350    Comment: SEE FLOWSHEET    Acceptance, E,D, NR by HP at 10/9/2022 1559   Family Acceptance, E,D, NR by HP at 10/9/2022 1559   Significant Other Acceptance, E, VU,NR by CD at 10/14/2022 1350    Comment: SEE FLOWSHEET                   Point: Body mechanics (In Progress)     Learning Progress Summary           Patient Acceptance, E, VU,NR by CD at 10/14/2022 1350    Comment: SEE FLOWSHEET    Acceptance, E,D, NR by HP  at 10/9/2022 1559   Family Acceptance, E,D, NR by HP at 10/9/2022 1559   Significant Other Acceptance, E, VU,NR by  at 10/14/2022 1350    Comment: SEE FLOWSHEET                   Point: Precautions (In Progress)     Learning Progress Summary           Patient Acceptance, E, VU,NR by  at 10/14/2022 1350    Comment: SEE FLOWSHEET    Acceptance, E,D, NR by  at 10/9/2022 1559   Family Acceptance, E,D, NR by  at 10/9/2022 1559   Significant Other Acceptance, E, VU,NR by CD at 10/14/2022 1350    Comment: SEE FLOWSHEET                               User Key     Initials Effective Dates Name Provider Type Discipline    CD 06/16/21 -  Franca Reveles PT Physical Therapist PT     06/01/21 -  Leticia Ghotra PT Physical Therapist PT              PT Recommendation and Plan     Plan of Care Reviewed With: patient, spouse  Progress: improving  Outcome Evaluation: PT DEMONSTRATED IMPROVED TRANSFERS AND SITTING/STANDING BALANCE.  INITIATED GAIT X 25 FEET WITH R WALKER AND MIN ASSIST. PT NEEDS CUES FOR SAFETY AWARENESS WITH TRANSFERS AND WALKER PLACEMENT DURING GAIT. PT PLEASANTLY CONFUSED BUT FOLLOWS COMMANDS. RECOMMEND SNF AT D/C PRIOR TO HOME WITH ELDERLY SPOUSE.     Time Calculation:    PT Charges     Row Name 10/14/22 1351             Time Calculation    Start Time 1304  -CD      PT Received On 10/14/22  -CD      PT Goal Re-Cert Due Date 10/19/22  -CD         Time Calculation- PT    Total Timed Code Minutes- PT 30 minute(s)  -CD         Timed Charges    82499 - PT Therapeutic Exercise Minutes 10  -CD      70307 - Gait Training Minutes  10  -CD      75445 - PT Therapeutic Activity Minutes 10  -CD         Total Minutes    Timed Charges Total Minutes 30  -CD       Total Minutes 30  -CD            User Key  (r) = Recorded By, (t) = Taken By, (c) = Cosigned By    Initials Name Provider Type    CD Franca Reveles PT Physical Therapist              Therapy Charges for Today     Code Description Service Date Service Provider  Modifiers Qty    76739859096 HC PT THER PROC EA 15 MIN 10/14/2022 Franca Reveles, PT GP 1    92277878213 HC GAIT TRAINING EA 15 MIN 10/14/2022 Franca Reveles, PT GP 1    31240392604 HC PT THER SUPP EA 15 MIN 10/14/2022 Franca Reveles, PT GP 2          PT G-Codes  Outcome Measure Options: AM-PAC 6 Clicks Basic Mobility (PT)  AM-PAC 6 Clicks Score (PT): 13  AM-PAC 6 Clicks Score (OT): 13    Franca Reveles, PT  10/14/2022      Electronically signed by Franca Reveles, PT at 10/14/22 7574

## 2022-10-17 NOTE — PROGRESS NOTES
[facility] Medicine Services  PROGRESS NOTE    Patient Name: Shantelle Decker  : 1940  MRN: [id number]    Date of Admission: 10/7/2022  Primary Care Physician: Nancy Montiel APRN    Subjective   Subjective     CC:  F/U fall, fecal impaction    HPI:  Patient resting in bed, remains disoriented and confused.    ROS:  Unable to obtain full ROS due to confusion    Objective   Objective     Vital Signs:   Temp:  [97.5 °F (36.4 °C)-98.6 °F (37 °C)] 97.5 °F (36.4 °C)  Heart Rate:  [62-84] 62  Resp:  [16-17] 16  BP: ()/(54-81) 170/69     Physical Exam:  Constitutional: No acute distress, sleeping, laying in bed  HENT: NCAT, mucous membranes moist  Respiratory: Clear to auscultation bilaterally, respiratory effort normal   Cardiovascular: RRR, no murmurs, rubs, or gallops  Gastrointestinal: Positive bowel sounds, soft, nontender, nondistended  Musculoskeletal: No bilateral ankle edema  Psychiatric: calm, cooperative  Neurologic: confused and disoriented, speech clear  Skin: No rashes    Results Reviewed:  LAB RESULTS:      Lab 10/16/22  0427 10/14/22  0449 10/13/22  0432   WBC 7.62 11.07* 8.87   HEMOGLOBIN 10.2* 12.0 11.1*   HEMATOCRIT 31.7* 36.7 33.4*   PLATELETS 257 264 228   MCV 92.2 91.8 89.1         Lab 10/16/22  0427 10/14/22  0449 10/13/22  0432 10/11/22  1126   SODIUM 141 137 142 140   POTASSIUM 4.6 3.9 3.9 3.8   CHLORIDE 110* 106 111* 108*   CO2 25.0 22.0 22.0 24.0   ANION GAP 6.0 9.0 9.0 8.0   BUN 29* 25* 21 18   CREATININE 1.27* 1.24* 1.29* 1.06*   EGFR 41.8* 43.0* 41.0* 51.9*   GLUCOSE 79 97 79 93   CALCIUM 7.9* 8.1* 7.8* 8.5*         Lab 10/16/22  0427 10/14/22  0449 10/13/22  0432   TOTAL PROTEIN 4.5* 5.2* 4.6*   ALBUMIN 2.50* 2.90* 2.60*   GLOBULIN 2.0 2.3 2.0   ALT (SGPT) 7 6 5   AST (SGOT) 13 12 9   BILIRUBIN 0.3 0.4 0.3   ALK PHOS 43 53 50                     Brief Urine Lab Results  (Last result in the past 365 days)      Color   Clarity   Blood   Leuk Est    Nitrite   Protein   CREAT   Urine HCG        10/07/22 1917 Yellow   Clear   Negative   Trace   Negative   30 mg/dL (1+)                 Microbiology Results Abnormal     Procedure Component Value - Date/Time    COVID PRE-OP / PRE-PROCEDURE SCREENING ORDER (NO ISOLATION) - Swab, Nasopharynx [[id number]]  (Normal) Collected: 10/07/22 1955    Lab Status: Final result Specimen: Swab from Nasopharynx Updated: 10/07/22 2039    Narrative:      The following orders were created for panel order COVID PRE-OP / PRE-PROCEDURE SCREENING ORDER (NO ISOLATION) - Swab, Nasopharynx.  Procedure                               Abnormality         Status                     ---------                               -----------         ------                     COVID-19 and FLU A/B PCR...[[id number]]  Normal              Final result                 Please view results for these tests on the individual orders.    COVID-19 and FLU A/B PCR - Swab, Nasopharynx [[id number]]  (Normal) Collected: 10/07/22 1955    Lab Status: Final result Specimen: Swab from Nasopharynx Updated: 10/07/22 2039     COVID19 Not Detected     Influenza A PCR Not Detected     Influenza B PCR Not Detected    Narrative:      Fact sheet for providers: https://www.fda.gov/media/726045/download    Fact sheet for patients: https://www.fda.gov/media/307640/download    Test performed by PCR.          No radiology results from the last 24 hrs    Results for orders placed during the hospital encounter of 05/20/22    Adult Transthoracic Echo Complete W/ Cont if Necessary Per Protocol    Interpretation Summary  · Left ventricular systolic function is normal. Estimated left ventricular EF = 65%.  · Left ventricular wall thickness is consistent with mild concentric hypertrophy.  · Left ventricular diastolic function is consistent with (grade I) impaired relaxation.  · The aortic valve is mildly calcified. Otherwise, the cardiac valves are anatomically and functionally normal.  ·  Estimated right ventricular systolic pressure from tricuspid regurgitation is normal (<35 mmHg).      I have reviewed the medications:  Scheduled Meds:aspirin, 81 mg, Oral, Daily  citalopram, 20 mg, Oral, Daily  docusate sodium, 100 mg, Oral, BID  donepezil, 10 mg, Oral, Nightly  heparin (porcine), 5,000 Units, Subcutaneous, Q8H  melatonin, 10 mg, Oral, Nightly  memantine, 5 mg, Oral, BID  NIFEdipine XL, 60 mg, Oral, Q24H  nystatin, , Topical, Q12H  polyethylene glycol, 17 g, Oral, Daily  QUEtiapine, 50 mg, Oral, Nightly  senna, 2 tablet, Oral, BID  sodium chloride, 10 mL, Intravenous, Q12H  traZODone, 50 mg, Oral, Nightly      Continuous Infusions:   PRN Meds:.•  acetaminophen **OR** acetaminophen **OR** acetaminophen  •  ondansetron **OR** ondansetron  •  QUEtiapine  •  [COMPLETED] Insert peripheral IV **AND** sodium chloride  •  sodium chloride    Assessment & Plan   Assessment & Plan     Active Hospital Problems    Diagnosis  POA   • **Fecal impaction (HCC) [K56.41]  Yes   • Elevated lactic acid level [R79.89]  Unknown   • Elevated troponin [R77.8]  Unknown   • Difficulty swallowing [R13.10]  Unknown   • Leukocytosis [D72.829]  Unknown   • Fall [W19.XXXA]  Unknown   • LIANNA (acute kidney injury) (HCC) [N17.9]  Yes   • Dementia with behavioral disturbance [F03.918]  Yes   • Essential hypertension [I10]  Yes   • Coronary artery disease without angina pectoris [I25.10]  Yes      Resolved Hospital Problems   No resolved problems to display.        Brief Hospital Course to date:  Shantelle Decker is a 82 y.o. female with advanced dementia, HTN, CAD, history of breast cancer who presented to the ED after a fall and was found to have fecal impaction and acute kidney injury.    This patient's problems and plans were partially entered by my partner and updated as appropriate by me 10/17/22.    Fecal impaction  - Now disimpacted, having bowel movements  - Continue scheduled miralax and docusate, senna     Difficulty  swallowing  - SLP following.  Placed on modified diet- puree, thin liquids for mild, oral dysphagia     Leukocytosis  Elevated lactic acid  - Suspect related to dehydration  - Procalcitonin negative, UA negative, CT abdomen pelvis without infectious process, CXR without evidence of pneumonia     Elevated troponin  - Likely demand ischemia in setting of dehydration, LIANNA     LIANNA on CKD III  -- Creatinine 2.11 on admission (baseline 1.1-1.5)  -- Improving with IV fluids-at baseline now     Dementia  Fall  - Continue home meds  - PT/OT     HTN  CAD  - Continue home meds    Expected Discharge Location and Transportation:  agreeable to rehab- referral sent to [facility] in [place]- pending  Expected Discharge Date: 10/19    DVT prophylaxis:  Medical DVT prophylaxis orders are present.     AM-PAC 6 Clicks Score (PT): 11 (10/15/22 0800)    CODE STATUS:   Code Status and Medical Interventions:   Ordered at: 10/07/22 2340     Level Of Support Discussed With:    Next of Kin (If No Surrogate)     Code Status (Patient has no pulse and is not breathing):    CPR (Attempt to Resuscitate)     Medical Interventions (Patient has pulse or is breathing):    Full Support       Yue Elmore DO  10/17/22

## 2022-10-17 NOTE — PLAN OF CARE
Goal Outcome Evaluation:  Plan of Care Reviewed With: patient        Progress: no changePlesantly confused VSS Paced on tele. Positioned for comfort by staff using skin interventions. Possible DC today.

## 2022-10-17 NOTE — THERAPY TREATMENT NOTE
Patient Name: Shantelle Decker  : 1940    MRN: [id number]                              Today's Date: 10/17/2022       Admit Date: 10/7/2022    Visit Dx:     ICD-10-CM ICD-9-CM   1. Fecal impaction (HCC)  K56.41 560.32   2. Acute dehydration  E86.0 276.51   3. Acute renal insufficiency  N28.9 593.9   4. Elevated troponin  R77.8 790.6   5. Dysphagia, oral phase  R13.11 787.21     Patient Active Problem List   Diagnosis   • Incarcerated ventral hernia   • Coronary artery disease without angina pectoris   • Morbid obesity (HCC)   • Normocytic anemia   • Dementia with behavioral disturbance   • Essential hypertension   • History of breast cancer   • Thyroid mass   • LIANNA (acute kidney injury) (HCC)   • CKD (chronic kidney disease) stage 3, GFR 30-59 ml/min (HCC)   • Chest pain   • Metabolic acidosis   • Other chest pain   • Fecal impaction (HCC)   • Elevated lactic acid level   • Elevated troponin   • Difficulty swallowing   • Leukocytosis   • Fall     Past Medical History:   Diagnosis Date   • Breast cancer (CMS/HCC)    • Coronary artery disease    • Dementia (CMS/HCC)    • Dysrhythmia    • Hypertension      Past Surgical History:   Procedure Laterality Date   • ADENOIDECTOMY     • BREAST SURGERY     • CARDIAC DEFIBRILLATOR PLACEMENT     • EXPLORATORY LAPAROTOMY N/A 2016    Procedure: open ventral hernia repair with mesh;  Surgeon: Bright Buckley MD;  Location: [facility];  Service:    • MASTECTOMY Bilateral    • TONSILLECTOMY        General Information     Row Name 10/17/22 1500          Physical Therapy Time and Intention    Document Type therapy note (daily note)  -ES     Mode of Treatment physical therapy  -ES     Row Name 10/17/22 1500          General Information    Patient Profile Reviewed yes  -ES     Existing Precautions/Restrictions fall  advanced dementia.  -ES     Barriers to Rehab medically complex;cognitive status;previous functional deficit  -ES     Row Name 10/17/22 1500          Cognition     Orientation Status (Cognition) oriented to;person;disoriented to;place;situation;time  -ES     Row Name 10/17/22 1500          Safety Issues, Functional Mobility    Safety Issues Affecting Function (Mobility) safety precaution awareness;safety precautions follow-through/compliance;positioning of assistive device;awareness of need for assistance  -ES     Impairments Affecting Function (Mobility) balance;cognition;endurance/activity tolerance;postural/trunk control;strength;pain  -ES     Cognitive Impairments, Mobility Safety/Performance attention;awareness, need for assistance;insight into deficits/self-awareness;judgment;problem-solving/reasoning;safety precaution awareness  -ES     Comment, Safety Issues/Impairments (Mobility) Pleasantly confused. Requires increased time for encouragement and cues.  -ES           User Key  (r) = Recorded By, (t) = Taken By, (c) = Cosigned By    Initials Name Provider Type    ES Debbie William, PT Physical Therapist               Mobility     Row Name 10/17/22 1501          Bed Mobility    Scooting/Bridging Bandy (Bed Mobility) moderate assist (50% patient effort);verbal cues  -ES     Supine-Sit Bandy (Bed Mobility) moderate assist (50% patient effort);verbal cues  -ES     Assistive Device (Bed Mobility) bed rails;draw sheet;head of bed elevated  -ES     Comment, (Bed Mobility) ModA with draw sheet to move hips to EOB  -ES     Row Name 10/17/22 1501          Sit-Stand Transfer    Sit-Stand Bandy (Transfers) verbal cues;moderate assist (50% patient effort)  -ES     Assistive Device (Sit-Stand Transfers) walker, front-wheeled  -ES     Comment, (Sit-Stand Transfer) VC for upright posture  -ES     Row Name 10/17/22 1501          Gait/Stairs (Locomotion)    Bandy Level (Gait) minimum assist (75% patient effort);verbal cues  -ES     Assistive Device (Gait) walker, front-wheeled  -ES     Distance in Feet (Gait) 30  -ES     Deviations/Abnormal Patterns (Gait)  stride length decreased;weight shifting decreased;gait speed decreased;deyanira decreased;bilateral deviations  -ES     Bilateral Gait Deviations forward flexed posture;heel strike decreased  -ES     Comment, (Gait/Stairs) Pt amb 30' with Jayme and FWW. Pt required VC for upright posture as well as min-modA for FWW placement.  -ES           User Key  (r) = Recorded By, (t) = Taken By, (c) = Cosigned By    Initials Name Provider Type    ES Debbie William PT Physical Therapist               Obj/Interventions     Row Name 10/17/22 1503          Balance    Balance Assessment sitting static balance;sitting dynamic balance;sit to stand dynamic balance;standing static balance;standing dynamic balance  -ES     Static Sitting Balance contact guard  -ES     Dynamic Sitting Balance contact guard;verbal cues  -ES     Position, Sitting Balance supported;sitting edge of bed;sitting in chair  -ES     Sit to Stand Dynamic Balance moderate assist;verbal cues  -ES     Static Standing Balance contact guard;verbal cues  -ES     Dynamic Standing Balance minimal assist;verbal cues  -ES     Position/Device Used, Standing Balance supported;walker, front-wheeled  -ES     Balance Interventions sitting;standing;sit to stand;supported;static;dynamic;occupation based/functional task  -ES     Comment, Balance No LOB. Required Jayme during ambulation with cues for upright posture.  -ES           User Key  (r) = Recorded By, (t) = Taken By, (c) = Cosigned By    Initials Name Provider Type    Debbie Wilson PT Physical Therapist               Goals/Plan    No documentation.                Clinical Impression     Row Name 10/17/22 1502          Pain    Pretreatment Pain Rating 0/10 - no pain  -ES     Posttreatment Pain Rating 0/10 - no pain  -ES     Pre/Posttreatment Pain Comment Pt denied pain throughout session  -ES     Pain Intervention(s) Repositioned;Ambulation/increased activity  -ES     Row Name 10/17/22 150          Plan of Care  Review    Plan of Care Reviewed With patient;spouse  -ES     Progress improving  -ES     Outcome Evaluation Pt pleasantly confused but able to follow simple commands with repetition. Completed bed mobility with modA. Pt amb 30' with Jayme and FWW, requiring max cues for upright posture and modA for FWW placement. Limited primarily by fatigue and impaired activity tolerance. PT continue to rec SNF upon d/c.  -ES     Row Name 10/17/22 1508          Therapy Assessment/Plan (PT)    Rehab Potential (PT) good, to achieve stated therapy goals  -ES     Criteria for Skilled Interventions Met (PT) yes;meets criteria;skilled treatment is necessary  -ES     Therapy Frequency (PT) daily  -ES     Row Name 10/17/22 1509          Vital Signs    Pre Systolic BP Rehab --  VSS. cleared by RN.  -ES     O2 Delivery Pre Treatment room air  -ES     O2 Delivery Intra Treatment room air  -ES     O2 Delivery Post Treatment room air  -ES     Pre Patient Position Supine  -ES     Intra Patient Position Standing  -ES     Post Patient Position Sitting  -ES     Row Name 10/17/22 8594          Positioning and Restraints    Pre-Treatment Position in bed  -ES     Post Treatment Position chair  -ES     In Chair notified nsg;reclined;sitting;call light within reach;encouraged to call for assist;exit alarm on;with family/caregiver;legs elevated;heels elevated;on mechanical lift sling;waffle cushion  -ES           User Key  (r) = Recorded By, (t) = Taken By, (c) = Cosigned By    Initials Name Provider Type    ES Debbie William, PT Physical Therapist               Outcome Measures     Row Name 10/17/22 0819          How much help from another person do you currently need...    Turning from your back to your side while in flat bed without using bedrails? 2  -ES     Moving from lying on back to sitting on the side of a flat bed without bedrails? 2  -ES     Moving to and from a bed to a chair (including a wheelchair)? 2  -ES     Standing up from a chair  using your arms (e.g., wheelchair, bedside chair)? 3  -ES     Climbing 3-5 steps with a railing? 1  -ES     To walk in hospital room? 2  -ES     AM-PAC 6 Clicks Score (PT) 12  -ES     Highest level of mobility 4 --> Transferred to chair/commode  -ES     Row Name 10/17/22 1507          Functional Assessment    Outcome Measure Options AM-PAC 6 Clicks Basic Mobility (PT)  -ES           User Key  (r) = Recorded By, (t) = Taken By, (c) = Cosigned By    Initials Name Provider Type    Debbie Wilson PT Physical Therapist                             Physical Therapy Education     Title: PT OT SLP Therapies (In Progress)     Topic: Physical Therapy (In Progress)     Point: Mobility training (In Progress)     Learning Progress Summary           Patient Acceptance, E, NR by ND at 10/17/2022 1339    Comment: Pt is confused x4, only oriented to self at times. Will continue to reinforce education    Acceptance, E, NR by ND at 10/16/2022 1300    Comment: Pt confused. Will enforce education.    Acceptance, E, VU by LS at 10/14/2022 2341    Acceptance, E, VU,NR by CD at 10/14/2022 1350    Comment: SEE FLOWSHEET    Acceptance, E,D, NR by HP at 10/9/2022 1559   Family Acceptance, E,D, NR by HP at 10/9/2022 1559   Significant Other Acceptance, E, VU,NR by CD at 10/14/2022 1350    Comment: SEE FLOWSHEET                   Point: Home exercise program (In Progress)     Learning Progress Summary           Patient Acceptance, E, NR by ND at 10/17/2022 1339    Comment: Pt is confused x4, only oriented to self at times. Will continue to reinforce education    Acceptance, E, NR by ND at 10/16/2022 1300    Comment: Pt confused. Will enforce education.    Acceptance, E, VU by LS at 10/14/2022 2341    Acceptance, E, VU,NR by CD at 10/14/2022 1350    Comment: SEE FLOWSHEET    Acceptance, E,D, NR by HP at 10/9/2022 1559   Family Acceptance, E,D, NR by HP at 10/9/2022 1559   Significant Other Acceptance, E, VU,NR by CD at 10/14/2022 1350     Comment: SEE FLOWSHEET                   Point: Body mechanics (In Progress)     Learning Progress Summary           Patient Acceptance, E, NR by ND at 10/17/2022 1339    Comment: Pt is confused x4, only oriented to self at times. Will continue to reinforce education    Acceptance, E, NR by ND at 10/16/2022 1300    Comment: Pt confused. Will enforce education.    Acceptance, E, VU by LS at 10/14/2022 2341    Acceptance, E, VU,NR by CD at 10/14/2022 1350    Comment: SEE FLOWSHEET    Acceptance, E,D, NR by HP at 10/9/2022 1559   Family Acceptance, E,D, NR by HP at 10/9/2022 1559   Significant Other Acceptance, E, VU,NR by CD at 10/14/2022 1350    Comment: SEE FLOWSHEET                   Point: Precautions (In Progress)     Learning Progress Summary           Patient Acceptance, E, NR by ND at 10/17/2022 1339    Comment: Pt is confused x4, only oriented to self at times. Will continue to reinforce education    Acceptance, E, NR by ND at 10/16/2022 1300    Comment: Pt confused. Will enforce education.    Acceptance, E, VU by LS at 10/14/2022 2341    Acceptance, E, VU,NR by CD at 10/14/2022 1350    Comment: SEE FLOWSHEET    Acceptance, E,D, NR by HP at 10/9/2022 1559   Family Acceptance, E,D, NR by HP at 10/9/2022 1559   Significant Other Acceptance, E, VU,NR by CD at 10/14/2022 1350    Comment: SEE FLOWSHEET                               User Key     Initials Effective Dates Name Provider Type Discipline    CD 06/16/21 -  Franca Reveles PT Physical Therapist PT     06/01/21 -  Leticia Ghotra PT Physical Therapist PT     09/22/22 -  Verenice Archibald, RN Registered Nurse Nurse    ND 09/30/22 -  Angie Yu, RN Registered Nurse Nurse              PT Recommendation and Plan     Plan of Care Reviewed With: patient, spouse  Progress: improving  Outcome Evaluation: Pt pleasantly confused but able to follow simple commands with repetition. Completed bed mobility with modA. Pt amb 30' with Jayme and FWW, requiring max  cues for upright posture and modA for FWW placement. Limited primarily by fatigue and impaired activity tolerance. PT continue to rec SNF upon d/c.     Time Calculation:    PT Charges     Row Name 10/17/22 1507             Time Calculation    Start Time 1417  -ES      PT Received On 10/17/22  -ES      PT Goal Re-Cert Due Date 10/19/22  -ES         Time Calculation- PT    Total Timed Code Minutes- PT 38 minute(s)  -ES         Timed Charges    29631 - Gait Training Minutes  23  -ES      92161 - PT Therapeutic Activity Minutes 15  -ES         Total Minutes    Timed Charges Total Minutes 38  -ES       Total Minutes 38  -ES            User Key  (r) = Recorded By, (t) = Taken By, (c) = Cosigned By    Initials Name Provider Type    ES Debbie William PT Physical Therapist              Therapy Charges for Today     Code Description Service Date Service Provider Modifiers Qty    92854520578 HC GAIT TRAINING EA 15 MIN 10/17/2022 Debbie Willima, PT GP 2    41918387555 HC PT THERAPEUTIC ACT EA 15 MIN 10/17/2022 Debbei William PT GP 1          PT G-Codes  Outcome Measure Options: AM-PAC 6 Clicks Basic Mobility (PT)  AM-PAC 6 Clicks Score (PT): 12  AM-PAC 6 Clicks Score (OT): 13    Debbie William PT  10/17/2022

## 2022-10-17 NOTE — CASE MANAGEMENT/SOCIAL WORK
Continued Stay Note  [facility]     Patient Name: Shantelle Decker  MRN: [id number]  Today's Date: 10/17/2022    Admit Date: 10/7/2022    Plan: [facility]   Discharge Plan     Row Name 10/17/22 1228       Plan    Plan [facility]    Plan Comments Updated clinical faxed to [facility], swing bed.    If they decline, will pursue SNF.    Final Discharge Disposition Code 61 - hospital-based swing bed               Discharge Codes    No documentation.               Expected Discharge Date and Time     Expected Discharge Date Expected Discharge Time    Oct 19, 2022             Nina Brennan RN

## 2022-10-17 NOTE — PLAN OF CARE
Goal Outcome Evaluation:  Plan of Care Reviewed With: patient, spouse        Progress: improving  Outcome Evaluation: Pt pleasantly confused but able to follow simple commands with repetition. Completed bed mobility with modA. Pt amb 30' with Jayme and FWW, requiring max cues for upright posture and modA for FWW placement. Limited primarily by fatigue and impaired activity tolerance. PT continue to rec SNF upon d/c.

## 2022-10-18 LAB — SARS-COV-2 RDRP RESP QL NAA+PROBE: NORMAL

## 2022-10-18 PROCEDURE — 99231 SBSQ HOSP IP/OBS SF/LOW 25: CPT | Performed by: PHYSICIAN ASSISTANT

## 2022-10-18 PROCEDURE — 97535 SELF CARE MNGMENT TRAINING: CPT

## 2022-10-18 PROCEDURE — 87635 SARS-COV-2 COVID-19 AMP PRB: CPT | Performed by: INTERNAL MEDICINE

## 2022-10-18 PROCEDURE — 97110 THERAPEUTIC EXERCISES: CPT

## 2022-10-18 PROCEDURE — 25010000002 HEPARIN (PORCINE) PER 1000 UNITS: Performed by: INTERNAL MEDICINE

## 2022-10-18 RX ADMIN — POLYETHYLENE GLYCOL 3350 17 G: 17 POWDER, FOR SOLUTION ORAL at 08:57

## 2022-10-18 RX ADMIN — HEPARIN SODIUM 5000 UNITS: 5000 INJECTION INTRAVENOUS; SUBCUTANEOUS at 13:43

## 2022-10-18 RX ADMIN — HEPARIN SODIUM 5000 UNITS: 5000 INJECTION INTRAVENOUS; SUBCUTANEOUS at 06:38

## 2022-10-18 RX ADMIN — NYSTATIN: 100000 POWDER TOPICAL at 08:58

## 2022-10-18 RX ADMIN — TRAZODONE HYDROCHLORIDE 50 MG: 50 TABLET ORAL at 21:26

## 2022-10-18 RX ADMIN — QUETIAPINE FUMARATE 12.5 MG: 25 TABLET, FILM COATED ORAL at 14:11

## 2022-10-18 RX ADMIN — DOCUSATE SODIUM 100 MG: 100 CAPSULE, LIQUID FILLED ORAL at 21:26

## 2022-10-18 RX ADMIN — ASPIRIN 81 MG CHEWABLE TABLET 81 MG: 81 TABLET CHEWABLE at 09:00

## 2022-10-18 RX ADMIN — NIFEDIPINE 60 MG: 30 TABLET, FILM COATED, EXTENDED RELEASE ORAL at 08:57

## 2022-10-18 RX ADMIN — HEPARIN SODIUM 5000 UNITS: 5000 INJECTION INTRAVENOUS; SUBCUTANEOUS at 21:27

## 2022-10-18 RX ADMIN — MEMANTINE 5 MG: 10 TABLET ORAL at 21:26

## 2022-10-18 RX ADMIN — SENNOSIDES 2 TABLET: 8.6 TABLET, FILM COATED ORAL at 08:58

## 2022-10-18 RX ADMIN — NYSTATIN: 100000 POWDER TOPICAL at 21:26

## 2022-10-18 RX ADMIN — DONEPEZIL HYDROCHLORIDE 10 MG: 10 TABLET, FILM COATED ORAL at 21:26

## 2022-10-18 RX ADMIN — DOCUSATE SODIUM 100 MG: 100 CAPSULE, LIQUID FILLED ORAL at 08:57

## 2022-10-18 RX ADMIN — QUETIAPINE FUMARATE 50 MG: 25 TABLET, FILM COATED ORAL at 21:26

## 2022-10-18 RX ADMIN — SENNOSIDES 2 TABLET: 8.6 TABLET, FILM COATED ORAL at 21:26

## 2022-10-18 RX ADMIN — CITALOPRAM HYDROBROMIDE 20 MG: 20 TABLET ORAL at 08:58

## 2022-10-18 RX ADMIN — Medication 10 MG: at 21:27

## 2022-10-18 RX ADMIN — MEMANTINE 5 MG: 10 TABLET ORAL at 08:57

## 2022-10-18 NOTE — PLAN OF CARE
Goal Outcome Evaluation:  Plan of Care Reviewed With: patient        Progress: no change pleasantly confused. VSS. Paced on tele. Positioned for comfort  by staff using skin interventions.Possible dc to swing bed in am.

## 2022-10-18 NOTE — THERAPY PROGRESS REPORT/RE-CERT
Patient Name: Shantelle Decker  : 1940    MRN: [id number]                              Today's Date: 10/18/2022       Admit Date: 10/7/2022    Visit Dx:     ICD-10-CM ICD-9-CM   1. Fecal impaction (HCC)  K56.41 560.32   2. Acute dehydration  E86.0 276.51   3. Acute renal insufficiency  N28.9 593.9   4. Elevated troponin  R77.8 790.6   5. Dysphagia, oral phase  R13.11 787.21     Patient Active Problem List   Diagnosis   • Incarcerated ventral hernia   • Coronary artery disease without angina pectoris   • Morbid obesity (HCC)   • Normocytic anemia   • Dementia with behavioral disturbance   • Essential hypertension   • History of breast cancer   • Thyroid mass   • LIANNA (acute kidney injury) (HCC)   • CKD (chronic kidney disease) stage 3, GFR 30-59 ml/min (HCC)   • Chest pain   • Metabolic acidosis   • Other chest pain   • Fecal impaction (HCC)   • Elevated lactic acid level   • Elevated troponin   • Difficulty swallowing   • Leukocytosis   • Fall     Past Medical History:   Diagnosis Date   • Breast cancer (CMS/HCC)    • Coronary artery disease    • Dementia (CMS/HCC)    • Dysrhythmia    • Hypertension      Past Surgical History:   Procedure Laterality Date   • ADENOIDECTOMY     • BREAST SURGERY     • CARDIAC DEFIBRILLATOR PLACEMENT     • EXPLORATORY LAPAROTOMY N/A 2016    Procedure: open ventral hernia repair with mesh;  Surgeon: Bright Buckley MD;  Location: [facility];  Service:    • MASTECTOMY Bilateral    • TONSILLECTOMY        General Information     Row Name 10/18/22 1532          OT Time and Intention    Document Type progress note/recertification  -TB     Mode of Treatment occupational therapy;individual therapy  -TB     Row Name 10/18/22 1537          General Information    Patient Profile Reviewed yes  -TB     Existing Precautions/Restrictions fall  -TB     Barriers to Rehab medically complex;previous functional deficit;cognitive status  -TB     Row Name 10/18/22 7662          Occupational Profile     Reason for Services/Referral (Occupational Profile) Occupational decline  -TB     Row Name 10/18/22 1532          Cognition    Orientation Status (Cognition) oriented to;person;disoriented to;place;situation;time  -TB     Row Name 10/18/22 1532          Safety Issues, Functional Mobility    Safety Issues Affecting Function (Mobility) ability to follow commands;awareness of need for assistance;insight into deficits/self-awareness;safety precaution awareness;safety precautions follow-through/compliance;sequencing abilities;judgment;problem-solving  -TB     Impairments Affecting Function (Mobility) cognition;balance;endurance/activity tolerance;strength;postural/trunk control  -TB     Cognitive Impairments, Mobility Safety/Performance attention;awareness, need for assistance;insight into deficits/self-awareness;judgment;problem-solving/reasoning;safety precaution awareness;safety precaution follow-through;sequencing abilities  -TB     Comment, Safety Issues/Impairments (Mobility) Confused, distractable  -TB           User Key  (r) = Recorded By, (t) = Taken By, (c) = Cosigned By    Initials Name Provider Type    TB Amaya Gonzalez OT Occupational Therapist                 Mobility/ADL's     Row Name 10/18/22 1533          Bed Mobility    Bed Mobility supine-sit;scooting/bridging  -TB     Scooting/Bridging [place] (Bed Mobility) moderate assist (50% patient effort);1 person assist;verbal cues  -TB     Supine-Sit [place] (Bed Mobility) moderate assist (50% patient effort);1 person assist;verbal cues  -TB     Bed Mobility, Safety Issues cognitive deficits limit understanding;decreased use of arms for pushing/pulling;decreased use of legs for bridging/pushing;impaired trunk control for bed mobility  -TB     Assistive Device (Bed Mobility) head of bed elevated;bed rails;draw sheet  -TB     Comment, (Bed Mobility) Cooperative, requires max cues  -TB     Row Name 10/18/22 1533          Transfers     Transfers sit-stand transfer;stand-sit transfer;bed-chair transfer  -TB     Row Name 10/18/22 1533          Bed-Chair Transfer    Bed-Chair [place] (Transfers) moderate assist (50% patient effort);1 person assist;verbal cues  -TB     Assistive Device (Bed-Chair Transfers) walker, front-wheeled  -TB     Comment, (Bed-Chair Transfer) Small, shuffling steps EOB to chair. Cues and assist for sequencing and to manage RW  -TB     Row Name 10/18/22 1533          Sit-Stand Transfer    Sit-Stand [place] (Transfers) moderate assist (50% patient effort);2 person assist;verbal cues  -TB     Assistive Device (Sit-Stand Transfers) walker, front-wheeled  -TB     Comment, (Sit-Stand Transfer) Cues and assist for hip and  trunk extension/upright posture  -TB     Row Name 10/18/22 1533          Stand-Sit Transfer    Stand-Sit [place] (Transfers) moderate assist (50% patient effort);1 person assist;verbal cues  -TB     Assistive Device (Stand-Sit Transfers) walker, front-wheeled  -TB     Comment, (Stand-Sit Transfer) Cues and assist for hand placement  -TB     Row Name 10/18/22 1533          Activities of Daily Living    BADL Assessment/Intervention upper body dressing;grooming  -TB     Row Name 10/18/22 1533          Grooming Assessment/Training    [place] Level (Grooming) set up;standby assist;verbal cues;wash face, hands  -TB     Position (Grooming) supported sitting  -TB     Row Name 10/18/22 1533          Upper Body Dressing Assessment/Training    [place] Level (Upper Body Dressing) don;pajama/robe;minimum assist (75% patient effort);verbal cues  -TB     Position (Upper Body Dressing) edge of bed sitting  -TB           User Key  (r) = Recorded By, (t) = Taken By, (c) = Cosigned By    Initials Name Provider Type    TB Amaya Gonzalez OT Occupational Therapist               Obj/Interventions     Row Name 10/18/22 1536          Shoulder (Therapeutic Exercise)    Shoulder (Therapeutic Exercise) AROM  (active range of motion)  -     Shoulder AROM (Therapeutic Exercise) bilateral;flexion;extension;aBduction;aDduction;10 repetitions  -     Row Name 10/18/22 1536          Elbow/Forearm (Therapeutic Exercise)    Elbow/Forearm (Therapeutic Exercise) AROM (active range of motion)  -TB     Elbow/Forearm AROM (Therapeutic Exercise) bilateral;flexion;extension;10 repetitions  -     Row Name 10/18/22 1536          Hand (Therapeutic Exercise)    Hand (Therapeutic Exercise) AROM (active range of motion)  -     Hand AROM/AAROM (Therapeutic Exercise) bilateral;AROM (active range of motion);finger flexion;finger extension;10 repetitions  -     Row Name 10/18/22 1536          Motor Skills    Motor Skills posture  -     Therapeutic Exercise shoulder;elbow/forearm;hand  Education reinforced for benefits of dynamic OOB activity/therapy, role of OT, and ther ex to support self-care  -     Row Name 10/18/22 1536          Balance    Balance Assessment sitting static balance;sitting dynamic balance;sit to stand dynamic balance;standing dynamic balance  -     Static Sitting Balance contact guard;1-person assist;verbal cues  -TB     Dynamic Sitting Balance minimal assist;1-person assist;verbal cues  -TB     Position, Sitting Balance supported;sitting in chair;sitting edge of bed  -     Sit to Stand Dynamic Balance moderate assist;2-person assist;verbal cues  -TB     Static Standing Balance minimal assist;1-person assist;verbal cues  -TB     Dynamic Standing Balance moderate assist;1-person assist;verbal cues  -TB     Position/Device Used, Standing Balance supported;walker, front-wheeled  -     Balance Interventions sitting;standing;sit to stand;supported;dynamic;dynamic reaching;occupation based/functional task;UE activity with balance activity  -TB     Comment, Balance Presents with posterior lean in EOB and chair sitting. Able to achieve and maintain midline orientation with Min A while engaged in activity  -TB            User Key  (r) = Recorded By, (t) = Taken By, (c) = Cosigned By    Initials Name Provider Type    TB Amaya Gonzalez OT Occupational Therapist               Goals/Plan     Row Name 10/18/22 1549          Bed Mobility Goal 1 (OT)    Progress/Outcomes (Bed Mobility Goal 1, OT) continuing progress toward goal;progress slower than expected;goal ongoing  -TB     Row Name 10/18/22 1543          Transfer Goal 1 (OT)    Activity/Assistive Device (Transfer Goal 1, OT) toilet  -TB     [place] Level/Cues Needed (Transfer Goal 1, OT) minimum assist (75% or more patient effort);verbal cues required  -TB     Time Frame (Transfer Goal 1, OT) by discharge  -TB     Progress/Outcome (Transfer Goal 1, OT) goal met;goal revised this date  -TB     Row Name 10/18/22 1549          Toileting Goal 1 (OT)    Progress/Outcome (Toileting Goal 1, OT) continuing progress toward goal;progress slower than expected;goal ongoing  -TB           User Key  (r) = Recorded By, (t) = Taken By, (c) = Cosigned By    Initials Name Provider Type    Amaya Dixon OT Occupational Therapist               Clinical Impression     Row Name 10/18/22 1546          Pain Assessment    Pretreatment Pain Rating 0/10 - no pain  -TB     Posttreatment Pain Rating 0/10 - no pain  -TB     Pre/Posttreatment Pain Comment Pt denies pain with activity  -TB     Pain Intervention(s) Ambulation/increased activity;Repositioned  -TB     Row Name 10/18/22 1540          Plan of Care Review    Plan of Care Reviewed With patient  -TB     Outcome Evaluation OT Progress Note completed. POC addressed. Pt met 1 LTG and remaining goals revised. Pt is A/Ox1 and cooperative with therapy. Pt follows simple commands, but requires frequent redirections due to distractablity. Pt mobility and self-care is limited by confusion, weakness, and balance deficits. Mod A bed mobility. Mod A EOB to chair transfer. Min A simple UB ADL tasks. OT will continue to follow IP.   Continue to recommend SNF at d/c for best outcome.  -TB     Row Name 10/18/22 1541          Therapy Plan Review/Discharge Plan (OT)    Anticipated Discharge Disposition (OT) skilled nursing facility  -TB     Row Name 10/18/22 1541          Vital Signs    Pre Systolic BP Rehab --  RN cleared OT  -TB     O2 Delivery Pre Treatment room air  -TB     Pre Patient Position Supine  -TB     Intra Patient Position Standing  -TB     Post Patient Position Sitting  -TB     Row Name 10/18/22 1541          Positioning and Restraints    Pre-Treatment Position in bed  -TB     Post Treatment Position chair  -TB     In Chair notified nsg;reclined;call light within reach;encouraged to call for assist;exit alarm on;on mechanical lift sling;waffle cushion;legs elevated  -TB           User Key  (r) = Recorded By, (t) = Taken By, (c) = Cosigned By    Initials Name Provider Type    Amaya Dixon OT Occupational Therapist               Outcome Measures     Row Name 10/18/22 1546          How much help from another is currently needed...    Putting on and taking off regular lower body clothing? 1  -TB     Bathing (including washing, rinsing, and drying) 2  -TB     Toileting (which includes using toilet bed pan or urinal) 1  -TB     Putting on and taking off regular upper body clothing 3  -TB     Taking care of personal grooming (such as brushing teeth) 3  -TB     Eating meals 3  -TB     AM-PAC 6 Clicks Score (OT) 13  -TB     Row Name 10/18/22 1546          Functional Assessment    Outcome Measure Options AM-PAC 6 Clicks Daily Activity (OT)  -TB           User Key  (r) = Recorded By, (t) = Taken By, (c) = Cosigned By    Initials Name Provider Type    Amaya Dixon OT Occupational Therapist                Occupational Therapy Education     Title: PT OT SLP Therapies (In Progress)     Topic: Occupational Therapy (In Progress)     Point: ADL training (In Progress)     Description:   Instruct learner(s) on proper safety  adaptation and remediation techniques during self care or transfers.   Instruct in proper use of assistive devices.              Learning Progress Summary           Patient Acceptance, E,D, NR by TB at 10/18/2022 1546    Acceptance, E, NR by ND at 10/18/2022 1212    Comment: Patient confused, only alerted to self at times. Will explain education to  when at bedside. Will also reinforce as needed.    Acceptance, E, NR by ND at 10/17/2022 1339    Comment: Pt is confused x4, only oriented to self at times. Will continue to reinforce education    Acceptance, E, NR by ND at 10/16/2022 1300    Comment: Pt confused. Will enforce education.    Acceptance, E, VU by LS at 10/14/2022 2341    Acceptance, E, VU,NR by AN at 10/13/2022 1343    Acceptance, E,D, NR by JY at 10/11/2022 1425    Acceptance, E, VU,NR by AN at 10/8/2022 1547   Family Acceptance, E, NR by ND at 10/18/2022 1212    Comment: Patient confused, only alerted to self at times. Will explain education to  when at bedside. Will also reinforce as needed.    Acceptance, E, VU,NR by AN at 10/13/2022 1343    Acceptance, E,D, NR by OLESYA at 10/11/2022 1425    Acceptance, E, VU,NR by AN at 10/8/2022 1547                   Point: Home exercise program (In Progress)     Description:   Instruct learner(s) on appropriate technique for monitoring, assisting and/or progressing therapeutic exercises/activities.              Learning Progress Summary           Patient Acceptance, E, NR by ND at 10/18/2022 1212    Comment: Patient confused, only alerted to self at times. Will explain education to  when at bedside. Will also reinforce as needed.    Acceptance, E, NR by ND at 10/17/2022 1339    Comment: Pt is confused x4, only oriented to self at times. Will continue to reinforce education    Acceptance, E, NR by ND at 10/16/2022 1300    Comment: Pt confused. Will enforce education.    Acceptance, E, VU by LS at 10/14/2022 2341   Family Acceptance, E, NR by ND at  10/18/2022 1212    Comment: Patient confused, only alerted to self at times. Will explain education to  when at bedside. Will also reinforce as needed.                   Point: Precautions (In Progress)     Description:   Instruct learner(s) on prescribed precautions during self-care and functional transfers.              Learning Progress Summary           Patient Acceptance, E, NR by ND at 10/18/2022 1212    Comment: Patient confused, only alerted to self at times. Will explain education to  when at bedside. Will also reinforce as needed.    Acceptance, E, NR by ND at 10/17/2022 1339    Comment: Pt is confused x4, only oriented to self at times. Will continue to reinforce education    Acceptance, E, NR by ND at 10/16/2022 1300    Comment: Pt confused. Will enforce education.    Acceptance, E, VU by LS at 10/14/2022 2341    Acceptance, E, VU,NR by AN at 10/13/2022 1343    Acceptance, E,D, NR by JY at 10/11/2022 1425    Acceptance, E, VU,NR by AN at 10/8/2022 1547   Family Acceptance, E, NR by ND at 10/18/2022 1212    Comment: Patient confused, only alerted to self at times. Will explain education to  when at bedside. Will also reinforce as needed.    Acceptance, E, VU,NR by AN at 10/13/2022 1343    Acceptance, E,D, NR by JY at 10/11/2022 1425    Acceptance, E, VU,NR by AN at 10/8/2022 1547                   Point: Body mechanics (In Progress)     Description:   Instruct learner(s) on proper positioning and spine alignment during self-care, functional mobility activities and/or exercises.              Learning Progress Summary           Patient Acceptance, E, NR by ND at 10/18/2022 1212    Comment: Patient confused, only alerted to self at times. Will explain education to  when at bedside. Will also reinforce as needed.    Acceptance, E, NR by ND at 10/17/2022 1339    Comment: Pt is confused x4, only oriented to self at times. Will continue to reinforce education    Acceptance, E, NR by  ND at 10/16/2022 1300    Comment: Pt confused. Will enforce education.    Acceptance, E, VU by LS at 10/14/2022 2341    Acceptance, E, VU,NR by AN at 10/13/2022 1343    Acceptance, E,D, NR by JY at 10/11/2022 1425    Acceptance, E, VU,NR by AN at 10/8/2022 1547   Family Acceptance, E, NR by ND at 10/18/2022 1212    Comment: Patient confused, only alerted to self at times. Will explain education to  when at bedside. Will also reinforce as needed.    Acceptance, E, VU,NR by AN at 10/13/2022 1343    Acceptance, E,D, NR by OLESYA at 10/11/2022 1425    Acceptance, E, VU,NR by AN at 10/8/2022 1547                               User Key     Initials Effective Dates Name Provider Type Discipline    TB 06/16/21 -  Amaya Gonzalez OT Occupational Therapist OT    OLESYA 06/16/21 -  Rosa Turner OT Occupational Therapist OT    AN 09/21/21 -  Marcelle Crooks OT Occupational Therapist OT    LS 09/22/22 -  Verenice Archibald, RN Registered Nurse Nurse    ND 09/30/22 -  Angie Yu, RADHA Registered Nurse Nurse              OT Recommendation and Plan     Plan of Care Review  Plan of Care Reviewed With: patient  Outcome Evaluation: OT Progress Note completed. POC addressed. Pt met 1 LTG and remaining goals revised. Pt is A/Ox1 and cooperative with therapy. Pt follows simple commands, but requires frequent redirections due to distractablity. Pt mobility and self-care is limited by confusion, weakness, and balance deficits. Mod A bed mobility. Mod A EOB to chair transfer. Min A simple UB ADL tasks. OT will continue to follow IP.  Continue to recommend SNF at d/c for best outcome.     Time Calculation:    Time Calculation- OT     Row Name 10/18/22 1505             Time Calculation- OT    OT Start Time 1505  -TB      OT Received On 10/18/22  -TB      OT Goal Re-Cert Due Date 10/28/22  -TB         Timed Charges    25062 - OT Therapeutic Exercise Minutes 10  -TB      19573 - OT Self Care/Mgmt Minutes 14  -TB         Total  Minutes    Timed Charges Total Minutes 24  -TB       Total Minutes 24  -TB            User Key  (r) = Recorded By, (t) = Taken By, (c) = Cosigned By    Initials Name Provider Type    TB Amaya Gonzalez OT Occupational Therapist              Therapy Charges for Today     Code Description Service Date Service Provider Modifiers Qty    [id number]  OT THER PROC EA 15 MIN 10/18/2022 Amaya Gonzalez OT GO 1    [id number]  OT SELF CARE/MGMT/TRAIN EA 15 MIN 10/18/2022 Amaya Gonzalez OT GO 1               Amaya Gonzalez OT  10/18/2022

## 2022-10-18 NOTE — DISCHARGE PLACEMENT REQUEST
"Case Management [phone]    Farshad Dial (82 y.o. Female)     Date of Birth   1940    Social Security Number       Address   [address] [place] [id number]    Home Phone   [phone]    MRN   [id number]       [facility]    Marital Status                               Admission Date   10/7/22    Admission Type   Emergency    Admitting Provider   Dalton Workman MD    Attending Provider   Dalton Workman MD    Department, Room/Bed   [facility] 3G, S361/1       Discharge Date       Discharge Disposition       Discharge Destination                               Attending Provider: Dalton Workman MD    Allergies: Demerol [Meperidine]    Isolation: None   Infection: None   Code Status: CPR    Ht: 162.6 cm (64\")   Wt: 92 kg (202 lb 14.4 oz)    Admission Cmt: None   Principal Problem: Fecal impaction (HCC) [K56.41]                 Active Insurance as of 10/7/2022     Primary Coverage     Payor Plan Insurance Group Employer/Plan Group    MEDICARE MEDICARE A & B      Payor Plan Address Payor Plan Phone Number Payor Plan Fax Number Effective Dates    [address] [phone]  4/1/1996 - None Entered    [place] [id number]       Subscriber Name Subscriber Birth Date Member ID       FARSHAD DIAL 1940 [id number]                 Emergency Contacts      (Rel.) Home Phone Work Phone Mobile Phone    Sheldon Dial (Spouse) [phone] -- [phone]    Camilo,Lynn (Daughter) -- -- [phone]    JeronimoIris arizmendi (Daughter) -- -- [phone]        COVID PRE-OP / PRE-PROCEDURE SCREENING ORDER (NO ISOLATION) - Swab, Nasopharynx [[id number]] (Order [id number])  Order  Date: 10/18/2022 Department: [address] Released By: Uzma Leonard RN (auto-released) Authorizing: Dalton Workman MD     Linked Results    Procedure Abnormality Status   COVID-19, ABBOTT IN-HOUSE,NASAL Swab (NO TRANSPORT MEDIA) 2 HR TAT - " Swab, Nasopharynx Normal Final result       Reprint Order Requisition

## 2022-10-18 NOTE — PROGRESS NOTES
"    [facility] Medicine Services  PROGRESS NOTE    Patient Name: Shantelle Decker  : 1940  MRN: [id number]    Date of Admission: 10/7/2022  Primary Care Physician: Nancy Montiel APRN    Subjective     CC: f/u fall, fecal impaction    HPI:  In bed. Yells \"help help help\" repeatedly. Told me she needs to get up but shrugged when I asked where she wanted to go.     ROS:  Unable to obtain complete or reliable ROS due to dementia    Objective     Vital Signs:   Temp:  [98 °F (36.7 °C)-98.5 °F (36.9 °C)] 98.3 °F (36.8 °C)  Heart Rate:  [65-70] 65  Resp:  [11-18] 11  BP: ()/(61-83) 95/83     Physical Exam:  Constitutional: No acute distress, awake and alert  HENT: NCAT, mucous membranes moist  Respiratory: Clear to auscultation bilaterally, respiratory effort normal on room air   Cardiovascular: RRR, no murmurs, rubs, or gallops  Gastrointestinal: Positive bowel sounds, soft, nontender, nondistended  Musculoskeletal: No bilateral ankle edema  Psychiatric: Cooperative with exam  Neurologic: Confused. Moves all extremities spontaneously without focal deficit.     Results Reviewed:  LAB RESULTS:      Lab 10/17/22  1235 10/16/22  0427 10/14/22  0449 10/13/22  0432   WBC 10.57 7.62 11.07* 8.87   HEMOGLOBIN 11.7* 10.2* 12.0 11.1*   HEMATOCRIT 36.2 31.7* 36.7 33.4*   PLATELETS 337 257 264 228   MCV 91.9 92.2 91.8 89.1         Lab 10/17/22  1234 10/16/22  0427 10/14/22  0449 10/13/22  0432   SODIUM 142 141 137 142   POTASSIUM 4.6 4.6 3.9 3.9   CHLORIDE 108* 110* 106 111*   CO2 24.0 25.0 22.0 22.0   ANION GAP 10.0 6.0 9.0 9.0   BUN 32* 29* 25* 21   CREATININE 1.22* 1.27* 1.24* 1.29*   EGFR 43.8* 41.8* 43.0* 41.0*   GLUCOSE 137* 79 97 79   CALCIUM 8.4* 7.9* 8.1* 7.8*   MAGNESIUM 1.9  --   --   --          Lab 10/17/22  1234 10/16/22  0427 10/14/22  0449 10/13/22  0432   TOTAL PROTEIN 5.2* 4.5* 5.2* 4.6*   ALBUMIN 3.10* 2.50* 2.90* 2.60*   GLOBULIN 2.1 2.0 2.3 2.0   ALT (SGPT) 8 7 6 5   AST " (SGOT) 15 13 12 9   BILIRUBIN 0.5 0.3 0.4 0.3   ALK PHOS 50 43 53 50     Brief Urine Lab Results  (Last result in the past 365 days)      Color   Clarity   Blood   Leuk Est   Nitrite   Protein   CREAT   Urine HCG        10/07/22 1917 Yellow   Clear   Negative   Trace   Negative   30 mg/dL (1+)               Microbiology Results Abnormal     Procedure Component Value - Date/Time    COVID PRE-OP / PRE-PROCEDURE SCREENING ORDER (NO ISOLATION) - Swab, Nasopharynx [[id number]]  (Normal) Collected: 10/18/22 1006    Lab Status: Final result Specimen: Swab from Nasopharynx Updated: 10/18/22 1033    Narrative:      The following orders were created for panel order COVID PRE-OP / PRE-PROCEDURE SCREENING ORDER (NO ISOLATION) - Swab, Nasopharynx.  Procedure                               Abnormality         Status                     ---------                               -----------         ------                     COVID-19, ABBOTT IN-HOUS...[[id number]]  Normal              Final result                 Please view results for these tests on the individual orders.    COVID-19, ABBOTT IN-HOUSE,NASAL Swab (NO TRANSPORT MEDIA) 2 HR TAT - Swab, Nasopharynx [[id number]]  (Normal) Collected: 10/18/22 1006    Lab Status: Final result Specimen: Swab from Nasopharynx Updated: 10/18/22 1033     COVID19 Presumptive Negative    Narrative:      Fact sheet for providers: https://www.fda.gov/media/312076/download     Fact sheet for patients: https://www.fda.gov/media/891815/download    Test performed by PCR.  If inconsistent with clinical signs and symptoms patient should be tested with different authorized molecular test.    COVID PRE-OP / PRE-PROCEDURE SCREENING ORDER (NO ISOLATION) - Swab, Nasopharynx [[id number]]  (Normal) Collected: 10/07/22 1955    Lab Status: Final result Specimen: Swab from Nasopharynx Updated: 10/07/22 2039    Narrative:      The following orders were created for panel order COVID PRE-OP / PRE-PROCEDURE SCREENING  ORDER (NO ISOLATION) - Swab, Nasopharynx.  Procedure                               Abnormality         Status                     ---------                               -----------         ------                     COVID-19 and FLU A/B PCR...[[id number]]  Normal              Final result                 Please view results for these tests on the individual orders.    COVID-19 and FLU A/B PCR - Swab, Nasopharynx [[id number]]  (Normal) Collected: 10/07/22 1955    Lab Status: Final result Specimen: Swab from Nasopharynx Updated: 10/07/22 2039     COVID19 Not Detected     Influenza A PCR Not Detected     Influenza B PCR Not Detected    Narrative:      Fact sheet for providers: https://www.fda.gov/media/136192/download    Fact sheet for patients: https://www.fda.gov/media/519373/download    Test performed by PCR.        No radiology results from the last 24 hrs    Results for orders placed during the hospital encounter of 05/20/22    Adult Transthoracic Echo Complete W/ Cont if Necessary Per Protocol    Interpretation Summary  · Left ventricular systolic function is normal. Estimated left ventricular EF = 65%.  · Left ventricular wall thickness is consistent with mild concentric hypertrophy.  · Left ventricular diastolic function is consistent with (grade I) impaired relaxation.  · The aortic valve is mildly calcified. Otherwise, the cardiac valves are anatomically and functionally normal.  · Estimated right ventricular systolic pressure from tricuspid regurgitation is normal (<35 mmHg).    I have reviewed the medications:  Scheduled Meds:aspirin, 81 mg, Oral, Daily  citalopram, 20 mg, Oral, Daily  docusate sodium, 100 mg, Oral, BID  donepezil, 10 mg, Oral, Nightly  heparin (porcine), 5,000 Units, Subcutaneous, Q8H  melatonin, 10 mg, Oral, Nightly  memantine, 5 mg, Oral, BID  NIFEdipine XL, 60 mg, Oral, Q24H  nystatin, , Topical, Q12H  polyethylene glycol, 17 g, Oral, Daily  QUEtiapine, 50 mg, Oral, Nightly  senna, 2  tablet, Oral, BID  sodium chloride, 10 mL, Intravenous, Q12H  traZODone, 50 mg, Oral, Nightly      Continuous Infusions:   PRN Meds:.•  acetaminophen **OR** acetaminophen **OR** acetaminophen  •  ondansetron **OR** ondansetron  •  QUEtiapine  •  [COMPLETED] Insert peripheral IV **AND** sodium chloride  •  sodium chloride    Assessment & Plan     Active Hospital Problems    Diagnosis  POA   • **Fecal impaction (HCC) [K56.41]  Yes   • Elevated lactic acid level [R79.89]  Unknown   • Elevated troponin [R77.8]  Unknown   • Difficulty swallowing [R13.10]  Unknown   • Leukocytosis [D72.829]  Unknown   • Fall [W19.XXXA]  Unknown   • LIANNA (acute kidney injury) (HCC) [N17.9]  Yes   • Dementia with behavioral disturbance [F03.918]  Yes   • Essential hypertension [I10]  Yes   • Coronary artery disease without angina pectoris [I25.10]  Yes      Resolved Hospital Problems   No resolved problems to display.     Brief Hospital Course to date:  Shantelle Decker is a 82 y.o. female with PMH significant for HTN, CAD, prior breast cancer and advanced dementia. She resides with her . At baseline, she uses a walker with mobility. She was brought to [facility] ED on 10/7/22 for evaluation of LIANNA / volume depletion and fecal impaction.     Fecal impaction  - s/p disimpaction  - Continue bowel regimen     Difficulty swallowing   - SLP following. On pureed solids with thin liquids for mild oral dysphagia    Leukocytosis, resolved  Elevated lactic acid, resolved  - UA negative. CT abdomen/pelvis without evidence of infectious process. CXR without evidence of PNA  - Suspect secondary to dehydration / fecal impaction    LIANNA on CKD III  - Baseline creatinine appears to be 1.1-1.5  - Creatinine 2.11 on admission  - s/p IV fluids - back to baseline      Elevated troponin  - Likely demand ischemia in setting of dehydration, LIANNA    HTN  CAD  - Continue ASA, Nifedipine    Dementia   Insomnia  - Continue Celexa, Aricept, Namenda,  Seroquel and Trazodone     Falls  Generalzed weakness   - PT/OT following. To rehab tomorrow       Expected Discharge Location and Transportation: [facility] rehab via EMS  Expected Discharge Date: 10/19 @ 10:30am    DVT prophylaxis:Medical DVT prophylaxis orders are present.     AM-PAC 6 Clicks Score (PT): 12 (10/17/22 1507)    CODE STATUS:   Code Status and Medical Interventions:   Ordered at: 10/07/22 2340     Level Of Support Discussed With:    Next of Kin (If No Surrogate)     Code Status (Patient has no pulse and is not breathing):    CPR (Attempt to Resuscitate)     Medical Interventions (Patient has pulse or is breathing):    Full Support     Pam Mauricio PA-C  10/18/22

## 2022-10-18 NOTE — PLAN OF CARE
Problem: Adult Inpatient Plan of Care  Goal: Plan of Care Review  Recent Flowsheet Documentation  Taken 10/18/2022 1541 by Amaya Gonzalez OT  Plan of Care Reviewed With: patient  Outcome Evaluation: OT Progress Note completed. POC addressed. Pt met 1 LTG and remaining goals revised. Pt is A/Ox1 and cooperative with therapy. Pt follows simple commands, but requires frequent redirections due to distractablity. Pt mobility and self-care is limited by confusion, weakness, and balance deficits. Mod A bed mobility. Mod A EOB to chair transfer. Min A simple UB ADL tasks. OT will continue to follow IP.  Continue to recommend SNF at d/c for best outcome.

## 2022-10-18 NOTE — PLAN OF CARE
Goal Outcome Evaluation:              Outcome Evaluation: Patient confused. Only alerted to self at times. Plan to discharge to rehab in AM.

## 2022-10-18 NOTE — CASE MANAGEMENT/SOCIAL WORK
Continued Stay Note  [facility]     Patient Name: Shantelle Decker  MRN: [id number]  Today's Date: 10/18/2022    Admit Date: 10/7/2022    Plan: [facility] Bed   Discharge Plan     Row Name 10/18/22 1145       Plan    Plan [facility]    Patient/Family in Agreement with Plan yes    Plan Comments D/C plan is for pt to discharge to [facility] for Skilled Rehab on 10/19/22.  ambulance will be transporting pt at 1030. PCS is on the chart.  I spoke with pt's spouse/Sheldon on the phone today to give them an update. Pt and spouse are agreeable with D/C plan. I spoke with Saundra/[facility] ([phone]), she states that she will need the COVID results and a Discharge Summary before she can give  the RN report phone number. COVID test orders are in, RN/Angie conducted the COVID test, Results are negative/normal, I faxed the results to Saundra at [phone]. I updated Dr. Workman, in Medical Rounds, on transfer for tomorrow. No other needs voiced or identified. CM will continue to follow.    Final Discharge Disposition Code 61 - hospital-based swing bed               Discharge Codes    No documentation.               Expected Discharge Date and Time     Expected Discharge Date Expected Discharge Time    Oct 19, 2022             Uzma Leonard, RN

## 2022-10-19 VITALS
DIASTOLIC BLOOD PRESSURE: 77 MMHG | BODY MASS INDEX: 34.64 KG/M2 | WEIGHT: 202.9 LBS | HEIGHT: 64 IN | HEART RATE: 60 BPM | SYSTOLIC BLOOD PRESSURE: 152 MMHG | RESPIRATION RATE: 16 BRPM | OXYGEN SATURATION: 99 % | TEMPERATURE: 97.6 F

## 2022-10-19 PROBLEM — K59.00 CONSTIPATION: Status: ACTIVE | Noted: 2022-10-19

## 2022-10-19 PROBLEM — E87.20 METABOLIC ACIDOSIS: Status: RESOLVED | Noted: 2022-05-21 | Resolved: 2022-10-19

## 2022-10-19 PROBLEM — R77.8 ELEVATED TROPONIN: Status: RESOLVED | Noted: 2022-10-07 | Resolved: 2022-10-19

## 2022-10-19 PROBLEM — R79.89 ELEVATED LACTIC ACID LEVEL: Status: RESOLVED | Noted: 2022-10-07 | Resolved: 2022-10-19

## 2022-10-19 PROBLEM — R13.12 OROPHARYNGEAL DYSPHAGIA: Status: ACTIVE | Noted: 2022-01-01

## 2022-10-19 PROBLEM — D72.829 LEUKOCYTOSIS: Status: RESOLVED | Noted: 2022-10-07 | Resolved: 2022-10-19

## 2022-10-19 PROBLEM — K56.41 FECAL IMPACTION: Status: RESOLVED | Noted: 2022-01-01 | Resolved: 2022-01-01

## 2022-10-19 PROBLEM — N17.9 AKI (ACUTE KIDNEY INJURY): Status: RESOLVED | Noted: 2018-05-31 | Resolved: 2022-01-01

## 2022-10-19 PROCEDURE — 99238 HOSP IP/OBS DSCHRG MGMT 30/<: CPT | Performed by: PHYSICIAN ASSISTANT

## 2022-10-19 PROCEDURE — 25010000002 HEPARIN (PORCINE) PER 1000 UNITS: Performed by: INTERNAL MEDICINE

## 2022-10-19 RX ORDER — BISACODYL 5 MG
10 TABLET, DELAYED RELEASE (ENTERIC COATED) ORAL DAILY PRN
Start: 2022-10-19

## 2022-10-19 RX ORDER — POLYETHYLENE GLYCOL 3350 17 G/17G
17 POWDER, FOR SOLUTION ORAL DAILY
Start: 2022-10-19 | End: 2023-01-26 | Stop reason: HOSPADM

## 2022-10-19 RX ORDER — PSEUDOEPHEDRINE HCL 30 MG
100 TABLET ORAL 2 TIMES DAILY
Start: 2022-10-19 | End: 2023-01-26 | Stop reason: HOSPADM

## 2022-10-19 RX ORDER — NYSTATIN 100000 [USP'U]/G
POWDER TOPICAL EVERY 12 HOURS SCHEDULED
Start: 2022-10-19

## 2022-10-19 RX ORDER — BISACODYL 10 MG
10 SUPPOSITORY, RECTAL RECTAL DAILY PRN
Start: 2022-10-19

## 2022-10-19 RX ORDER — ACETAMINOPHEN 325 MG/1
650 TABLET ORAL EVERY 4 HOURS PRN
Start: 2022-10-19

## 2022-10-19 RX ORDER — SENNA PLUS 8.6 MG/1
2 TABLET ORAL 2 TIMES DAILY
Start: 2022-10-19 | End: 2023-01-26 | Stop reason: HOSPADM

## 2022-10-19 RX ADMIN — NIFEDIPINE 60 MG: 30 TABLET, FILM COATED, EXTENDED RELEASE ORAL at 08:29

## 2022-10-19 RX ADMIN — POLYETHYLENE GLYCOL 3350 17 G: 17 POWDER, FOR SOLUTION ORAL at 08:28

## 2022-10-19 RX ADMIN — MEMANTINE 5 MG: 10 TABLET ORAL at 08:29

## 2022-10-19 RX ADMIN — CITALOPRAM HYDROBROMIDE 20 MG: 20 TABLET ORAL at 08:29

## 2022-10-19 RX ADMIN — HEPARIN SODIUM 5000 UNITS: 5000 INJECTION INTRAVENOUS; SUBCUTANEOUS at 06:21

## 2022-10-19 RX ADMIN — ASPIRIN 81 MG CHEWABLE TABLET 81 MG: 81 TABLET CHEWABLE at 08:29

## 2022-10-19 RX ADMIN — NYSTATIN: 100000 POWDER TOPICAL at 08:30

## 2022-10-19 RX ADMIN — SENNOSIDES 2 TABLET: 8.6 TABLET, FILM COATED ORAL at 08:29

## 2022-10-19 NOTE — DISCHARGE SUMMARY
[facility] Hospital Medicine Services  DISCHARGE SUMMARY    Patient Name: Shantelle Decker  : 1940  MRN: [id number]    Date of Admission: 10/7/2022  6:24 PM  Date of Discharge: 10/19/2022  Primary Care Physician: Nanyc Montiel APRN    Hospital Course     Presenting Problem:   Fecal impaction (HCC) [K56.41]    Active Hospital Problems    Diagnosis  POA   • Constipation [K59.00]  Yes   • Oropharyngeal dysphagia [R13.12]  Yes   • Fall [W19.XXXA]  Yes   • CKD (chronic kidney disease) stage 3, GFR 30-59 ml/min (HCC) [N18.30]  Yes   • Dementia with behavioral disturbance [F03.918]  Yes   • Essential hypertension [I10]  Yes   • Coronary artery disease without angina pectoris [I25.10]  Yes      Resolved Hospital Problems    Diagnosis Date Resolved POA   • **Fecal impaction (HCC) [K56.41] 10/19/2022 Yes   • Elevated lactic acid level [R79.89] 10/19/2022 Yes   • Elevated troponin [R77.8] 10/19/2022 Yes   • Leukocytosis [D72.829] 10/19/2022 Yes   • LIANNA (acute kidney injury) (HCC) [N17.9] 10/19/2022 Yes      Hospital Course:  Shantelle Decker is a 82 y.o. female with PMH significant for HTN, CAD, prior breast cancer and advanced dementia. She resides with her . At baseline, she uses a walker with mobility. She was brought to [facility] ED on 10/7/22 for evaluation of LIANNA / volume depletion and fecal impaction.      Fecal impaction  - s/p disimpaction  - Continue daily bowel regimen with PRNs      Difficulty swallowing   - SLP following. On pureed solids with thin liquids for mild oral dysphagia     Leukocytosis, resolved  Elevated lactic acid, resolved  - UA negative. CT abdomen/pelvis without evidence of infectious process. CXR without evidence of PNA  - Suspect secondary to dehydration / fecal impaction     LIANNA on CKD III  - Baseline creatinine appears to be 1.1-1.5  - Creatinine 2.11 on admission  - s/p IV fluids - back to baseline      Elevated troponin  - Likely demand  "ischemia in setting of dehydration, LIANNA     HTN  CAD  - Continue ASA, Nifedipine     Dementia   Insomnia  - Continue Celexa, Aricept, Namenda, Seroquel and Trazodone      Falls  Generalzed weakness   - PT/OT following. To rehab today    Day of Discharge     HPI:   In bed. Appears she ate 50-75% of her breakfast. Says \"help help help\" to me and tells me she is cold. Appreciative of more blankets. No concerns from nursing staff. To rehab today.     Review of Systems  Unable to obtain complete or reliable ROS due to underlying dementia     Vital Signs:   Temp:  [97.6 °F (36.4 °C)-98.4 °F (36.9 °C)] 97.6 °F (36.4 °C)  Heart Rate:  [59-66] 59  Resp:  [15-16] 16  BP: ()/(52-95) 134/68    Physical Exam:  Constitutional: No acute distress, awake, alert and conversant. Laying in bed   HENT: NCAT, mucous membranes moist  Respiratory: Clear to auscultation bilaterally, respiratory effort normal on room air   Cardiovascular: RRR without m/r/g   Gastrointestinal: Positive bowel sounds, soft, nondistended  Musculoskeletal: No bilateral ankle edema  Psychiatric: Calm and cooperative  Neurologic: Confused. Moves all extremities spontaneously without focal deficits     Pertinent  and/or Most Recent Results     LAB RESULTS:      Lab 10/17/22  1235 10/16/22  0427 10/14/22  0449 10/13/22  0432   WBC 10.57 7.62 11.07* 8.87   HEMOGLOBIN 11.7* 10.2* 12.0 11.1*   HEMATOCRIT 36.2 31.7* 36.7 33.4*   PLATELETS 337 257 264 228   MCV 91.9 92.2 91.8 89.1         Lab 10/17/22  1234 10/16/22  0427 10/14/22  0449 10/13/22  0432   SODIUM 142 141 137 142   POTASSIUM 4.6 4.6 3.9 3.9   CHLORIDE 108* 110* 106 111*   CO2 24.0 25.0 22.0 22.0   ANION GAP 10.0 6.0 9.0 9.0   BUN 32* 29* 25* 21   CREATININE 1.22* 1.27* 1.24* 1.29*   EGFR 43.8* 41.8* 43.0* 41.0*   GLUCOSE 137* 79 97 79   CALCIUM 8.4* 7.9* 8.1* 7.8*   MAGNESIUM 1.9  --   --   --          Lab 10/17/22  1234 10/16/22  0427 10/14/22  5899 10/13/22  9672   TOTAL PROTEIN 5.2* 4.5* 5.2* 4.6* "   ALBUMIN 3.10* 2.50* 2.90* 2.60*   GLOBULIN 2.1 2.0 2.3 2.0   ALT (SGPT) 8 7 6 5   AST (SGOT) 15 13 12 9   BILIRUBIN 0.5 0.3 0.4 0.3   ALK PHOS 50 43 53 50     Brief Urine Lab Results  (Last result in the past 365 days)      Color   Clarity   Blood   Leuk Est   Nitrite   Protein   CREAT   Urine HCG        10/07/22 1917 Yellow   Clear   Negative   Trace   Negative   30 mg/dL (1+)               Microbiology Results (last 10 days)     Procedure Component Value - Date/Time    COVID PRE-OP / PRE-PROCEDURE SCREENING ORDER (NO ISOLATION) - Swab, Nasopharynx [[id number]]  (Normal) Collected: 10/18/22 1006    Lab Status: Final result Specimen: Swab from Nasopharynx Updated: 10/18/22 1033    Narrative:      The following orders were created for panel order COVID PRE-OP / PRE-PROCEDURE SCREENING ORDER (NO ISOLATION) - Swab, Nasopharynx.  Procedure                               Abnormality         Status                     ---------                               -----------         ------                     COVID-19, ABBOTT IN-HOUS...[[id number]]  Normal              Final result                 Please view results for these tests on the individual orders.    COVID-19, ABBOTT IN-HOUSE,NASAL Swab (NO TRANSPORT MEDIA) 2 HR TAT - Swab, Nasopharynx [[id number]]  (Normal) Collected: 10/18/22 1006    Lab Status: Final result Specimen: Swab from Nasopharynx Updated: 10/18/22 1033     COVID19 Presumptive Negative    Narrative:      Fact sheet for providers: https://www.fda.gov/media/574265/download     Fact sheet for patients: https://www.fda.gov/media/186900/download    Test performed by PCR.  If inconsistent with clinical signs and symptoms patient should be tested with different authorized molecular test.        FL Video Swallow With Speech Single Contrast    Result Date: 10/10/2022  EXAMINATION: FL VIDEO SWALLOW W SPEECH SINGLE-CONTRAST-, FL LIMITED UGI FOR MBS REFLUX SINGLE-CONTRAST-  INDICATION: Assess oropharyngeal/esophageal  swallow; K56.41-Fecal impaction; E86.0-Dehydration; N28.9-Disorder of kidney and ureter, unspecified; R77.8-Other specified abnormalities of plasma proteins  TECHNIQUE: 48 seconds of fluoroscopic time was used for this exam. 7 associated fluoroscopic loops were saved. The patient was evaluated in the seated lateral position while taking a variety of consistencies of barium by mouth under the direction of speech pathology.  COMPARISON: NONE  FINDINGS: 48 seconds of fluoroscopy provided for a modified barium swallow. Please see speech therapy report for full details and recommendations. Limited upper GI series demonstrated no signs of a focal esophageal stricture, or significant gastroesophageal reflux.       Fluoroscopy provided for a modified barium swallow, and limited upper GI series. Please see speech therapy report for full details and recommendations. Limited upper GI series appeared within normal limits.    This report was finalized on 10/10/2022 9:16 PM by Dr. Cameron Smith MD.      FL Limited Ugi For Mbs Reflux Single-Contrast    Result Date: 10/10/2022  EXAMINATION: FL VIDEO SWALLOW W SPEECH SINGLE-CONTRAST-, FL LIMITED UGI FOR MBS REFLUX SINGLE-CONTRAST-  INDICATION: Assess oropharyngeal/esophageal swallow; K56.41-Fecal impaction; E86.0-Dehydration; N28.9-Disorder of kidney and ureter, unspecified; R77.8-Other specified abnormalities of plasma proteins  TECHNIQUE: 48 seconds of fluoroscopic time was used for this exam. 7 associated fluoroscopic loops were saved. The patient was evaluated in the seated lateral position while taking a variety of consistencies of barium by mouth under the direction of speech pathology.  COMPARISON: NONE  FINDINGS: 48 seconds of fluoroscopy provided for a modified barium swallow. Please see speech therapy report for full details and recommendations. Limited upper GI series demonstrated no signs of a focal esophageal stricture, or significant gastroesophageal reflux.        Fluoroscopy provided for a modified barium swallow, and limited upper GI series. Please see speech therapy report for full details and recommendations. Limited upper GI series appeared within normal limits.    This report was finalized on 10/10/2022 9:16 PM by Dr. Cameron Smith MD.      Results for orders placed during the hospital encounter of 04/04/21    Duplex Venous Lower Extremity - Bilateral CAR    Interpretation Summary  · Normal bilateral lower extremity venous duplex scan.  · Incidental 2.9 cm x 2.7 cm avascular right medial popliteal cystic structure suggestive of Baker's cyst noted.  · Incidental 3.6 cm x 2.1 cm avascular left popliteal cystic structure suggestive of Baker's cyst noted.  · Technically difficult and limited study.    Results for orders placed during the hospital encounter of 05/20/22    Adult Transthoracic Echo Complete W/ Cont if Necessary Per Protocol    Interpretation Summary  · Left ventricular systolic function is normal. Estimated left ventricular EF = 65%.  · Left ventricular wall thickness is consistent with mild concentric hypertrophy.  · Left ventricular diastolic function is consistent with (grade I) impaired relaxation.  · The aortic valve is mildly calcified. Otherwise, the cardiac valves are anatomically and functionally normal.  · Estimated right ventricular systolic pressure from tricuspid regurgitation is normal (<35 mmHg).    Discharge Details        Discharge Medications      New Medications      Instructions Start Date   acetaminophen 325 MG tablet  Commonly known as: TYLENOL   650 mg, Oral, Every 4 Hours PRN      docusate sodium 100 MG capsule   100 mg, Oral, 2 Times Daily      Dulcolax 5 MG EC tablet  Generic drug: bisacodyl   10 mg, Oral, Daily PRN      bisacodyl 10 MG suppository  Commonly known as: DULCOLAX   10 mg, Rectal, Daily PRN      nystatin 871822 UNIT/GM powder  Commonly known as: MYCOSTATIN   Topical, Every 12 Hours Scheduled      polyethylene glycol 17 g  packet  Commonly known as: MIRALAX   17 g, Oral, Daily      senna 8.6 MG tablet  Commonly known as: SENOKOT   2 tablets, Oral, 2 Times Daily         Changes to Medications      Instructions Start Date   QUEtiapine 25 MG tablet  Commonly known as: SEROquel  What changed: Another medication with the same name was removed. Continue taking this medication, and follow the directions you see here.   50 mg, Oral, Nightly         Continue These Medications      Instructions Start Date   aspirin 81 MG EC tablet   81 mg, Oral, Daily      citalopram 20 MG tablet  Commonly known as: CeleXA   20 mg, Oral, Daily      Cyanocobalamin 1000 MCG/ML kit   1 mL, Injection, Every 30 Days      donepezil 10 MG tablet  Commonly known as: ARICEPT   10 mg, Oral, Nightly      Melatonin 10 MG tablet   10 mg, Oral, Nightly      memantine 10 MG tablet  Commonly known as: NAMENDA   10 mg, Oral, 2 Times Daily      NIFEdipine CC 60 MG 24 hr tablet  Commonly known as: ADALAT CC   60 mg, Oral, Every 24 Hours Scheduled      traZODone 50 MG tablet  Commonly known as: DESYREL   50 mg, Oral, Nightly         Stop These Medications    lidocaine 5 %  Commonly known as: LIDODERM     PHARMACY MEDS TO BED CONSULT          Allergies   Allergen Reactions   • Demerol [Meperidine] Anaphylaxis     And itching     Discharge Disposition:  Skilled Nursing Facility (DC - External)    Diet:  Diet Order   Procedures   • Diet Dysphagia; II - Pureed; Thin; Cardiac     Activity:  Activity Instructions     Activity as Tolerated      Up WIth Assist             CODE STATUS:    Code Status and Medical Interventions:   Ordered at: 10/07/22 2980     Level Of Support Discussed With:    Next of Kin (If No Surrogate)     Code Status (Patient has no pulse and is not breathing):    CPR (Attempt to Resuscitate)     Medical Interventions (Patient has pulse or is breathing):    Full Support     Future Appointments   Date Time Provider Department Center   10/19/2022 10:30 AM EMS [facility] Mauricio PA-C  10/19/22    Time Spent on Discharge:  I spent 25 minutes on this discharge activity which included: face-to-face encounter with the patient, reviewing the data in the system, coordination of the care with the nursing staff as well as consultants, documentation, and entering orders.

## 2022-10-19 NOTE — CASE MANAGEMENT/SOCIAL WORK
Case Management Discharge Note      Final Note: Plan is [facility] Bed. [facility] is scheduled for 10:30. PCS is on chart. Nurse to report to [phone].  faxed discharge summary.         Selected Continued Care - Admitted Since 10/7/2022     Destination Coordination complete.    Service Provider Selected Services Address Phone Fax Patient Preferred    [facility] SWING BED UNIT Skilled Nursing [address], [facility] [id number] [phone] --       Internal Comment last updated by Uzma Leonard, RN 10/18/2022 2906    Faxed COVID results to [phone]                     Durable Medical Equipment    No services have been selected for the patient.              Dialysis/Infusion    No services have been selected for the patient.              Home Medical Care    No services have been selected for the patient.              Therapy    No services have been selected for the patient.              Community Resources    No services have been selected for the patient.              Community & DME    No services have been selected for the patient.                       Final Discharge Disposition Code: 61 - hospital-based swing bed

## 2022-10-19 NOTE — DISCHARGE PLACEMENT REQUEST
"Farshad Dial (82 y.o. Female)   Roney Miller, RN Case Manager  [phone]    Date of Birth   1940    Social Security Number       [place] [id number]    Home Phone   [phone]    MRN   [id number]       [facility]    Marital Status                               Admission Date   10/7/22    Admission Type   Emergency    Admitting Provider   Dalton Workman MD    Attending Provider   Dalton Workman MD    Department, Room/Bed   [facility] 3G, S361/1       Discharge Date       Discharge Disposition   Skilled Nursing Facility (DC - External)    Discharge Destination                               Attending Provider: Dalton Workman MD    Allergies: Demerol [Meperidine]    Isolation: None   Infection: None   Code Status: CPR    Ht: 162.6 cm (64\")   Wt: 92 kg (202 lb 14.4 oz)    Admission Cmt: None   Principal Problem: Fecal impaction (HCC) [K56.41]                 Active Insurance as of 10/7/2022     Primary Coverage     Payor Plan Insurance Group Employer/Plan Group    MEDICARE MEDICARE A & B      Payor Plan Address Payor Plan Phone Number Payor Plan Fax Number Effective Dates    [address] [phone]  1996 - None Entered    [place] [address]       Subscriber Name Subscriber Birth Date Member ID       FARSHAD DIAL 1940 [id number]                 Emergency Contacts      (Rel.) Home Phone Work Phone Mobile Phone    JeronimoSheldon (Spouse) [phone] -- [phone]    Christa aCmilo (Daughter) -- -- [phone]    JeronimoIris (Daughter) -- -- [phone]               Discharge Summary      Pam Mauricio PA-C at 10/19/22 [address] [place] [address] Medicine Services  DISCHARGE SUMMARY    Patient Name: Farshad Dial  : 1940  MRN: [id number]    Date of Admission: 10/7/2022  6:24 PM  Date of Discharge: 10/19/2022  Primary Care Physician: Dinesh" ROSALINO Campos    Hospital Course     Presenting Problem:   Fecal impaction (HCC) [K56.41]    Active Hospital Problems    Diagnosis  POA   • Constipation [K59.00]  Yes   • Oropharyngeal dysphagia [R13.12]  Yes   • Fall [W19.XXXA]  Yes   • CKD (chronic kidney disease) stage 3, GFR 30-59 ml/min (HCC) [N18.30]  Yes   • Dementia with behavioral disturbance [F03.918]  Yes   • Essential hypertension [I10]  Yes   • Coronary artery disease without angina pectoris [I25.10]  Yes      Resolved Hospital Problems    Diagnosis Date Resolved POA   • **Fecal impaction (HCC) [K56.41] 10/19/2022 Yes   • Elevated lactic acid level [R79.89] 10/19/2022 Yes   • Elevated troponin [R77.8] 10/19/2022 Yes   • Leukocytosis [D72.829] 10/19/2022 Yes   • LIANNA (acute kidney injury) (HCC) [N17.9] 10/19/2022 Yes      Hospital Course:  Shantelle Decker is a 82 y.o. female with PMH significant for HTN, CAD, prior breast cancer and advanced dementia. She resides with her . At baseline, she uses a walker with mobility. She was brought to [facility] ED on 10/7/22 for evaluation of LIANNA / volume depletion and fecal impaction.      Fecal impaction  - s/p disimpaction  - Continue daily bowel regimen with PRNs      Difficulty swallowing   - SLP following. On pureed solids with thin liquids for mild oral dysphagia     Leukocytosis, resolved  Elevated lactic acid, resolved  - UA negative. CT abdomen/pelvis without evidence of infectious process. CXR without evidence of PNA  - Suspect secondary to dehydration / fecal impaction     LIANNA on CKD III  - Baseline creatinine appears to be 1.1-1.5  - Creatinine 2.11 on admission  - s/p IV fluids - back to baseline      Elevated troponin  - Likely demand ischemia in setting of dehydration, LIANNA     HTN  CAD  - Continue ASA, Nifedipine     Dementia   Insomnia  - Continue Celexa, Aricept, Namenda, Seroquel and Trazodone      Falls  Generalzed weakness   - PT/OT following. To rehab today    Day of  "Discharge     HPI:   In bed. Appears she ate 50-75% of her breakfast. Says \"help help help\" to me and tells me she is cold. Appreciative of more blankets. No concerns from nursing staff. To rehab today.     Review of Systems  Unable to obtain complete or reliable ROS due to underlying dementia     Vital Signs:   Temp:  [97.6 °F (36.4 °C)-98.4 °F (36.9 °C)] 97.6 °F (36.4 °C)  Heart Rate:  [59-66] 59  Resp:  [15-16] 16  BP: ()/(52-95) 134/68    Physical Exam:  Constitutional: No acute distress, awake, alert and conversant. Laying in bed   HENT: NCAT, mucous membranes moist  Respiratory: Clear to auscultation bilaterally, respiratory effort normal on room air   Cardiovascular: RRR without m/r/g   Gastrointestinal: Positive bowel sounds, soft, nondistended  Musculoskeletal: No bilateral ankle edema  Psychiatric: Calm and cooperative  Neurologic: Confused. Moves all extremities spontaneously without focal deficits     Pertinent  and/or Most Recent Results     LAB RESULTS:      Lab 10/17/22  1235 10/16/22  0427 10/14/22  0449 10/13/22  0432   WBC 10.57 7.62 11.07* 8.87   HEMOGLOBIN 11.7* 10.2* 12.0 11.1*   HEMATOCRIT 36.2 31.7* 36.7 33.4*   PLATELETS 337 257 264 228   MCV 91.9 92.2 91.8 89.1         Lab 10/17/22  1234 10/16/22  0427 10/14/22  0449 10/13/22  0432   SODIUM 142 141 137 142   POTASSIUM 4.6 4.6 3.9 3.9   CHLORIDE 108* 110* 106 111*   CO2 24.0 25.0 22.0 22.0   ANION GAP 10.0 6.0 9.0 9.0   BUN 32* 29* 25* 21   CREATININE 1.22* 1.27* 1.24* 1.29*   EGFR 43.8* 41.8* 43.0* 41.0*   GLUCOSE 137* 79 97 79   CALCIUM 8.4* 7.9* 8.1* 7.8*   MAGNESIUM 1.9  --   --   --          Lab 10/17/22  1234 10/16/22  0427 10/14/22  0449 10/13/22  0432   TOTAL PROTEIN 5.2* 4.5* 5.2* 4.6*   ALBUMIN 3.10* 2.50* 2.90* 2.60*   GLOBULIN 2.1 2.0 2.3 2.0   ALT (SGPT) 8 7 6 5   AST (SGOT) 15 13 12 9   BILIRUBIN 0.5 0.3 0.4 0.3   ALK PHOS 50 43 53 50     Brief Urine Lab Results  (Last result in the past 365 days)      Color   Clarity   " Blood   Leuk Est   Nitrite   Protein   CREAT   Urine HCG        10/07/22 1917 Yellow   Clear   Negative   Trace   Negative   30 mg/dL (1+)               Microbiology Results (last 10 days)     Procedure Component Value - Date/Time    COVID PRE-OP / PRE-PROCEDURE SCREENING ORDER (NO ISOLATION) - Swab, Nasopharynx [[id number]]  (Normal) Collected: 10/18/22 1006    Lab Status: Final result Specimen: Swab from Nasopharynx Updated: 10/18/22 1033    Narrative:      The following orders were created for panel order COVID PRE-OP / PRE-PROCEDURE SCREENING ORDER (NO ISOLATION) - Swab, Nasopharynx.  Procedure                               Abnormality         Status                     ---------                               -----------         ------                     COVID-19, ABBOTT IN-HOUS...[[id number]]  Normal              Final result                 Please view results for these tests on the individual orders.    COVID-19, ABBOTT IN-HOUSE,NASAL Swab (NO TRANSPORT MEDIA) 2 HR TAT - Swab, Nasopharynx [[id number]]  (Normal) Collected: 10/18/22 1006    Lab Status: Final result Specimen: Swab from Nasopharynx Updated: 10/18/22 1033     COVID19 Presumptive Negative    Narrative:      Fact sheet for providers: https://www.fda.gov/media/919260/download     Fact sheet for patients: https://www.fda.gov/media/878517/download    Test performed by PCR.  If inconsistent with clinical signs and symptoms patient should be tested with different authorized molecular test.        FL Video Swallow With Speech Single Contrast    Result Date: 10/10/2022  EXAMINATION: FL VIDEO SWALLOW W SPEECH SINGLE-CONTRAST-, FL LIMITED UGI FOR MBS REFLUX SINGLE-CONTRAST-  INDICATION: Assess oropharyngeal/esophageal swallow; K56.41-Fecal impaction; E86.0-Dehydration; N28.9-Disorder of kidney and ureter, unspecified; R77.8-Other specified abnormalities of plasma proteins  TECHNIQUE: 48 seconds of fluoroscopic time was used for this exam. 7 associated  fluoroscopic loops were saved. The patient was evaluated in the seated lateral position while taking a variety of consistencies of barium by mouth under the direction of speech pathology.  COMPARISON: NONE  FINDINGS: 48 seconds of fluoroscopy provided for a modified barium swallow. Please see speech therapy report for full details and recommendations. Limited upper GI series demonstrated no signs of a focal esophageal stricture, or significant gastroesophageal reflux.       Fluoroscopy provided for a modified barium swallow, and limited upper GI series. Please see speech therapy report for full details and recommendations. Limited upper GI series appeared within normal limits.    This report was finalized on 10/10/2022 9:16 PM by Dr. Cameron Smith MD.      FL Limited Ugi For Mbs Reflux Single-Contrast    Result Date: 10/10/2022  EXAMINATION: FL VIDEO SWALLOW W SPEECH SINGLE-CONTRAST-, FL LIMITED UGI FOR MBS REFLUX SINGLE-CONTRAST-  INDICATION: Assess oropharyngeal/esophageal swallow; K56.41-Fecal impaction; E86.0-Dehydration; N28.9-Disorder of kidney and ureter, unspecified; R77.8-Other specified abnormalities of plasma proteins  TECHNIQUE: 48 seconds of fluoroscopic time was used for this exam. 7 associated fluoroscopic loops were saved. The patient was evaluated in the seated lateral position while taking a variety of consistencies of barium by mouth under the direction of speech pathology.  COMPARISON: NONE  FINDINGS: 48 seconds of fluoroscopy provided for a modified barium swallow. Please see speech therapy report for full details and recommendations. Limited upper GI series demonstrated no signs of a focal esophageal stricture, or significant gastroesophageal reflux.       Fluoroscopy provided for a modified barium swallow, and limited upper GI series. Please see speech therapy report for full details and recommendations. Limited upper GI series appeared within normal limits.    This report was finalized on  10/10/2022 9:16 PM by Dr. Cameron Smith MD.      Results for orders placed during the hospital encounter of 04/04/21    Duplex Venous Lower Extremity - Bilateral CAR    Interpretation Summary  · Normal bilateral lower extremity venous duplex scan.  · Incidental 2.9 cm x 2.7 cm avascular right medial popliteal cystic structure suggestive of Baker's cyst noted.  · Incidental 3.6 cm x 2.1 cm avascular left popliteal cystic structure suggestive of Baker's cyst noted.  · Technically difficult and limited study.    Results for orders placed during the hospital encounter of 05/20/22    Adult Transthoracic Echo Complete W/ Cont if Necessary Per Protocol    Interpretation Summary  · Left ventricular systolic function is normal. Estimated left ventricular EF = 65%.  · Left ventricular wall thickness is consistent with mild concentric hypertrophy.  · Left ventricular diastolic function is consistent with (grade I) impaired relaxation.  · The aortic valve is mildly calcified. Otherwise, the cardiac valves are anatomically and functionally normal.  · Estimated right ventricular systolic pressure from tricuspid regurgitation is normal (<35 mmHg).    Discharge Details        Discharge Medications      New Medications      Instructions Start Date   acetaminophen 325 MG tablet  Commonly known as: TYLENOL   650 mg, Oral, Every 4 Hours PRN      docusate sodium 100 MG capsule   100 mg, Oral, 2 Times Daily      Dulcolax 5 MG EC tablet  Generic drug: bisacodyl   10 mg, Oral, Daily PRN      bisacodyl 10 MG suppository  Commonly known as: DULCOLAX   10 mg, Rectal, Daily PRN      nystatin 428165 UNIT/GM powder  Commonly known as: MYCOSTATIN   Topical, Every 12 Hours Scheduled      polyethylene glycol 17 g packet  Commonly known as: MIRALAX   17 g, Oral, Daily      senna 8.6 MG tablet  Commonly known as: SENOKOT   2 tablets, Oral, 2 Times Daily         Changes to Medications      Instructions Start Date   QUEtiapine 25 MG tablet  Commonly known  as: SEROquel  What changed: Another medication with the same name was removed. Continue taking this medication, and follow the directions you see here.   50 mg, Oral, Nightly         Continue These Medications      Instructions Start Date   aspirin 81 MG EC tablet   81 mg, Oral, Daily      citalopram 20 MG tablet  Commonly known as: CeleXA   20 mg, Oral, Daily      Cyanocobalamin 1000 MCG/ML kit   1 mL, Injection, Every 30 Days      donepezil 10 MG tablet  Commonly known as: ARICEPT   10 mg, Oral, Nightly      Melatonin 10 MG tablet   10 mg, Oral, Nightly      memantine 10 MG tablet  Commonly known as: NAMENDA   10 mg, Oral, 2 Times Daily      NIFEdipine CC 60 MG 24 hr tablet  Commonly known as: ADALAT CC   60 mg, Oral, Every 24 Hours Scheduled      traZODone 50 MG tablet  Commonly known as: DESYREL   50 mg, Oral, Nightly         Stop These Medications    lidocaine 5 %  Commonly known as: LIDODERM     PHARMACY MEDS TO BED CONSULT          Allergies   Allergen Reactions   • Demerol [Meperidine] Anaphylaxis     And itching     Discharge Disposition:  Skilled Nursing Facility (DC - External)    Diet:  Diet Order   Procedures   • Diet Dysphagia; II - Pureed; Thin; Cardiac     Activity:  Activity Instructions     Activity as Tolerated      Up WIth Assist             CODE STATUS:    Code Status and Medical Interventions:   Ordered at: 10/07/22 2340     Level Of Support Discussed With:    Next of Kin (If No Surrogate)     Code Status (Patient has no pulse and is not breathing):    CPR (Attempt to Resuscitate)     Medical Interventions (Patient has pulse or is breathing):    Full Support     Future Appointments   Date Time Provider Department Center   10/19/2022 10:30 AM EMS 2  [facility] Mauricio PA-C  10/19/22    Time Spent on Discharge:  I spent 25 minutes on this discharge activity which included: face-to-face encounter with the patient, reviewing the data in the system, coordination of the care  with the nursing staff as well as consultants, documentation, and entering orders.            Electronically signed by Pam Mauricio PA-C at 10/19/22 8745

## 2023-01-01 ENCOUNTER — HOSPITAL ENCOUNTER (EMERGENCY)
Facility: HOSPITAL | Age: 83
End: 2023-08-15
Attending: EMERGENCY MEDICINE
Payer: MEDICARE

## 2023-01-01 VITALS — HEIGHT: 66 IN | BODY MASS INDEX: 27.8 KG/M2 | WEIGHT: 173 LBS

## 2023-01-01 DIAGNOSIS — I46.9 CARDIAC ARREST: Primary | ICD-10-CM

## 2023-01-01 PROCEDURE — 92950 HEART/LUNG RESUSCITATION CPR: CPT

## 2023-01-01 PROCEDURE — 25010000002 EPINEPHRINE 1 MG/10ML SOLUTION PREFILLED SYRINGE: Performed by: EMERGENCY MEDICINE

## 2023-01-01 PROCEDURE — 99285 EMERGENCY DEPT VISIT HI MDM: CPT

## 2023-01-01 RX ORDER — EPINEPHRINE IN SOD CHLOR,ISO 1 MG/10 ML
SYRINGE (ML) INTRAVENOUS
Status: COMPLETED | OUTPATIENT
Start: 2023-01-01 | End: 2023-01-01

## 2023-01-01 RX ADMIN — EPINEPHRINE 1 MG: 0.1 INJECTION INTRACARDIAC; INTRAVENOUS at 04:01

## 2023-01-15 ENCOUNTER — APPOINTMENT (OUTPATIENT)
Dept: GENERAL RADIOLOGY | Facility: HOSPITAL | Age: 83
DRG: 178 | End: 2023-01-15
Payer: MEDICARE

## 2023-01-15 ENCOUNTER — APPOINTMENT (OUTPATIENT)
Dept: CT IMAGING | Facility: HOSPITAL | Age: 83
DRG: 178 | End: 2023-01-15
Payer: MEDICARE

## 2023-01-15 ENCOUNTER — HOSPITAL ENCOUNTER (INPATIENT)
Facility: HOSPITAL | Age: 83
LOS: 11 days | Discharge: NURSING, REHAB OR LONG-TERM CARE | DRG: 178 | End: 2023-01-26
Attending: EMERGENCY MEDICINE | Admitting: INTERNAL MEDICINE
Payer: MEDICARE

## 2023-01-15 DIAGNOSIS — U07.1 COVID-19: Primary | ICD-10-CM

## 2023-01-15 DIAGNOSIS — E83.42 HYPOMAGNESEMIA: ICD-10-CM

## 2023-01-15 DIAGNOSIS — R62.7 FAILURE TO THRIVE IN ADULT: ICD-10-CM

## 2023-01-15 DIAGNOSIS — Z86.59 HISTORY OF DEMENTIA: ICD-10-CM

## 2023-01-15 DIAGNOSIS — S51.811A SKIN TEAR OF RIGHT FOREARM WITHOUT COMPLICATION, INITIAL ENCOUNTER: ICD-10-CM

## 2023-01-15 DIAGNOSIS — N18.9 CHRONIC KIDNEY DISEASE, UNSPECIFIED CKD STAGE: ICD-10-CM

## 2023-01-15 DIAGNOSIS — L03.113 CELLULITIS OF RIGHT FOREARM: ICD-10-CM

## 2023-01-15 PROBLEM — R07.9 CHEST PAIN: Status: RESOLVED | Noted: 2022-05-21 | Resolved: 2023-01-15

## 2023-01-15 PROBLEM — L03.114 LEFT ARM CELLULITIS: Status: ACTIVE | Noted: 2023-01-15

## 2023-01-15 LAB
ALBUMIN SERPL-MCNC: 3.2 G/DL (ref 3.5–5.2)
ALBUMIN/GLOB SERPL: 1.1 G/DL
ALP SERPL-CCNC: 57 U/L (ref 39–117)
ALT SERPL W P-5'-P-CCNC: 10 U/L (ref 1–33)
ANION GAP SERPL CALCULATED.3IONS-SCNC: 10 MMOL/L (ref 5–15)
AST SERPL-CCNC: 23 U/L (ref 1–32)
BACTERIA UR QL AUTO: ABNORMAL /HPF
BASOPHILS # BLD AUTO: 0.02 10*3/MM3 (ref 0–0.2)
BASOPHILS NFR BLD AUTO: 0.3 % (ref 0–1.5)
BILIRUB SERPL-MCNC: 0.6 MG/DL (ref 0–1.2)
BILIRUB UR QL STRIP: NEGATIVE
BUN SERPL-MCNC: 16 MG/DL (ref 8–23)
BUN/CREAT SERPL: 11.9 (ref 7–25)
CALCIUM SPEC-SCNC: 7.9 MG/DL (ref 8.6–10.5)
CHLORIDE SERPL-SCNC: 106 MMOL/L (ref 98–107)
CK SERPL-CCNC: 136 U/L (ref 20–180)
CLARITY UR: CLEAR
CO2 SERPL-SCNC: 23 MMOL/L (ref 22–29)
COLOR UR: YELLOW
CREAT SERPL-MCNC: 1.34 MG/DL (ref 0.57–1)
CRP SERPL-MCNC: 0.95 MG/DL (ref 0–0.5)
D DIMER PPP FEU-MCNC: 2.22 MCGFEU/ML (ref 0–0.84)
D-LACTATE SERPL-SCNC: 2 MMOL/L (ref 0.5–2)
DEPRECATED RDW RBC AUTO: 45.8 FL (ref 37–54)
EGFRCR SERPLBLD CKD-EPI 2021: 39.2 ML/MIN/1.73
EOSINOPHIL # BLD AUTO: 0.01 10*3/MM3 (ref 0–0.4)
EOSINOPHIL NFR BLD AUTO: 0.2 % (ref 0.3–6.2)
ERYTHROCYTE [DISTWIDTH] IN BLOOD BY AUTOMATED COUNT: 13.7 % (ref 12.3–15.4)
FERRITIN SERPL-MCNC: 112.7 NG/ML (ref 13–150)
FLUAV RNA RESP QL NAA+PROBE: NOT DETECTED
FLUBV RNA RESP QL NAA+PROBE: NOT DETECTED
GLOBULIN UR ELPH-MCNC: 2.8 GM/DL
GLUCOSE SERPL-MCNC: 95 MG/DL (ref 65–99)
GLUCOSE UR STRIP-MCNC: NEGATIVE MG/DL
HCT VFR BLD AUTO: 36.9 % (ref 34–46.6)
HGB BLD-MCNC: 11.9 G/DL (ref 12–15.9)
HGB UR QL STRIP.AUTO: NEGATIVE
IMM GRANULOCYTES # BLD AUTO: 0.04 10*3/MM3 (ref 0–0.05)
IMM GRANULOCYTES NFR BLD AUTO: 0.7 % (ref 0–0.5)
INR PPP: 0.96 (ref 0.84–1.13)
KETONES UR QL STRIP: NEGATIVE
LDH SERPL-CCNC: 273 U/L (ref 135–214)
LEUKOCYTE ESTERASE UR QL STRIP.AUTO: ABNORMAL
LYMPHOCYTES # BLD AUTO: 1.13 10*3/MM3 (ref 0.7–3.1)
LYMPHOCYTES NFR BLD AUTO: 18.5 % (ref 19.6–45.3)
MAGNESIUM SERPL-MCNC: 1.5 MG/DL (ref 1.6–2.4)
MCH RBC QN AUTO: 29 PG (ref 26.6–33)
MCHC RBC AUTO-ENTMCNC: 32.2 G/DL (ref 31.5–35.7)
MCV RBC AUTO: 90 FL (ref 79–97)
MONOCYTES # BLD AUTO: 0.49 10*3/MM3 (ref 0.1–0.9)
MONOCYTES NFR BLD AUTO: 8 % (ref 5–12)
NEUTROPHILS NFR BLD AUTO: 4.43 10*3/MM3 (ref 1.7–7)
NEUTROPHILS NFR BLD AUTO: 72.3 % (ref 42.7–76)
NITRITE UR QL STRIP: NEGATIVE
NRBC BLD AUTO-RTO: 0 /100 WBC (ref 0–0.2)
PH UR STRIP.AUTO: 7 [PH] (ref 5–8)
PLATELET # BLD AUTO: 210 10*3/MM3 (ref 140–450)
PMV BLD AUTO: 10.9 FL (ref 6–12)
POTASSIUM SERPL-SCNC: 4.5 MMOL/L (ref 3.5–5.2)
PROCALCITONIN SERPL-MCNC: 0.17 NG/ML (ref 0–0.25)
PROT SERPL-MCNC: 6 G/DL (ref 6–8.5)
PROT UR QL STRIP: ABNORMAL
PROTHROMBIN TIME: 12.7 SECONDS (ref 11.4–14.4)
RBC # BLD AUTO: 4.1 10*6/MM3 (ref 3.77–5.28)
RBC # UR STRIP: ABNORMAL /HPF
REF LAB TEST METHOD: ABNORMAL
SARS-COV-2 RNA RESP QL NAA+PROBE: DETECTED
SODIUM SERPL-SCNC: 139 MMOL/L (ref 136–145)
SP GR UR STRIP: 1.01 (ref 1–1.03)
SQUAMOUS #/AREA URNS HPF: ABNORMAL /HPF
T4 FREE SERPL-MCNC: 0.96 NG/DL (ref 0.93–1.7)
TROPONIN T SERPL-MCNC: 0.04 NG/ML (ref 0–0.03)
TROPONIN T SERPL-MCNC: 0.05 NG/ML (ref 0–0.03)
TSH SERPL DL<=0.05 MIU/L-ACNC: 3.9 UIU/ML (ref 0.27–4.2)
UROBILINOGEN UR QL STRIP: ABNORMAL
WBC # UR STRIP: ABNORMAL /HPF
WBC NRBC COR # BLD: 6.12 10*3/MM3 (ref 3.4–10.8)

## 2023-01-15 PROCEDURE — 84439 ASSAY OF FREE THYROXINE: CPT | Performed by: EMERGENCY MEDICINE

## 2023-01-15 PROCEDURE — 99223 1ST HOSP IP/OBS HIGH 75: CPT | Performed by: INTERNAL MEDICINE

## 2023-01-15 PROCEDURE — 73090 X-RAY EXAM OF FOREARM: CPT

## 2023-01-15 PROCEDURE — P9612 CATHETERIZE FOR URINE SPEC: HCPCS

## 2023-01-15 PROCEDURE — 0 MAGNESIUM SULFATE 4 GM/100ML SOLUTION: Performed by: INTERNAL MEDICINE

## 2023-01-15 PROCEDURE — 99285 EMERGENCY DEPT VISIT HI MDM: CPT

## 2023-01-15 PROCEDURE — 84145 PROCALCITONIN (PCT): CPT | Performed by: EMERGENCY MEDICINE

## 2023-01-15 PROCEDURE — 25010000002 VANCOMYCIN 10 G RECONSTITUTED SOLUTION: Performed by: EMERGENCY MEDICINE

## 2023-01-15 PROCEDURE — 84484 ASSAY OF TROPONIN QUANT: CPT | Performed by: EMERGENCY MEDICINE

## 2023-01-15 PROCEDURE — 87636 SARSCOV2 & INF A&B AMP PRB: CPT | Performed by: EMERGENCY MEDICINE

## 2023-01-15 PROCEDURE — 82728 ASSAY OF FERRITIN: CPT | Performed by: INTERNAL MEDICINE

## 2023-01-15 PROCEDURE — 80053 COMPREHEN METABOLIC PANEL: CPT | Performed by: EMERGENCY MEDICINE

## 2023-01-15 PROCEDURE — 87086 URINE CULTURE/COLONY COUNT: CPT | Performed by: EMERGENCY MEDICINE

## 2023-01-15 PROCEDURE — 72125 CT NECK SPINE W/O DYE: CPT

## 2023-01-15 PROCEDURE — 84443 ASSAY THYROID STIM HORMONE: CPT | Performed by: EMERGENCY MEDICINE

## 2023-01-15 PROCEDURE — 84484 ASSAY OF TROPONIN QUANT: CPT | Performed by: INTERNAL MEDICINE

## 2023-01-15 PROCEDURE — 85610 PROTHROMBIN TIME: CPT | Performed by: EMERGENCY MEDICINE

## 2023-01-15 PROCEDURE — 86140 C-REACTIVE PROTEIN: CPT | Performed by: INTERNAL MEDICINE

## 2023-01-15 PROCEDURE — 71045 X-RAY EXAM CHEST 1 VIEW: CPT

## 2023-01-15 PROCEDURE — 83615 LACTATE (LD) (LDH) ENZYME: CPT | Performed by: INTERNAL MEDICINE

## 2023-01-15 PROCEDURE — 82550 ASSAY OF CK (CPK): CPT | Performed by: EMERGENCY MEDICINE

## 2023-01-15 PROCEDURE — 85025 COMPLETE CBC W/AUTO DIFF WBC: CPT | Performed by: EMERGENCY MEDICINE

## 2023-01-15 PROCEDURE — 83735 ASSAY OF MAGNESIUM: CPT | Performed by: EMERGENCY MEDICINE

## 2023-01-15 PROCEDURE — 36415 COLL VENOUS BLD VENIPUNCTURE: CPT

## 2023-01-15 PROCEDURE — 85379 FIBRIN DEGRADATION QUANT: CPT | Performed by: INTERNAL MEDICINE

## 2023-01-15 PROCEDURE — 87040 BLOOD CULTURE FOR BACTERIA: CPT | Performed by: EMERGENCY MEDICINE

## 2023-01-15 PROCEDURE — 70450 CT HEAD/BRAIN W/O DYE: CPT

## 2023-01-15 PROCEDURE — 81001 URINALYSIS AUTO W/SCOPE: CPT | Performed by: EMERGENCY MEDICINE

## 2023-01-15 PROCEDURE — 93005 ELECTROCARDIOGRAM TRACING: CPT | Performed by: EMERGENCY MEDICINE

## 2023-01-15 PROCEDURE — 83605 ASSAY OF LACTIC ACID: CPT | Performed by: EMERGENCY MEDICINE

## 2023-01-15 RX ORDER — SODIUM CHLORIDE 9 MG/ML
100 INJECTION, SOLUTION INTRAVENOUS CONTINUOUS
Status: ACTIVE | OUTPATIENT
Start: 2023-01-15 | End: 2023-01-16

## 2023-01-15 RX ORDER — ACETAMINOPHEN 325 MG/1
650 TABLET ORAL EVERY 4 HOURS PRN
Status: DISCONTINUED | OUTPATIENT
Start: 2023-01-15 | End: 2023-01-26 | Stop reason: HOSPADM

## 2023-01-15 RX ORDER — SENNA PLUS 8.6 MG/1
2 TABLET ORAL 2 TIMES DAILY
Status: DISCONTINUED | OUTPATIENT
Start: 2023-01-16 | End: 2023-01-17

## 2023-01-15 RX ORDER — ENOXAPARIN SODIUM 100 MG/ML
40 INJECTION SUBCUTANEOUS EVERY 12 HOURS SCHEDULED
Status: DISCONTINUED | OUTPATIENT
Start: 2023-01-15 | End: 2023-01-16

## 2023-01-15 RX ORDER — TRAZODONE HYDROCHLORIDE 50 MG/1
50 TABLET ORAL NIGHTLY
Status: DISCONTINUED | OUTPATIENT
Start: 2023-01-15 | End: 2023-01-26 | Stop reason: HOSPADM

## 2023-01-15 RX ORDER — NYSTATIN 100000 [USP'U]/G
POWDER TOPICAL EVERY 12 HOURS SCHEDULED
Status: DISCONTINUED | OUTPATIENT
Start: 2023-01-15 | End: 2023-01-26 | Stop reason: HOSPADM

## 2023-01-15 RX ORDER — CITALOPRAM 20 MG/1
20 TABLET ORAL DAILY
Status: DISCONTINUED | OUTPATIENT
Start: 2023-01-16 | End: 2023-01-17

## 2023-01-15 RX ORDER — NIFEDIPINE 30 MG/1
30 TABLET, EXTENDED RELEASE ORAL
Status: DISCONTINUED | OUTPATIENT
Start: 2023-01-16 | End: 2023-01-17

## 2023-01-15 RX ORDER — ASPIRIN 81 MG/1
81 TABLET, CHEWABLE ORAL DAILY
Status: DISCONTINUED | OUTPATIENT
Start: 2023-01-17 | End: 2023-01-26 | Stop reason: HOSPADM

## 2023-01-15 RX ORDER — MAGNESIUM SULFATE HEPTAHYDRATE 40 MG/ML
4 INJECTION, SOLUTION INTRAVENOUS ONCE
Status: COMPLETED | OUTPATIENT
Start: 2023-01-15 | End: 2023-01-15

## 2023-01-15 RX ORDER — ASPIRIN 81 MG/1
324 TABLET, CHEWABLE ORAL ONCE
Status: COMPLETED | OUTPATIENT
Start: 2023-01-15 | End: 2023-01-16

## 2023-01-15 RX ORDER — DONEPEZIL HYDROCHLORIDE 10 MG/1
10 TABLET, FILM COATED ORAL NIGHTLY
Status: DISCONTINUED | OUTPATIENT
Start: 2023-01-15 | End: 2023-01-26 | Stop reason: HOSPADM

## 2023-01-15 RX ORDER — MEMANTINE HYDROCHLORIDE 10 MG/1
10 TABLET ORAL 2 TIMES DAILY
Status: DISCONTINUED | OUTPATIENT
Start: 2023-01-15 | End: 2023-01-26 | Stop reason: HOSPADM

## 2023-01-15 RX ADMIN — VANCOMYCIN HYDROCHLORIDE 1750 MG: 10 INJECTION, POWDER, LYOPHILIZED, FOR SOLUTION INTRAVENOUS at 14:41

## 2023-01-15 RX ADMIN — NYSTATIN: 100000 POWDER TOPICAL at 22:00

## 2023-01-15 RX ADMIN — MAGNESIUM SULFATE HEPTAHYDRATE 4 G: 40 INJECTION, SOLUTION INTRAVENOUS at 18:28

## 2023-01-16 LAB
ALBUMIN SERPL-MCNC: 2.4 G/DL (ref 3.5–5.2)
ALBUMIN/GLOB SERPL: 1 G/DL
ALP SERPL-CCNC: 44 U/L (ref 39–117)
ALT SERPL W P-5'-P-CCNC: 6 U/L (ref 1–33)
ANION GAP SERPL CALCULATED.3IONS-SCNC: 9 MMOL/L (ref 5–15)
AST SERPL-CCNC: 16 U/L (ref 1–32)
BASOPHILS # BLD AUTO: 0.02 10*3/MM3 (ref 0–0.2)
BASOPHILS NFR BLD AUTO: 0.5 % (ref 0–1.5)
BILIRUB CONJ SERPL-MCNC: <0.2 MG/DL (ref 0–0.3)
BILIRUB SERPL-MCNC: 0.3 MG/DL (ref 0–1.2)
BUN SERPL-MCNC: 14 MG/DL (ref 8–23)
BUN/CREAT SERPL: 10.8 (ref 7–25)
CALCIUM SPEC-SCNC: 7.2 MG/DL (ref 8.6–10.5)
CHLORIDE SERPL-SCNC: 109 MMOL/L (ref 98–107)
CO2 SERPL-SCNC: 19 MMOL/L (ref 22–29)
CREAT SERPL-MCNC: 1.3 MG/DL (ref 0.57–1)
D DIMER PPP FEU-MCNC: 1.51 MCGFEU/ML (ref 0–0.84)
DEPRECATED RDW RBC AUTO: 45.1 FL (ref 37–54)
EGFRCR SERPLBLD CKD-EPI 2021: 40.6 ML/MIN/1.73
EOSINOPHIL # BLD AUTO: 0.01 10*3/MM3 (ref 0–0.4)
EOSINOPHIL NFR BLD AUTO: 0.3 % (ref 0.3–6.2)
ERYTHROCYTE [DISTWIDTH] IN BLOOD BY AUTOMATED COUNT: 13.8 % (ref 12.3–15.4)
GLOBULIN UR ELPH-MCNC: 2.4 GM/DL
GLUCOSE SERPL-MCNC: 71 MG/DL (ref 65–99)
HCT VFR BLD AUTO: 32.6 % (ref 34–46.6)
HGB BLD-MCNC: 10.6 G/DL (ref 12–15.9)
IMM GRANULOCYTES # BLD AUTO: 0.02 10*3/MM3 (ref 0–0.05)
IMM GRANULOCYTES NFR BLD AUTO: 0.5 % (ref 0–0.5)
LDH SERPL-CCNC: 240 U/L (ref 135–214)
LYMPHOCYTES # BLD AUTO: 1.23 10*3/MM3 (ref 0.7–3.1)
LYMPHOCYTES NFR BLD AUTO: 31.9 % (ref 19.6–45.3)
MAGNESIUM SERPL-MCNC: 2 MG/DL (ref 1.6–2.4)
MCH RBC QN AUTO: 29 PG (ref 26.6–33)
MCHC RBC AUTO-ENTMCNC: 32.5 G/DL (ref 31.5–35.7)
MCV RBC AUTO: 89.1 FL (ref 79–97)
MONOCYTES # BLD AUTO: 0.42 10*3/MM3 (ref 0.1–0.9)
MONOCYTES NFR BLD AUTO: 10.9 % (ref 5–12)
NEUTROPHILS NFR BLD AUTO: 2.16 10*3/MM3 (ref 1.7–7)
NEUTROPHILS NFR BLD AUTO: 55.9 % (ref 42.7–76)
NRBC BLD AUTO-RTO: 0 /100 WBC (ref 0–0.2)
PLATELET # BLD AUTO: 175 10*3/MM3 (ref 140–450)
PMV BLD AUTO: 11.6 FL (ref 6–12)
POTASSIUM SERPL-SCNC: 4.2 MMOL/L (ref 3.5–5.2)
PROT SERPL-MCNC: 4.8 G/DL (ref 6–8.5)
QT INTERVAL: 442 MS
QTC INTERVAL: 534 MS
RBC # BLD AUTO: 3.66 10*6/MM3 (ref 3.77–5.28)
SODIUM SERPL-SCNC: 137 MMOL/L (ref 136–145)
TROPONIN T SERPL-MCNC: 0.04 NG/ML (ref 0–0.03)
WBC NRBC COR # BLD: 3.86 10*3/MM3 (ref 3.4–10.8)

## 2023-01-16 PROCEDURE — 25010000002 REMDESIVIR 100 MG/20ML SOLUTION 1 EACH VIAL: Performed by: INTERNAL MEDICINE

## 2023-01-16 PROCEDURE — 85379 FIBRIN DEGRADATION QUANT: CPT | Performed by: INTERNAL MEDICINE

## 2023-01-16 PROCEDURE — 90471 IMMUNIZATION ADMIN: CPT | Performed by: EMERGENCY MEDICINE

## 2023-01-16 PROCEDURE — 85025 COMPLETE CBC W/AUTO DIFF WBC: CPT | Performed by: INTERNAL MEDICINE

## 2023-01-16 PROCEDURE — 83615 LACTATE (LD) (LDH) ENZYME: CPT | Performed by: INTERNAL MEDICINE

## 2023-01-16 PROCEDURE — 80053 COMPREHEN METABOLIC PANEL: CPT | Performed by: INTERNAL MEDICINE

## 2023-01-16 PROCEDURE — 25010000002 CEFTRIAXONE PER 250 MG: Performed by: EMERGENCY MEDICINE

## 2023-01-16 PROCEDURE — 25010000002 ENOXAPARIN PER 10 MG: Performed by: INTERNAL MEDICINE

## 2023-01-16 PROCEDURE — XW033E5 INTRODUCTION OF REMDESIVIR ANTI-INFECTIVE INTO PERIPHERAL VEIN, PERCUTANEOUS APPROACH, NEW TECHNOLOGY GROUP 5: ICD-10-PCS | Performed by: INTERNAL MEDICINE

## 2023-01-16 PROCEDURE — 90715 TDAP VACCINE 7 YRS/> IM: CPT | Performed by: EMERGENCY MEDICINE

## 2023-01-16 PROCEDURE — 25010000002 TETANUS-DIPHTH-ACELL PERTUSSIS 5-2.5-18.5 LF-MCG/0.5 SUSPENSION PREFILLED SYRINGE: Performed by: EMERGENCY MEDICINE

## 2023-01-16 PROCEDURE — 97162 PT EVAL MOD COMPLEX 30 MIN: CPT

## 2023-01-16 PROCEDURE — 83735 ASSAY OF MAGNESIUM: CPT | Performed by: INTERNAL MEDICINE

## 2023-01-16 PROCEDURE — 97166 OT EVAL MOD COMPLEX 45 MIN: CPT

## 2023-01-16 PROCEDURE — 97535 SELF CARE MNGMENT TRAINING: CPT

## 2023-01-16 PROCEDURE — 99232 SBSQ HOSP IP/OBS MODERATE 35: CPT | Performed by: INTERNAL MEDICINE

## 2023-01-16 PROCEDURE — 84484 ASSAY OF TROPONIN QUANT: CPT | Performed by: INTERNAL MEDICINE

## 2023-01-16 PROCEDURE — 82248 BILIRUBIN DIRECT: CPT | Performed by: INTERNAL MEDICINE

## 2023-01-16 RX ORDER — LEVOTHYROXINE SODIUM 0.05 MG/1
50 TABLET ORAL DAILY
COMMUNITY

## 2023-01-16 RX ORDER — ENOXAPARIN SODIUM 100 MG/ML
40 INJECTION SUBCUTANEOUS EVERY 24 HOURS
Status: DISCONTINUED | OUTPATIENT
Start: 2023-01-17 | End: 2023-01-26 | Stop reason: HOSPADM

## 2023-01-16 RX ADMIN — MEMANTINE 10 MG: 10 TABLET ORAL at 08:45

## 2023-01-16 RX ADMIN — ENOXAPARIN SODIUM 40 MG: 40 INJECTION SUBCUTANEOUS at 08:45

## 2023-01-16 RX ADMIN — REMDESIVIR 200 MG: 100 INJECTION, POWDER, LYOPHILIZED, FOR SOLUTION INTRAVENOUS at 01:20

## 2023-01-16 RX ADMIN — NYSTATIN: 100000 POWDER TOPICAL at 11:42

## 2023-01-16 RX ADMIN — TRAZODONE HYDROCHLORIDE 50 MG: 50 TABLET ORAL at 20:18

## 2023-01-16 RX ADMIN — ASPIRIN 81 MG CHEWABLE TABLET 324 MG: 81 TABLET CHEWABLE at 00:05

## 2023-01-16 RX ADMIN — MEMANTINE 10 MG: 10 TABLET ORAL at 00:06

## 2023-01-16 RX ADMIN — CITALOPRAM HYDROBROMIDE 20 MG: 20 TABLET ORAL at 08:45

## 2023-01-16 RX ADMIN — NIFEDIPINE 30 MG: 30 TABLET, FILM COATED, EXTENDED RELEASE ORAL at 08:45

## 2023-01-16 RX ADMIN — DONEPEZIL HYDROCHLORIDE 10 MG: 10 TABLET ORAL at 20:18

## 2023-01-16 RX ADMIN — ENOXAPARIN SODIUM 40 MG: 40 INJECTION SUBCUTANEOUS at 00:03

## 2023-01-16 RX ADMIN — TETANUS TOXOID, REDUCED DIPHTHERIA TOXOID AND ACELLULAR PERTUSSIS VACCINE, ADSORBED 0.5 ML: 5; 2.5; 8; 8; 2.5 SUSPENSION INTRAMUSCULAR at 00:04

## 2023-01-16 RX ADMIN — REMDESIVIR 100 MG: 100 INJECTION, POWDER, LYOPHILIZED, FOR SOLUTION INTRAVENOUS at 18:11

## 2023-01-16 RX ADMIN — DONEPEZIL HYDROCHLORIDE 10 MG: 10 TABLET ORAL at 00:05

## 2023-01-16 RX ADMIN — CEFTRIAXONE SODIUM 2 G: 2 INJECTION, POWDER, FOR SOLUTION INTRAMUSCULAR; INTRAVENOUS at 00:06

## 2023-01-16 RX ADMIN — SODIUM CHLORIDE 100 ML/HR: 9 INJECTION, SOLUTION INTRAVENOUS at 00:06

## 2023-01-16 RX ADMIN — SENNOSIDES 2 TABLET: 8.6 TABLET, FILM COATED ORAL at 20:18

## 2023-01-16 RX ADMIN — SENNOSIDES 2 TABLET: 8.6 TABLET, FILM COATED ORAL at 08:45

## 2023-01-16 RX ADMIN — TRAZODONE HYDROCHLORIDE 50 MG: 50 TABLET ORAL at 00:06

## 2023-01-16 RX ADMIN — NYSTATIN: 100000 POWDER TOPICAL at 20:18

## 2023-01-16 RX ADMIN — MEMANTINE 10 MG: 10 TABLET ORAL at 20:21

## 2023-01-17 LAB
ALBUMIN SERPL-MCNC: 2.4 G/DL (ref 3.5–5.2)
ALBUMIN/GLOB SERPL: 1.1 G/DL
ALP SERPL-CCNC: 49 U/L (ref 39–117)
ALT SERPL W P-5'-P-CCNC: 6 U/L (ref 1–33)
ANION GAP SERPL CALCULATED.3IONS-SCNC: 9 MMOL/L (ref 5–15)
AST SERPL-CCNC: 15 U/L (ref 1–32)
BILIRUB CONJ SERPL-MCNC: <0.2 MG/DL (ref 0–0.3)
BILIRUB SERPL-MCNC: 0.4 MG/DL (ref 0–1.2)
BUN SERPL-MCNC: 19 MG/DL (ref 8–23)
BUN/CREAT SERPL: 14.7 (ref 7–25)
CALCIUM SPEC-SCNC: 7.4 MG/DL (ref 8.6–10.5)
CHLORIDE SERPL-SCNC: 109 MMOL/L (ref 98–107)
CO2 SERPL-SCNC: 21 MMOL/L (ref 22–29)
CREAT SERPL-MCNC: 1.29 MG/DL (ref 0.57–1)
EGFRCR SERPLBLD CKD-EPI 2021: 41 ML/MIN/1.73
GLOBULIN UR ELPH-MCNC: 2.2 GM/DL
GLUCOSE SERPL-MCNC: 82 MG/DL (ref 65–99)
POTASSIUM SERPL-SCNC: 4.4 MMOL/L (ref 3.5–5.2)
PROT SERPL-MCNC: 4.6 G/DL (ref 6–8.5)
SODIUM SERPL-SCNC: 139 MMOL/L (ref 136–145)

## 2023-01-17 PROCEDURE — 25010000002 REMDESIVIR 100 MG/20ML SOLUTION 1 EACH VIAL: Performed by: INTERNAL MEDICINE

## 2023-01-17 PROCEDURE — 80053 COMPREHEN METABOLIC PANEL: CPT | Performed by: INTERNAL MEDICINE

## 2023-01-17 PROCEDURE — 97530 THERAPEUTIC ACTIVITIES: CPT

## 2023-01-17 PROCEDURE — 82248 BILIRUBIN DIRECT: CPT | Performed by: INTERNAL MEDICINE

## 2023-01-17 PROCEDURE — 99232 SBSQ HOSP IP/OBS MODERATE 35: CPT | Performed by: NURSE PRACTITIONER

## 2023-01-17 PROCEDURE — 25010000002 ENOXAPARIN PER 10 MG: Performed by: INTERNAL MEDICINE

## 2023-01-17 RX ORDER — BISACODYL 5 MG/1
5 TABLET, DELAYED RELEASE ORAL DAILY PRN
Status: DISCONTINUED | OUTPATIENT
Start: 2023-01-17 | End: 2023-01-18

## 2023-01-17 RX ORDER — BISACODYL 10 MG
10 SUPPOSITORY, RECTAL RECTAL DAILY PRN
Status: DISCONTINUED | OUTPATIENT
Start: 2023-01-17 | End: 2023-01-18

## 2023-01-17 RX ORDER — AMOXICILLIN 250 MG
2 CAPSULE ORAL 2 TIMES DAILY
Status: DISCONTINUED | OUTPATIENT
Start: 2023-01-17 | End: 2023-01-18

## 2023-01-17 RX ORDER — POLYETHYLENE GLYCOL 3350 17 G/17G
17 POWDER, FOR SOLUTION ORAL DAILY PRN
Status: DISCONTINUED | OUTPATIENT
Start: 2023-01-17 | End: 2023-01-18

## 2023-01-17 RX ADMIN — ASPIRIN 81 MG CHEWABLE TABLET 81 MG: 81 TABLET CHEWABLE at 09:27

## 2023-01-17 RX ADMIN — SENNOSIDES 2 TABLET: 8.6 TABLET, FILM COATED ORAL at 09:27

## 2023-01-17 RX ADMIN — MEMANTINE 10 MG: 10 TABLET ORAL at 09:27

## 2023-01-17 RX ADMIN — NYSTATIN: 100000 POWDER TOPICAL at 20:16

## 2023-01-17 RX ADMIN — ENOXAPARIN SODIUM 40 MG: 40 INJECTION SUBCUTANEOUS at 09:18

## 2023-01-17 RX ADMIN — REMDESIVIR 100 MG: 100 INJECTION, POWDER, LYOPHILIZED, FOR SOLUTION INTRAVENOUS at 12:19

## 2023-01-17 RX ADMIN — CITALOPRAM HYDROBROMIDE 20 MG: 20 TABLET ORAL at 09:27

## 2023-01-17 RX ADMIN — NYSTATIN: 100000 POWDER TOPICAL at 09:27

## 2023-01-18 ENCOUNTER — APPOINTMENT (OUTPATIENT)
Dept: GENERAL RADIOLOGY | Facility: HOSPITAL | Age: 83
DRG: 178 | End: 2023-01-18
Payer: MEDICARE

## 2023-01-18 LAB
ALBUMIN SERPL-MCNC: 2.5 G/DL (ref 3.5–5.2)
ALBUMIN/GLOB SERPL: 1.1 G/DL
ALP SERPL-CCNC: 49 U/L (ref 39–117)
ALT SERPL W P-5'-P-CCNC: 7 U/L (ref 1–33)
ANION GAP SERPL CALCULATED.3IONS-SCNC: 9 MMOL/L (ref 5–15)
AST SERPL-CCNC: 19 U/L (ref 1–32)
BACTERIA SPEC AEROBE CULT: NORMAL
BILIRUB SERPL-MCNC: 0.6 MG/DL (ref 0–1.2)
BUN SERPL-MCNC: 22 MG/DL (ref 8–23)
BUN/CREAT SERPL: 16.7 (ref 7–25)
CALCIUM SPEC-SCNC: 7.4 MG/DL (ref 8.6–10.5)
CHLORIDE SERPL-SCNC: 110 MMOL/L (ref 98–107)
CO2 SERPL-SCNC: 20 MMOL/L (ref 22–29)
CREAT SERPL-MCNC: 1.32 MG/DL (ref 0.57–1)
EGFRCR SERPLBLD CKD-EPI 2021: 39.9 ML/MIN/1.73
GLOBULIN UR ELPH-MCNC: 2.2 GM/DL
GLUCOSE SERPL-MCNC: 96 MG/DL (ref 65–99)
POTASSIUM SERPL-SCNC: 4.1 MMOL/L (ref 3.5–5.2)
PROT SERPL-MCNC: 4.7 G/DL (ref 6–8.5)
SODIUM SERPL-SCNC: 139 MMOL/L (ref 136–145)

## 2023-01-18 PROCEDURE — 93005 ELECTROCARDIOGRAM TRACING: CPT | Performed by: NURSE PRACTITIONER

## 2023-01-18 PROCEDURE — 74018 RADEX ABDOMEN 1 VIEW: CPT

## 2023-01-18 PROCEDURE — 80053 COMPREHEN METABOLIC PANEL: CPT | Performed by: INTERNAL MEDICINE

## 2023-01-18 PROCEDURE — 97530 THERAPEUTIC ACTIVITIES: CPT

## 2023-01-18 PROCEDURE — 25010000002 ENOXAPARIN PER 10 MG: Performed by: INTERNAL MEDICINE

## 2023-01-18 PROCEDURE — 99231 SBSQ HOSP IP/OBS SF/LOW 25: CPT | Performed by: NURSE PRACTITIONER

## 2023-01-18 PROCEDURE — 92610 EVALUATE SWALLOWING FUNCTION: CPT | Performed by: SPEECH-LANGUAGE PATHOLOGIST

## 2023-01-18 PROCEDURE — 93010 ELECTROCARDIOGRAM REPORT: CPT | Performed by: INTERNAL MEDICINE

## 2023-01-18 RX ORDER — BISACODYL 10 MG
10 SUPPOSITORY, RECTAL RECTAL DAILY PRN
Status: DISCONTINUED | OUTPATIENT
Start: 2023-01-18 | End: 2023-01-26 | Stop reason: HOSPADM

## 2023-01-18 RX ORDER — AMOXICILLIN 250 MG
1 CAPSULE ORAL NIGHTLY
Status: DISCONTINUED | OUTPATIENT
Start: 2023-01-18 | End: 2023-01-26 | Stop reason: HOSPADM

## 2023-01-18 RX ORDER — POLYETHYLENE GLYCOL 3350 17 G/17G
17 POWDER, FOR SOLUTION ORAL DAILY PRN
Status: DISCONTINUED | OUTPATIENT
Start: 2023-01-18 | End: 2023-01-26 | Stop reason: HOSPADM

## 2023-01-18 RX ORDER — BISACODYL 10 MG
10 SUPPOSITORY, RECTAL RECTAL ONCE
Status: COMPLETED | OUTPATIENT
Start: 2023-01-18 | End: 2023-01-18

## 2023-01-18 RX ORDER — BISACODYL 5 MG/1
5 TABLET, DELAYED RELEASE ORAL DAILY PRN
Status: DISCONTINUED | OUTPATIENT
Start: 2023-01-18 | End: 2023-01-26 | Stop reason: HOSPADM

## 2023-01-18 RX ORDER — LEVOTHYROXINE SODIUM 0.05 MG/1
50 TABLET ORAL DAILY
Status: DISCONTINUED | OUTPATIENT
Start: 2023-01-18 | End: 2023-01-26 | Stop reason: HOSPADM

## 2023-01-18 RX ADMIN — MEMANTINE 10 MG: 10 TABLET ORAL at 09:06

## 2023-01-18 RX ADMIN — SENNOSIDES AND DOCUSATE SODIUM 1 TABLET: 50; 8.6 TABLET ORAL at 20:44

## 2023-01-18 RX ADMIN — Medication 10 MG: at 20:44

## 2023-01-18 RX ADMIN — NYSTATIN: 100000 POWDER TOPICAL at 09:11

## 2023-01-18 RX ADMIN — NYSTATIN: 100000 POWDER TOPICAL at 20:44

## 2023-01-18 RX ADMIN — ENOXAPARIN SODIUM 40 MG: 40 INJECTION SUBCUTANEOUS at 09:06

## 2023-01-18 RX ADMIN — LEVOTHYROXINE SODIUM 50 MCG: 50 TABLET ORAL at 09:06

## 2023-01-18 RX ADMIN — MEMANTINE 10 MG: 10 TABLET ORAL at 20:44

## 2023-01-18 RX ADMIN — DONEPEZIL HYDROCHLORIDE 10 MG: 10 TABLET ORAL at 20:44

## 2023-01-18 RX ADMIN — ASPIRIN 81 MG CHEWABLE TABLET 81 MG: 81 TABLET CHEWABLE at 09:06

## 2023-01-18 RX ADMIN — TRAZODONE HYDROCHLORIDE 50 MG: 50 TABLET ORAL at 20:44

## 2023-01-19 PROCEDURE — 25010000002 ENOXAPARIN PER 10 MG: Performed by: INTERNAL MEDICINE

## 2023-01-19 PROCEDURE — 99232 SBSQ HOSP IP/OBS MODERATE 35: CPT | Performed by: NURSE PRACTITIONER

## 2023-01-19 PROCEDURE — 92610 EVALUATE SWALLOWING FUNCTION: CPT

## 2023-01-19 RX ORDER — NIFEDIPINE 30 MG/1
30 TABLET, EXTENDED RELEASE ORAL
Status: DISCONTINUED | OUTPATIENT
Start: 2023-01-19 | End: 2023-01-24

## 2023-01-19 RX ADMIN — MEMANTINE 10 MG: 10 TABLET ORAL at 20:22

## 2023-01-19 RX ADMIN — MEMANTINE 10 MG: 10 TABLET ORAL at 09:09

## 2023-01-19 RX ADMIN — ENOXAPARIN SODIUM 40 MG: 40 INJECTION SUBCUTANEOUS at 09:09

## 2023-01-19 RX ADMIN — ASPIRIN 81 MG CHEWABLE TABLET 81 MG: 81 TABLET CHEWABLE at 09:09

## 2023-01-19 RX ADMIN — NYSTATIN: 100000 POWDER TOPICAL at 09:09

## 2023-01-19 RX ADMIN — SENNOSIDES AND DOCUSATE SODIUM 1 TABLET: 50; 8.6 TABLET ORAL at 20:22

## 2023-01-19 RX ADMIN — DONEPEZIL HYDROCHLORIDE 10 MG: 10 TABLET ORAL at 21:13

## 2023-01-19 RX ADMIN — TRAZODONE HYDROCHLORIDE 50 MG: 50 TABLET ORAL at 20:22

## 2023-01-19 RX ADMIN — NIFEDIPINE 30 MG: 30 TABLET, FILM COATED, EXTENDED RELEASE ORAL at 14:19

## 2023-01-19 RX ADMIN — NYSTATIN: 100000 POWDER TOPICAL at 20:22

## 2023-01-19 RX ADMIN — LEVOTHYROXINE SODIUM 50 MCG: 50 TABLET ORAL at 09:09

## 2023-01-20 LAB
ANION GAP SERPL CALCULATED.3IONS-SCNC: 9 MMOL/L (ref 5–15)
BACTERIA SPEC AEROBE CULT: NORMAL
BACTERIA SPEC AEROBE CULT: NORMAL
BUN SERPL-MCNC: 18 MG/DL (ref 8–23)
BUN/CREAT SERPL: 17.5 (ref 7–25)
CALCIUM SPEC-SCNC: 7.5 MG/DL (ref 8.6–10.5)
CHLORIDE SERPL-SCNC: 109 MMOL/L (ref 98–107)
CO2 SERPL-SCNC: 22 MMOL/L (ref 22–29)
CREAT SERPL-MCNC: 1.03 MG/DL (ref 0.57–1)
DEPRECATED RDW RBC AUTO: 42.9 FL (ref 37–54)
EGFRCR SERPLBLD CKD-EPI 2021: 53.7 ML/MIN/1.73
ERYTHROCYTE [DISTWIDTH] IN BLOOD BY AUTOMATED COUNT: 13.4 % (ref 12.3–15.4)
GLUCOSE SERPL-MCNC: 87 MG/DL (ref 65–99)
HCT VFR BLD AUTO: 31.4 % (ref 34–46.6)
HGB BLD-MCNC: 10.6 G/DL (ref 12–15.9)
MCH RBC QN AUTO: 29.2 PG (ref 26.6–33)
MCHC RBC AUTO-ENTMCNC: 33.8 G/DL (ref 31.5–35.7)
MCV RBC AUTO: 86.5 FL (ref 79–97)
PLATELET # BLD AUTO: 216 10*3/MM3 (ref 140–450)
PMV BLD AUTO: 11.3 FL (ref 6–12)
POTASSIUM SERPL-SCNC: 4.1 MMOL/L (ref 3.5–5.2)
RBC # BLD AUTO: 3.63 10*6/MM3 (ref 3.77–5.28)
SODIUM SERPL-SCNC: 140 MMOL/L (ref 136–145)
WBC NRBC COR # BLD: 4.63 10*3/MM3 (ref 3.4–10.8)

## 2023-01-20 PROCEDURE — 97535 SELF CARE MNGMENT TRAINING: CPT

## 2023-01-20 PROCEDURE — 25010000002 ENOXAPARIN PER 10 MG: Performed by: INTERNAL MEDICINE

## 2023-01-20 PROCEDURE — 93005 ELECTROCARDIOGRAM TRACING: CPT | Performed by: NURSE PRACTITIONER

## 2023-01-20 PROCEDURE — 80048 BASIC METABOLIC PNL TOTAL CA: CPT | Performed by: NURSE PRACTITIONER

## 2023-01-20 PROCEDURE — 99232 SBSQ HOSP IP/OBS MODERATE 35: CPT | Performed by: NURSE PRACTITIONER

## 2023-01-20 PROCEDURE — 97530 THERAPEUTIC ACTIVITIES: CPT

## 2023-01-20 PROCEDURE — 85027 COMPLETE CBC AUTOMATED: CPT | Performed by: NURSE PRACTITIONER

## 2023-01-20 PROCEDURE — 93010 ELECTROCARDIOGRAM REPORT: CPT | Performed by: INTERNAL MEDICINE

## 2023-01-20 RX ADMIN — MEMANTINE 10 MG: 10 TABLET ORAL at 08:35

## 2023-01-20 RX ADMIN — MEMANTINE 10 MG: 10 TABLET ORAL at 22:02

## 2023-01-20 RX ADMIN — ENOXAPARIN SODIUM 40 MG: 40 INJECTION SUBCUTANEOUS at 08:35

## 2023-01-20 RX ADMIN — NYSTATIN: 100000 POWDER TOPICAL at 08:36

## 2023-01-20 RX ADMIN — LEVOTHYROXINE SODIUM 50 MCG: 50 TABLET ORAL at 06:07

## 2023-01-20 RX ADMIN — SENNOSIDES AND DOCUSATE SODIUM 1 TABLET: 50; 8.6 TABLET ORAL at 22:01

## 2023-01-20 RX ADMIN — TRAZODONE HYDROCHLORIDE 50 MG: 50 TABLET ORAL at 22:02

## 2023-01-20 RX ADMIN — NYSTATIN: 100000 POWDER TOPICAL at 22:02

## 2023-01-20 RX ADMIN — ASPIRIN 81 MG CHEWABLE TABLET 81 MG: 81 TABLET CHEWABLE at 08:35

## 2023-01-20 RX ADMIN — DONEPEZIL HYDROCHLORIDE 10 MG: 10 TABLET ORAL at 22:01

## 2023-01-21 PROCEDURE — 99231 SBSQ HOSP IP/OBS SF/LOW 25: CPT | Performed by: NURSE PRACTITIONER

## 2023-01-21 PROCEDURE — 25010000002 ENOXAPARIN PER 10 MG: Performed by: INTERNAL MEDICINE

## 2023-01-21 RX ADMIN — ENOXAPARIN SODIUM 40 MG: 40 INJECTION SUBCUTANEOUS at 09:04

## 2023-01-21 RX ADMIN — LEVOTHYROXINE SODIUM 50 MCG: 50 TABLET ORAL at 05:29

## 2023-01-21 RX ADMIN — MEMANTINE 10 MG: 10 TABLET ORAL at 21:17

## 2023-01-21 RX ADMIN — DONEPEZIL HYDROCHLORIDE 10 MG: 10 TABLET ORAL at 21:17

## 2023-01-21 RX ADMIN — NYSTATIN: 100000 POWDER TOPICAL at 21:17

## 2023-01-21 RX ADMIN — NYSTATIN: 100000 POWDER TOPICAL at 08:55

## 2023-01-21 RX ADMIN — ASPIRIN 81 MG CHEWABLE TABLET 81 MG: 81 TABLET CHEWABLE at 08:55

## 2023-01-21 RX ADMIN — NIFEDIPINE 30 MG: 30 TABLET, FILM COATED, EXTENDED RELEASE ORAL at 08:55

## 2023-01-21 RX ADMIN — MEMANTINE 10 MG: 10 TABLET ORAL at 08:55

## 2023-01-21 RX ADMIN — TRAZODONE HYDROCHLORIDE 50 MG: 50 TABLET ORAL at 21:17

## 2023-01-21 RX ADMIN — SENNOSIDES AND DOCUSATE SODIUM 1 TABLET: 50; 8.6 TABLET ORAL at 21:17

## 2023-01-22 PROCEDURE — 25010000002 ENOXAPARIN PER 10 MG: Performed by: INTERNAL MEDICINE

## 2023-01-22 PROCEDURE — 99231 SBSQ HOSP IP/OBS SF/LOW 25: CPT | Performed by: NURSE PRACTITIONER

## 2023-01-22 RX ADMIN — TRAZODONE HYDROCHLORIDE 50 MG: 50 TABLET ORAL at 21:58

## 2023-01-22 RX ADMIN — DONEPEZIL HYDROCHLORIDE 10 MG: 10 TABLET ORAL at 21:58

## 2023-01-22 RX ADMIN — ENOXAPARIN SODIUM 40 MG: 40 INJECTION SUBCUTANEOUS at 10:33

## 2023-01-22 RX ADMIN — MEMANTINE 10 MG: 10 TABLET ORAL at 10:33

## 2023-01-22 RX ADMIN — MEMANTINE 10 MG: 10 TABLET ORAL at 21:58

## 2023-01-22 RX ADMIN — NIFEDIPINE 30 MG: 30 TABLET, FILM COATED, EXTENDED RELEASE ORAL at 10:33

## 2023-01-22 RX ADMIN — ASPIRIN 81 MG CHEWABLE TABLET 81 MG: 81 TABLET CHEWABLE at 10:33

## 2023-01-22 RX ADMIN — LEVOTHYROXINE SODIUM 50 MCG: 50 TABLET ORAL at 05:41

## 2023-01-22 RX ADMIN — NYSTATIN: 100000 POWDER TOPICAL at 21:58

## 2023-01-22 RX ADMIN — SENNOSIDES AND DOCUSATE SODIUM 1 TABLET: 50; 8.6 TABLET ORAL at 21:58

## 2023-01-22 RX ADMIN — NYSTATIN: 100000 POWDER TOPICAL at 10:33

## 2023-01-23 LAB
QT INTERVAL: 506 MS
QTC INTERVAL: 642 MS

## 2023-01-23 PROCEDURE — 25010000002 ENOXAPARIN PER 10 MG: Performed by: INTERNAL MEDICINE

## 2023-01-23 PROCEDURE — 99232 SBSQ HOSP IP/OBS MODERATE 35: CPT | Performed by: NURSE PRACTITIONER

## 2023-01-23 PROCEDURE — 97530 THERAPEUTIC ACTIVITIES: CPT

## 2023-01-23 RX ADMIN — NYSTATIN: 100000 POWDER TOPICAL at 21:30

## 2023-01-23 RX ADMIN — TRAZODONE HYDROCHLORIDE 50 MG: 50 TABLET ORAL at 21:30

## 2023-01-23 RX ADMIN — LEVOTHYROXINE SODIUM 50 MCG: 50 TABLET ORAL at 05:35

## 2023-01-23 RX ADMIN — MEMANTINE 10 MG: 10 TABLET ORAL at 09:29

## 2023-01-23 RX ADMIN — NIFEDIPINE 30 MG: 30 TABLET, FILM COATED, EXTENDED RELEASE ORAL at 09:29

## 2023-01-23 RX ADMIN — SENNOSIDES AND DOCUSATE SODIUM 1 TABLET: 50; 8.6 TABLET ORAL at 21:30

## 2023-01-23 RX ADMIN — ASPIRIN 81 MG CHEWABLE TABLET 81 MG: 81 TABLET CHEWABLE at 09:29

## 2023-01-23 RX ADMIN — MEMANTINE 10 MG: 10 TABLET ORAL at 21:30

## 2023-01-23 RX ADMIN — DONEPEZIL HYDROCHLORIDE 10 MG: 10 TABLET ORAL at 21:30

## 2023-01-23 RX ADMIN — NYSTATIN: 100000 POWDER TOPICAL at 09:29

## 2023-01-23 RX ADMIN — ENOXAPARIN SODIUM 40 MG: 40 INJECTION SUBCUTANEOUS at 09:27

## 2023-01-24 LAB
ALBUMIN SERPL-MCNC: 2.6 G/DL (ref 3.5–5.2)
ALBUMIN/GLOB SERPL: 1.1 G/DL
ALP SERPL-CCNC: 63 U/L (ref 39–117)
ALT SERPL W P-5'-P-CCNC: 5 U/L (ref 1–33)
ANION GAP SERPL CALCULATED.3IONS-SCNC: 7 MMOL/L (ref 5–15)
AST SERPL-CCNC: 12 U/L (ref 1–32)
BASOPHILS # BLD AUTO: 0.06 10*3/MM3 (ref 0–0.2)
BASOPHILS NFR BLD AUTO: 1 % (ref 0–1.5)
BILIRUB SERPL-MCNC: 0.7 MG/DL (ref 0–1.2)
BUN SERPL-MCNC: 17 MG/DL (ref 8–23)
BUN/CREAT SERPL: 14.3 (ref 7–25)
CALCIUM SPEC-SCNC: 8.2 MG/DL (ref 8.6–10.5)
CHLORIDE SERPL-SCNC: 109 MMOL/L (ref 98–107)
CO2 SERPL-SCNC: 24 MMOL/L (ref 22–29)
CREAT SERPL-MCNC: 1.19 MG/DL (ref 0.57–1)
DEPRECATED RDW RBC AUTO: 46.2 FL (ref 37–54)
EGFRCR SERPLBLD CKD-EPI 2021: 45.2 ML/MIN/1.73
EOSINOPHIL # BLD AUTO: 0.23 10*3/MM3 (ref 0–0.4)
EOSINOPHIL NFR BLD AUTO: 4 % (ref 0.3–6.2)
ERYTHROCYTE [DISTWIDTH] IN BLOOD BY AUTOMATED COUNT: 13.5 % (ref 12.3–15.4)
GLOBULIN UR ELPH-MCNC: 2.4 GM/DL
GLUCOSE SERPL-MCNC: 82 MG/DL (ref 65–99)
HCT VFR BLD AUTO: 33.4 % (ref 34–46.6)
HGB BLD-MCNC: 10.7 G/DL (ref 12–15.9)
IMM GRANULOCYTES # BLD AUTO: 0.05 10*3/MM3 (ref 0–0.05)
IMM GRANULOCYTES NFR BLD AUTO: 0.9 % (ref 0–0.5)
LYMPHOCYTES # BLD AUTO: 1.59 10*3/MM3 (ref 0.7–3.1)
LYMPHOCYTES NFR BLD AUTO: 27.6 % (ref 19.6–45.3)
MAGNESIUM SERPL-MCNC: 1.9 MG/DL (ref 1.6–2.4)
MCH RBC QN AUTO: 29.6 PG (ref 26.6–33)
MCHC RBC AUTO-ENTMCNC: 32 G/DL (ref 31.5–35.7)
MCV RBC AUTO: 92.3 FL (ref 79–97)
MONOCYTES # BLD AUTO: 0.74 10*3/MM3 (ref 0.1–0.9)
MONOCYTES NFR BLD AUTO: 12.8 % (ref 5–12)
NEUTROPHILS NFR BLD AUTO: 3.09 10*3/MM3 (ref 1.7–7)
NEUTROPHILS NFR BLD AUTO: 53.7 % (ref 42.7–76)
NRBC BLD AUTO-RTO: 0 /100 WBC (ref 0–0.2)
PLAT MORPH BLD: NORMAL
PLATELET # BLD AUTO: 242 10*3/MM3 (ref 140–450)
PMV BLD AUTO: 11.4 FL (ref 6–12)
POTASSIUM SERPL-SCNC: 4.4 MMOL/L (ref 3.5–5.2)
PROT SERPL-MCNC: 5 G/DL (ref 6–8.5)
QT INTERVAL: 428 MS
QTC INTERVAL: 487 MS
RBC # BLD AUTO: 3.62 10*6/MM3 (ref 3.77–5.28)
RBC MORPH BLD: NORMAL
SODIUM SERPL-SCNC: 140 MMOL/L (ref 136–145)
WBC MORPH BLD: NORMAL
WBC NRBC COR # BLD: 5.76 10*3/MM3 (ref 3.4–10.8)

## 2023-01-24 PROCEDURE — 85025 COMPLETE CBC W/AUTO DIFF WBC: CPT | Performed by: NURSE PRACTITIONER

## 2023-01-24 PROCEDURE — 93005 ELECTROCARDIOGRAM TRACING: CPT | Performed by: NURSE PRACTITIONER

## 2023-01-24 PROCEDURE — 99231 SBSQ HOSP IP/OBS SF/LOW 25: CPT | Performed by: INTERNAL MEDICINE

## 2023-01-24 PROCEDURE — 97530 THERAPEUTIC ACTIVITIES: CPT

## 2023-01-24 PROCEDURE — 97535 SELF CARE MNGMENT TRAINING: CPT

## 2023-01-24 PROCEDURE — 83735 ASSAY OF MAGNESIUM: CPT | Performed by: NURSE PRACTITIONER

## 2023-01-24 PROCEDURE — 25010000002 ENOXAPARIN PER 10 MG: Performed by: INTERNAL MEDICINE

## 2023-01-24 PROCEDURE — 85007 BL SMEAR W/DIFF WBC COUNT: CPT | Performed by: NURSE PRACTITIONER

## 2023-01-24 PROCEDURE — 93010 ELECTROCARDIOGRAM REPORT: CPT | Performed by: INTERNAL MEDICINE

## 2023-01-24 PROCEDURE — 80053 COMPREHEN METABOLIC PANEL: CPT | Performed by: NURSE PRACTITIONER

## 2023-01-24 RX ORDER — NIFEDIPINE 10 MG/1
10 CAPSULE ORAL EVERY 8 HOURS SCHEDULED
Status: DISCONTINUED | OUTPATIENT
Start: 2023-01-24 | End: 2023-01-26 | Stop reason: HOSPADM

## 2023-01-24 RX ADMIN — MEMANTINE 10 MG: 10 TABLET ORAL at 21:29

## 2023-01-24 RX ADMIN — TRAZODONE HYDROCHLORIDE 50 MG: 50 TABLET ORAL at 21:29

## 2023-01-24 RX ADMIN — MEMANTINE 10 MG: 10 TABLET ORAL at 11:21

## 2023-01-24 RX ADMIN — LEVOTHYROXINE SODIUM 50 MCG: 50 TABLET ORAL at 05:42

## 2023-01-24 RX ADMIN — SENNOSIDES AND DOCUSATE SODIUM 1 TABLET: 50; 8.6 TABLET ORAL at 21:29

## 2023-01-24 RX ADMIN — DONEPEZIL HYDROCHLORIDE 10 MG: 10 TABLET ORAL at 21:29

## 2023-01-24 RX ADMIN — NIFEDIPINE 10 MG: 10 CAPSULE ORAL at 21:29

## 2023-01-24 RX ADMIN — ENOXAPARIN SODIUM 40 MG: 40 INJECTION SUBCUTANEOUS at 11:21

## 2023-01-24 RX ADMIN — NYSTATIN: 100000 POWDER TOPICAL at 21:29

## 2023-01-24 RX ADMIN — ASPIRIN 81 MG CHEWABLE TABLET 81 MG: 81 TABLET CHEWABLE at 11:21

## 2023-01-24 RX ADMIN — NYSTATIN: 100000 POWDER TOPICAL at 11:21

## 2023-01-25 PROCEDURE — 99232 SBSQ HOSP IP/OBS MODERATE 35: CPT | Performed by: NURSE PRACTITIONER

## 2023-01-25 PROCEDURE — 97530 THERAPEUTIC ACTIVITIES: CPT

## 2023-01-25 PROCEDURE — 25010000002 ENOXAPARIN PER 10 MG: Performed by: INTERNAL MEDICINE

## 2023-01-25 RX ADMIN — MEMANTINE 10 MG: 10 TABLET ORAL at 10:16

## 2023-01-25 RX ADMIN — MEMANTINE 10 MG: 10 TABLET ORAL at 22:27

## 2023-01-25 RX ADMIN — NYSTATIN: 100000 POWDER TOPICAL at 22:53

## 2023-01-25 RX ADMIN — SENNOSIDES AND DOCUSATE SODIUM 1 TABLET: 50; 8.6 TABLET ORAL at 22:27

## 2023-01-25 RX ADMIN — LEVOTHYROXINE SODIUM 50 MCG: 50 TABLET ORAL at 05:03

## 2023-01-25 RX ADMIN — ENOXAPARIN SODIUM 40 MG: 40 INJECTION SUBCUTANEOUS at 10:16

## 2023-01-25 RX ADMIN — NIFEDIPINE 10 MG: 10 CAPSULE ORAL at 05:03

## 2023-01-25 RX ADMIN — DONEPEZIL HYDROCHLORIDE 10 MG: 10 TABLET ORAL at 22:27

## 2023-01-25 RX ADMIN — NIFEDIPINE 10 MG: 10 CAPSULE ORAL at 16:22

## 2023-01-25 RX ADMIN — NYSTATIN: 100000 POWDER TOPICAL at 10:15

## 2023-01-25 RX ADMIN — BISACODYL 5 MG: 5 TABLET, COATED ORAL at 10:16

## 2023-01-25 RX ADMIN — TRAZODONE HYDROCHLORIDE 50 MG: 50 TABLET ORAL at 22:27

## 2023-01-25 RX ADMIN — ACETAMINOPHEN 325MG 650 MG: 325 TABLET ORAL at 10:16

## 2023-01-25 RX ADMIN — NIFEDIPINE 10 MG: 10 CAPSULE ORAL at 22:53

## 2023-01-25 RX ADMIN — ASPIRIN 81 MG CHEWABLE TABLET 81 MG: 81 TABLET CHEWABLE at 10:16

## 2023-01-26 ENCOUNTER — HOME HEALTH ADMISSION (OUTPATIENT)
Dept: HOME HEALTH SERVICES | Facility: HOME HEALTHCARE | Age: 83
End: 2023-01-26
Payer: MEDICARE

## 2023-01-26 VITALS
DIASTOLIC BLOOD PRESSURE: 64 MMHG | TEMPERATURE: 96.8 F | OXYGEN SATURATION: 98 % | RESPIRATION RATE: 18 BRPM | HEIGHT: 64 IN | SYSTOLIC BLOOD PRESSURE: 121 MMHG | WEIGHT: 202 LBS | BODY MASS INDEX: 34.49 KG/M2 | HEART RATE: 66 BPM

## 2023-01-26 PROBLEM — D89.831 CYTOKINE RELEASE SYNDROME, GRADE 1: Status: ACTIVE | Noted: 2023-01-01

## 2023-01-26 PROCEDURE — 25010000002 ENOXAPARIN PER 10 MG: Performed by: INTERNAL MEDICINE

## 2023-01-26 PROCEDURE — 97535 SELF CARE MNGMENT TRAINING: CPT

## 2023-01-26 PROCEDURE — 99239 HOSP IP/OBS DSCHRG MGMT >30: CPT | Performed by: NURSE PRACTITIONER

## 2023-01-26 RX ORDER — AMOXICILLIN 250 MG
1 CAPSULE ORAL NIGHTLY
Start: 2023-01-26

## 2023-01-26 RX ORDER — NIFEDIPINE 10 MG/1
10 CAPSULE ORAL EVERY 8 HOURS SCHEDULED
Start: 2023-01-26

## 2023-01-26 RX ORDER — DONEPEZIL HYDROCHLORIDE 10 MG/1
10 TABLET, FILM COATED ORAL NIGHTLY
Start: 2023-01-26

## 2023-01-26 RX ADMIN — NYSTATIN: 100000 POWDER TOPICAL at 10:02

## 2023-01-26 RX ADMIN — MEMANTINE 10 MG: 10 TABLET ORAL at 10:02

## 2023-01-26 RX ADMIN — NIFEDIPINE 10 MG: 10 CAPSULE ORAL at 05:52

## 2023-01-26 RX ADMIN — LEVOTHYROXINE SODIUM 50 MCG: 50 TABLET ORAL at 05:52

## 2023-01-26 RX ADMIN — ASPIRIN 81 MG CHEWABLE TABLET 81 MG: 81 TABLET CHEWABLE at 10:02

## 2023-01-26 RX ADMIN — ENOXAPARIN SODIUM 40 MG: 40 INJECTION SUBCUTANEOUS at 10:02

## 2023-02-01 LAB
QT INTERVAL: 460 MS
QTC INTERVAL: 493 MS

## 2023-02-07 ENCOUNTER — TRANSCRIBE ORDERS (OUTPATIENT)
Dept: ADMINISTRATIVE | Facility: HOSPITAL | Age: 83
End: 2023-02-07
Payer: MEDICARE

## 2023-02-07 DIAGNOSIS — R13.12 DYSPHAGIA, OROPHARYNGEAL: Primary | ICD-10-CM

## 2023-02-19 ENCOUNTER — HOSPITAL ENCOUNTER (EMERGENCY)
Facility: HOSPITAL | Age: 83
Discharge: NURSING, REHAB OR LONG-TERM CARE | End: 2023-02-19
Attending: EMERGENCY MEDICINE | Admitting: EMERGENCY MEDICINE
Payer: MEDICARE

## 2023-02-19 ENCOUNTER — APPOINTMENT (OUTPATIENT)
Dept: CT IMAGING | Facility: HOSPITAL | Age: 83
End: 2023-02-19
Payer: MEDICARE

## 2023-02-19 VITALS
DIASTOLIC BLOOD PRESSURE: 99 MMHG | HEART RATE: 59 BPM | TEMPERATURE: 98.2 F | RESPIRATION RATE: 17 BRPM | SYSTOLIC BLOOD PRESSURE: 148 MMHG | WEIGHT: 198 LBS | BODY MASS INDEX: 33.8 KG/M2 | OXYGEN SATURATION: 100 % | HEIGHT: 64 IN

## 2023-02-19 DIAGNOSIS — Z13.9 ENCOUNTER FOR MEDICAL SCREENING EXAMINATION: Primary | ICD-10-CM

## 2023-02-19 DIAGNOSIS — Z71.89 HEAD INJURY CONSULTATION: ICD-10-CM

## 2023-02-19 PROCEDURE — 70450 CT HEAD/BRAIN W/O DYE: CPT

## 2023-02-19 PROCEDURE — 99284 EMERGENCY DEPT VISIT MOD MDM: CPT

## 2023-02-19 NOTE — ED PROVIDER NOTES
EMERGENCY DEPARTMENT ENCOUNTER    Pt Name: Shantelle Decker  MRN: [id number]  Pt :   1940  Room Number:    Date of encounter:  2023  PCP: Nancy Montiel APRN  ED Provider: Edmund Goldman MD    Historian: ems, patient      HPI:  Chief Complaint: fall        Context: Shantelle Decker is a 82 y.o. female who presents to the ED c/o 82-year-old female with a witnessed fall, concerns of altered mental status or baseline history of dementia, and head injury.  Ms. Decker is communicating, and alert to person and place, she says she has no pain, or recent illness.  When asked about the fall she says she was getting up and tripped.      PAST MEDICAL HISTORY  Past Medical History:   Diagnosis Date   • Breast cancer (HCC)    • Coronary artery disease    • Dementia (HCC)    • Dysrhythmia    • Hypertension          PAST SURGICAL HISTORY  Past Surgical History:   Procedure Laterality Date   • ADENOIDECTOMY     • BREAST SURGERY     • CARDIAC DEFIBRILLATOR PLACEMENT     • EXPLORATORY LAPAROTOMY N/A 2016    Procedure: open ventral hernia repair with mesh;  Surgeon: Bright Buckley MD;  Location: Replaced by [facility];  Service:    • MASTECTOMY Bilateral    • TONSILLECTOMY           FAMILY HISTORY  Family History   Problem Relation Age of Onset   • Heart disease Mother    • Heart disease Father    • Alcohol abuse Father          SOCIAL HISTORY  Social History     Socioeconomic History   • Marital status:    Tobacco Use   • Smoking status: Former     Types: Cigarettes, Pipe   • Smokeless tobacco: Never   • Tobacco comments:     quit over 20 years ago    Vaping Use   • Vaping Use: Never used   Substance and Sexual Activity   • Alcohol use: Yes     Comment: glass of wine occasionally with dinner    • Drug use: Not Currently     Types: Marijuana     Comment: quit three years ago          ALLERGIES  Demerol [meperidine]        REVIEW OF SYSTEMS  Review of Systems       All systems reviewed and negative except for  those discussed in HPI.       PHYSICAL EXAM    I have reviewed the triage vital signs and nursing notes.    ED Triage Vitals   Temp Heart Rate Resp BP SpO2   -- 02/19/23 0235 02/19/23 0231 02/19/23 0235 02/19/23 0235    66 17 148/99 98 %      Temp src Heart Rate Source Patient Position BP Location FiO2 (%)   -- 02/19/23 0235 02/19/23 0235 02/19/23 0235 --    Monitor Sitting Right arm        Physical Exam  GENERAL:   Appears in no acute distress.   HENT: Nares patent.  EYES: No scleral icterus.  CV: Regular rhythm, regular rate.  Grade 1 or 2 murmur.  No edema.  Good perfusion.  RESPIRATORY: Normal effort.  No audible wheezes, rales or rhonchi.  ABDOMEN: Soft, nontender  MUSCULOSKELETAL: No deformities.   NEURO: Alert, moves all extremities, follows commands.  SKIN: Warm, dry, no rash visualized.              RADIOLOGY  CT Head Without Contrast    Result Date: 2/19/2023  Examination: CT HEAD WO CONTRAST Date and Time: 2/19/2023 3:18 AM Clinical Information:82 yearsFemale [id number] injury Comparison: Comparison is made with prior CT scan performed on January 15, 2023. Technical factors: Contiguous axial images were obtained from the skull base through the vertex. Coronal and sagittal reformations were performed. All CT exams at this location are performed using dose optimization techniques as appropriate to a performed exam including the following: *  Automated exposure control *  Adjustment of the mA and/or kV according to patient size. This includes techniques or standardized protocols for targeted exams where dose is matched to indication (for example, extremities or head) *  Use of iterative reconstructive technique. Findings: Evaluation is limited on the basis of motion artifact. Despite multiple imaging attempts motion artifact persists. Brain parenchyma: There is cerebral atrophy which is unchanged. There is no mass, mass effect or midline shift. Ventricles:The ventricles are normal in size and  configuration. There is no hydrocephalus. No intraventricular mass or hemorrhage is demonstrated. Sulcal prominence: Normally maintained Gray/white matter interface: The gray/white matter interface at the bilateral basal ganglia, insular cortices, internal capsules, caudate Thalami, frontal opercula, anterior and posterior temporal lobes are normally maintained. Symmetry: No asymmetric hypodensity is demonstrated at the bilateral cerebral or cerebellar hemispheres. The basilar cisterns: The basilar cisterns and suprasellar cistern appear normal. The unenhanced intracranial vascular structures: No asymmetric hyperdensity is demonstrated at the bilateral middle cerebral arteries, basilar artery or the bilateral sylvian fissures. Hemorrhage: There is no intraparenchymal, intraventricular, subarachnoid, subdural or epidural hematoma. Posterior fossa:  The cerebellar hemispheres and cerebellopontine angles appear normal. Orbits: The intraorbital soft tissues appear normal. The optic nerves, extraocular muscles and globes are symmetric in appearance. Paranasal sinuses: There is significant mucoperiosteal thickening at the nasal sinuses with partial opacification of the ethmoid air cells and frontal sinuses. There is a small left-sided pleural effusion. There is moderate calcific plaque of the cavernous carotid arteries. Calvarium/facial structures: The calvarium, facial structures, skull base, bilateral petrous apices, petrous ridges and internal auditory canals appear normal. Soft tissues: The soft tissues appear normal.     1.  Limited evaluation on the basis of motion artifact. 2.  No traumatic injury is demonstrated. 3.  Paranasal sinusitis Thank you for the referral of this patient. This exam was interpreted by an American Board of Radiology certified radiologist with subspecialty fellowship training. If there are any questions regarding this exam please feel free to contact a radiologist directly at [phone].  Slot 70 Electronically signed by:  Livan Poole M.D.  2/19/2023 2:06 AM Mountain Time      I ordered and independently reviewed the above noted radiographic studies.      I viewed images of CT head which showed no intracranial hemorrhage, report reviewed per my independent interpretation.    See radiologist's dictation for official interpretation.        PROCEDURES    Procedures    No orders to display       MEDICATIONS GIVEN IN ER    Medications - No data to display      MEDICAL DECISION MAKING, PROGRESS, and CONSULTS    82-year-old female, with history of dementia, on aspirin, with fall and concerns of head injury.  She does appear to be at her baseline from my review of prior medical records.    Concerns of syncope, cardiac event, illness, head injury including fracture or intracranial bleed      Findings of sinus opacification on CT scan, on examining the patient I do not see any purulent discharge, fever, she says she has no headache.    Discussion below represents my analysis of pertinent findings related to patient's condition, differential diagnosis, treatment plan and final disposition.      Differential diagnosis:    Cranial hemorrhage, fall, fracture.        -   Orders placed during this visit:  Orders Placed This Encounter   Procedures   • CT Head Without Contrast   • Check temperature         ED Course:  CT scan without fracture, images reviewed.    No clinical correlation of acute infectious, neurologic, or pulmonary unstable condition at this time.    Patient is interactive, says she feels well, and medical screening exam shows no acute findings to indicate unstable cardiac cause for fall, or intracranial head injury.             AS OF 04:12 EST VITALS:    BP - 148/99  HR - 59  TEMP - 98.2 °F (36.8 °C) (Oral)  O2 SATS - 100%                  DIAGNOSIS  Final diagnoses:   Encounter for medical screening examination   Head injury consultation         DISPOSITION  DISCHARGE    Patient discharged in  stable condition.    Reviewed implications of results, diagnosis, meds, responsibility to follow up, warning signs and symptoms of possible worsening, potential complications and reasons to return to ER.    Patient/Family voiced understanding of above instructions.    Discussed plan for discharge, as there is no emergent indication for admission.  Pt/family is agreeable and understands need for follow up and possible repeat testing.  Pt/family is aware that discharge does not mean that nothing is wrong but that it indicates no emergency is currently present that requires admission and they must continue care with follow-up as given below or with a physician of their choice.     FOLLOW-UP  Nancy Montiel, APRN  [address] [facility] [id number] [phone]               Medication List      No changes were made to your prescriptions during this visit.             Please note that portions of this document were completed with voice recognition software.      Edmund Goldman MD  02/19/23 0419

## 2023-03-04 ENCOUNTER — HOSPITAL ENCOUNTER (EMERGENCY)
Facility: HOSPITAL | Age: 83
Discharge: NURSING, REHAB OR LONG-TERM CARE | End: 2023-03-04
Attending: EMERGENCY MEDICINE | Admitting: EMERGENCY MEDICINE
Payer: MEDICARE

## 2023-03-04 ENCOUNTER — APPOINTMENT (OUTPATIENT)
Dept: GENERAL RADIOLOGY | Facility: HOSPITAL | Age: 83
End: 2023-03-04
Payer: MEDICARE

## 2023-03-04 VITALS
TEMPERATURE: 98.8 F | HEART RATE: 88 BPM | DIASTOLIC BLOOD PRESSURE: 73 MMHG | RESPIRATION RATE: 18 BRPM | BODY MASS INDEX: 34.15 KG/M2 | OXYGEN SATURATION: 96 % | SYSTOLIC BLOOD PRESSURE: 149 MMHG | HEIGHT: 64 IN | WEIGHT: 200 LBS

## 2023-03-04 DIAGNOSIS — R07.9 CHEST PAIN, UNSPECIFIED TYPE: Primary | ICD-10-CM

## 2023-03-04 DIAGNOSIS — D64.9 ANEMIA, UNSPECIFIED TYPE: ICD-10-CM

## 2023-03-04 DIAGNOSIS — N18.9 CHRONIC KIDNEY DISEASE, UNSPECIFIED CKD STAGE: ICD-10-CM

## 2023-03-04 DIAGNOSIS — Z86.59 HISTORY OF DEMENTIA: ICD-10-CM

## 2023-03-04 DIAGNOSIS — E87.5 HYPERKALEMIA: ICD-10-CM

## 2023-03-04 LAB
ALBUMIN SERPL-MCNC: 3.3 G/DL (ref 3.5–5.2)
ALBUMIN/GLOB SERPL: 1.2 G/DL
ALP SERPL-CCNC: 63 U/L (ref 39–117)
ALT SERPL W P-5'-P-CCNC: 7 U/L (ref 1–33)
ANION GAP SERPL CALCULATED.3IONS-SCNC: 8 MMOL/L (ref 5–15)
AST SERPL-CCNC: 16 U/L (ref 1–32)
BASOPHILS # BLD AUTO: 0.04 10*3/MM3 (ref 0–0.2)
BASOPHILS NFR BLD AUTO: 0.7 % (ref 0–1.5)
BILIRUB SERPL-MCNC: 0.6 MG/DL (ref 0–1.2)
BILIRUB UR QL STRIP: NEGATIVE
BUN BLDA-MCNC: 30 MG/DL (ref 8–26)
BUN SERPL-MCNC: 29 MG/DL (ref 8–23)
BUN/CREAT SERPL: 23.2 (ref 7–25)
CA-I BLDA-SCNC: 1.2 MMOL/L (ref 1.2–1.32)
CALCIUM SPEC-SCNC: 8.8 MG/DL (ref 8.6–10.5)
CHLORIDE BLDA-SCNC: 105 MMOL/L (ref 98–109)
CHLORIDE SERPL-SCNC: 109 MMOL/L (ref 98–107)
CLARITY UR: CLEAR
CO2 BLDA-SCNC: 24 MMOL/L (ref 24–29)
CO2 SERPL-SCNC: 25 MMOL/L (ref 22–29)
COLOR UR: YELLOW
CREAT BLDA-MCNC: 1.4 MG/DL (ref 0.6–1.3)
CREAT SERPL-MCNC: 1.25 MG/DL (ref 0.57–1)
DEPRECATED RDW RBC AUTO: 50.3 FL (ref 37–54)
EGFRCR SERPLBLD CKD-EPI 2021: 36.9 ML/MIN/1.73
EGFRCR SERPLBLD CKD-EPI 2021: 42.3 ML/MIN/1.73
EOSINOPHIL # BLD AUTO: 0.16 10*3/MM3 (ref 0–0.4)
EOSINOPHIL NFR BLD AUTO: 2.6 % (ref 0.3–6.2)
ERYTHROCYTE [DISTWIDTH] IN BLOOD BY AUTOMATED COUNT: 14.8 % (ref 12.3–15.4)
GEN 5 2HR TROPONIN T REFLEX: 30 NG/L
GLOBULIN UR ELPH-MCNC: 2.7 GM/DL
GLUCOSE BLDC GLUCOMTR-MCNC: 121 MG/DL (ref 70–130)
GLUCOSE BLDC GLUCOMTR-MCNC: 67 MG/DL (ref 70–130)
GLUCOSE SERPL-MCNC: 94 MG/DL (ref 65–99)
GLUCOSE UR STRIP-MCNC: NEGATIVE MG/DL
HCT VFR BLD AUTO: 33.7 % (ref 34–46.6)
HCT VFR BLDA CALC: 32 % (ref 38–51)
HGB BLD-MCNC: 10.8 G/DL (ref 12–15.9)
HGB BLDA-MCNC: 10.9 G/DL (ref 12–17)
HGB UR QL STRIP.AUTO: NEGATIVE
HOLD SPECIMEN: NORMAL
IMM GRANULOCYTES # BLD AUTO: 0.03 10*3/MM3 (ref 0–0.05)
IMM GRANULOCYTES NFR BLD AUTO: 0.5 % (ref 0–0.5)
KETONES UR QL STRIP: NEGATIVE
LEUKOCYTE ESTERASE UR QL STRIP.AUTO: NEGATIVE
LIPASE SERPL-CCNC: 37 U/L (ref 13–60)
LYMPHOCYTES # BLD AUTO: 1.87 10*3/MM3 (ref 0.7–3.1)
LYMPHOCYTES NFR BLD AUTO: 30.9 % (ref 19.6–45.3)
MAGNESIUM SERPL-MCNC: 2.1 MG/DL (ref 1.6–2.4)
MCH RBC QN AUTO: 29.9 PG (ref 26.6–33)
MCHC RBC AUTO-ENTMCNC: 32 G/DL (ref 31.5–35.7)
MCV RBC AUTO: 93.4 FL (ref 79–97)
MONOCYTES # BLD AUTO: 0.47 10*3/MM3 (ref 0.1–0.9)
MONOCYTES NFR BLD AUTO: 7.8 % (ref 5–12)
NEUTROPHILS NFR BLD AUTO: 3.49 10*3/MM3 (ref 1.7–7)
NEUTROPHILS NFR BLD AUTO: 57.5 % (ref 42.7–76)
NITRITE UR QL STRIP: NEGATIVE
NRBC BLD AUTO-RTO: 0 /100 WBC (ref 0–0.2)
NT-PROBNP SERPL-MCNC: 605.5 PG/ML (ref 0–1800)
PH UR STRIP.AUTO: 8 [PH] (ref 5–8)
PLATELET # BLD AUTO: 232 10*3/MM3 (ref 140–450)
PMV BLD AUTO: 10.9 FL (ref 6–12)
POTASSIUM BLDA-SCNC: 4.4 MMOL/L (ref 3.5–4.9)
POTASSIUM SERPL-SCNC: 5.4 MMOL/L (ref 3.5–5.2)
PROT SERPL-MCNC: 6 G/DL (ref 6–8.5)
PROT UR QL STRIP: NEGATIVE
RBC # BLD AUTO: 3.61 10*6/MM3 (ref 3.77–5.28)
SODIUM BLD-SCNC: 141 MMOL/L (ref 138–146)
SODIUM SERPL-SCNC: 142 MMOL/L (ref 136–145)
SP GR UR STRIP: 1.01 (ref 1–1.03)
TROPONIN T DELTA: -4 NG/L
TROPONIN T SERPL HS-MCNC: 34 NG/L
UROBILINOGEN UR QL STRIP: NORMAL
WBC NRBC COR # BLD: 6.06 10*3/MM3 (ref 3.4–10.8)
WHOLE BLOOD HOLD COAG: NORMAL
WHOLE BLOOD HOLD SPECIMEN: NORMAL

## 2023-03-04 PROCEDURE — 25010000002 DIPHENHYDRAMINE PER 50 MG: Performed by: EMERGENCY MEDICINE

## 2023-03-04 PROCEDURE — 25010000002 LORAZEPAM PER 2 MG: Performed by: EMERGENCY MEDICINE

## 2023-03-04 PROCEDURE — 63710000001 INSULIN REGULAR HUMAN PER 5 UNITS: Performed by: EMERGENCY MEDICINE

## 2023-03-04 PROCEDURE — 84484 ASSAY OF TROPONIN QUANT: CPT | Performed by: EMERGENCY MEDICINE

## 2023-03-04 PROCEDURE — 94640 AIRWAY INHALATION TREATMENT: CPT

## 2023-03-04 PROCEDURE — 83880 ASSAY OF NATRIURETIC PEPTIDE: CPT | Performed by: EMERGENCY MEDICINE

## 2023-03-04 PROCEDURE — 96374 THER/PROPH/DIAG INJ IV PUSH: CPT

## 2023-03-04 PROCEDURE — 80047 BASIC METABLC PNL IONIZED CA: CPT

## 2023-03-04 PROCEDURE — 93005 ELECTROCARDIOGRAM TRACING: CPT | Performed by: EMERGENCY MEDICINE

## 2023-03-04 PROCEDURE — 25010000002 HALOPERIDOL LACTATE PER 5 MG: Performed by: EMERGENCY MEDICINE

## 2023-03-04 PROCEDURE — 71045 X-RAY EXAM CHEST 1 VIEW: CPT

## 2023-03-04 PROCEDURE — 80053 COMPREHEN METABOLIC PANEL: CPT | Performed by: EMERGENCY MEDICINE

## 2023-03-04 PROCEDURE — 83690 ASSAY OF LIPASE: CPT | Performed by: EMERGENCY MEDICINE

## 2023-03-04 PROCEDURE — 96375 TX/PRO/DX INJ NEW DRUG ADDON: CPT

## 2023-03-04 PROCEDURE — P9612 CATHETERIZE FOR URINE SPEC: HCPCS

## 2023-03-04 PROCEDURE — 81003 URINALYSIS AUTO W/O SCOPE: CPT | Performed by: EMERGENCY MEDICINE

## 2023-03-04 PROCEDURE — 82962 GLUCOSE BLOOD TEST: CPT

## 2023-03-04 PROCEDURE — 85025 COMPLETE CBC W/AUTO DIFF WBC: CPT | Performed by: EMERGENCY MEDICINE

## 2023-03-04 PROCEDURE — 83735 ASSAY OF MAGNESIUM: CPT | Performed by: EMERGENCY MEDICINE

## 2023-03-04 PROCEDURE — 85014 HEMATOCRIT: CPT

## 2023-03-04 PROCEDURE — 99284 EMERGENCY DEPT VISIT MOD MDM: CPT

## 2023-03-04 RX ORDER — SODIUM CHLORIDE 0.9 % (FLUSH) 0.9 %
10 SYRINGE (ML) INJECTION AS NEEDED
Status: DISCONTINUED | OUTPATIENT
Start: 2023-03-04 | End: 2023-03-04 | Stop reason: HOSPADM

## 2023-03-04 RX ORDER — LORAZEPAM 2 MG/ML
1 INJECTION INTRAMUSCULAR ONCE
Status: COMPLETED | OUTPATIENT
Start: 2023-03-04 | End: 2023-03-04

## 2023-03-04 RX ORDER — ALBUTEROL SULFATE 2.5 MG/3ML
10 SOLUTION RESPIRATORY (INHALATION) ONCE
Status: COMPLETED | OUTPATIENT
Start: 2023-03-04 | End: 2023-03-04

## 2023-03-04 RX ORDER — HALOPERIDOL 5 MG/ML
5 INJECTION INTRAMUSCULAR ONCE
Status: COMPLETED | OUTPATIENT
Start: 2023-03-04 | End: 2023-03-04

## 2023-03-04 RX ORDER — DIPHENHYDRAMINE HYDROCHLORIDE 50 MG/ML
25 INJECTION INTRAMUSCULAR; INTRAVENOUS ONCE
Status: COMPLETED | OUTPATIENT
Start: 2023-03-04 | End: 2023-03-04

## 2023-03-04 RX ORDER — ASPIRIN 81 MG/1
324 TABLET, CHEWABLE ORAL ONCE
Status: COMPLETED | OUTPATIENT
Start: 2023-03-04 | End: 2023-03-04

## 2023-03-04 RX ORDER — DEXTROSE MONOHYDRATE 25 G/50ML
50 INJECTION, SOLUTION INTRAVENOUS ONCE
Status: COMPLETED | OUTPATIENT
Start: 2023-03-04 | End: 2023-03-04

## 2023-03-04 RX ADMIN — ALBUTEROL SULFATE 10 MG: 2.5 SOLUTION RESPIRATORY (INHALATION) at 11:45

## 2023-03-04 RX ADMIN — INSULIN HUMAN 6 UNITS: 100 INJECTION, SOLUTION PARENTERAL at 12:44

## 2023-03-04 RX ADMIN — HALOPERIDOL LACTATE 5 MG: 5 INJECTION, SOLUTION INTRAMUSCULAR at 12:32

## 2023-03-04 RX ADMIN — LORAZEPAM 1 MG: 2 INJECTION INTRAMUSCULAR; INTRAVENOUS at 10:53

## 2023-03-04 RX ADMIN — ASPIRIN 81 MG 324 MG: 81 TABLET ORAL at 12:29

## 2023-03-04 RX ADMIN — DIPHENHYDRAMINE HYDROCHLORIDE 25 MG: 50 INJECTION INTRAMUSCULAR; INTRAVENOUS at 10:49

## 2023-03-04 RX ADMIN — DEXTROSE MONOHYDRATE 50 ML: 25 INJECTION, SOLUTION INTRAVENOUS at 12:32

## 2023-03-04 RX ADMIN — SODIUM ZIRCONIUM CYCLOSILICATE 10 G: 10 POWDER, FOR SUSPENSION ORAL at 12:32

## 2023-03-04 NOTE — ED PROVIDER NOTES
"Subjective   History of Present Illness  83-year-old female presents for evaluation of \"possible chest pain.\"  Of note, the patient has a history of dementia and is a very poor historian.  She was at her nursing home this morning when she reportedly called out to nursing home staff complaining of chest pain approximately 4 hours before coming to the ED.  Her symptoms were reportedly short-lived and resolve spontaneously.  However, staff was concerned and called EMS later in the morning.  On EMS arrival, the patient had no complaints.  Currently, the patient denies any pain.  She states that she \"wants to go home.\"  EMS reports that she had an episode of vomiting earlier this morning as well.  The patient's only complaint at this time is that she \"feels cold.\"        Review of Systems   Unable to perform ROS: Dementia   Cardiovascular: Positive for chest pain.   Gastrointestinal: Positive for vomiting.       Past Medical History:   Diagnosis Date   • Breast cancer (HCC)    • Coronary artery disease    • Dementia (HCC)    • Dysrhythmia    • Hypertension        Allergies   Allergen Reactions   • Demerol [Meperidine] Anaphylaxis     And itching       Past Surgical History:   Procedure Laterality Date   • ADENOIDECTOMY     • BREAST SURGERY     • CARDIAC DEFIBRILLATOR PLACEMENT     • EXPLORATORY LAPAROTOMY N/A 7/16/2016    Procedure: open ventral hernia repair with mesh;  Surgeon: Bright Buckley MD;  Location: [facility];  Service:    • MASTECTOMY Bilateral    • TONSILLECTOMY         Family History   Problem Relation Age of Onset   • Heart disease Mother    • Heart disease Father    • Alcohol abuse Father        Social History     Socioeconomic History   • Marital status:    Tobacco Use   • Smoking status: Former     Types: Cigarettes, Pipe   • Smokeless tobacco: Never   • Tobacco comments:     quit over 20 years ago    Vaping Use   • Vaping Use: Never used   Substance and Sexual Activity   • Alcohol use: Yes     " "Comment: glass of wine occasionally with dinner    • Drug use: Not Currently     Types: Marijuana     Comment: quit three years ago            Objective   Physical Exam  Vitals and nursing note reviewed.   Constitutional:       Appearance: She is well-developed. She is not diaphoretic.      Comments: Nontoxic-appearing female, combative and demented   HENT:      Head: Normocephalic and atraumatic.   Eyes:      Pupils: Pupils are equal, round, and reactive to light.   Cardiovascular:      Rate and Rhythm: Normal rate and regular rhythm.      Heart sounds: Normal heart sounds. No murmur heard.    No friction rub. No gallop.   Pulmonary:      Effort: Pulmonary effort is normal. No respiratory distress.      Breath sounds: Normal breath sounds. No wheezing or rales.   Abdominal:      General: Bowel sounds are normal. There is no distension.      Palpations: Abdomen is soft. There is no mass.      Tenderness: There is no abdominal tenderness. There is no guarding or rebound.      Comments: No focal abdominal tenderness, no peritoneal signs, no pain out of proportion to exam   Musculoskeletal:         General: Normal range of motion.      Cervical back: Neck supple.   Skin:     General: Skin is warm and dry.      Findings: No erythema or rash.   Neurological:      Mental Status: She is alert.      Comments: Mental status at baseline per EMS personnel   Psychiatric:      Comments: Unable to adequately evaluate         Procedures           ED Course  ED Course as of 03/04/23 1442   Sat Mar 04, 2023   1041 83-year-old female presents for evaluation of \"possible chest pain.\"  Of note, the patient has a history of dementia and is a very poor historian.  According to EMS personnel, the patient reportedly called out to nursing home staff early this morning, approximately 4 hours ago, complaining of chest pain.  As a result, EMS was called and she was brought to our facility for evaluation.  Currently, the patient has no " "complaints other than \"feeling cold.\"  She is a very limited historian.  She denies any pain.  Differential diagnosis is quite broad.  We will obtain labs and imaging, and we will reassess following initial interventions.    Unfortunately, the patient is currently quite combative and we will have to chemically sedate her in order to obtain work-up. [DD]   1042 Nonsurgical abdomen. [DD]   1042 EKG was unrevealing. [DD]   1135 Labs remarkable for mildly elevated high-sensitivity troponin.  Additionally, the patient has mild hyperkalemia.  Potassium treated medically.  Labs are otherwise bland. [DD]   1136 I personally and independently viewed the patient's x-ray images myself, and I am in agreement with the radiologist's reading for final interpretation.   [DD]   1341 Following medications, the patient's repeat potassium is normal. [DD]   1430 Upon reevaluation, the patient is asymptomatic.  Vital signs are reassuring.  No chest pain.   [DD]   1431 Repeat troponin/EKG negative with downtrending delta/unchanged.  Doubt ACS, PE, dissection, or emergent cardiothoracic process at this time based on exam, history, clinical presentation, gestalt, and objective findings in the ED.  The patient will follow up with the chest pain clinic within the next 72 hours for further outpatient work-up and evaluation.  Agreeable with plan and given appropriate strict return precautions.     [DD]      ED Course User Index  [DD] Roscoe Noel MD                                       Recent Results (from the past 24 hour(s))   ECG 12 Lead Chest Pain    Collection Time: 03/04/23 10:33 AM   Result Value Ref Range    QT Interval 462 ms    QTC Interval 484 ms   High Sensitivity Troponin T    Collection Time: 03/04/23 10:45 AM    Specimen: Blood   Result Value Ref Range    HS Troponin T 34 (H) <10 ng/L   Comprehensive Metabolic Panel    Collection Time: 03/04/23 10:45 AM    Specimen: Blood   Result Value Ref Range    Glucose 94 65 - 99 " mg/dL    BUN 29 (H) 8 - 23 mg/dL    Creatinine 1.25 (H) 0.57 - 1.00 mg/dL    Sodium 142 136 - 145 mmol/L    Potassium 5.4 (H) 3.5 - 5.2 mmol/L    Chloride 109 (H) 98 - 107 mmol/L    CO2 25.0 22.0 - 29.0 mmol/L    Calcium 8.8 8.6 - 10.5 mg/dL    Total Protein 6.0 6.0 - 8.5 g/dL    Albumin 3.3 (L) 3.5 - 5.2 g/dL    ALT (SGPT) 7 1 - 33 U/L    AST (SGOT) 16 1 - 32 U/L    Alkaline Phosphatase 63 39 - 117 U/L    Total Bilirubin 0.6 0.0 - 1.2 mg/dL    Globulin 2.7 gm/dL    A/G Ratio 1.2 g/dL    BUN/Creatinine Ratio 23.2 7.0 - 25.0    Anion Gap 8.0 5.0 - 15.0 mmol/L    eGFR 42.3 (L) >60.0 mL/min/1.73   Lipase    Collection Time: 03/04/23 10:45 AM    Specimen: Blood   Result Value Ref Range    Lipase 37 13 - 60 U/L   BNP    Collection Time: 03/04/23 10:45 AM    Specimen: Blood   Result Value Ref Range    proBNP 605.5 0.0 - 1,800.0 pg/mL   Green Top (Gel)    Collection Time: 03/04/23 10:45 AM   Result Value Ref Range    Extra Tube Hold for add-ons.    Lavender Top    Collection Time: 03/04/23 10:45 AM   Result Value Ref Range    Extra Tube hold for add-on    Gold Top - SST    Collection Time: 03/04/23 10:45 AM   Result Value Ref Range    Extra Tube Hold for add-ons.    Light Blue Top    Collection Time: 03/04/23 10:45 AM   Result Value Ref Range    Extra Tube Hold for add-ons.    CBC Auto Differential    Collection Time: 03/04/23 10:45 AM    Specimen: Blood   Result Value Ref Range    WBC 6.06 3.40 - 10.80 10*3/mm3    RBC 3.61 (L) 3.77 - 5.28 10*6/mm3    Hemoglobin 10.8 (L) 12.0 - 15.9 g/dL    Hematocrit 33.7 (L) 34.0 - 46.6 %    MCV 93.4 79.0 - 97.0 fL    MCH 29.9 26.6 - 33.0 pg    MCHC 32.0 31.5 - 35.7 g/dL    RDW 14.8 12.3 - 15.4 %    RDW-SD 50.3 37.0 - 54.0 fl    MPV 10.9 6.0 - 12.0 fL    Platelets 232 140 - 450 10*3/mm3    Neutrophil % 57.5 42.7 - 76.0 %    Lymphocyte % 30.9 19.6 - 45.3 %    Monocyte % 7.8 5.0 - 12.0 %    Eosinophil % 2.6 0.3 - 6.2 %    Basophil % 0.7 0.0 - 1.5 %    Immature Grans % 0.5 0.0 - 0.5 %     Neutrophils, Absolute 3.49 1.70 - 7.00 10*3/mm3    Lymphocytes, Absolute 1.87 0.70 - 3.10 10*3/mm3    Monocytes, Absolute 0.47 0.10 - 0.90 10*3/mm3    Eosinophils, Absolute 0.16 0.00 - 0.40 10*3/mm3    Basophils, Absolute 0.04 0.00 - 0.20 10*3/mm3    Immature Grans, Absolute 0.03 0.00 - 0.05 10*3/mm3    nRBC 0.0 0.0 - 0.2 /100 WBC   Magnesium    Collection Time: 03/04/23 10:45 AM    Specimen: Blood   Result Value Ref Range    Magnesium 2.1 1.6 - 2.4 mg/dL   Urinalysis With Culture If Indicated - Urine, Catheter    Collection Time: 03/04/23 10:46 AM    Specimen: Urine, Catheter   Result Value Ref Range    Color, UA Yellow Yellow, Straw    Appearance, UA Clear Clear    pH, UA 8.0 5.0 - 8.0    Specific Gravity, UA 1.013 1.001 - 1.030    Glucose, UA Negative Negative    Ketones, UA Negative Negative    Bilirubin, UA Negative Negative    Blood, UA Negative Negative    Protein, UA Negative Negative    Leuk Esterase, UA Negative Negative    Nitrite, UA Negative Negative    Urobilinogen, UA 0.2 E.U./dL 0.2 - 1.0 E.U./dL   POC Glucose Once    Collection Time: 03/04/23 12:27 PM    Specimen: Blood   Result Value Ref Range    Glucose 67 (L) 70 - 130 mg/dL   High Sensitivity Troponin T 2Hr    Collection Time: 03/04/23  1:31 PM    Specimen: Blood   Result Value Ref Range    HS Troponin T 30 (H) <10 ng/L    Troponin T Delta -4 (L) >=-4 - <+4 ng/L   POC CHEM 8    Collection Time: 03/04/23  1:32 PM    Specimen: Blood   Result Value Ref Range    Glucose 121 70 - 130 mg/dL    BUN 30 (H) 8 - 26 mg/dL    Creatinine 1.40 (H) 0.60 - 1.30 mg/dL    Sodium 141 138 - 146 mmol/L    POC Potassium 4.4 3.5 - 4.9 mmol/L    Chloride 105 98 - 109 mmol/L    Total CO2 24 24 - 29 mmol/L    Hemoglobin 10.9 (L) 12.0 - 17.0 g/dL    Hematocrit 32 (L) 38 - 51 %    Ionized Calcium 1.20 1.20 - 1.32 mmol/L    eGFR 36.9 (L) >60.0 mL/min/1.73   ECG 12 Lead ED Triage Standing Order; Chest Pain    Collection Time: 03/04/23  1:32 PM   Result Value Ref Range    QT  Interval 462 ms    QTC Interval 472 ms     Note: In addition to lab results from this visit, the labs listed above may include labs taken at another facility or during a different encounter within the last 24 hours. Please correlate lab times with ED admission and discharge times for further clarification of the services performed during this visit.    XR Chest 1 View   Final Result   Impression:   No acute cardiopulmonary disease      Electronically Signed: Chandler Horne     3/4/2023 11:39 AM EST     Workstation ID: [id number]        Vitals:    03/04/23 1119 03/04/23 1145 03/04/23 1238 03/04/23 1319   BP:    144/80   BP Location:    Left arm   Patient Position:    Lying   Pulse: 67 60 60 66   Resp:  20 16 20   Temp:       TempSrc:       SpO2: 97% 96% 95% 95%   Weight:       Height:         Medications   sodium chloride 0.9 % flush 10 mL (has no administration in time range)   aspirin chewable tablet 324 mg (324 mg Oral Given 3/4/23 1229)   haloperidol lactate (HALDOL) injection 5 mg (5 mg Intravenous Given 3/4/23 1232)   LORazepam (ATIVAN) injection 1 mg (1 mg Intravenous Given 3/4/23 1053)   diphenhydrAMINE (BENADRYL) injection 25 mg (25 mg Intravenous Given 3/4/23 1049)   dextrose (D50W) (25 g/50 mL) IV injection 50 mL (50 mL Intravenous Given 3/4/23 1232)   insulin regular (humuLIN R,novoLIN R) injection 6 Units (6 Units Intravenous Given 3/4/23 1244)   albuterol (PROVENTIL) nebulizer solution 0.083% 2.5 mg/3mL (10 mg Nebulization Given 3/4/23 1145)   sodium zirconium cyclosilicate (LOKELMA) pack 10 g (10 g Oral Given 3/4/23 1232)     ECG/EMG Results (last 24 hours)     Procedure Component Value Units Date/Time    ECG 12 Lead Chest Pain [[id number]] Collected: 03/04/23 1033     Updated: 03/04/23 1033     QT Interval 462 ms      QTC Interval 484 ms     Narrative:      Test Reason : Chest Pain  Blood Pressure :   */*   mmHG  Vent. Rate :  66 BPM     Atrial Rate :  65 BPM     P-R Int : 240 ms          QRS Dur :  82  ms      QT Int : 462 ms       P-R-T Axes :   *  48  31 degrees     QTc Int : 484 ms    Atrial-paced rhythm with prolonged AV conduction with premature supraventricular complexes and with occasional premature ventricular complexes  Abnormal ECG  When compared with ECG of 24-JAN-2023 07:13,  Electronic atrial pacemaker has replaced Atrial fibrillation    Referred By: ED MD           Confirmed By:     ECG 12 Lead ED Triage Standing Order; Chest Pain [[id number]] Collected: 03/04/23 1332     Updated: 03/04/23 1331     QT Interval 462 ms      QTC Interval 472 ms     Narrative:      Test Reason : ED Triage Standing Order~  Blood Pressure :   */*   mmHG  Vent. Rate :  63 BPM     Atrial Rate :  63 BPM     P-R Int : 222 ms          QRS Dur :  84 ms      QT Int : 462 ms       P-R-T Axes :  -3  24  11 degrees     QTc Int : 472 ms    Atrial-paced rhythm with prolonged AV conduction with occasional premature ventricular complexes  Abnormal ECG  When compared with ECG of 04-MAR-2023 10:33, (Unconfirmed)  premature supraventricular complexes are no longer present    Referred By: EDMD           Confirmed By:         ECG 12 Lead ED Triage Standing Order; Chest Pain   Preliminary Result   Test Reason : ED Triage Standing Order~   Blood Pressure :   */*   mmHG   Vent. Rate :  63 BPM     Atrial Rate :  63 BPM      P-R Int : 222 ms          QRS Dur :  84 ms       QT Int : 462 ms       P-R-T Axes :  -3  24  11 degrees      QTc Int : 472 ms      Atrial-paced rhythm with prolonged AV conduction with occasional premature    ventricular complexes   Abnormal ECG   When compared with ECG of 04-MAR-2023 10:33, (Unconfirmed)   premature supraventricular complexes are no longer present      Referred By: EDMD           Confirmed By:       ECG 12 Lead Chest Pain   Preliminary Result   Test Reason : Chest Pain   Blood Pressure :   */*   mmHG   Vent. Rate :  66 BPM     Atrial Rate :  65 BPM      P-R Int : 240 ms          QRS Dur :  82 ms       QT Int  : 462 ms       P-R-T Axes :   *  48  31 degrees      QTc Int : 484 ms      Atrial-paced rhythm with prolonged AV conduction with premature    supraventricular complexes and with occasional premature ventricular    complexes   Abnormal ECG   When compared with ECG of 24-JAN-2023 07:13,   Electronic atrial pacemaker has replaced Atrial fibrillation      Referred By: ED MD           Confirmed By:       ECG 12 Lead ED Triage Standing Order; Chest Pain    (Results Pending)       Recent Results (from the past 24 hour(s))   ECG 12 Lead Chest Pain    Collection Time: 03/04/23 10:33 AM   Result Value Ref Range    QT Interval 462 ms    QTC Interval 484 ms   High Sensitivity Troponin T    Collection Time: 03/04/23 10:45 AM    Specimen: Blood   Result Value Ref Range    HS Troponin T 34 (H) <10 ng/L   Comprehensive Metabolic Panel    Collection Time: 03/04/23 10:45 AM    Specimen: Blood   Result Value Ref Range    Glucose 94 65 - 99 mg/dL    BUN 29 (H) 8 - 23 mg/dL    Creatinine 1.25 (H) 0.57 - 1.00 mg/dL    Sodium 142 136 - 145 mmol/L    Potassium 5.4 (H) 3.5 - 5.2 mmol/L    Chloride 109 (H) 98 - 107 mmol/L    CO2 25.0 22.0 - 29.0 mmol/L    Calcium 8.8 8.6 - 10.5 mg/dL    Total Protein 6.0 6.0 - 8.5 g/dL    Albumin 3.3 (L) 3.5 - 5.2 g/dL    ALT (SGPT) 7 1 - 33 U/L    AST (SGOT) 16 1 - 32 U/L    Alkaline Phosphatase 63 39 - 117 U/L    Total Bilirubin 0.6 0.0 - 1.2 mg/dL    Globulin 2.7 gm/dL    A/G Ratio 1.2 g/dL    BUN/Creatinine Ratio 23.2 7.0 - 25.0    Anion Gap 8.0 5.0 - 15.0 mmol/L    eGFR 42.3 (L) >60.0 mL/min/1.73   Lipase    Collection Time: 03/04/23 10:45 AM    Specimen: Blood   Result Value Ref Range    Lipase 37 13 - 60 U/L   BNP    Collection Time: 03/04/23 10:45 AM    Specimen: Blood   Result Value Ref Range    proBNP 605.5 0.0 - 1,800.0 pg/mL   Green Top (Gel)    Collection Time: 03/04/23 10:45 AM   Result Value Ref Range    Extra Tube Hold for add-ons.    Lavender Top    Collection Time: 03/04/23 10:45 AM    Result Value Ref Range    Extra Tube hold for add-on    Gold Top - SST    Collection Time: 03/04/23 10:45 AM   Result Value Ref Range    Extra Tube Hold for add-ons.    Light Blue Top    Collection Time: 03/04/23 10:45 AM   Result Value Ref Range    Extra Tube Hold for add-ons.    CBC Auto Differential    Collection Time: 03/04/23 10:45 AM    Specimen: Blood   Result Value Ref Range    WBC 6.06 3.40 - 10.80 10*3/mm3    RBC 3.61 (L) 3.77 - 5.28 10*6/mm3    Hemoglobin 10.8 (L) 12.0 - 15.9 g/dL    Hematocrit 33.7 (L) 34.0 - 46.6 %    MCV 93.4 79.0 - 97.0 fL    MCH 29.9 26.6 - 33.0 pg    MCHC 32.0 31.5 - 35.7 g/dL    RDW 14.8 12.3 - 15.4 %    RDW-SD 50.3 37.0 - 54.0 fl    MPV 10.9 6.0 - 12.0 fL    Platelets 232 140 - 450 10*3/mm3    Neutrophil % 57.5 42.7 - 76.0 %    Lymphocyte % 30.9 19.6 - 45.3 %    Monocyte % 7.8 5.0 - 12.0 %    Eosinophil % 2.6 0.3 - 6.2 %    Basophil % 0.7 0.0 - 1.5 %    Immature Grans % 0.5 0.0 - 0.5 %    Neutrophils, Absolute 3.49 1.70 - 7.00 10*3/mm3    Lymphocytes, Absolute 1.87 0.70 - 3.10 10*3/mm3    Monocytes, Absolute 0.47 0.10 - 0.90 10*3/mm3    Eosinophils, Absolute 0.16 0.00 - 0.40 10*3/mm3    Basophils, Absolute 0.04 0.00 - 0.20 10*3/mm3    Immature Grans, Absolute 0.03 0.00 - 0.05 10*3/mm3    nRBC 0.0 0.0 - 0.2 /100 WBC   Magnesium    Collection Time: 03/04/23 10:45 AM    Specimen: Blood   Result Value Ref Range    Magnesium 2.1 1.6 - 2.4 mg/dL   Urinalysis With Culture If Indicated - Urine, Catheter    Collection Time: 03/04/23 10:46 AM    Specimen: Urine, Catheter   Result Value Ref Range    Color, UA Yellow Yellow, Straw    Appearance, UA Clear Clear    pH, UA 8.0 5.0 - 8.0    Specific Gravity, UA 1.013 1.001 - 1.030    Glucose, UA Negative Negative    Ketones, UA Negative Negative    Bilirubin, UA Negative Negative    Blood, UA Negative Negative    Protein, UA Negative Negative    Leuk Esterase, UA Negative Negative    Nitrite, UA Negative Negative    Urobilinogen, UA 0.2 E.U./dL  0.2 - 1.0 E.U./dL   POC Glucose Once    Collection Time: 03/04/23 12:27 PM    Specimen: Blood   Result Value Ref Range    Glucose 67 (L) 70 - 130 mg/dL   High Sensitivity Troponin T 2Hr    Collection Time: 03/04/23  1:31 PM    Specimen: Blood   Result Value Ref Range    HS Troponin T 30 (H) <10 ng/L    Troponin T Delta -4 (L) >=-4 - <+4 ng/L   POC CHEM 8    Collection Time: 03/04/23  1:32 PM    Specimen: Blood   Result Value Ref Range    Glucose 121 70 - 130 mg/dL    BUN 30 (H) 8 - 26 mg/dL    Creatinine 1.40 (H) 0.60 - 1.30 mg/dL    Sodium 141 138 - 146 mmol/L    POC Potassium 4.4 3.5 - 4.9 mmol/L    Chloride 105 98 - 109 mmol/L    Total CO2 24 24 - 29 mmol/L    Hemoglobin 10.9 (L) 12.0 - 17.0 g/dL    Hematocrit 32 (L) 38 - 51 %    Ionized Calcium 1.20 1.20 - 1.32 mmol/L    eGFR 36.9 (L) >60.0 mL/min/1.73   ECG 12 Lead ED Triage Standing Order; Chest Pain    Collection Time: 03/04/23  1:32 PM   Result Value Ref Range    QT Interval 462 ms    QTC Interval 472 ms     Note: In addition to lab results from this visit, the labs listed above may include labs taken at another facility or during a different encounter within the last 24 hours. Please correlate lab times with ED admission and discharge times for further clarification of the services performed during this visit.    XR Chest 1 View   Final Result   Impression:   No acute cardiopulmonary disease      Electronically Signed: Chandler Horne     3/4/2023 11:39 AM EST     Workstation ID: [id number]        Vitals:    03/04/23 1119 03/04/23 1145 03/04/23 1238 03/04/23 1319   BP:    144/80   BP Location:    Left arm   Patient Position:    Lying   Pulse: 67 60 60 66   Resp:  20 16 20   Temp:       TempSrc:       SpO2: 97% 96% 95% 95%   Weight:       Height:         Medications   sodium chloride 0.9 % flush 10 mL (has no administration in time range)   aspirin chewable tablet 324 mg (324 mg Oral Given 3/4/23 1229)   haloperidol lactate (HALDOL) injection 5 mg (5 mg  Intravenous Given 3/4/23 1232)   LORazepam (ATIVAN) injection 1 mg (1 mg Intravenous Given 3/4/23 1053)   diphenhydrAMINE (BENADRYL) injection 25 mg (25 mg Intravenous Given 3/4/23 1049)   dextrose (D50W) (25 g/50 mL) IV injection 50 mL (50 mL Intravenous Given 3/4/23 1232)   insulin regular (humuLIN R,novoLIN R) injection 6 Units (6 Units Intravenous Given 3/4/23 1244)   albuterol (PROVENTIL) nebulizer solution 0.083% 2.5 mg/3mL (10 mg Nebulization Given 3/4/23 1145)   sodium zirconium cyclosilicate (LOKELMA) pack 10 g (10 g Oral Given 3/4/23 1232)     ECG/EMG Results (last 24 hours)     Procedure Component Value Units Date/Time    ECG 12 Lead Chest Pain [[id number]] Collected: 03/04/23 1033     Updated: 03/04/23 1033     QT Interval 462 ms      QTC Interval 484 ms     Narrative:      Test Reason : Chest Pain  Blood Pressure :   */*   mmHG  Vent. Rate :  66 BPM     Atrial Rate :  65 BPM     P-R Int : 240 ms          QRS Dur :  82 ms      QT Int : 462 ms       P-R-T Axes :   *  48  31 degrees     QTc Int : 484 ms    Atrial-paced rhythm with prolonged AV conduction with premature supraventricular complexes and with occasional premature ventricular complexes  Abnormal ECG  When compared with ECG of 24-JAN-2023 07:13,  Electronic atrial pacemaker has replaced Atrial fibrillation    Referred By: ED MD           Confirmed By:     ECG 12 Lead ED Triage Standing Order; Chest Pain [[id number]] Collected: 03/04/23 1332     Updated: 03/04/23 1331     QT Interval 462 ms      QTC Interval 472 ms     Narrative:      Test Reason : ED Triage Standing Order~  Blood Pressure :   */*   mmHG  Vent. Rate :  63 BPM     Atrial Rate :  63 BPM     P-R Int : 222 ms          QRS Dur :  84 ms      QT Int : 462 ms       P-R-T Axes :  -3  24  11 degrees     QTc Int : 472 ms    Atrial-paced rhythm with prolonged AV conduction with occasional premature ventricular complexes  Abnormal ECG  When compared with ECG of 04-MAR-2023 10:33,  (Unconfirmed)  premature supraventricular complexes are no longer present    Referred By: EDMD           Confirmed By:         ECG 12 Lead ED Triage Standing Order; Chest Pain   Preliminary Result   Test Reason : ED Triage Standing Order~   Blood Pressure :   */*   mmHG   Vent. Rate :  63 BPM     Atrial Rate :  63 BPM      P-R Int : 222 ms          QRS Dur :  84 ms       QT Int : 462 ms       P-R-T Axes :  -3  24  11 degrees      QTc Int : 472 ms      Atrial-paced rhythm with prolonged AV conduction with occasional premature    ventricular complexes   Abnormal ECG   When compared with ECG of 04-MAR-2023 10:33, (Unconfirmed)   premature supraventricular complexes are no longer present      Referred By: EDMD           Confirmed By:       ECG 12 Lead Chest Pain   Preliminary Result   Test Reason : Chest Pain   Blood Pressure :   */*   mmHG   Vent. Rate :  66 BPM     Atrial Rate :  65 BPM      P-R Int : 240 ms          QRS Dur :  82 ms       QT Int : 462 ms       P-R-T Axes :   *  48  31 degrees      QTc Int : 484 ms      Atrial-paced rhythm with prolonged AV conduction with premature    supraventricular complexes and with occasional premature ventricular    complexes   Abnormal ECG   When compared with ECG of 24-JAN-2023 07:13,   Electronic atrial pacemaker has replaced Atrial fibrillation      Referred By: ED MD           Confirmed By:       ECG 12 Lead ED Triage Standing Order; Chest Pain    (Results Pending)              MDM    Final diagnoses:   Chest pain, unspecified type   History of dementia   Hyperkalemia   Anemia, unspecified type   Chronic kidney disease, unspecified CKD stage       ED Disposition  ED Disposition     ED Disposition   Discharge    Condition   Stable    Comment   --             Nancy Montiel, APRN  [address] [facility] [address] [phone]    In 1 week           Medication List      No changes were made to your prescriptions during this visit.          Roscoe Noel,  MD  03/04/23 1432       Roscoe Noel MD  03/04/23 1754

## 2023-03-07 LAB
QT INTERVAL: 462 MS
QT INTERVAL: 462 MS
QTC INTERVAL: 472 MS
QTC INTERVAL: 484 MS

## 2023-07-12 PROBLEM — N39.0 UTI (URINARY TRACT INFECTION): Status: ACTIVE | Noted: 2023-01-01

## 2023-08-10 ENCOUNTER — TELEPHONE (OUTPATIENT)
Dept: CARDIOLOGY | Facility: HOSPITAL | Age: 83
End: 2023-08-10
Payer: MEDICARE

## 2023-08-10 NOTE — TELEPHONE ENCOUNTER
Patient was referred to [facility] by the [facility]. Several attempts have been made to contact the patient with no success. Letter was mailed to patient to contact the office to schedule

## 2023-08-11 ENCOUNTER — APPOINTMENT (OUTPATIENT)
Dept: CT IMAGING | Facility: HOSPITAL | Age: 83
End: 2023-08-11
Payer: MEDICARE

## 2023-08-11 ENCOUNTER — HOSPITAL ENCOUNTER (EMERGENCY)
Facility: HOSPITAL | Age: 83
Discharge: HOME | End: 2023-08-12
Attending: EMERGENCY MEDICINE
Payer: MEDICARE

## 2023-08-11 DIAGNOSIS — S01.81XA FOREHEAD LACERATION, INITIAL ENCOUNTER: Primary | ICD-10-CM

## 2023-08-11 DIAGNOSIS — Z86.59 HISTORY OF DEMENTIA: ICD-10-CM

## 2023-08-11 PROCEDURE — 90715 TDAP VACCINE 7 YRS/> IM: CPT | Performed by: EMERGENCY MEDICINE

## 2023-08-11 PROCEDURE — 72131 CT LUMBAR SPINE W/O DYE: CPT

## 2023-08-11 PROCEDURE — 72125 CT NECK SPINE W/O DYE: CPT

## 2023-08-11 PROCEDURE — 72128 CT CHEST SPINE W/O DYE: CPT

## 2023-08-11 PROCEDURE — 90471 IMMUNIZATION ADMIN: CPT | Performed by: EMERGENCY MEDICINE

## 2023-08-11 PROCEDURE — 99284 EMERGENCY DEPT VISIT MOD MDM: CPT

## 2023-08-11 PROCEDURE — 25010000002 TETANUS-DIPHTH-ACELL PERTUSSIS 5-2.5-18.5 LF-MCG/0.5 SUSPENSION PREFILLED SYRINGE: Performed by: EMERGENCY MEDICINE

## 2023-08-11 PROCEDURE — 70450 CT HEAD/BRAIN W/O DYE: CPT

## 2023-08-11 RX ORDER — LIDOCAINE HYDROCHLORIDE AND EPINEPHRINE 10; 10 MG/ML; UG/ML
10 INJECTION, SOLUTION INFILTRATION; PERINEURAL ONCE
Status: COMPLETED | OUTPATIENT
Start: 2023-08-11 | End: 2023-08-11

## 2023-08-11 RX ADMIN — TETANUS TOXOID, REDUCED DIPHTHERIA TOXOID AND ACELLULAR PERTUSSIS VACCINE, ADSORBED 0.5 ML: 5; 2.5; 8; 8; 2.5 SUSPENSION INTRAMUSCULAR at 23:13

## 2023-08-11 RX ADMIN — LIDOCAINE HYDROCHLORIDE AND EPINEPHRINE 10 ML: 10; 10 INJECTION, SOLUTION INFILTRATION; PERINEURAL at 23:13

## 2023-08-12 VITALS
DIASTOLIC BLOOD PRESSURE: 102 MMHG | HEART RATE: 68 BPM | HEIGHT: 66 IN | WEIGHT: 173 LBS | OXYGEN SATURATION: 95 % | TEMPERATURE: 98.7 F | SYSTOLIC BLOOD PRESSURE: 142 MMHG | BODY MASS INDEX: 27.8 KG/M2 | RESPIRATION RATE: 18 BRPM

## 2023-08-12 NOTE — ED PROVIDER NOTES
Subjective   History of Present Illness  83-year-old female presents for evaluation via EMS following a fall.  Of note, the patient has a history of dementia.  She is a very limited historian.  She reportedly fell to the ground out of her wheelchair this evening and was noted to have a laceration to her forehead after striking her head.  She did not lose consciousness.  She was reportedly complaining of a headache as well as neck and back pain so EMS was called and she was brought to our facility to be evaluated.  EMS noted a laceration to her forehead.  Unknown tetanus status.  Mental status is currently at baseline per EMS personnel.  The patient has no acute complaints at this time.    Review of Systems   Unable to perform ROS: Dementia   Musculoskeletal:  Positive for back pain and neck pain.   Skin:  Positive for wound.     Past Medical History:   Diagnosis Date    Breast cancer     Coronary artery disease     Dementia     Dysrhythmia     Hypertension        Allergies   Allergen Reactions    Demerol [Meperidine] Anaphylaxis     And itching       Past Surgical History:   Procedure Laterality Date    ADENOIDECTOMY      BREAST SURGERY      CARDIAC DEFIBRILLATOR PLACEMENT      EXPLORATORY LAPAROTOMY N/A 7/16/2016    Procedure: open ventral hernia repair with mesh;  Surgeon: Bright Buckley MD;  [facility]: [facility];  Service:     MASTECTOMY Bilateral     TONSILLECTOMY         Family History   Problem Relation Age of Onset    Heart disease Mother     Heart disease Father     Alcohol abuse Father        Social History     Socioeconomic History    Marital status:    Tobacco Use    Smoking status: Former     Types: Cigarettes, Pipe    Smokeless tobacco: Never    Tobacco comments:     quit over 20 years ago    Vaping Use    Vaping Use: Never used   Substance and Sexual Activity    Alcohol use: Yes     Comment: glass of wine occasionally with dinner     Drug use: Not Currently     Types: Marijuana     Comment:  quit three years ago            Objective   Physical Exam  Vitals and nursing note reviewed.   Constitutional:       General: She is not in acute distress.     Appearance: She is well-developed. She is not diaphoretic.      Comments: Nontoxic-appearing elderly female, demented   HENT:      Head: Normocephalic.   Eyes:      Pupils: Pupils are equal, round, and reactive to light.   Neck:      Comments: C-collar in place  Cardiovascular:      Rate and Rhythm: Normal rate and regular rhythm.      Heart sounds: Normal heart sounds. No murmur heard.    No friction rub. No gallop.   Pulmonary:      Effort: Pulmonary effort is normal. No respiratory distress.      Breath sounds: Normal breath sounds. No wheezing or rales.   Abdominal:      General: Bowel sounds are normal. There is no distension.      Palpations: Abdomen is soft. There is no mass.      Tenderness: There is no abdominal tenderness. There is no guarding or rebound.   Musculoskeletal:         General: Normal range of motion.   Skin:     Comments: Approximately 3 cm laceration noted to the central forehead, no active bleeding present   Neurological:      Comments: Moving all 4 extremities, neurovascularly intact distally in all fours with bounding distal pulses normal sensation, following simple commands   Psychiatric:      Comments: Unable to adequately evaluate       Laceration Repair    Date/Time: 8/12/2023 1:26 AM  Performed by: Roscoe Noel MD  Authorized by: Roscoe Noel MD     Consent:     Consent obtained:  Verbal    Consent given by:  Patient    Risks discussed:  Infection, need for additional repair, nerve damage, poor wound healing, poor cosmetic result and pain  Universal protocol:     Procedure explained and questions answered to patient or proxy's satisfaction: yes      Relevant documents present and verified: yes      Imaging studies available: yes      Site/side marked: yes      Immediately prior to procedure, a time out was  called: yes      Patient identity confirmed:  Verbally with patient and arm band  Anesthesia:     Anesthesia method:  Local infiltration    Local anesthetic:  Lidocaine 1% WITH epi  Laceration details:     Location:  Face    Face location:  Forehead    Length (cm):  3  Pre-procedure details:     Preparation:  Patient was prepped and draped in usual sterile fashion  Exploration:     Hemostasis achieved with:  Epinephrine    Imaging outcome: foreign body not noted      Wound exploration: wound explored through full range of motion and entire depth of wound visualized      Contaminated: no    Treatment:     Area cleansed with:  Saline    Amount of cleaning:  Standard    Irrigation solution:  Sterile water    Irrigation volume:  250    Irrigation method:  Syringe    Visualized foreign bodies/material removed: no      Debridement:  None    Undermining:  None    Scar revision: no    Skin repair:     Repair method:  Sutures    Suture size:  4-0    Wound skin closure material used: ethilon.    Suture technique:  Simple interrupted    Number of sutures:  6  Approximation:     Approximation:  Close  Repair type:     Repair type:  Simple  Post-procedure details:     Dressing:  Open (no dressing)    Procedure completion:  Tolerated well, no immediate complications           ED Course  ED Course as of 08/12/23 0124   Fri Aug 11, 2023   2251 83-year-old female presents for evaluation via EMS following a fall.  Of note, the patient has a history of dementia.  She is a very limited historian.  She reportedly fell to the ground out of her wheelchair this evening and was noted to have a laceration to her forehead.  She was complaining of a headache as well as neck and back pain so EMS was called and she was brought to our facility to be evaluated.  On arrival, the patient is pleasantly demented.  She has an approximately 3 cm laceration noted to her central forehead.  No other traumatic exam findings noted.  C-collar in place.   Neurovascularly intact distally in all fours with bounding distal pulses normal sensation.  Following simple commands.  Moving all 4 extremities. [DD]   2252 CT head is negative.  CT of cervical spine is negative.  C-collar cleared.  Tetanus updated. [DD]   2257 CT of thoracic spine is negative for underlying fracture [DD]   2307 CT of the lumbar spine is negative as well. [DD]   2307 Following copious irrigation, the patient's forehead wound was repaired without complication.  She tolerated the procedure well.  She will follow-up with her primary care physician in a week for suture removal.  Clean wound--no indication for antibiotics at this time.  Agreeable with plan and given appropriate strict return precautions. [DD]      ED Course User Index  [DD] Roscoe Noel MD                                    No results found for this or any previous visit (from the past 24 hour(s)).  Note: In addition to lab results from this visit, the labs listed above may include labs taken at another facility or during a different encounter within the last 24 hours. Please correlate lab times with ED admission and discharge times for further clarification of the services performed during this visit.    CT Head Without Contrast   Final Result   Impression:   No evidence of acute trauma to the brain or other acute intracranial disease.            Electronically Signed: Cameron Smith MD     8/11/2023 10:33 PM EDT     Workstation ID: [id number]      CT Cervical Spine Without Contrast   Final Result   Impression:   Cervical spine degenerative disc disease as noted. No evidence of acute or healing trauma. Incidentally noted right thyroid enlargement.            Electronically Signed: Cameron Smith MD     8/11/2023 10:41 PM EDT     Workstation ID: [id number]      CT Lumbar Spine Without Contrast   Final Result   Impression:      1. Advanced multilevel lumbar degenerative disc disease, similar in appearance to previous 10/7/2022 CT scan. No  evidence to suggest acute trauma.      2. Probable multilevel spinal stenosis, as noted above, and which may be chronic.      3. Potential mild rectal impaction.            Electronically Signed: Cameron Smith MD     8/11/2023 11:01 PM EDT     Workstation ID: [id number]      CT Thoracic Spine Without Contrast   Final Result   Impression:   Multilevel, chronic degenerative osseous changes of the thoracic spine. No evidence of acute or healing trauma, significant focal subluxation or gross evidence of thoracic spinal stenosis is seen.      Electronically Signed: Cameron Smith MD     8/11/2023 10:49 PM EDT     Workstation ID: [id number]        Vitals:    08/11/23 2230 08/11/23 2300 08/12/23 0000 08/12/23 0030   BP: 121/62 124/65 129/75 112/51   Pulse: 60 59 60 60   Resp:       Temp:       TempSrc:       SpO2: 96% 96% 97% 96%   Weight:       Height:         Medications   Tetanus-Diphth-Acell Pertussis (BOOSTRIX) injection 0.5 mL (0.5 mL Intramuscular Given 8/11/23 2313)   lidocaine 1% - EPINEPHrine 1:493389 (XYLOCAINE W/EPI) 1 %-1:242830 injection 10 mL (10 mL Injection Given by Other 8/11/23 2313)     ECG/EMG Results (last 24 hours)       ** No results found for the last 24 hours. **          No orders to display              Medical Decision Making  Problems Addressed:  Forehead laceration, initial encounter: complicated acute illness or injury  History of dementia: complicated acute illness or injury    Amount and/or Complexity of Data Reviewed  Radiology: ordered.    Risk  Prescription drug management.        Final diagnoses:   Forehead laceration, initial encounter   History of dementia       ED Disposition  ED Disposition       ED Disposition   Discharge    Condition   Stable    Comment   --               Nancy Montiel, APRN  [address] [facility] [address]  [phone]    In 1 week           Medication List      No changes were made to your prescriptions during this visit.            Roscoe Noel,  MD  08/12/23 0128

## 2023-08-15 NOTE — ED PROVIDER NOTES
Subjective   History of Present Illness  This is a 83-year-old female with longstanding history of coronary disease and hypertension presenting to the emergency department in cardiac arrest.  Patient is coming from nursing home facility.  Apparently she had a unwitnessed witnessed arrest prior to arrival.  Nursing home staff administered CPR for multiple rounds for prolonged period of time over an hour.  When EMS arrived, the patient was unresponsive.  Initial rhythm was V-fib.  They did try to defibrillate the patient, however was unsuccessful.  They continued CPR as well as ACLS protocol, however the patient remained in asystole.  No other history could be obtained given the patient's current clinical status.    History provided by:  EMS personnel   used: No      Review of Systems   Unable to perform ROS: Acuity of condition     Past Medical History:   Diagnosis Date    Breast cancer     Coronary artery disease     Dementia     Dysrhythmia     Hypertension        Allergies   Allergen Reactions    Demerol [Meperidine] Anaphylaxis     And itching       Past Surgical History:   Procedure Laterality Date    ADENOIDECTOMY      BREAST SURGERY      CARDIAC DEFIBRILLATOR PLACEMENT      EXPLORATORY LAPAROTOMY N/A 7/16/2016    Procedure: open ventral hernia repair with mesh;  Surgeon: Bright Buckley MD;  Location: [facility];  Service:     MASTECTOMY Bilateral     TONSILLECTOMY         Family History   Problem Relation Age of Onset    Heart disease Mother     Heart disease Father     Alcohol abuse Father        Social History     Socioeconomic History    Marital status:    Tobacco Use    Smoking status: Former     Types: Cigarettes, Pipe    Smokeless tobacco: Never    Tobacco comments:     quit over 20 years ago    Vaping Use    Vaping Use: Never used   Substance and Sexual Activity    Alcohol use: Yes     Comment: glass of wine occasionally with dinner     Drug use: Not Currently     Types:  Marijuana     Comment: quit three years ago            Objective   Physical Exam  Vitals and nursing note reviewed.   Constitutional:       Appearance: She is ill-appearing.      Comments: Unresponsive   Eyes:      Comments: Pupils are fixed and dilated   Cardiovascular:      Comments: Asystole  Pulmonary:      Comments: Patient intubated, respirations assisted by bag-valve-mask.  No spontaneous respirations  Abdominal:      General: Abdomen is flat.      Palpations: There is no mass.   Musculoskeletal:         General: No deformity.   Lymphadenopathy:      Cervical: No cervical adenopathy.   Skin:     General: Skin is dry.   Neurological:      Comments: Patient is unresponsive       CPR    Date/Time: 8/15/2023 4:08 AM  Performed by: Carlos Vega MD  Authorized by: Carlos Vega MD     Comments:      CPR Procedure    Upon my arrival the patient was in cardiopulmonary arrest, cardiopulmonary resuscitation (CPR) was in progress, the patient was being ventilated with a bag valve mask and 100% oxygen, and ACLS protocol had been initiated: see code flowsheet.  Rhythm on arrival:asystole  Interventions:Ventilation: Patient was intubated by EMS, ventilation assisted by bag-valve-mask.  Patient received continuous CPR as well as ACLS protocol medications including epinephrine.  Patient was on epinephrine drip prior to arrival.  Outcome: Cardiac monitor after ACLS intervention(s) showed asystole.  The resuscitation was unsuccessful and the patient was pronounced at 0403.   notified by house.  Discussed with family.                       ED Course  ED Course as of 08/15/23 0415   Tue Aug 15, 2023   0413 Heart Rate(!): 0  Patient's repeat vitals, telemetry tracing, and pulse oximetry tracing were directly viewed and interpreted by myself.  Patient had no evidence of spontaneous vital signs [JK]   0413 We did perform continuous CPR without any success.  Patient remained in asystole.  Pulseless.  I did  perform bedside ultrasound which revealed cardiac standstill.  All members of the code team were in agreement to terminate resuscitation.  Time of death was called at 0403 [JK]      ED Course User Index  [JK] Carlos Vega MD                                           Medical Decision Making  This is a 83-year-old female presenting in cardiac arrest.  Patient was found at nursing facility.  Received continuous CPR for well over an hour and a half prior to arrival in the emergency department.  Patient remains in asystole.  We did resume CPR on arrival.      Differential diagnosis: Cardiac arrest, ACS, PE, electrolyte abnormality      Amount and/or Complexity of Data Reviewed  External Data Reviewed: labs, radiology and notes.     Details: External laboratories, imaging as well as notes were reviewed personally by myself.  All relevant studies were used to guide decision making.     Date of previous record: 2023    Source of note: Admission record    Summary: Patient was admitted for UTI as well as altered mental status.  Records reviewed patient has longstanding history of dementia.    Critical Care  Total time providing critical care: 20 minutes (I personally spent a total of 20 minutes of critical care time with the patient.  Patient's presentation with unstable vital signs required emergent intervention, including, but not limited to, establishing IV access, continuous pulse oximeter and telemetry monitoring, frequent monitoring and reevaluations, management the patient's airway, cardiovascular system, discussion with other consultants as needed, which bear directly on the management the patient.  This does not include time spent on separately reported billable procedures.)      Final diagnoses:   Cardiac arrest       ED Disposition  ED Disposition       ED Disposition       Condition   --    Comment   --               No follow-up provider specified.       Medication List      No changes were  made to your prescriptions during this visit.            Carlos Vega MD  08/15/23 0417

## 2023-08-15 NOTE — SIGNIFICANT NOTE
Exam confirms with auscultation zero audible heart tones and zero audible respirations. Ms.Julie Decker was pronounced dead at 0403.  MD notified by Patient's RN.    Nancy Collins RN  Clinical House Supervisor  8/15/2023 07:48 EDT